# Patient Record
Sex: FEMALE | Race: WHITE | NOT HISPANIC OR LATINO | Employment: FULL TIME | URBAN - METROPOLITAN AREA
[De-identification: names, ages, dates, MRNs, and addresses within clinical notes are randomized per-mention and may not be internally consistent; named-entity substitution may affect disease eponyms.]

---

## 2017-01-27 ENCOUNTER — RADIANT APPOINTMENT (OUTPATIENT)
Dept: GENERAL RADIOLOGY | Facility: CLINIC | Age: 42
End: 2017-01-27
Attending: PEDIATRICS
Payer: COMMERCIAL

## 2017-01-27 ENCOUNTER — OFFICE VISIT (OUTPATIENT)
Dept: ORTHOPEDICS | Facility: CLINIC | Age: 42
End: 2017-01-27
Payer: COMMERCIAL

## 2017-01-27 VITALS
WEIGHT: 254 LBS | HEIGHT: 65 IN | BODY MASS INDEX: 42.32 KG/M2 | DIASTOLIC BLOOD PRESSURE: 80 MMHG | SYSTOLIC BLOOD PRESSURE: 126 MMHG

## 2017-01-27 DIAGNOSIS — M25.512 LEFT SHOULDER PAIN, UNSPECIFIED CHRONICITY: ICD-10-CM

## 2017-01-27 DIAGNOSIS — M25.552 LATERAL PAIN OF LEFT HIP: ICD-10-CM

## 2017-01-27 DIAGNOSIS — M75.42 IMPINGEMENT SYNDROME OF LEFT SHOULDER: Primary | ICD-10-CM

## 2017-01-27 PROCEDURE — 99214 OFFICE O/P EST MOD 30 MIN: CPT | Performed by: PEDIATRICS

## 2017-01-27 PROCEDURE — 73030 X-RAY EXAM OF SHOULDER: CPT | Mod: LT

## 2017-01-27 NOTE — PROGRESS NOTES
Sports Medicine Clinic Visit    PCP: Vonnie Baugh    Raffy Castelan is a 41 year old female who is seen  as a self referral presenting with left lateral shoulder pain.  Pain has been present for at least 2 months.  Pain with overhead motions and reaching behind her head.  Pain with sudden movements.  Pain with reaching.  Patient is right hand dominant.     Patient does also have pain in her left hip with crossing her legs.  No known injury.  Has been present for a few weeks.  Shoulder is more concerning to her at this point.     Injury: gradual onset  + pain is constant now.  No nighttime pain.    Location of Pain: left lateral shoulder  Duration of Pain: 2 month(s)  Rating of Pain at worst: 8/10  Rating of Pain Currently: 2/10  Symptoms are better with: Nothing  Symptoms are worse with: reaching, overhead motions  Additional Features:   Positive:    Negative: swelling, bruising, popping, grinding, locking, instability, paresthesias, numbness, weakness, pain in other joints and systemic symptoms  Other evaluation and/or treatments so far consists of: Nothing  Prior History of related problems: denies    Social History:      Review of Systems  Musculoskeletal: as above  Remainder of review of systems is negative including constitutional, CV, pulmonary, GI, Skin and Neurologic except as noted in HPI or medical history.    Past Medical History   Diagnosis Date     GERD (gastroesophageal reflux disease)      Asthma      Hypertension      Past Surgical History   Procedure Laterality Date     No history of surgery       Esophagoscopy, gastroscopy, duodenoscopy (egd), combined  7/30/2013     Procedure: COMBINED ESOPHAGOSCOPY, GASTROSCOPY, DUODENOSCOPY (EGD), BIOPSY SINGLE OR MULTIPLE;;  Surgeon: Wilson Fournier MD;  Location:  GI     Laparoscopic gastric adjustable banding  5/27/2014     Procedure: LAPAROSCOPIC GASTRIC ADJUSTABLE BANDING;  Surgeon: Wilson Fournier MD;  Location:  OR      "Esophagoscopy, gastroscopy, duodenoscopy (egd), combined  3/24/2014     Procedure: COMBINED ESOPHAGOSCOPY, GASTROSCOPY, DUODENOSCOPY (EGD);  Surgeon: Wilson Fournier MD;  Location: [facility]     Biopsy       Colonoscopy       Hernia repair       Family History   Problem Relation Age of Onset     Blood Disease Mother      anemia     HEART DISEASE Mother      Hypertension Mother      Hyperlipidemia Mother      Anxiety Disorder Mother      OSTEOPOROSIS Mother      Genetic Disorder Mother      CEREBROVASCULAR DISEASE Father      Hypertension Father      DIABETES Father      type  2     Breast Cancer Maternal Aunt      Breast Cancer Paternal Aunt      CANCER Paternal Aunt      stomach     Breast Cancer Other      Blood Disease       son with alpha thalessemia/mother alpha thalasemia trait     Breast Cancer Other      Breast Cancer Other      Breast Cancer Cousin      Social History     Social History     Marital Status:      Spouse Name: N/A     Number of Children: N/A     Years of Education: N/A     Occupational History     [employer]     Social History Main Topics     Smoking status: Never Smoker      Smokeless tobacco: Never Used     Alcohol Use: No     Drug Use: No     Sexual Activity:     Partners: Male     Birth Control/ Protection: IUD     Other Topics Concern     Parent/Sibling W/ Cabg, Mi Or Angioplasty Before 65f 55m? No     Social History Narrative       Objective  /80 mmHg  Ht 5' 5\" (1.651 m)  Wt 254 lb (115.214 kg)  BMI 42.27 kg/m2      GENERAL APPEARANCE: healthy, alert and no distress   GAIT: NORMAL  SKIN: no suspicious lesions or rashes  NEURO: Normal strength and tone, mentation intact and speech normal  PSYCH:  mentation appears normal and affect normal/bright  HEENT: no scleral icterus  CV: no extremity edema  RESP: nonlabored breathing    Left Shoulder exam    ROM:        forward flexion 150 mild limitation       abduction 100 mod " limitation       internal rotation L2 mild limitation       external rotation 80 min limitation  Pain at/above shoulder level, also end of rotation    Tender:        No focal tenderness    Non Tender:       acromioclavicular joint       subacromial space       posterior shoulder    Strength:        abduction 5-/5       internal rotation 5/5       external rotation 4+/5  No pain    Impingement testing:        Min pain with Neer       Mild pain with [place]       Mild pain with empty can       neg (-) crossover       neg (-) [place]    Skin:       no visible deformities       well perfused       capillary refill brisk    Sensation:        normal sensation over shoulder and upper extremity     **    Hip (left):  Range of motion: flexion:     extension:     internal rotation:     external rotation:    Grossly full, symmetric   Mild pain with resisted abduction  Strength testing: flexion: 5/5    ABduction: 4/5    ADduction: 5/5  Palpation: Tender to palpation over TFL.  Sensation: grossly intact in hip, thigh  FADIR: neg  Log roll: neg  Toya with lateral pain      Radiology  Visualized radiographs of left shoulder obtained today, and reviewed the images with the patient.  Impression: no acute findings. May be mild narrowed subacromial space.  XR Shoulder Left G/E 3 Views    Narrative    XR SHOULDER LT G/E 3 VW 1/27/2017 9:39 AM     HISTORY: Pain in left shoulder      Impression    IMPRESSION: Negative exam.    TRUDY PETE MD         Assessment:  1. Impingement syndrome of left shoulder    2. Left shoulder pain, unspecified chronicity    3. Lateral pain of left hip        Plan:  Discussed the assessment with the patient. We discussed the following treatment options: symptom treatment, activity modification/rest, imaging, rehab and injection therapy. Following discussion, plan:  Topical Treatments: Ice or Heat  Over the counter medication: Patient's preferred OTC medication as directed on packaging.  Plain films of the  shoulder reviewed. We discussed potential for additional imaging with MRI, not required with her current assessment. Also discussed potential for left hip plain films, but this does not appear to be a joint pain pattern.  Activity Modification: discussed  Rehab: Home Exercise Program reviewed, pt preference, both shoulder and for hip  Physical Therapy: may call for therapy, particularly for the shoulder  Briefly discussed consideration of subacromial steroid injection for pain, she prefers to start with physical therapy, and I agree.  Follow up: one month if not improving with above, sooner if needed.  Questions answered. The patient indicates understanding of these issues and agrees with the plan.    Srikanth Watkins, DO, CAQ        Disclaimer: This note consists of symbols derived from keyboarding, dictation and/or voice recognition software. As a result, there may be errors in the script that have gone undetected. Please consider this when interpreting information found in this chart.

## 2017-01-27 NOTE — NURSING NOTE
"Chief Complaint   Patient presents with     Musculoskeletal Problem     left shoulder pain       Initial /80 mmHg  Ht 5' 5\" (1.651 m)  Wt 254 lb (115.214 kg)  BMI 42.27 kg/m2 Estimated body mass index is 42.27 kg/(m^2) as calculated from the following:    Height as of this encounter: 5' 5\" (1.651 m).    Weight as of this encounter: 254 lb (115.214 kg).  BP completed using cuff size: regular    Patient was given home exercise program at the direction of HOLLIE CAGE  that included the following exercise(s) :    Exercise(s) Isometric IR/ER, wall push ups, I, W, T    Repititions: 2x10    Times per day: 1, every other    Patient demonstrated understanding of and the ability to perform these exercises. Patient was seen for 5 minutes to provide this home exercise program. Patient was directed to discontinue any exercises that cause pain, and to call the clinic if any questions.    "

## 2017-01-27 NOTE — Clinical Note
Raffy CEBALLOS was seen in [facility] clinic for her shoulder and hip complaints. Plan rehab approach. Please see copy of the chart note for additional details. Thanks.

## 2017-04-06 ENCOUNTER — TELEPHONE (OUTPATIENT)
Dept: FAMILY MEDICINE | Facility: CLINIC | Age: 42
End: 2017-04-06

## 2017-04-06 NOTE — TELEPHONE ENCOUNTER
OK to call the patient and let her know that the risk for her is low, both from the blood pressure and the medication.  She should keep her visit on 4/14 and continue to monitor her blood pressure at home, checking 1-3 times per day and call us for numbers >170 top number or >110 bottom number.  She may experience some dizziness or swelling in her legs today and tomorrow due to the extra doses of amlodipine.  She can take her usual dose later tomorrow and once daily after until follow up.    Antione Reina MD

## 2017-04-06 NOTE — TELEPHONE ENCOUNTER
Reason for Call:  Other     Detailed comments: Patient stated that her blood sugar is high at 157/99 and she took three of her amlodipine. Please contact patient to discuss further.     Phone Number Patient can be reached at: Home number on file [phone] (home)    Best Time:     Can we leave a detailed message on this number? Not Applicable    Call taken on 4/6/2017 at 12:46 PM by Richelle Polanco

## 2017-04-06 NOTE — TELEPHONE ENCOUNTER
Called patient- patient states her blood pressure was high this AM- the lowest was 155/99 & 166/104, hr- . She decided to take three amlodipine at 8, 9, & 12:30. She doesn't have a headache, chest pain or dizziness.   She has been sparingly checking her blood pressure. She just got back from her vacation & took her amlodipine only once in the last week. She states she gained weight & ate unhealthy foods. There is family stuff going on as well that is causing her stress- her cousin just had a heart attack.   Per [facility] the maximum dose of amlodipine is 10mg daily. She has taken 30mg- will huddle with provider.  She knows to watch for dizziness, will stand slowly & will not drive if she doesn't feel well. She is drinking lots of water.  She is scheduled with RN htn on 4/14. She is going camping with her son MChristianW next week.    Will huddle with provider.     Lala Clement RN

## 2017-04-07 NOTE — TELEPHONE ENCOUNTER
Relayed message, At bed 150/99 so she took a 4th one. She notes swelling in her feet. She states she wasn't drinking as much & was in the heat in [place].   She was voiding a lot last night. At 3am her bp= 135/85 & this AM it was 144/90. She states she lost 4 pounds & feels better. She is working today & will keep us posted if her bps are elevated. Patient verbalized understanding.     Lala Clement RN

## 2017-04-14 ENCOUNTER — ALLIED HEALTH/NURSE VISIT (OUTPATIENT)
Dept: NURSING | Facility: CLINIC | Age: 42
End: 2017-04-14
Payer: COMMERCIAL

## 2017-04-14 VITALS
WEIGHT: 254.4 LBS | SYSTOLIC BLOOD PRESSURE: 142 MMHG | HEART RATE: 88 BPM | BODY MASS INDEX: 42.33 KG/M2 | DIASTOLIC BLOOD PRESSURE: 88 MMHG

## 2017-04-14 DIAGNOSIS — I10 HYPERTENSION GOAL BP (BLOOD PRESSURE) < 140/90: Primary | ICD-10-CM

## 2017-04-14 LAB
ANION GAP SERPL CALCULATED.3IONS-SCNC: 6 MMOL/L (ref 3–14)
BUN SERPL-MCNC: 12 MG/DL (ref 7–30)
CALCIUM SERPL-MCNC: 9 MG/DL (ref 8.5–10.1)
CHLORIDE SERPL-SCNC: 103 MMOL/L (ref 94–109)
CO2 SERPL-SCNC: 28 MMOL/L (ref 20–32)
CREAT SERPL-MCNC: 0.65 MG/DL (ref 0.52–1.04)
GFR SERPL CREATININE-BSD FRML MDRD: NORMAL ML/MIN/1.7M2
GLUCOSE SERPL-MCNC: 85 MG/DL (ref 70–99)
POTASSIUM SERPL-SCNC: 4 MMOL/L (ref 3.4–5.3)
SODIUM SERPL-SCNC: 137 MMOL/L (ref 133–144)

## 2017-04-14 PROCEDURE — 99211 OFF/OP EST MAY X REQ PHY/QHP: CPT

## 2017-04-14 PROCEDURE — 36415 COLL VENOUS BLD VENIPUNCTURE: CPT | Performed by: FAMILY MEDICINE

## 2017-04-14 PROCEDURE — 80048 BASIC METABOLIC PNL TOTAL CA: CPT | Performed by: FAMILY MEDICINE

## 2017-04-14 RX ORDER — ALBUTEROL SULFATE 90 UG/1
2 AEROSOL, METERED RESPIRATORY (INHALATION) EVERY 6 HOURS PRN
COMMUNITY
End: 2018-02-02

## 2017-04-14 RX ORDER — HYDROCHLOROTHIAZIDE 25 MG/1
25 TABLET ORAL DAILY
Qty: 30 TABLET | Refills: 0 | Status: SHIPPED | OUTPATIENT
Start: 2017-04-14 | End: 2017-04-28

## 2017-04-14 NOTE — PROGRESS NOTES
"Subjective/Objective    Indications for Blood Pressure (BP) monitoring: See 4/6/17 telephone encounter. Raffy has been having high blood pressure readings at home. She has been on her own taking extra of her amlodipine but it \"isn't helping.\" She is already on the maximum recommended dose of 10mg daily.     Her home BP reading this morning was 153/98 with a resting heart rate of 98.     Headaches?: Occasionally, mild.   Chest pain?: No  Shortness of breath?: No  Edema?: Yes - +1 pitting in feet, and her weight is going up.   Visual problems?: No  Parathesia?: No  Epitaxis?: No  Dizziness?: No  Hematuria?: No    BP:   BP Readings from Last 1 Encounters:   04/14/17 142/88     88       Diabetic?: No  Heart Disease?: No  Smoking?: No  Alcohol usage?: No  Low sodium diet?: No  Exercise?: Yes - has been very active lately on vacation and at camp, getting between 10,000 and 20,000 steps some days.     Assessment/Plan:   BP in clinic today is lower than what she has been getting at home but still above goal of 140/90. Discussed that the cuff of her home machine may be too small. Recommended not checking at home because this has been increasing her anxiety, but instead coming here to the pharmacy for walk-in blood pressure checks once per week or so.     Huddled with Dr. Baugh who gave V.O. To start hydrochlorothiazide 25mg daily and follow up with her in 2-3 weeks. Also gave VO for BMP today. Patient was counseled to NOT take extra amlodipine, this may be what is causing her swelling.     Gave DASH diet information as she is very concerned with her weight and also healthy eating in general.     Patient verbalized understanding and agreement with this plan.       CC: Chart to Dr. Baugh.     Thi Loco RN                             "

## 2017-04-14 NOTE — PATIENT INSTRUCTIONS
Start the new medication, hydrochlorothiazide, 25mg every morning.   Keep taking your amlodipine, 10mg in the evening. Do not take extra!     See Dr. Baugh on 4/28.                                      Dietary Approaches to Stop Hypertension (The DASH Diet)   What is hypertension?   Hypertension is blood pressure that keeps being higher than normal. Blood pressure is the force of blood against artery walls as the heart pumps blood through the body. Blood pressure can be unhealthy if it is above 120/80. The higher your blood pressure, the greater the health risk.   High blood pressure can be controlled if you take these steps:   Maintain a healthy weight.   Are physically active.   Follow a healthy eating plan, which includes foods that do not have a lot of salt and sodium.   Do not drink a lot of alcohol.   Diet affects high blood pressure. Following the DASH diet and reducing the amount of sodium in your diet will help lower your blood pressure. It will also help prevent high blood pressure.   What is the DASH diet?   Dietary Approaches to Stop Hypertension (DASH) is a diet that is low in saturated fat, cholesterol, and total fat. It emphasizes fruits, vegetables, and low-fat dairy foods. The DASH diet also includes whole-grain products, fish, poultry, and nuts. It encourages fewer servings of red meat, sweets, and sugar-containing beverages. It is rich in magnesium, potassium, and calcium, as well as protein and fiber.   How do I get started on the DASH diet?   The DASH diet requires no special foods and has no hard-to-follow recipes. Start by seeing how DASH compares with your current eating habits.   The DASH eating plan illustrated below is based on a diet of 2,000 calories a day. Your healthcare provider or a dietitian can help you determine how many calories a day you need. Most adults need somewhere between 1600 and 2800 calories a day. Serving sizes for different foods vary from 1/2 cup to 1 and 1/4  cups. Check product nutrition labels for serving sizes and the number of calories per serving.                      Number of        Examples of  Food Group      servings         serving size  ----------------------------------------------------------------    Grains and      7 to 8 per day   1 slice of bread  grain products                   1 cup ready-to-eat cold cereal                                   1/2 cup cooked rice, pasta,                                   or cereal    Vegetables      4 to 5 per day   1 cup raw leafy vegetable                                   1/2 cup cooked vegetable                                   6 ounces (oz) vegetable juice      Fruits          4 to 5 per day   1 medium fruit                                   1/4 cup dried fruit                                   1/2 cup fresh, frozen, or                                   canned fruit                                   6 oz fruit juice      Low-fat or      2 to 3 per day   8 oz milk  fat-free                         1 cup yogurt  dairy foods                      1 and 1/2 oz cheese    Lean meats,  poultry,        2 or fewer per   3 oz cooked lean meat,  or fish         day              skinless poultry, or fish    Nuts, seeds,    4 to 5 per week  1/3 cup or 1 and 1/2 oz nuts  and dry beans                    1 tablespoon or 1/2 oz seeds                                   1/2 cup cooked dry beans    Fats and oils   2 to 3 per day   1 teaspoon soft margarine                                   1 tablespoon low-fat mayonnaise                                   2 tablespoons light salad                                   dressing                                   1 teaspoon vegetable oil    Sweets          5 per week       1 tablespoon sugar                                   1 tablespoon jelly or jam                                   1/2 oz jelly beans                                   8 oz  lemonade  ----------------------------------------------------------------  Make changes gradually. Here are some suggestions that might help:   If you now eat 1 or 2 servings of vegetables a day, add a serving at lunch and another at dinner.   If you have not been eating fruit regularly, or have only juice at breakfast, add a serving to your meals or have it as a snack.   Drink milk or water with lunch or dinner instead of soda, sugar-sweetened tea, or alcohol. Choose low-fat (1%) or fat-free (nonfat) dairy products so that you are eating fewer calories and less saturated fat, total fat, and cholesterol.   Read food labels on margarines and salad dressings to choose products lowest in fat.   If you now eat large portions of meat, slowly cut back--by a half or a third at each meal. Limit meat to 6 ounces a day (two 3-ounce servings). Three to 4 ounces is about the size of a deck of cards.   Have 2 or more meatless meals each week. Increase servings of vegetables, rice, pasta, and beans in all meals. Try casseroles, pasta, and stir-macedo dishes, which have less meat and more vegetables, grains, and beans.   Use fruits canned in their own juice. Fresh fruits require little or no preparation. Dried fruits are a good choice to carry with you or to have ready in the car.   Try these snacks ideas: unsalted pretzels or nuts mixed with raisins, todd crackers, low-fat and fat-free yogurt or frozen yogurt, popcorn with no salt or butter added, and raw vegetables.   Choose whole-grain foods to get more nutrients, including minerals and fiber. For example, choose whole-wheat bread, whole-grain cereals, or brown rice.   Use fresh, frozen, or no-salt-added canned vegetables.   Remember to also reduce the salt and sodium in your diet. Try to have no more than 2000 milligrams (mg) of sodium per day, with a goal of further reducing the sodium to 1500 mg per day. Three important ways to reduce sodium are:   Eat food products with  reduced-sodium or no salt added.   Use less salt when you prepare foods and do not add salt to your food at the table.   Read food labels. Aim for foods that contain less than 5% of the daily value of sodium.   The DASH eating plan is not designed for weight loss. But it contains many lower-calorie foods, such as fruits and vegetables. You can make it lower in calories by replacing high-calorie foods with more fruits and vegetables. Some ideas to increase fruits and vegetables and decrease calories include:   Eat a medium apple instead of 4 shortbread cookies. You'll save 80 calories.   Eat 1/4 cup of dried apricots instead of a 2-ounce bag of pork rinds. You'll save 230 calories.   Have a hamburger that's 3 ounces instead of 6 ounces. Add a 1/2 cup serving of carrots and a 1/2 cup serving of spinach. You'll save more than 200 calories.   Instead of 5 ounces of chicken, have a stir macedo with 2 ounces of chicken and 1 and 1/2 cups of raw vegetables. Use just a small amount of vegetable oil. You'll save 50 calories.   Have a 1/2 cup serving of low-fat frozen yogurt instead of a 1-and-1/2-ounce chocolate bar. You'll save about 110 calories.   Use low-fat or fat-free condiments, such as fat-free salad dressings.   Eat smaller portions. Cut back gradually.   Use food labels to compare fat and calorie content in packaged foods. Items marked low fat or fat free may be lower in fat but not lower in calories than their regular versions.   Limit foods with lots of added sugar, such as pies, flavored yogurts, candy bars, ice cream, sherbet, regular soft drinks, and fruit drinks.   Drink water or club soda instead of cola or other soda drinks.     For more information, see the National Heart, Lung, and [employer] [place] Web site at: http://www.nhlbi.nih.gov/health/public/heart/hbp/dash/.

## 2017-04-14 NOTE — MR AVS SNAPSHOT
After Visit Summary   4/14/2017    Raffy Castelan    MRN: [id number]           Patient Information     Date Of Birth          1975        Visit Information        Provider Department      4/14/2017 8:30 AM NE RN [facility]        Today's Diagnoses     Hypertension goal BP (blood pressure) < 140/90    -  1      Care Instructions    Start the new medication, hydrochlorothiazide, 25mg every morning.   Keep taking your amlodipine, 10mg in the evening. Do not take extra!     See Dr. Baugh on 4/28.                                      Dietary Approaches to Stop Hypertension (The DASH Diet)   What is hypertension?   Hypertension is blood pressure that keeps being higher than normal. Blood pressure is the force of blood against artery walls as the heart pumps blood through the body. Blood pressure can be unhealthy if it is above 120/80. The higher your blood pressure, the greater the health risk.   High blood pressure can be controlled if you take these steps:   Maintain a healthy weight.   Are physically active.   Follow a healthy eating plan, which includes foods that do not have a lot of salt and sodium.   Do not drink a lot of alcohol.   Diet affects high blood pressure. Following the DASH diet and reducing the amount of sodium in your diet will help lower your blood pressure. It will also help prevent high blood pressure.   What is the DASH diet?   Dietary Approaches to Stop Hypertension (DASH) is a diet that is low in saturated fat, cholesterol, and total fat. It emphasizes fruits, vegetables, and low-fat dairy foods. The DASH diet also includes whole-grain products, fish, poultry, and nuts. It encourages fewer servings of red meat, sweets, and sugar-containing beverages. It is rich in magnesium, potassium, and calcium, as well as protein and fiber.   How do I get started on the DASH diet?   The DASH diet requires no special foods and has no hard-to-follow recipes. Start by  seeing how DASH compares with your current eating habits.   The DASH eating plan illustrated below is based on a diet of 2,000 calories a day. Your healthcare provider or a dietitian can help you determine how many calories a day you need. Most adults need somewhere between 1600 and 2800 calories a day. Serving sizes for different foods vary from 1/2 cup to 1 and 1/4 cups. Check product nutrition labels for serving sizes and the number of calories per serving.                      Number of        Examples of  Food Group      servings         serving size  ----------------------------------------------------------------    Grains and      7 to 8 per day   1 slice of bread  grain products                   1 cup ready-to-eat cold cereal                                   1/2 cup cooked rice, pasta,                                   or cereal    Vegetables      4 to 5 per day   1 cup raw leafy vegetable                                   1/2 cup cooked vegetable                                   6 ounces (oz) vegetable juice      Fruits          4 to 5 per day   1 medium fruit                                   1/4 cup dried fruit                                   1/2 cup fresh, frozen, or                                   canned fruit                                   6 oz fruit juice      Low-fat or      2 to 3 per day   8 oz milk  fat-free                         1 cup yogurt  dairy foods                      1 and 1/2 oz cheese    Lean meats,  poultry,        2 or fewer per   3 oz cooked lean meat,  or fish         day              skinless poultry, or fish    Nuts, seeds,    4 to 5 per week  1/3 cup or 1 and 1/2 oz nuts  and dry beans                    1 tablespoon or 1/2 oz seeds                                   1/2 cup cooked dry beans    Fats and oils   2 to 3 per day   1 teaspoon soft margarine                                   1 tablespoon low-fat mayonnaise                                   2 tablespoons  light salad                                   dressing                                   1 teaspoon vegetable oil    Sweets          5 per week       1 tablespoon sugar                                   1 tablespoon jelly or jam                                   1/2 oz jelly beans                                   8 oz lemonade  ----------------------------------------------------------------  Make changes gradually. Here are some suggestions that might help:   If you now eat 1 or 2 servings of vegetables a day, add a serving at lunch and another at dinner.   If you have not been eating fruit regularly, or have only juice at breakfast, add a serving to your meals or have it as a snack.   Drink milk or water with lunch or dinner instead of soda, sugar-sweetened tea, or alcohol. Choose low-fat (1%) or fat-free (nonfat) dairy products so that you are eating fewer calories and less saturated fat, total fat, and cholesterol.   Read food labels on margarines and salad dressings to choose products lowest in fat.   If you now eat large portions of meat, slowly cut back--by a half or a third at each meal. Limit meat to 6 ounces a day (two 3-ounce servings). Three to 4 ounces is about the size of a deck of cards.   Have 2 or more meatless meals each week. Increase servings of vegetables, rice, pasta, and beans in all meals. Try casseroles, pasta, and stir-macedo dishes, which have less meat and more vegetables, grains, and beans.   Use fruits canned in their own juice. Fresh fruits require little or no preparation. Dried fruits are a good choice to carry with you or to have ready in the car.   Try these snacks ideas: unsalted pretzels or nuts mixed with raisins, todd crackers, low-fat and fat-free yogurt or frozen yogurt, popcorn with no salt or butter added, and raw vegetables.   Choose whole-grain foods to get more nutrients, including minerals and fiber. For example, choose whole-wheat bread, whole-grain cereals, or brown  rice.   Use fresh, frozen, or no-salt-added canned vegetables.   Remember to also reduce the salt and sodium in your diet. Try to have no more than 2000 milligrams (mg) of sodium per day, with a goal of further reducing the sodium to 1500 mg per day. Three important ways to reduce sodium are:   Eat food products with reduced-sodium or no salt added.   Use less salt when you prepare foods and do not add salt to your food at the table.   Read food labels. Aim for foods that contain less than 5% of the daily value of sodium.   The DASH eating plan is not designed for weight loss. But it contains many lower-calorie foods, such as fruits and vegetables. You can make it lower in calories by replacing high-calorie foods with more fruits and vegetables. Some ideas to increase fruits and vegetables and decrease calories include:   Eat a medium apple instead of 4 shortbread cookies. You'll save 80 calories.   Eat 1/4 cup of dried apricots instead of a 2-ounce bag of pork rinds. You'll save 230 calories.   Have a hamburger that's 3 ounces instead of 6 ounces. Add a 1/2 cup serving of carrots and a 1/2 cup serving of spinach. You'll save more than 200 calories.   Instead of 5 ounces of chicken, have a stir macedo with 2 ounces of chicken and 1 and 1/2 cups of raw vegetables. Use just a small amount of vegetable oil. You'll save 50 calories.   Have a 1/2 cup serving of low-fat frozen yogurt instead of a 1-and-1/2-ounce chocolate bar. You'll save about 110 calories.   Use low-fat or fat-free condiments, such as fat-free salad dressings.   Eat smaller portions. Cut back gradually.   Use food labels to compare fat and calorie content in packaged foods. Items marked low fat or fat free may be lower in fat but not lower in calories than their regular versions.   Limit foods with lots of added sugar, such as pies, flavored yogurts, candy bars, ice cream, sherbet, regular soft drinks, and fruit drinks.   Drink water or club soda instead  of cola or other soda drinks.     For more information, see the National Heart, Lung, and Blood [place] Web site at: http://www.nhlbi.nih.gov/health/public/heart/hbp/dash/.           Follow-ups after your visit        Your next 10 appointments already scheduled     Apr 28, 2017  9:20 AM CDT   [facility]    [address] [place] [id number] [phone]           Your Arrival times is X, We need you to be here at this time to get checked in and have the assistant get you ready for the provider, which can take about 15 minutes. Your appointment time with your provider is at  X. Thank you.              Who to contact     If you have questions or need follow up information about today's clinic visit or your schedule please contact [facility] directly at [phone].  Normal or non-critical lab and imaging results will be communicated to you by Infinity Augmented Realityhart, letter or phone within 4 business days after the clinic has received the results. If you do not hear from us within 7 days, please contact the clinic through ScaleXtremet or phone. If you have a critical or abnormal lab result, we will notify you by phone as soon as possible.  Submit refill requests through [employer] or call your pharmacy and they will forward the refill request to us. Please allow 3 business days for your refill to be completed.          Additional Information About Your Visit        [employer] Reality[employer] Laundry Information     [employer] gives you secure access to your electronic health record. If you see a primary care provider, you can also send messages to your care team and make appointments. If you have questions, please call your primary care clinic.  If you do not have a primary care provider, please call [phone] and they will assist you.        Care EveryWhere ID     This is your Care EveryWhere ID. This could be used by other organizations to  access your [place] medical records  [id number]        Your Vitals Were     Pulse BMI (Body Mass Index)                88 42.33 kg/m2           Blood Pressure from Last 3 Encounters:   04/14/17 142/88   01/27/17 126/80   12/12/16 124/84    Weight from Last 3 Encounters:   04/14/17 254 lb 6.4 oz (115.4 kg)   01/27/17 254 lb (115.2 kg)   12/12/16 257 lb (116.6 kg)              We Performed the Following     Basic metabolic panel          Today's Medication Changes          These changes are accurate as of: 4/14/17  8:59 AM.  If you have any questions, ask your nurse or doctor.               Start taking these medicines.        Dose/Directions    hydrochlorothiazide 25 MG tablet   Commonly known as:  HYDRODIURIL   Used for:  Hypertension goal BP (blood pressure) < 140/90        Dose:  25 mg   Take 1 tablet (25 mg) by mouth daily   Quantity:  30 tablet   Refills:  0            Where to get your medicines      These medications were sent to [facility] [address]  [address] [place] [address]     Phone:  [phone]     hydrochlorothiazide 25 MG tablet                Primary Care Provider Office Phone # Fax #    Vonnie Baugh [phone]       [place] [id number]        Thank you!     Thank you for choosing [facility]  for your care. Our goal is always to provide you with excellent care. Hearing back from our patients is one way we can continue to improve our services. Please take a few minutes to complete the written survey that you may receive in the mail after your visit with us. Thank you!             Your Updated Medication List - Protect others around you: Learn how to safely use, store and throw away your medicines at www.disposemymeds.org.          This list is accurate as of: 4/14/17  8:59 AM.  Always use your most recent med list.                    Brand Name Dispense Instructions for use    albuterol 108 (90 BASE) MCG/ACT Inhaler    PROAIR HFA/PROVENTIL HFA/VENTOLIN HFA     Inhale 2 puffs into the lungs every 6 hours as needed for shortness of breath / dyspnea or wheezing       amLODIPine 10 MG tablet    NORVASC    90 tablet    Take 1 tablet (10 mg) by mouth daily       clobetasol propionate 0.05 % Foam     600 g    Apply sparingly to affected area twice daily as needed.  Do not apply to face.       hydrochlorothiazide 25 MG tablet    HYDRODIURIL    30 tablet    Take 1 tablet (25 mg) by mouth daily       Multi-vitamin Tabs tablet      Take 1 tablet by mouth daily.       Phentermine-Topiramate 15-92 MG Cp24     30 capsule    Take 1 capsule by mouth daily       SENNA LAXATIVE 8.6 MG tablet   Generic drug:  senna      Take 1 tablet by mouth daily

## 2017-04-28 ENCOUNTER — OFFICE VISIT (OUTPATIENT)
Dept: FAMILY MEDICINE | Facility: CLINIC | Age: 42
End: 2017-04-28
Payer: COMMERCIAL

## 2017-04-28 VITALS
DIASTOLIC BLOOD PRESSURE: 80 MMHG | RESPIRATION RATE: 20 BRPM | TEMPERATURE: 98.3 F | HEIGHT: 65 IN | SYSTOLIC BLOOD PRESSURE: 138 MMHG | HEART RATE: 88 BPM | WEIGHT: 256 LBS | BODY MASS INDEX: 42.65 KG/M2

## 2017-04-28 DIAGNOSIS — E66.01 MORBID OBESITY DUE TO EXCESS CALORIES (H): ICD-10-CM

## 2017-04-28 DIAGNOSIS — I10 HYPERTENSION GOAL BP (BLOOD PRESSURE) < 140/90: Primary | ICD-10-CM

## 2017-04-28 DIAGNOSIS — M75.42 IMPINGEMENT SYNDROME OF LEFT SHOULDER: ICD-10-CM

## 2017-04-28 DIAGNOSIS — J45.20 INTERMITTENT ASTHMA, UNCOMPLICATED: ICD-10-CM

## 2017-04-28 LAB
ANION GAP SERPL CALCULATED.3IONS-SCNC: 7 MMOL/L (ref 3–14)
BUN SERPL-MCNC: 11 MG/DL (ref 7–30)
CALCIUM SERPL-MCNC: 8.9 MG/DL (ref 8.5–10.1)
CHLORIDE SERPL-SCNC: 101 MMOL/L (ref 94–109)
CO2 SERPL-SCNC: 29 MMOL/L (ref 20–32)
CREAT SERPL-MCNC: 0.72 MG/DL (ref 0.52–1.04)
GFR SERPL CREATININE-BSD FRML MDRD: 88 ML/MIN/1.7M2
GLUCOSE SERPL-MCNC: 99 MG/DL (ref 70–99)
POTASSIUM SERPL-SCNC: 3.3 MMOL/L (ref 3.4–5.3)
SODIUM SERPL-SCNC: 137 MMOL/L (ref 133–144)

## 2017-04-28 PROCEDURE — 36415 COLL VENOUS BLD VENIPUNCTURE: CPT | Performed by: FAMILY MEDICINE

## 2017-04-28 PROCEDURE — 80048 BASIC METABOLIC PNL TOTAL CA: CPT | Performed by: FAMILY MEDICINE

## 2017-04-28 PROCEDURE — 99214 OFFICE O/P EST MOD 30 MIN: CPT | Performed by: FAMILY MEDICINE

## 2017-04-28 RX ORDER — AMLODIPINE BESYLATE 10 MG/1
10 TABLET ORAL DAILY
Qty: 90 TABLET | Refills: 3 | Status: SHIPPED | OUTPATIENT
Start: 2017-04-28 | End: 2017-12-08 | Stop reason: SINTOL

## 2017-04-28 RX ORDER — HYDROCHLOROTHIAZIDE 25 MG/1
25 TABLET ORAL DAILY
Qty: 90 TABLET | Refills: 3 | Status: SHIPPED | OUTPATIENT
Start: 2017-04-28 | End: 2017-12-08

## 2017-04-28 NOTE — MR AVS SNAPSHOT
After Visit Summary   4/28/2017    Raffy Castelan    MRN: [id number]           Patient Information     Date Of Birth          1975        Visit Information        Provider Department      4/28/2017 9:20 AM Vonnie Baugh MD [facility]        Today's Diagnoses     Hypertension goal BP (blood pressure) < 140/90    -  1    Impingement syndrome of left shoulder        Intermittent asthma, uncomplicated        BMI >40          Care Instructions    Research the Anh Program   I would like you to consider therapy again    [facility]   Discharged by : Dulce SOTO, Certified Medical Assistant (AAMA)April 28, 2017 10:11 AM    Paper scripts provided to patient : none   If you have any questions regarding to your visit please contact your care team:   Team Gold Clinic Hours Telephone Number   Dr. Dulce Ang, OMAIRA   7am-7pm Monday - Thursday   7am-5pm Fridays  [phone]   (Appointment scheduling available 24/7)   RN Line   [phone] option 2     What options do I have for visits at the clinic other than the traditional office visit?   To expand how we care for you, many of our providers are utilizing electronic visits (e-visits) and telephone visits, when medically appropriate, for interactions with their patients rather than a visit in the clinic. We also offer nurse visits for many medical concerns. Just like any other service, we will bill your insurance company for this type of visit based on time spent on the phone with your provider. Not all insurance companies cover these visits. Please check with your medical insurance if this type of visit is covered. You will be responsible for any charges that are not paid by your insurance.   E-visits via Boastify: generally incur a $35.00 fee.   Telephone visits:   Time spent on the phone: *charged based on time that is spent on the phone in increments of 10  minutes. Estimated cost:   5-10 mins $30.00   11-20 mins. $59.00   21-30 mins. $85.00   Use [employer] (secure email communication and access to your chart) to send your primary care provider a message or make an appointment. Ask someone on your Team how to sign up for [employer].   For a Price Quote for your services, please call our Consumer Price Line at [phone].   As always, Thank you for trusting us with your health care needs!                    [facility]:    Scheduling Appointments  [place] [facility]  Call: [phone]    [place], [facility]  Call: [phone]    [facility]  Call: [phone]    WHERE TO GO FOR CARE?    Clinic    Make an appointment if you:       Are sick (cold, cough, flu, sore throat, earache or in pain).       Have a small injury (sprain, small cut, burn or broken bone).       Need a physical exam, Pap smear, vaccine or prescription refill.       Have questions about your health or medicines.    To reach us:      Call [phone] ([phone]). Open 24 hours every day. (For counseling services, call [phone].)    Log into [employer] at [email or web address]. (Visit [email or web address] to create an account.) Hospital emergency room    An emergency is a serious or life- threatening problem that must be treated right away.    Call 833 or get to the hospital if you have:      Very bad or sudden:            - Chest pain or pressure         - Bleeding         - Head or belly pain         - Dizziness or trouble seeing, walking or                          Speaking      Problems breathing      Blood in your vomit or you are coughing up blood      A major injury (knocked out, loss of a finger or limb, rape, broken bone protruding from skin)    A mental health crisis. (Or call the Mental Health Crisis line at 1-809.792.9889 or Suicide Prevention Hotline at 1-363.101.9853.)    Open 24 hours every day. You don't  need an appointment.     Urgent care    Visit urgent care for sickness or small injuries when the clinic is closed. You don't need an appointment. To check hours or find an urgent care near you, visit [email or web address]. Online care    Get online care from [facility] for more than 70 common problems, like colds, allergies and infections. Open 24 hours every day at: [email or web address]   Need help deciding?    For advice about where to be seen, you may call your clinic and ask to speak with a nurse. We're here for you 24 hours every day.         If you are deaf or hard of hearing, please let us know. We provide many free services including sign language interpreters, oral interpreters, TTYs, telephone amplifiers, note takers and written materials.       Dulce SOTO, Certified Medical Assistant (AAMA)April 28, 2017 10:11 AM          Follow-ups after your visit        Additional Services     PARAM PT, HAND, AND CHIROPRACTIC REFERRAL       **This order will print in the [facility] Scheduling Office**    Physical Therapy, Hand Therapy and Chiropractic Care are available through:    *[facility]  *[facility]  *[facility]    Call one number to schedule at any of the above locations: [phone].    Your provider has referred you to: Physical Therapy at [facility] or [facility]    Indication/Reason for Referral: Shoulder Pain  Onset of Illness: few months  Therapy Orders: Evaluate and Treat  Special Programs: None  Special Request: None    Tarah Rascon      Additional Comments for the Therapist or Chiropractor:     Please be aware that coverage of these services is subject to the terms and limitations of your health insurance plan.  Call member services at your health plan with any benefit or coverage questions.      Please bring the following to your appointment:    *Your personal calendar for scheduling future appointments  *Comfortable clothing                  Follow-up notes  "from your care team     Return in about 3 months (around 7/28/2017) for BP Recheck.      Who to contact     If you have questions or need follow up information about today's clinic visit or your schedule please contact [facility] directly at [phone].  Normal or non-critical lab and imaging results will be communicated to you by MyChart, letter or phone within 4 business days after the clinic has received the results. If you do not hear from us within 7 days, please contact the clinic through profectus health researchhart or phone. If you have a critical or abnormal lab result, we will notify you by phone as soon as possible.  Submit refill requests through EatingWell or call your pharmacy and they will forward the refill request to us. Please allow 3 business days for your refill to be completed.          Additional Information About Your Visit        MyChart Information     [employer]atingWell gives you secure access to your electronic health record. If you see a primary care provider, you can also send messages to your care team and make appointments. If you have questions, please call your primary care clinic.  If you do not have a primary care provider, please call [phone] and they will assist you.        Care EveryWhere ID     This is your Care EveryWhere ID. This could be used by other organizations to access your [facility] medical records  [id number]        Your Vitals Were     Pulse Temperature Respirations Height BMI (Body Mass Index)       88 98.3  F (36.8  C) (Oral) 20 5' 5\" (1.651 m) 42.6 kg/m2        Blood Pressure from Last 3 Encounters:   04/28/17 138/80   04/14/17 142/88   01/27/17 126/80    Weight from Last 3 Encounters:   04/28/17 256 lb (116.1 kg)   04/14/17 254 lb 6.4 oz (115.4 kg)   01/27/17 254 lb (115.2 kg)              We Performed the Following     Basic metabolic panel     PARAM PT, HAND, AND CHIROPRACTIC REFERRAL          Where to get your medicines      These medications were sent to [facility] - [place], [place] - [address]  [address] [facility] [address]     Phone:  [phone]     amLODIPine 10 MG tablet    hydrochlorothiazide 25 MG tablet          Primary Care Provider Office Phone # Fax #    Vonnie Baugh [phone]       [facility] [address] [facility] [address]        Thank you!     Thank you for choosing [facility]  for your care. Our goal is always to provide you with excellent care. Hearing back from our patients is one way we can continue to improve our services. Please take a few minutes to complete the written survey that you may receive in the mail after your visit with us. Thank you!             Your Updated Medication List - Protect others around you: Learn how to safely use, store and throw away your medicines at www.disposemymeds.org.          This list is accurate as of: 4/28/17 10:12 AM.  Always use your most recent med list.                   Brand Name Dispense Instructions for use    albuterol 108 (90 BASE) MCG/ACT Inhaler    PROAIR HFA/PROVENTIL HFA/VENTOLIN HFA     Inhale 2 puffs into the lungs every 6 hours as needed for shortness of breath / dyspnea or wheezing       amLODIPine 10 MG tablet    NORVASC    90 tablet    Take 1 tablet (10 mg) by mouth daily       clobetasol propionate 0.05 % Foam     600 g    Apply sparingly to affected area twice daily as needed.  Do not apply to face.       hydrochlorothiazide 25 MG tablet    HYDRODIURIL    90 tablet    Take 1 tablet (25 mg) by mouth daily       Multi-vitamin Tabs tablet      Take 1 tablet by mouth daily.       SENNA LAXATIVE 8.6 MG tablet   Generic drug:  senna      Take 1 tablet by mouth daily

## 2017-04-28 NOTE — NURSING NOTE
"Chief Complaint   Patient presents with     Hypertension       Initial /80 (BP Location: Right arm, Patient Position: Chair, Cuff Size: Adult Large)  Pulse 88  Temp 98.3  F (36.8  C) (Oral)  Resp 20  Ht 5' 5\" (1.651 m)  Wt 256 lb (116.1 kg)  BMI 42.6 kg/m2 Estimated body mass index is 42.6 kg/(m^2) as calculated from the following:    Height as of this encounter: 5' 5\" (1.651 m).    Weight as of this encounter: 256 lb (116.1 kg).  Medication Reconciliation: complete    "

## 2017-04-28 NOTE — PROGRESS NOTES
SUBJECTIVE:                                                    Raffy Castelan is a 41 year old female who presents to clinic today for the following health issues:      Hypertension Follow-up      Outpatient blood pressures are not being checked.    Low Salt Diet: not monitoring salt     Asthma Follow-Up    Was ACT completed today?    Yes    ACT Total Scores 4/28/2017   ACT TOTAL SCORE (Goal Greater than or Equal to 20) 25   In the past 12 months, how many times did you visit the emergency room for your asthma without being admitted to the hospital? 0   In the past 12 months, how many times were you hospitalized overnight because of your asthma? 0       Recent asthma triggers that patient is dealing with: Gastric Reflux        Amount of exercise or physical activity: 2-3 days/week for an average of 15-30 minutes    Problems taking medications regularly: No    Medication side effects: none    Diet: regular (no restrictions)      Hypertension    Checked BP at home the other day - 166/104. No indicative symptoms with this elevated blood pressure reading.    Experiences headaches with elevated blood pressure. Did not have a headache     Taking amlodipine 10 mg in PM, hydrochlorothiazide 25 mg in AM. She relates she sometimes forget to take the amlodipine at night    Had swelling in ankles that has decreased some since starting hydrochlorothiazide     Weight management     Gaining weight as she has not been following a nutritious diet    Eats more frequently with increased stress. Eating is a stress coping mechanism for her.    Not currently in therapy. When she was in therapy, she relates it helped significantly and made her more aware of her situation. She has considering restarting therapy as she has had increased power struggles with her mother.     Did find that she stress ate less while in therapy. Therapist told her not to go by her weight on the scale, but focus on healthy eating habits and physical activity.  "She notes weight loss using this strategy. However, since discontinuing counseling she has put the scale back in her bathroom     Weight management physician prescribed qsymia for weight loss. She had to discontinue it as her blood pressure and heart rate became elevated     Positive history of diabetes: type 1 in maternal grandmother and maternal uncle; type 2 in father     Left shoulder pain    Limited ROM of left shoulder. Pain with overhead and extension motions.     Onset: December 2016 after lifting luggage out of overhead compartment. She notes she heard a \"popping\" sound while lifting the luggage out     Short relief in pain with warm compresses     Seen by sports medicine Dr. Watkins on 1/27/17 for left shoulder pain     Heartburn      Occasional symptoms if she eats heavier meal     TUMS provide relief    Problem list and histories reviewed & adjusted, as indicated.  Additional history: as documented    Patient Active Problem List   Diagnosis     Chronic constipation     CARDIOVASCULAR SCREENING; LDL GOAL LESS THAN 160     Family history of breast cancer     GERD (gastroesophageal reflux disease)     BMI >40     Vitamin D deficiency     Psoriasis     Intermittent asthma     Bariatric surgery status     H/O laparoscopic adjustable gastric banding     Diaphragmatic hernia     Hypertension goal BP (blood pressure) < 140/90     Anxiety     Past Surgical History:   Procedure Laterality Date     BIOPSY       COLONOSCOPY       ESOPHAGOSCOPY, GASTROSCOPY, DUODENOSCOPY (EGD), COMBINED  7/30/2013    Procedure: COMBINED ESOPHAGOSCOPY, GASTROSCOPY, DUODENOSCOPY (EGD), BIOPSY SINGLE OR MULTIPLE;;  Surgeon: Wilson Fournier MD;  Location:  GI     ESOPHAGOSCOPY, GASTROSCOPY, DUODENOSCOPY (EGD), COMBINED  3/24/2014    Procedure: COMBINED ESOPHAGOSCOPY, GASTROSCOPY, DUODENOSCOPY (EGD);  Surgeon: Wilson Fournier MD;  Location:  GI     HERNIA REPAIR       LAPAROSCOPIC GASTRIC ADJUSTABLE BANDING  5/27/2014    " Procedure: LAPAROSCOPIC GASTRIC ADJUSTABLE BANDING;  Surgeon: Wilson Fournier MD;  Location: [facility] OR     no history of surgery         Social History   Substance Use Topics     Smoking status: Never Smoker     Smokeless tobacco: Never Used     Alcohol use No     Family History   Problem Relation Age of Onset     Blood Disease Mother      anemia     HEART DISEASE Mother      Hypertension Mother      Hyperlipidemia Mother      Anxiety Disorder Mother      OSTEOPOROSIS Mother      Genetic Disorder Mother      CEREBROVASCULAR DISEASE Father      Hypertension Father      DIABETES Father      type  2     Breast Cancer Maternal Aunt      Breast Cancer Paternal Aunt      CANCER Paternal Aunt      stomach     Breast Cancer Other      Blood Disease Other      son with alpha thalessemia/mother alpha thalasemia trait     Breast Cancer Other      Breast Cancer Other      Breast Cancer Cousin          Current Outpatient Prescriptions   Medication Sig Dispense Refill     hydrochlorothiazide (HYDRODIURIL) 25 MG tablet Take 1 tablet (25 mg) by mouth daily 90 tablet 3     amLODIPine (NORVASC) 10 MG tablet Take 1 tablet (10 mg) by mouth daily 90 tablet 3     albuterol (PROAIR HFA/PROVENTIL HFA/VENTOLIN HFA) 108 (90 BASE) MCG/ACT Inhaler Inhale 2 puffs into the lungs every 6 hours as needed for shortness of breath / dyspnea or wheezing       clobetasol propionate 0.05 % FOAM Apply sparingly to affected area twice daily as needed.  Do not apply to face. 600 g 1     senna (SENNA LAXATIVE) 8.6 MG tablet Take 1 tablet by mouth daily       multivitamin, therapeutic with minerals (MULTI-VITAMIN) TABS Take 1 tablet by mouth daily.       [DISCONTINUED] hydrochlorothiazide (HYDRODIURIL) 25 MG tablet Take 1 tablet (25 mg) by mouth daily 30 tablet 0     [DISCONTINUED] amLODIPine (NORVASC) 10 MG tablet Take 1 tablet (10 mg) by mouth daily 90 tablet 3     Allergies   Allergen Reactions     Lisinopril Cough       Reviewed and updated as needed  "this visit by clinical staff  Tobacco  Allergies  Meds  Med Hx  Surg Hx  Fam Hx  Soc Hx      Reviewed and updated as needed this visit by Provider         ROS:  Constitutional, neuro, ENT, endocrine, pulmonary, cardiac, gastrointestinal, genitourinary, musculoskeletal, integument and psychiatric systems are negative, except as otherwise noted.    GEN: POSITIVE for weight gain   GI: POSITIVE for heartburn   MS: POSITIVE for left shoulder pain and limited ROM. Swelling in ankles     This document serves as a record of the services and decisions personally performed and made by Vonnie Baugh MD. It was created on her behalf by Verónica Madden, a trained medical scribe. The creation of this document is based on the provider's statements to the medical scribe.  Verónica Madden 9:58 AM April 28, 2017    OBJECTIVE:                                                    /80 (BP Location: Right arm, Patient Position: Chair, Cuff Size: Adult Large)  Pulse 88  Temp 98.3  F (36.8  C) (Oral)  Resp 20  Ht 5' 5\" (1.651 m)  Wt 256 lb (116.1 kg)  BMI 42.6 kg/m2  Body mass index is 42.6 kg/(m^2).  GENERAL APPEARANCE: healthy, alert and no distress  RESP: lungs clear to auscultation - no rales, rhonchi or wheezes  CV: regular rates and rhythm, normal S1 S2, no S3 or S4 and no murmur, click or rub  MS: Decreased internal and external rotation of left shoulder, left anterior shoulder pain. Normal ROM of right shoulder.  Positive impingement  PSYCH: mentation appears normal and affect normal/bright    Results for orders placed or performed in visit on 04/28/17 (from the past 24 hour(s))   Basic metabolic panel   Result Value Ref Range    Sodium 137 133 - 144 mmol/L    Potassium 3.3 (L) 3.4 - 5.3 mmol/L    Chloride 101 94 - 109 mmol/L    Carbon Dioxide 29 20 - 32 mmol/L    Anion Gap 7 3 - 14 mmol/L    Glucose 99 70 - 99 mg/dL    Urea Nitrogen 11 7 - 30 mg/dL    Creatinine 0.72 0.52 - 1.04 mg/dL    GFR Estimate 88 " >60 mL/min/1.7m2    GFR Estimate If Black >90   GFR Calc   >60 mL/min/1.7m2    Calcium 8.9 8.5 - 10.1 mg/dL        ASSESSMENT/PLAN:                                                    Raffy was seen today for hypertension.    Diagnoses and all orders for this visit:    Hypertension goal BP (blood pressure) < 140/90  /80, within guidelines on calcium channel blocker and diuretic. Continue to monitor swelling.   -     hydrochlorothiazide (HYDRODIURIL) 25 MG tablet; Take 1 tablet (25 mg) by mouth daily  -     amLODIPine (NORVASC) 10 MG tablet; Take 1 tablet (10 mg) by mouth daily  -     Basic metabolic panel    Impingement syndrome of left shoulder  Limited ROM of left shoulder.   -     [facility] CHIR[facility]RACTIC REFERRAL    Intermittent asthma, uncomplicated  Well controlled with albuterol as needed.    BMI >40  BMI is 42.6. Recommended patient research the [facility]h Program as this may provide appropriate coping strategies in place of stress eating. Patient is considering restarting counseling as well.     Follow up with: Physical therapy    The information in this document, created by the medical scribe for me, accurately reflects the services I personally performed and the decisions made by me. I have reviewed and approved this document for accuracy prior to leaving the patient care area.  April 28, 2017 10:21 AM    Vonnie Baugh MD  [employer] [facility]

## 2017-04-28 NOTE — PATIENT INSTRUCTIONS
Research the [facility]h Program   I would like you to consider therapy again    [facility]   Discharged by : Dulce SOTO, Certified Medical Assistant (AAMA)April 28, 2017 10:11 AM    Paper scripts provided to patient : none   If you have any questions regarding to your visit please contact your care team:   [facility] Clinic Hours Telephone Number   Dr. Dulce Ang, OMAIRA   7am-7pm Monday - Thursday   7am-5pm Fridays  [phone]   (Appointment scheduling available 24/7)   RN Line   [phone] option 2     What options do I have for visits at the clinic other than the traditional office visit?   To expand how we care for you, many of our providers are utilizing electronic visits (e-visits) and telephone visits, when medically appropriate, for interactions with their patients rather than a visit in the clinic. We also offer nurse visits for many medical concerns. Just like any other service, we will bill your insurance company for this type of visit based on time spent on the phone with your provider. Not all insurance companies cover these visits. Please check with your medical insurance if this type of visit is covered. You will be responsible for any charges that are not paid by your insurance.   E-visits via [employer]: generally incur a $35.00 fee.   Telephone visits:   Time spent on the phone: *charged based on time that is spent on the phone in increments of 10 minutes. Estimated cost:   5-10 mins $30.00   11-20 mins. $59.00   21-30 mins. $85.00   Use [employer] (secure email communication and access to your chart) to send your primary care provider a message or make an appointment. Ask someone on your Team how to sign up for [employer].   For a Price Quote for your services, please call our Consumer Price Line at [phone].   As always, Thank you for trusting us with your health care needs!                    [facility]  Services:    Scheduling Appointments  [facility] [place]  Call: [phone]    [facility]  Call: [phone]    [facility]  Call: [phone]    WHERE TO GO FOR CARE?    Clinic    Make an appointment if you:       Are sick (cold, cough, flu, sore throat, earache or in pain).       Have a small injury (sprain, small cut, burn or broken bone).       Need a physical exam, Pap smear, vaccine or prescription refill.       Have questions about your health or medicines.    To reach us:      Call [phone] ([phone]). Open 24 hours every day. (For counseling services, call [phone].)    Log into [employer] at [employer]. (Visit [employer] to create an account.) Hospital emergency room    An emergency is a serious or life- threatening problem that must be treated right away.    Call [phone] or get to the hospital if you have:      Very bad or sudden:            - Chest pain or pressure         - Bleeding         - Head or belly pain         - Dizziness or trouble seeing, walking or                          Speaking      Problems breathing      Blood in your vomit or you are coughing up blood      A major injury (knocked out, loss of a finger or limb, rape, broken bone protruding from skin)    A mental health crisis. (Or call the Mental Health Crisis line at 1-407.283.3297 or Suicide Prevention Hotline at 1-470.516.7458.)    Open 24 hours every day. You don't need an appointment.     Urgent care    Visit urgent care for sickness or small injuries when the clinic is closed. You don't need an appointment. To check hours or find an urgent care near you, visit [email or web address]. Online care    Get online care from [facility] for more than 70 common problems, like colds, allergies and infections. Open 24 hours every day at: [email or web address]   Need help deciding?    For advice about where to be seen, you may call your clinic  and ask to speak with a nurse. We're here for you 24 hours every day.         If you are deaf or hard of hearing, please let us know. We provide many free services including sign language interpreters, oral interpreters, TTYs, telephone amplifiers, note takers and written materials.       Dulce SOTO, Certified Medical Assistant (AAMA)April 28, 2017 10:11 AM

## 2017-04-29 ASSESSMENT — ASTHMA QUESTIONNAIRES: ACT_TOTALSCORE: 25

## 2017-05-01 ENCOUNTER — MYC MEDICAL ADVICE (OUTPATIENT)
Dept: FAMILY MEDICINE | Facility: CLINIC | Age: 42
End: 2017-05-01

## 2017-05-01 DIAGNOSIS — E87.6 HYPOKALEMIA: Primary | ICD-10-CM

## 2017-05-01 NOTE — TELEPHONE ENCOUNTER
"Inform patient slightly low potassium  I talked to her about high potassium foods at last visit, but forgot to print out the list from \"up to date\".  Please print list and send her info and send my chart.  Order future potassium for one month.  Vonnie Baugh MD    "

## 2017-05-01 NOTE — LETTER
[address] [id number] [phone]    May 1, 2017    Dear Raffy,  Your potassium level is a little low. I'm sending you a list of foods that have a lot of potassium in them & Dr. Baugh would like to recheck your blood level in one month. She already ordered the lab, so you can schedule or walk into the lab in about one more month. I will print & mail you this list as well. Please let us know if you have more questions!     High-potassium content foods: Highest content (>25 meq/100 g)   Dried figs   Molasses   Seaweed   Very high content (>12.5 meq/100 g)   Dried fruits (dates, prunes)   Nuts   Avocados   Bran cereals   Wheat germ   Lima beans      High content (>6.2 meq/100 g)   Vegetables   Spinach   Tomatoes   Broccoli   Winter squash   Beets   Carrots   Cauliflower   Potatoes   Fruits   Bananas   Cantaloupe   Kiwis, Oranges, Mangos   Meats- Ground beef, Steak, Pork, Veal, Lamb       Adapted from: Arjun HERRERA. Hypokalemia. N Engl J Med 1998; 339:451.    Thanks & have a good day!  AVILA Crandall

## 2017-05-08 ENCOUNTER — THERAPY VISIT (OUTPATIENT)
Dept: PHYSICAL THERAPY | Facility: CLINIC | Age: 42
End: 2017-05-08
Payer: COMMERCIAL

## 2017-05-08 DIAGNOSIS — M25.512 CHRONIC LEFT SHOULDER PAIN: Primary | ICD-10-CM

## 2017-05-08 DIAGNOSIS — G89.29 CHRONIC LEFT SHOULDER PAIN: Primary | ICD-10-CM

## 2017-05-08 DIAGNOSIS — M75.42 IMPINGEMENT SYNDROME, SHOULDER, LEFT: ICD-10-CM

## 2017-05-08 PROCEDURE — 97110 THERAPEUTIC EXERCISES: CPT | Mod: GP | Performed by: PHYSICAL THERAPIST

## 2017-05-08 PROCEDURE — 97161 PT EVAL LOW COMPLEX 20 MIN: CPT | Mod: GP | Performed by: PHYSICAL THERAPIST

## 2017-05-08 PROCEDURE — 97112 NEUROMUSCULAR REEDUCATION: CPT | Mod: GP | Performed by: PHYSICAL THERAPIST

## 2017-05-08 NOTE — PROGRESS NOTES
Subjective:    HPI                    Objective:    System    Physical Exam    [facility]  [place] [facility] Initial Evaluation - Upper Extremity    Evaluation Date: May 8, 2017  Raffy Castelan is a 41 year old female with a Lt shld   condition.   Referral: GP  Employment:    Employment status: normal hours  Work Mechanical Stresses: Computer work  Leisure Mechanical Stresses: none - d/t busy with kids and work  Functional disability from present episode: Difficulty washing back, can't unhook Bra  Functional disability score (SPADI): 36.92  Visual Analog Score (VAS 0-10): 1-2/10, ranges 0-9/10    HISTORY:  Pt presents with:  Intermittent pain lateral upper arm Lt, Dull achy, sharp shooting  (Constant/Intermittent, Dull/achy/sharp/stabbing/throbbing and location/Radiation)  Associated symptoms (Numbness/tingling/weakness/swelling/catching/clicking/locking/subluxing):  none  Onset of symptoms (date/how):  Dec 2016 - Lifting luggage from overhead compartment and felt pop in shld  Symptoms Status (new/recurrent/chronic): Chronic and (improving/not changing/worsening): worsening  Condition occurred in the following environment: on Airplane   Symptoms at onset: Sharp pain w/ reaching movements    (with consideration for bending, sitting, turning neck, dressing, reaching, gripping, am, as the day progresses, pm, when still, on the move, sleeping: prone, sup, side R/L, other )  Symptoms are made worse with:  Sitting - no change with length of time, not change with computer work, Reaching overhead, out to the side and behind back, Always, modifies dressing always, cannot sleep in Lt side - always, Time of day does not affect  Symptoms are made better with:  Heat, pain pills (Tylenol or Advil)    Continued use makes pain:  Avoiding causing pain; Sleep disturbance: occas if she lies on Lt shld ;   Pain at rest:  yes: Site:  Lat deltoid area    Previous episodes: none  Previous treatments:  none  Improvement with previous treatments: NA     Specific Questions: (as reported by the patient)  Do you have pain with coughing/sneezing: no  Is gait and coordination of upper extremities normal or abnormal: normal  Patient describes his/her general health as: good  Imaging/special testing: X-ray  Recent or major surgery includes the following: Lap binding  Do you have night pain: no  Have you had any recent accidents: no  Have you experienced any unexplained weight loss: no  Pertinent medical history includes: HTN, Hep A, Oerweight  Medical allergies include: none  Current medications: High Blood Pressure  Barriers at home: none  Other red flags: none    OBJECTIVE EXAMINATION:  Lt shld (site)    POSTURE:  Sitting: poor  Correction of Posture: NA  Standing Posture: normal    NEUROLOGICAL (motor/sensory/reflex/dural; if applicable): NA    BASELINES: (pain or functional activity): reaching behind back or overhead    EXTREMITIES: (Shoulder / Elbow / Wrist / Hand): Lt Shld   Rt AROM/Pain Lt AROM/Pain Rt PROM w/ w/o OP/Pain Lt PROM w/ w/o OP/Pain   Shld Flexion  115/++ (Concordant sign)  120/+++           Extension  38/+  40           Abduction  63/+++  90+++           Adduction               IR  Post Lt hip/+++ (Concordant sign)  Post Lt Hip/+++           ER  38/+  45++       Resisted Test Response (pain):    Rt Pain Lt Pain   Shld Flexion   W ++           Extension               Abduction   Wf +++           Adduction    ++           IR    +           ER       Elbow Flexion                  Extension         Other Tests:   SPINE  Cervical AROM:  Movement Loss % Loss  Pain   Protrusion 0%    Flexion 0%    Retraction 0% Slight Lt shld pain   Extension 0%    Lateral flexion R 0%    Lateral flexion L 0%    Rotation R 0%    Rotation L 0%    Effect of repeated movements: NA  Effect of static positioning: NA  Spine testing (relevant/not relevant/secondary problem): secondary    Baseline Symptoms: Restricted motion andpain  ER F;lexion  Repeated Tests Symptom Response Mechanical Response   Active/Passive movement, resisted test, functional test During - Produce, Abolish, Increase, Decrease, NE After - Better, Worse, NB, NW, NE Effect - ? or ? ROM, strength or key functional test No Effect   Active Shld Ext      x10 Prod NW  X   Passive Shld Ext - hand on counter   2x10 Prod NW Incr flexion                  Effect of static positioning                  Provisional Classification: Articular Derangememt  Extremity or Spine: Extremity  Principle of Management (education/equipment/mechanical therapy/specific principle): Posture correction - neutral spine and use of L-roll; Passive shld ext.    Assessment/Plan:      Patient is a 41 year old female with left side shoulder complaints.    Patient has the following significant findings with corresponding treatment plan.                Diagnosis 1:  Lt Shld Impingement  Pain -  hot/cold therapy, manual therapy, education, directional preference exercise and home program  Decreased ROM/flexibility - manual therapy, therapeutic exercise and home program  Decreased strength - therapeutic exercise, therapeutic activities and home program  Decreased function - therapeutic activities and home program  Impaired posture - neuro re-education and home program    Therapy Evaluation Codes:   1) History comprised of:   Personal factors that impact the plan of care:      None.    Comorbidity factors that impact the plan of care are:      High blood pressure, Overweight and Hepatitis A.     Medications impacting care: High blood pressure and Pain.  2) Examination of Body Systems comprised of:   Body structures and functions that impact the plan of care:      Cervical spine and Shoulder.   Activity limitations that impact the plan of care are:      Bathing, Dressing and Reaching.  3) Clinical presentation characteristics are:   Stable/Uncomplicated.  4) Decision-Making    Low complexity using standardized  patient assessment instrument and/or measureable assessment of functional outcome.  Cumulative Therapy Evaluation is: Low complexity.    Previous and current functional limitations:  (See Goal Flow Sheet for this information)    Short term and Long term goals: (See Goal Flow Sheet for this information)     Communication ability:  Patient appears to be able to clearly communicate and understand verbal and written communication and follow directions correctly.  Treatment Explanation - The following has been discussed with the patient:   RX ordered/plan of care  Anticipated outcomes  Possible risks and side effects  This patient would benefit from PT intervention to resume normal activities.   Rehab potential is good.    Frequency:  1 X week, once daily  Duration:  for 6-8 weeks  Discharge Plan:  Achieve all LTG.  Independent in home treatment program.  Reach maximal therapeutic benefit.    Please refer to the daily flowsheet for treatment today, total treatment time and time spent performing 1:1 timed codes.

## 2017-05-15 ENCOUNTER — THERAPY VISIT (OUTPATIENT)
Dept: PHYSICAL THERAPY | Facility: CLINIC | Age: 42
End: 2017-05-15
Payer: COMMERCIAL

## 2017-05-15 DIAGNOSIS — M75.42 IMPINGEMENT SYNDROME, SHOULDER, LEFT: ICD-10-CM

## 2017-05-15 DIAGNOSIS — M25.512 CHRONIC LEFT SHOULDER PAIN: ICD-10-CM

## 2017-05-15 DIAGNOSIS — G89.29 CHRONIC LEFT SHOULDER PAIN: ICD-10-CM

## 2017-05-15 PROCEDURE — 97530 THERAPEUTIC ACTIVITIES: CPT | Mod: GP | Performed by: PHYSICAL THERAPIST

## 2017-05-15 PROCEDURE — 97110 THERAPEUTIC EXERCISES: CPT | Mod: GP | Performed by: PHYSICAL THERAPIST

## 2017-06-05 ENCOUNTER — THERAPY VISIT (OUTPATIENT)
Dept: PHYSICAL THERAPY | Facility: CLINIC | Age: 42
End: 2017-06-05
Payer: COMMERCIAL

## 2017-06-05 DIAGNOSIS — M25.512 CHRONIC LEFT SHOULDER PAIN: ICD-10-CM

## 2017-06-05 DIAGNOSIS — G89.29 CHRONIC LEFT SHOULDER PAIN: ICD-10-CM

## 2017-06-05 DIAGNOSIS — M75.42 IMPINGEMENT SYNDROME, SHOULDER, LEFT: ICD-10-CM

## 2017-06-05 PROCEDURE — 97110 THERAPEUTIC EXERCISES: CPT | Mod: GP | Performed by: PHYSICAL THERAPY ASSISTANT

## 2017-07-25 ENCOUNTER — FCC EXTENDED DOCUMENTATION (OUTPATIENT)
Dept: PSYCHOLOGY | Facility: CLINIC | Age: 42
End: 2017-07-25

## 2017-07-25 NOTE — PROGRESS NOTES
Discharge Summary  Multiple Sessions    Client Name: Raffy Castelan MRN#: [id number] YOB: 1975      Intake / Discharge Date: 4-1-16 to 7-25-17       DSM5 Diagnoses: (Sustained by DSM5 Criteria Listed Above)  Diagnoses: 311 Other/unspec. Depressive Disorder  Psychosocial & Contextual Factors: relationship difficulty  WHODAS 2.0 (12 item)          Presenting Concern:  Mild depression      Reason for Discharge:  Client is satisfied with progress      Disposition at Time of Last Encounter:   Comments:        Risk Management:   Client denies a history of suicidal ideation, suicide attempts, self-injurious behavior, homicidal ideation, homicidal behavior and and other safety concerns  A safety and risk management plan has not been developed at this time, however client was given the after-hours number / 911 should there be a change in any of these risk factors.      Referred To:  N/a        Lucia Caldera LP   7/25/2017

## 2017-10-30 DIAGNOSIS — E66.01 MORBID OBESITY DUE TO EXCESS CALORIES (H): ICD-10-CM

## 2017-11-29 ENCOUNTER — MYC MEDICAL ADVICE (OUTPATIENT)
Dept: FAMILY MEDICINE | Facility: CLINIC | Age: 42
End: 2017-11-29

## 2017-11-30 NOTE — TELEPHONE ENCOUNTER
Called patient- she gained about 20 pounds over the last 2 years & hasn't changed her diet or activity.  She states she doesn't need the IUD anymore so she might as well not have it.   She had to cut back some of the medications for swelling due to side effects & Dr. Baugh is aware.   Scheduled 12/8 for f/u & 2/2 for her physical.  Lala Clement RN

## 2017-12-01 ENCOUNTER — TELEPHONE (OUTPATIENT)
Dept: FAMILY MEDICINE | Facility: CLINIC | Age: 42
End: 2017-12-01

## 2017-12-01 NOTE — TELEPHONE ENCOUNTER
"Patient has an appointment on 12/8 with Dr. Baugh and the note states \"remove IUD\"  Dr. Baugh does NOT remove IUD's. Please call and inform patient.     Olena Frias MA    "

## 2017-12-04 ENCOUNTER — OFFICE VISIT (OUTPATIENT)
Dept: FAMILY MEDICINE | Facility: CLINIC | Age: 42
End: 2017-12-04
Payer: COMMERCIAL

## 2017-12-04 VITALS
HEART RATE: 80 BPM | BODY MASS INDEX: 44.98 KG/M2 | SYSTOLIC BLOOD PRESSURE: 144 MMHG | HEIGHT: 65 IN | TEMPERATURE: 98.1 F | DIASTOLIC BLOOD PRESSURE: 82 MMHG | WEIGHT: 270 LBS

## 2017-12-04 DIAGNOSIS — Z30.432 ENCOUNTER FOR IUD REMOVAL: Primary | ICD-10-CM

## 2017-12-04 PROCEDURE — 58301 REMOVE INTRAUTERINE DEVICE: CPT | Performed by: FAMILY MEDICINE

## 2017-12-04 NOTE — MR AVS SNAPSHOT
After Visit Summary   12/4/2017    Raffy Castelan    MRN: [id number]           Patient Information     Date Of Birth          1975        Visit Information        Provider Department      12/4/2017 2:20 PM Antione Reina MD [facility]        Today's Diagnoses     Encounter for IUD removal    -  1      Care Instructions      Birth Control: IUD (Intrauterine Device)    The IUD (intrauterine device) is small, flexible, and T-shaped. A trained healthcare provider places it in the uterus. The IUD is one of the most effective birth control methods. It is also reversible. This means it can be removed at any time by a trained healthcare provider. New IUDs are safe and do not have the risks of older types of IUDs.  Pregnancy rates  Talk to your healthcare provider about the effectiveness of this birth control method.  Types of IUDs  IUD insertion is done in the healthcare provider s office. Two types of IUDs are available:    The copper IUD releases a small amount of copper into the uterus. The copper makes it harder for sperm to reach the egg. The device works for at least 10 years.    The progestin IUD releases a hormone called progestin. It causes changes in the uterus to help prevent pregnancy. The device works for 3 to 5 years, depending on which device is chosen. It may be recommended for women who have anemia or heavy and painful periods.  IUDs have thin strings that hang from the opening of the uterus into the vagina. This lets you check that the IUD stays in place.  Things to know about IUDs    IUDs can be used by women who have never been pregnant or by women with a history of sexually transmitted infections (STIs) or tubal pregnancy.    It won't move from the uterus to any other part of the body.    There is a slight risk of the device coming out of the vagina (expulsion).    It may not work in women who have an abnormally shaped uterus.    A copper IUD may cause  heavier periods and cramping.    Progestin IUD may cause light periods or no periods at all (irregular bleeding or spotting is possible and normal during first 3 to 6 months).    If you get a sexually transmitted infection with an IUD in place, symptoms may be more severe.  What to report to your healthcare provider  Be sure your healthcare provider knows if you have:    A sexually transmitted infection (STI) or possible STI    Liver problems    Blood clots (for progestin IUD only)    Breast cancer or a history of breast cancer (progestin IUD only)   Date Last Reviewed: 3/1/2017    2770-3547 [employer]. [address] [place] [address]. All rights reserved. This information is not intended as a substitute for professional medical care. Always follow your healthcare professional's instructions.                Follow-ups after your visit        Your next 10 appointments already scheduled     Dec 08, 2017  9:20 AM CST   SHORT with Vonnie Baugh MD   [facility] ([address] [address] [phone]           Your Arrival times is X, We need you to be here at this time to get checked in and have the assistant get you ready for the provider, which can take about 15 minutes. Your appointment time with your provider is at  X. Thank you.            Feb 02, 2018 11:20 AM CST   PHYSICAL with Vonnie Baugh MD   [facility] ([address] [address] [phone]              Who to contact     If you have questions or need follow up information about today's clinic visit or your schedule please contact [facility] [place] directly at [phone].  Normal or non-critical lab and imaging results will be communicated to you by MyChart, letter or phone within 4 business days after the clinic has received the results. If you do  "not hear from us within 7 days, please contact the clinic through [employer] or phone. If you have a critical or abnormal lab result, we will notify you by phone as soon as possible.  Submit refill requests through [employer] or call your pharmacy and they will forward the refill request to us. Please allow 3 business days for your refill to be completed.          Additional Information About Your Visit        Authentic[employer]hart Information     [employer] gives you secure access to your electronic health record. If you see a primary care provider, you can also send messages to your care team and make appointments. If you have questions, please call your primary care clinic.  If you do not have a primary care provider, please call [phone] and they will assist you.        Care EveryWhere ID     This is your Care EveryWhere ID. This could be used by other organizations to access your [place] medical records  [id number]        Your Vitals Were     Pulse Temperature Height BMI (Body Mass Index)          80 98.1  F (36.7  C) (Oral) 5' 5\" (1.651 m) 44.93 kg/m2         Blood Pressure from Last 3 Encounters:   12/04/17 144/82   04/28/17 138/80   04/14/17 142/88    Weight from Last 3 Encounters:   12/04/17 270 lb (122.5 kg)   04/28/17 256 lb (116.1 kg)   04/14/17 254 lb 6.4 oz (115.4 kg)              We Performed the Following     REMOVE INTRAUTERINE DEVICE          Today's Medication Changes          These changes are accurate as of: 12/4/17  3:30 PM.  If you have any questions, ask your nurse or doctor.               These medicines have changed or have updated prescriptions.        Dose/Directions    amLODIPine 10 MG tablet   Commonly known as:  NORVASC   This may have changed:  additional instructions   Used for:  Hypertension goal BP (blood pressure) < 140/90        Dose:  10 mg   Take 1 tablet (10 mg) by mouth daily   Quantity:  90 tablet   Refills:  3                Primary Care Provider Office Phone # Fax #    Vonnie Baugh, " [phone]       [address] [facility] [address]        Equal Access to Services     IMTIAZ BESS : Hadii aad ku hadevisummer Magojohanny, wasanchoda lubrody, qaybta kaalmada alba, gigi krishin hayaaoscar millernavya israyusefaidee barakat. So [facility] [phone].    ATENCIÓN: Si habla español, tiene a petersen disposición servicios gratuitos de asistencia lingüística. Llame al [phone].    We comply with applicable federal civil rights laws and [place] laws. We do not discriminate on the basis of race, color, national origin, age, disability, sex, sexual orientation, or gender identity.            Thank you!     Thank you for choosing [facility]  for your care. Our goal is always to provide you with excellent care. Hearing back from our patients is one way we can continue to improve our services. Please take a few minutes to complete the written survey that you may receive in the mail after your visit with us. Thank you!             Your Updated Medication List - Protect others around you: Learn how to safely use, store and throw away your medicines at www.disposemymeds.org.          This list is accurate as of: 12/4/17  3:30 PM.  Always use your most recent med list.                   Brand Name Dispense Instructions for use Diagnosis    albuterol 108 (90 BASE) MCG/ACT Inhaler    PROAIR HFA/PROVENTIL HFA/VENTOLIN HFA     Inhale 2 puffs into the lungs every 6 hours as needed for shortness of breath / dyspnea or wheezing        amLODIPine 10 MG tablet    NORVASC    90 tablet    Take 1 tablet (10 mg) by mouth daily    Hypertension goal BP (blood pressure) < 140/90       clobetasol propionate 0.05 % Foam     600 g    Apply sparingly to affected area twice daily as needed.  Do not apply to face.    Psoriasis       hydrochlorothiazide 25 MG tablet    HYDRODIURIL    90 tablet    Take 1 tablet (25 mg) by mouth daily    Hypertension goal BP (blood pressure) < 140/90       Multi-vitamin Tabs tablet       Take 1 tablet by mouth daily.        SENNA LAXATIVE 8.6 MG tablet   Generic drug:  senna      Take 1 tablet by mouth daily

## 2017-12-04 NOTE — NURSING NOTE
"Chief Complaint   Patient presents with     IUD     removal       Initial /82  Pulse 80  Temp 98.1  F (36.7  C) (Oral)  Ht 5' 5\" (1.651 m)  Wt 270 lb (122.5 kg)  BMI 44.93 kg/m2 Estimated body mass index is 44.93 kg/(m^2) as calculated from the following:    Height as of this encounter: 5' 5\" (1.651 m).    Weight as of this encounter: 270 lb (122.5 kg).  Medication Reconciliation: complete   Olena Frias MA      "

## 2017-12-04 NOTE — PROGRESS NOTES
SUBJECTIVE:   Raffy Castelan is a 42 year old female who presents to clinic today for the following health issues:      Patient is here today for IUD removal   worried about her weight gain and swelling in her hands and feet (also knows that her blood pressure med might be related).  She is not sexually active at this time and does not need the IUD for period control or contraception.  Has follow up later this with Dr. Baugh to discuss other issues.  Has appointment scheduled with PCP to discuss further concerns.         Problem list and histories reviewed & adjusted, as indicated.  Additional history: as documented    Patient Active Problem List   Diagnosis     Chronic constipation     CARDIOVASCULAR SCREENING; LDL GOAL LESS THAN 160     Family history of breast cancer     GERD (gastroesophageal reflux disease)     BMI >40     Vitamin D deficiency     Psoriasis     Intermittent asthma     Bariatric surgery status     H/O laparoscopic adjustable gastric banding     Diaphragmatic hernia     Hypertension goal BP (blood pressure) < 140/90     Anxiety     Chronic left shoulder pain     Impingement syndrome, shoulder, left     Past Surgical History:   Procedure Laterality Date     BIOPSY       COLONOSCOPY       ESOPHAGOSCOPY, GASTROSCOPY, DUODENOSCOPY (EGD), COMBINED  7/30/2013    Procedure: COMBINED ESOPHAGOSCOPY, GASTROSCOPY, DUODENOSCOPY (EGD), BIOPSY SINGLE OR MULTIPLE;;  Surgeon: Wilson Fournier MD;  Location:  GI     ESOPHAGOSCOPY, GASTROSCOPY, DUODENOSCOPY (EGD), COMBINED  3/24/2014    Procedure: COMBINED ESOPHAGOSCOPY, GASTROSCOPY, DUODENOSCOPY (EGD);  Surgeon: Wilson Fournier MD;  Location:  GI     HERNIA REPAIR       LAPAROSCOPIC GASTRIC ADJUSTABLE BANDING  5/27/2014    Procedure: LAPAROSCOPIC GASTRIC ADJUSTABLE BANDING;  Surgeon: Wilson Fournier MD;  Location:  OR     no history of surgery         Social History   Substance Use Topics     Smoking status: Never Smoker     Smokeless  tobacco: Never Used     Alcohol use No     Family History   Problem Relation Age of Onset     Blood Disease Mother      anemia     HEART DISEASE Mother      Hypertension Mother      Hyperlipidemia Mother      Anxiety Disorder Mother      OSTEOPOROSIS Mother      Genetic Disorder Mother      CEREBROVASCULAR DISEASE Father      Hypertension Father      DIABETES Father      type  2     Breast Cancer Maternal Aunt      Breast Cancer Paternal Aunt      CANCER Paternal Aunt      stomach     Breast Cancer Other      Blood Disease Other      son with alpha thalessemia/mother alpha thalasemia trait     Breast Cancer Other      Breast Cancer Other      Breast Cancer Cousin          Current Outpatient Prescriptions   Medication Sig Dispense Refill     hydrochlorothiazide (HYDRODIURIL) 25 MG tablet Take 1 tablet (25 mg) by mouth daily 90 tablet 3     amLODIPine (NORVASC) 10 MG tablet Take 1 tablet (10 mg) by mouth daily (Patient taking differently: Take 10 mg by mouth daily Taking 1/2 tablet for a couple weeks) 90 tablet 3     albuterol (PROAIR HFA/PROVENTIL HFA/VENTOLIN HFA) 108 (90 BASE) MCG/ACT Inhaler Inhale 2 puffs into the lungs every 6 hours as needed for shortness of breath / dyspnea or wheezing       clobetasol propionate 0.05 % FOAM Apply sparingly to affected area twice daily as needed.  Do not apply to face. 600 g 1     senna (SENNA LAXATIVE) 8.6 MG tablet Take 1 tablet by mouth daily       multivitamin, therapeutic with minerals (MULTI-VITAMIN) TABS Take 1 tablet by mouth daily.       Allergies   Allergen Reactions     Lisinopril Cough     BP Readings from Last 3 Encounters:   12/04/17 144/82   04/28/17 138/80   04/14/17 142/88    Wt Readings from Last 3 Encounters:   12/04/17 270 lb (122.5 kg)   04/28/17 256 lb (116.1 kg)   04/14/17 254 lb 6.4 oz (115.4 kg)                        Reviewed and updated as needed this visit by clinical staffTobacco  Allergies  Meds  Problems  Med Hx  Surg Hx  Fam Hx  Soc Hx  "       Reviewed and updated as needed this visit by Provider  Tobacco  Allergies  Meds  Problems  Med Hx  Surg Hx  Fam Hx  Soc Hx          ROS:  CONSTITUTIONAL:NEGATIVE for fever, chills, change in weight  : normal menstrual cycles    OBJECTIVE:     /82  Pulse 80  Temp 98.1  F (36.7  C) (Oral)  Ht 5' 5\" (1.651 m)  Wt 270 lb (122.5 kg)  BMI 44.93 kg/m2  Body mass index is 44.93 kg/(m^2).  GENERAL: healthy, alert and no distress  EYES: Eyes grossly normal to inspection, PERRL and conjunctivae and sclerae normal   (female): normal female external genitalia, normal urethral meatus, vaginal mucosa, normal cervix/adnexa/uterus without masses or discharge  MS: no gross musculoskeletal defects noted, no edema  NEURO: Normal strength and tone, mentation intact and speech normal  PSYCH: mentation appears normal, affect normal/bright    Diagnostic Test Results:  none     Procedure note:  After the nature of an IUD removal was explained and questions were answered, verbal permission was given to proceed.  With the patient in the supine position a speculum was placed, cervix and strings visualized the strings were grasped with a ring forceps.  The IUD was removed without any resistance or bleeding.  Inspected and noted to be complete prior to disposal.  EBL none, no complications noted.    ASSESSMENT/PLAN:             1. Encounter for IUD removal  Removed without difficulty.  May help with some of her concerns but will take some time to assess.  Follow up with PCP later this week.  - REMOVE INTRAUTERINE DEVICE    See Patient Instructions    Antione Reina MD  [facility]    "

## 2017-12-04 NOTE — PATIENT INSTRUCTIONS
Birth Control: IUD (Intrauterine Device)    The IUD (intrauterine device) is small, flexible, and T-shaped. A trained healthcare provider places it in the uterus. The IUD is one of the most effective birth control methods. It is also reversible. This means it can be removed at any time by a trained healthcare provider. New IUDs are safe and do not have the risks of older types of IUDs.  Pregnancy rates  Talk to your healthcare provider about the effectiveness of this birth control method.  Types of IUDs  IUD insertion is done in the healthcare provider s office. Two types of IUDs are available:    The copper IUD releases a small amount of copper into the uterus. The copper makes it harder for sperm to reach the egg. The device works for at least 10 years.    The progestin IUD releases a hormone called progestin. It causes changes in the uterus to help prevent pregnancy. The device works for 3 to 5 years, depending on which device is chosen. It may be recommended for women who have anemia or heavy and painful periods.  IUDs have thin strings that hang from the opening of the uterus into the vagina. This lets you check that the IUD stays in place.  Things to know about IUDs    IUDs can be used by women who have never been pregnant or by women with a history of sexually transmitted infections (STIs) or tubal pregnancy.    It won't move from the uterus to any other part of the body.    There is a slight risk of the device coming out of the vagina (expulsion).    It may not work in women who have an abnormally shaped uterus.    A copper IUD may cause heavier periods and cramping.    Progestin IUD may cause light periods or no periods at all (irregular bleeding or spotting is possible and normal during first 3 to 6 months).    If you get a sexually transmitted infection with an IUD in place, symptoms may be more severe.  What to report to your healthcare provider  Be sure your healthcare provider knows if you  have:    A sexually transmitted infection (STI) or possible STI    Liver problems    Blood clots (for progestin IUD only)    Breast cancer or a history of breast cancer (progestin IUD only)   Date Last Reviewed: 3/1/2017    9151-7599 [employer]. [address] [place] [address]. All rights reserved. This information is not intended as a substitute for professional medical care. Always follow your healthcare professional's instructions.

## 2017-12-08 ENCOUNTER — MYC MEDICAL ADVICE (OUTPATIENT)
Dept: FAMILY MEDICINE | Facility: CLINIC | Age: 42
End: 2017-12-08

## 2017-12-08 ENCOUNTER — OFFICE VISIT (OUTPATIENT)
Dept: FAMILY MEDICINE | Facility: CLINIC | Age: 42
End: 2017-12-08
Payer: COMMERCIAL

## 2017-12-08 VITALS
HEIGHT: 65 IN | WEIGHT: 269.2 LBS | SYSTOLIC BLOOD PRESSURE: 140 MMHG | BODY MASS INDEX: 44.85 KG/M2 | TEMPERATURE: 98.3 F | DIASTOLIC BLOOD PRESSURE: 80 MMHG | OXYGEN SATURATION: 98 % | HEART RATE: 108 BPM

## 2017-12-08 DIAGNOSIS — R06.00 DYSPNEA, UNSPECIFIED TYPE: ICD-10-CM

## 2017-12-08 DIAGNOSIS — Z98.84 H/O LAPAROSCOPIC ADJUSTABLE GASTRIC BANDING: ICD-10-CM

## 2017-12-08 DIAGNOSIS — E66.01 MORBID OBESITY DUE TO EXCESS CALORIES (H): ICD-10-CM

## 2017-12-08 DIAGNOSIS — I10 HYPERTENSION GOAL BP (BLOOD PRESSURE) < 140/90: Primary | ICD-10-CM

## 2017-12-08 PROCEDURE — 99214 OFFICE O/P EST MOD 30 MIN: CPT | Performed by: FAMILY MEDICINE

## 2017-12-08 PROCEDURE — 80053 COMPREHEN METABOLIC PANEL: CPT | Performed by: FAMILY MEDICINE

## 2017-12-08 PROCEDURE — 93000 ELECTROCARDIOGRAM COMPLETE: CPT | Performed by: FAMILY MEDICINE

## 2017-12-08 PROCEDURE — 84443 ASSAY THYROID STIM HORMONE: CPT | Performed by: FAMILY MEDICINE

## 2017-12-08 PROCEDURE — 36415 COLL VENOUS BLD VENIPUNCTURE: CPT | Performed by: FAMILY MEDICINE

## 2017-12-08 RX ORDER — LOSARTAN POTASSIUM 25 MG/1
25 TABLET ORAL DAILY
Qty: 90 TABLET | Refills: 3 | Status: SHIPPED | OUTPATIENT
Start: 2017-12-08 | End: 2018-01-04

## 2017-12-08 ASSESSMENT — ANXIETY QUESTIONNAIRES
IF YOU CHECKED OFF ANY PROBLEMS ON THIS QUESTIONNAIRE, HOW DIFFICULT HAVE THESE PROBLEMS MADE IT FOR YOU TO DO YOUR WORK, TAKE CARE OF THINGS AT HOME, OR GET ALONG WITH OTHER PEOPLE: NOT DIFFICULT AT ALL
6. BECOMING EASILY ANNOYED OR IRRITABLE: NOT AT ALL
7. FEELING AFRAID AS IF SOMETHING AWFUL MIGHT HAPPEN: NOT AT ALL
1. FEELING NERVOUS, ANXIOUS, OR ON EDGE: NOT AT ALL
5. BEING SO RESTLESS THAT IT IS HARD TO SIT STILL: NOT AT ALL
GAD7 TOTAL SCORE: 0
3. WORRYING TOO MUCH ABOUT DIFFERENT THINGS: NOT AT ALL
2. NOT BEING ABLE TO STOP OR CONTROL WORRYING: NOT AT ALL

## 2017-12-08 ASSESSMENT — PATIENT HEALTH QUESTIONNAIRE - PHQ9
5. POOR APPETITE OR OVEREATING: NOT AT ALL
SUM OF ALL RESPONSES TO PHQ QUESTIONS 1-9: 2

## 2017-12-08 NOTE — PATIENT INSTRUCTIONS
Due to your SOB, heart racing and swelling we will consider getting an Echocardiogram.   We will begin Losartan for your blood pressure.   Stop amlodipine  Decrease carbohydrates  Consider consult with endocrinology and/or bariatric center again      [facility]   Discharged by : Mone SAMPSON CMA ([facility])    Paper scripts provided to patient : none      If you have any questions regarding your visit please contact your care team:     Team Gold                Clinic Hours Telephone Number     Dr. Dulce Johnson 7am-7pm  Monday - Thursday   7am-5pm  Fridays  [phone]   (Appointment scheduling available 24/7)     RN Line  [phone] option 2     Urgent Care - [place] - 11am-9pm Monday-Friday Saturday-Sunday- 9am-5pm     [place] -   5pm-9pm Monday-Friday Saturday-Sunday- 9am-5pm    [phone] - [place]    [phone] - [place]       For a Price Quote for your services, please call our Consumer Price Line at [phone].     What options do I have for visits at the clinic other than the traditional office visit?     To expand how we care for you, many of our providers are utilizing electronic visits (e-visits) and telephone visits, when medically appropriate, for interactions with their patients rather than a visit in the clinic. We also offer nurse visits for many medical concerns. Just like any other service, we will bill your insurance company for this type of visit based on time spent on the phone with your provider. Not all insurance companies cover these visits. Please check with your medical insurance if this type of visit is covered. You will be responsible for any charges that are not paid by your insurance.   E-visits via [email or web address]: generally incur a $35.00 fee.     Telephone visits:   Time spent on the phone: *charged based on time that is spent on the phone in  increments of 10 minutes. Estimated cost:   5-10 mins $30.00   11-20 mins. $59.00   21-30 mins. $85.00     Use K[employer]Ex (secure email communication and access to your chart) to send your primary care provider a message or make an appointment. Ask someone on your Team how to sign up for KlickEx.     As always, Thank you for trusting us with your health care needs!      [facility]:    Scheduling Appointments  [place] [place]  Call: [phone]    Phyllis Mcnamara [facility]  Call: [phone]    [facility]  Call: [phone]    For Gastroenterology referrals   [facility]   Clinics and Surgery Center, 4th Floor   [address] [place] [address]   Appointments: [phone]    WHERE TO GO FOR CARE?  Clinic    Make an appointment if you:       Are sick (cold, cough, flu, sore throat, earache or in pain).       Have a small injury (sprain, small cut, burn or broken bone).       Need a physical exam, Pap smear, vaccine or prescription refill.       Have questions about your health or medicines.    To reach us:      Call [phone] ([phone]). Open 24 hours every day. (For counseling services, call [phone].)    Log into KlickEx at [email or web address]. (Visit [email or web address] to create an account.) Hospital emergency room    An emergency is a serious or life- threatening problem that must be treated right away.    Call 316 or get to the hospital if you have:      Very bad or sudden:            - Chest pain or pressure         - Bleeding         - Head or belly pain         - Dizziness or trouble seeing, walking or                          Speaking      Problems breathing      Blood in your vomit or you are coughing up blood      A major injury (knocked out, loss of a finger or limb, rape, broken bone protruding from skin)    A mental health crisis. (Or call the Mental Health Crisis line at 1-579.639.3242  or Suicide Prevention Hotline at 1-808.562.2086.)    Open 24 hours every day. You don't need an appointment.     Urgent care    Visit urgent care for sickness or small injuries when the clinic is closed. You don't need an appointment. To check hours or find an urgent care near you, visit [email or web address]. Online care    Get online care from [facility] for more than 70 common problems, like colds, allergies and infections. Open 24 hours every day at:   [email or web address]   Need help deciding?    For advice about where to be seen, you may call your clinic and ask to speak with a nurse. We're here for you 24 hours every day.         If you are deaf or hard of hearing, please let us know. We provide many free services including sign language interpreters, oral interpreters, TTYs, telephone amplifiers, note takers and written materials.

## 2017-12-08 NOTE — NURSING NOTE
"Chief Complaint   Patient presents with     Chronic Disease Management     Edema     Weight Problem     Tachycardia       Initial /80 (BP Location: Right arm, Patient Position: Sitting, Cuff Size: Adult Large)  Pulse 108  Temp 98.3  F (36.8  C) (Oral)  Ht 5' 5\" (1.651 m)  Wt 269 lb 3.2 oz (122.1 kg)  SpO2 98%  BMI 44.8 kg/m2 Estimated body mass index is 44.8 kg/(m^2) as calculated from the following:    Height as of this encounter: 5' 5\" (1.651 m).    Weight as of this encounter: 269 lb 3.2 oz (122.1 kg).  Medication Reconciliation: complete    "

## 2017-12-08 NOTE — PROGRESS NOTES
SUBJECTIVE:   Raffy Castelan is a 42 year old female who presents to clinic today for the following health issues:      Hypertension Follow-up      Outpatient blood pressures are being checked at home- sometimes- but not recently- 160's/100's    Low Salt Diet: not monitoring salt    Depression and Anxiety Follow-Up    Status since last visit: No change    Other associated symptoms:None    Complicating factors:     Significant life event: No     Current substance abuse: None    PHQ-9 Score and MyChart F/U Questions 4/1/2016   Total Score 3   Q9: Suicide Ideation Not at all     JENNY-7 SCORE 4/1/2016   Total Score 0     PHQ-9  English  PHQ-9   Any Language  GAD7  Suicide Assessment Five-step Evaluation and Treatment (SAFE-T)    Asthma Follow-Up    Was ACT completed today?    Yes    ACT Total Scores 4/28/2017   ACT TOTAL SCORE -   ASTHMA ER VISITS -   ASTHMA HOSPITALIZATIONS -   ACT TOTAL SCORE (Goal Greater than or Equal to 20) 25   In the past 12 months, how many times did you visit the emergency room for your asthma without being admitted to the hospital? 0   In the past 12 months, how many times were you hospitalized overnight because of your asthma? 0       Recent asthma triggers that patient is dealing with: None      Amount of exercise or physical activity:  Daily walks    Problems taking medications regularly: No    Medication side effects: none  Diet: regular (no restrictions)    Concern - Swelling with legs/feet  Onset: Couple months    Description:   Has noticed swelling with legs and feet.    Intensity: severe    Progression of Symptoms:  Improving- slightly    Accompanying Signs & Symptoms:  Weight gain-- just had IUD removed on Monday. Some shortness of breath, tachycardia, numbness with hands/fingers at times but not sure if related    Previous history of similar problem:   During pregnancy    Precipitating factors:   Worsened by: walking    Alleviating factors:  Improved by: None    Therapies Tried and  "outcome: None      Leg swelling:  She has noticed feet swelling recently. Has had to change shoes sizes from an 8 to a 10.5 wide due to swelling. She states that this can be improved slightly with walking more frequently.    Weight gain: Has put on 20 lbs in the last year without making significant changes in her lifestyle. She notes she is not eating the healthiest but is trying to watch portions. Tries to stay between 2000 and 2500 calories per day but has not been watching this lately. Believes that she may have lost a few pounds since she has her IUD removed. Has seen a weight loss physician who prescribed some weight loss medications which have not helped or caused unwanted side effects and are not covered by her insurance.     Heart: Feels like her heart is racing recently. Has a watch that reads her heartbeat and has seen 166 beats per minute when she walks short distances which is causing her to be concerned.  No chest pain.  Respiratory: More SOB in the past few months but believe it is related to weight gain.    Headache: Has some consistent headaches for a few weeks in her neck but believes it was because she was sleeping wrong. These have since resolved.  Medication:  -HCTZ: No longer on this because she felt it \"in her bones\" and it also gave her urinary urgency. She states it worked but decreased her nutrients and she had to be on supplements.   -Amlodipine: She recently decreased to 5 mg in the past few weeks.  -Does not tolerate lisinopril.     Stress: Less stress at work lately but some at home with her kids.    Problem list and histories reviewed & adjusted, as indicated.  Additional history: as documented    Patient Active Problem List   Diagnosis     Chronic constipation     CARDIOVASCULAR SCREENING; LDL GOAL LESS THAN 160     Family history of breast cancer     GERD (gastroesophageal reflux disease)     BMI >40     Vitamin D deficiency     Psoriasis     Intermittent asthma     Bariatric surgery " status     H/O laparoscopic adjustable gastric banding     Diaphragmatic hernia     Hypertension goal BP (blood pressure) < 140/90     Anxiety     Chronic left shoulder pain     Impingement syndrome, shoulder, left     Past Surgical History:   Procedure Laterality Date     BIOPSY       COLONOSCOPY       ESOPHAGOSCOPY, GASTROSCOPY, DUODENOSCOPY (EGD), COMBINED  7/30/2013    Procedure: COMBINED ESOPHAGOSCOPY, GASTROSCOPY, DUODENOSCOPY (EGD), BIOPSY SINGLE OR MULTIPLE;;  Surgeon: Wilson Fournier MD;  Location:  GI     ESOPHAGOSCOPY, GASTROSCOPY, DUODENOSCOPY (EGD), COMBINED  3/24/2014    Procedure: COMBINED ESOPHAGOSCOPY, GASTROSCOPY, DUODENOSCOPY (EGD);  Surgeon: Wilson Fournier MD;  Location:  GI     HERNIA REPAIR       LAPAROSCOPIC GASTRIC ADJUSTABLE BANDING  5/27/2014    Procedure: LAPAROSCOPIC GASTRIC ADJUSTABLE BANDING;  Surgeon: Wilson Fournier MD;  Location:  OR     no history of surgery         Social History   Substance Use Topics     Smoking status: Never Smoker     Smokeless tobacco: Never Used     Alcohol use No     Family History   Problem Relation Age of Onset     Blood Disease Mother      anemia     HEART DISEASE Mother      Hypertension Mother      Hyperlipidemia Mother      Anxiety Disorder Mother      OSTEOPOROSIS Mother      Genetic Disorder Mother      CEREBROVASCULAR DISEASE Father      Hypertension Father      DIABETES Father      type  2     Breast Cancer Maternal Aunt      Breast Cancer Paternal Aunt      CANCER Paternal Aunt      stomach     Breast Cancer Other      Blood Disease Other      son with alpha thalessemia/mother alpha thalasemia trait     Breast Cancer Other      Breast Cancer Other      Breast Cancer Cousin          Current Outpatient Prescriptions   Medication Sig Dispense Refill     amLODIPine (NORVASC) 10 MG tablet Take 1 tablet (10 mg) by mouth daily (Patient taking differently: Take 10 mg by mouth daily Taking 1/2 tablet for a couple weeks) 90 tablet 3      "clobetasol propionate 0.05 % FOAM Apply sparingly to affected area twice daily as needed.  Do not apply to face. 600 g 1     senna (SENNA LAXATIVE) 8.6 MG tablet Take 1 tablet by mouth daily       multivitamin, therapeutic with minerals (MULTI-VITAMIN) TABS Take 1 tablet by mouth daily.       albuterol (PROAIR HFA/PROVENTIL HFA/VENTOLIN HFA) 108 (90 BASE) MCG/ACT Inhaler Inhale 2 puffs into the lungs every 6 hours as needed for shortness of breath / dyspnea or wheezing       Allergies   Allergen Reactions     Lisinopril Cough       Reviewed and updated as needed this visit by clinical staffTobacco  Allergies  Meds  Med Hx  Surg Hx  Fam Hx  Soc Hx      Reviewed and updated as needed this visit by Provider       ROS:  Constitutional, HEENT, cardiovascular, pulmonary, GI, , musculoskeletal, neuro, skin, endocrine and psych systems are negative, except as otherwise noted.    This document serves as a record of the services and decisions personally performed and made by Vonnie Baugh MD. It was created on her behalf by Gladis Rondon, a trained medical scribe. The creation of this document is based the provider's statements to the medical scribe.    Gladis Rondon December 8, 2017 2:20 PM    OBJECTIVE:   /80 (BP Location: Right arm, Patient Position: Sitting, Cuff Size: Adult Large)  Pulse 108  Temp 98.3  F (36.8  C) (Oral)  Ht 1.651 m (5' 5\")  Wt 122.1 kg (269 lb 3.2 oz)  SpO2 98%  BMI 44.8 kg/m2  Body mass index is 44.8 kg/(m^2).  GENERAL: morbid obesity, alert and no distress  RESP: lungs clear to auscultation - no rales, rhonchi or wheezes  CV: regular rate and rhythm, normal S1 S2, no S3 or S4, no murmur, click or rub, no peripheral edema and peripheral pulses strong  SKIN: Trace non-pitting edema to bilateral lower extremities.  PSYCH: mentation appears normal, affect normal/bright    Diagnostic Test Results:  No results found for this or any previous visit (from the past 24 " hour(s)).    ASSESSMENT/PLAN:     1. Hypertension goal BP (blood pressure) < 140/90  Stop amlodipine due to swelling. Start Losartan.     Common side effects of medications prescribed at this visit were discussed with the patient. Severe side effects, including current applicable black box warnings, were discussed. We discussed options for dealing with these possible side effects and allergic reactions, based on their severity.    - EKG 12-lead complete w/read - Clinics  - Comprehensive metabolic panel (BMP + Alb, Alk Phos, ALT, AST, Total. Bili, TP)  - Echocardiogram Complete; Future  - losartan (COZAAR) 25 MG tablet; Take 1 tablet (25 mg) by mouth daily  Dispense: 90 tablet; Refill: 3  Will need RN HTN follow up in 2-3 weeks    2. BMI >40  Has gained weight back.  She is eating about 2400 calories/day  I recommend consult again with nutritionist, consider consult with bariatric surgery team and/or endocrine      - TSH with free T4 reflex  - Echocardiogram Complete; Future    3. H/O laparoscopic adjustable gastric banding  Has gained weight back.  She is eating about 2400 calories/day  I recommend consult again with nutritionist, consider consult with bariatric surgery team and/or endocrine    4. Dyspnea, unspecified type  Patient feels this is connected to her weight gain.  She reports it doesn't feel anything like her asthma symptoms.  - Echocardiogram Complete; Future    FUTURE APPOINTMENTS:       - Follow-up visit pending lab results and echo.    Vonnie Baugh MD  [facility]    The information in this document, created by the medical scribe for me, accurately reflects the services I personally performed and the decisions made by me. I have reviewed and approved this document for accuracy prior to leaving the patient care area.

## 2017-12-09 LAB
ALBUMIN SERPL-MCNC: 3.3 G/DL (ref 3.4–5)
ALP SERPL-CCNC: 50 U/L (ref 40–150)
ALT SERPL W P-5'-P-CCNC: 21 U/L (ref 0–50)
ANION GAP SERPL CALCULATED.3IONS-SCNC: 6 MMOL/L (ref 3–14)
AST SERPL W P-5'-P-CCNC: 13 U/L (ref 0–45)
BILIRUB SERPL-MCNC: 0.2 MG/DL (ref 0.2–1.3)
BUN SERPL-MCNC: 12 MG/DL (ref 7–30)
CALCIUM SERPL-MCNC: 9 MG/DL (ref 8.5–10.1)
CHLORIDE SERPL-SCNC: 101 MMOL/L (ref 94–109)
CO2 SERPL-SCNC: 29 MMOL/L (ref 20–32)
CREAT SERPL-MCNC: 0.74 MG/DL (ref 0.52–1.04)
GFR SERPL CREATININE-BSD FRML MDRD: 85 ML/MIN/1.7M2
GLUCOSE SERPL-MCNC: 128 MG/DL (ref 70–99)
POTASSIUM SERPL-SCNC: 3.7 MMOL/L (ref 3.4–5.3)
PROT SERPL-MCNC: 8.1 G/DL (ref 6.8–8.8)
SODIUM SERPL-SCNC: 136 MMOL/L (ref 133–144)
TSH SERPL DL<=0.005 MIU/L-ACNC: 0.84 MU/L (ref 0.4–4)

## 2017-12-09 ASSESSMENT — ASTHMA QUESTIONNAIRES: ACT_TOTALSCORE: 25

## 2017-12-09 ASSESSMENT — ANXIETY QUESTIONNAIRES: GAD7 TOTAL SCORE: 0

## 2017-12-15 ENCOUNTER — RADIANT APPOINTMENT (OUTPATIENT)
Dept: CARDIOLOGY | Facility: CLINIC | Age: 42
End: 2017-12-15
Attending: FAMILY MEDICINE
Payer: COMMERCIAL

## 2017-12-15 DIAGNOSIS — E66.01 MORBID OBESITY DUE TO EXCESS CALORIES (H): ICD-10-CM

## 2017-12-15 DIAGNOSIS — I10 HYPERTENSION GOAL BP (BLOOD PRESSURE) < 140/90: ICD-10-CM

## 2017-12-15 DIAGNOSIS — R06.00 DYSPNEA, UNSPECIFIED TYPE: ICD-10-CM

## 2017-12-15 PROCEDURE — 93306 TTE W/DOPPLER COMPLETE: CPT | Mod: GC | Performed by: INTERNAL MEDICINE

## 2017-12-17 ENCOUNTER — HEALTH MAINTENANCE LETTER (OUTPATIENT)
Age: 42
End: 2017-12-17

## 2017-12-24 ENCOUNTER — MYC MEDICAL ADVICE (OUTPATIENT)
Dept: FAMILY MEDICINE | Facility: CLINIC | Age: 42
End: 2017-12-24

## 2017-12-26 NOTE — TELEPHONE ENCOUNTER
Route to PCP to advise.  Returned call to patient; she reports her heart rate is still high per her Fitbit, resting is around 86, then increases to between 110-120 with walking/light activity.  She reports having a headache a couple of times, and when she checked, BP was 150's/100, then she doubled her dose of Losartan to 50mg and BP decreased to  140's/90.  She is more SOB with moderate activity, like going up stairs or for brisk walks, states this is likely from her weight gain.  She denies any SOB at this time, denies chest pain, dizziness or severe headaches.  She would like Dr. Baugh to advise, is aware Dr. Baugh is out until later this week and denies the need for an appt in the meantime.   iLzy Mcgill RN

## 2017-12-27 NOTE — TELEPHONE ENCOUNTER
RN HTN appointment recommended as she will need labs due to new start of losartan and dose changes.  Vonnie Baugh MD

## 2018-01-04 ENCOUNTER — ALLIED HEALTH/NURSE VISIT (OUTPATIENT)
Dept: NURSING | Facility: CLINIC | Age: 43
End: 2018-01-04
Payer: COMMERCIAL

## 2018-01-04 VITALS
WEIGHT: 265 LBS | DIASTOLIC BLOOD PRESSURE: 80 MMHG | BODY MASS INDEX: 42.59 KG/M2 | SYSTOLIC BLOOD PRESSURE: 120 MMHG | HEART RATE: 98 BPM | HEIGHT: 66 IN

## 2018-01-04 DIAGNOSIS — I10 HYPERTENSION GOAL BP (BLOOD PRESSURE) < 140/90: ICD-10-CM

## 2018-01-04 PROCEDURE — 99207 ZZC NO CHARGE NURSE ONLY: CPT

## 2018-01-04 RX ORDER — LOSARTAN POTASSIUM 50 MG/1
50 TABLET ORAL DAILY
Qty: 90 TABLET | Refills: 1 | Status: SHIPPED | OUTPATIENT
Start: 2018-01-04 | End: 2018-02-02

## 2018-01-04 NOTE — PROGRESS NOTES
Raffy Castelan is being followed for Blood Pressure management.    NURSING ASSESSMENT/PLAN:  Blood pressure reading today is at the provider specified goal of <130/80.    Current blood pressure medication(s):  Losartan taking 50 mg last 3 days not 25 as prescribed, so we will stay on this dose.  1.  The patient will continue current medication regimen unchanged. The 50 mg she increased to!!     2.  We will not be checking a metabolic lab panel today.  no    3.  Follow up instructions include:     Next Provider visit: Follow up in 1 month..    SUBJECTIVE:                                                    The patient is not taking medication as prescribed and is tolerating well.   Patient is monitoring Blood Pressure at home.   Last 3 home readings 153/95 155/95   In addition notes:     Out of the following complicating factors: Cough, Headache, Lightheadedness, Shortness of breath, Fatigue, Nausea, Sexual Dysfunction, New onset of swelling or edema, Weakness and New onset of Chest Pain, the patient reports:  None    OBJECTIVE:                                                    Is pulse 55 or greater? - Yes  Pulse Readings from Last 1 Encounters:   12/08/17 108     Today's BP completed using cuff size: large on right side arm.  BP Readings from Last 3 Encounters:   12/08/17 140/80   12/04/17 144/82   04/28/17 138/80       Current Outpatient Prescriptions   Medication Sig Dispense Refill     losartan (COZAAR) 25 MG tablet Take 1 tablet (25 mg) by mouth daily 90 tablet 3     albuterol (PROAIR HFA/PROVENTIL HFA/VENTOLIN HFA) 108 (90 BASE) MCG/ACT Inhaler Inhale 2 puffs into the lungs every 6 hours as needed for shortness of breath / dyspnea or wheezing       clobetasol propionate 0.05 % FOAM Apply sparingly to affected area twice daily as needed.  Do not apply to face. 600 g 1     senna (SENNA LAXATIVE) 8.6 MG tablet Take 1 tablet by mouth daily       multivitamin, therapeutic with minerals (MULTI-VITAMIN) TABS Take 1  tablet by mouth daily.       Potassium   Date Value Ref Range Status   12/08/2017 3.7 3.4 - 5.3 mmol/L Final     Creatinine   Date Value Ref Range Status   12/08/2017 0.74 0.52 - 1.04 mg/dL Final     Urea Nitrogen   Date Value Ref Range Status   12/08/2017 12 7 - 30 mg/dL Final     GFR Estimate   Date Value Ref Range Status   12/08/2017 85 >60 mL/min/1.7m2 Final     Comment:     Non  GFR Calc      A baseline potassium, creatinine, BUN, GFR has been done within past 12 months    Education:  general discussion/verbal explanation  Limit dietary sodium intake between 1500-2000mg every day  Regular aerobic exercise.  Discussed habit change regarding diet/weight loss and patient activation   These interventions were used: Empathy/Understanding and Permission to raise concern or advise  Education material provided and patient was given an opportunity to ask questions.    Patient verbalized understanding of this plan and is agreeable.    Professional Reference click here   Copy of current RN HTN MGMT order or refer to Other Orders:  Blood pressure goal:  <130/80    Cozaar (Losartan) starting dose 25 mg may be titrated to 100 mg.  Increase by doubling mg every 2 to 3 weeks.  Usual dose 50 mg to 100 mg orally once daily.  Most common side effect:  cough     Potassium  Creatinine  At 1st RN Visit  Two weeks after dose change    Nurse enter orders for lab draw.    If blood pressure not at goal after current medication titrated, the following medication(s) may be added and titrated.    No further medications ordered.    None    If blood pressure not at goal after maximum dose reached, consult provider while patient in clinic.    Nurse enter orders for lab draw.    Provider has reviewed contraindications and dosing considerations for current and recommended medications.  Yes    See provider 6 months from date of last appointment    Once at goal, recheck BP with nurse in 30 days.  Then if still at goal, see  provider in 6 months for hypertension follow up.  If not at goal at 30 day follow up, consult provider.  Dosage adjustment made based on patient specific, physician directed, care plan.  Birgit Paul RN

## 2018-01-04 NOTE — PATIENT INSTRUCTIONS
PATIENT INSTRUCTIONS     1.  You will continue current medication regimen unchanged    2.  FOLLOW UP:   Follow up with: provider inone month as scheduled    3.  Limit total daily sodium to 1500-2000mg per day    Your BP Goal is: less than <130/80 consistently    Today we used a large cuff on your right arm.    BP Readings from Last 3 Encounters:   18 120/80   17 140/80   17 144/82     Pulse Readings from Last 1 Encounters:   18 98       You will need baseline lab tests done within 12 months of beginning a new blood pressure medication and during/after medication adjustment is complete.    Last Labs:  Sodium   Date Value Ref Range Status   2017 136 133 - 144 mmol/L Final      Potassium   Date Value Ref Range Status   2017 3.7 3.4 - 5.3 mmol/L Final     Urea Nitrogen   Date Value Ref Range Status   2017 12 7 - 30 mg/dL Final     Creatinine   Date Value Ref Range Status   2017 0.74 0.52 - 1.04 mg/dL Final     GFR Estimate   Date Value Ref Range Status   2017 85 >60 mL/min/1.7m2 Final     Comment:     Non  GFR Calc       Today we talked about...     The key to effective blood pressure monitorin) Take your BP medication in the morning or as directed.     2) Sit in a chair with feet flat on the floor for 5-10 minutes before checking your blood pressure.     3) Use the same arm every time.       4) Use the same machine and appropriate cuff size.       5) Check your blood pressure at the same time of day.    High Blood Pressure      Over time, high blood pressure can damage the blood vessels and vital organs. This can lead to strokes, heart disease, and kidney disease. Treating high blood pressure decreases your chances of having heart and kidney problems.  A healthy diet low in sodium and high in grains along with regular exercise have a direct effect on your blood pressure.      Several kinds of medicines are used to treat high blood pressure.  The medicines may be effective alone, or they may be used with other drugs.     How do high blood pressure medicines work?   Each type of medicine lowers blood pressure in a different way.     Diuretics: also called water pills, rid the body of excess sodium (salt) and water. This means there is less blood volume putting pressure on the vessel walls.   Examples include: hydrochlorothiazide or furosemide (Lasix).     Beta blockers: reduce the heart rate and the heart's output of blood.  Slowing the heart down a bit allows it to fill more completely in between beats.  Examples include: metoprolol, atenolol, and propranolol.     Vasodilators, ACE inhibitors, ARBs, and calcium channel blockers: lower blood pressure by relaxing and opening up narrowed blood vessels allowing more room for blood to flow.   Examples:  -Calcium Channel Blockers: diltiazem, verapamil, nifedipine, and amlodipine .     -ACE inhibitors (angiotensin-converting enzyme inhibitor): enalapril, captopril and lisinopril.     -ARBs (angiotensin receptor blockers)- irbesartan, valsartan and losartan.      -Vasodilators- nitroglycerin, isosorbide mononitrate, and hydralazine.     What should I watch out for while taking high blood pressure medicines?   When you take high blood pressure medicine, it is important to:     Take the medicine regularly, exactly as prescribed. Do not change your dosage or stop taking the medicine without talking to your provider first. It can be dangerous to suddenly stop taking blood pressure medicine.     Tell your provider about any side effects right away. You may feel dizzy or have headaches while taking these medicines. Older people may be more sensitive than younger people to the medicine's effects. It may take several weeks or months to find the best treatment for you. Make sure that you keep your follow-up appointments with your healthcare provider and let your provider know how you are tolerating your medicine.      Check your blood pressure (or have it checked) as often as your healthcare provider advises. Ask your provider if you should have a home blood pressure monitor to check your blood pressure between visits.    Does everyone need to take medication to control high blood pressure?  No.  Some patients with elevated BP readings are able to lower their blood pressure by modifying diet and increasing activity level.    Your pharmacist is a great resource for questions regarding your medication's side effects and potential interactions.     If you are having a side effect from your medication, please contact your primary provider.     If you believe you are experiencing a life threatening reaction to your medication call 911 immediately.     Physical activity not only helps control your blood pressure but also helps manage your weight, strengthen your heart and manage your stress level.  Any activity you can add to your day is helpful.  American Heart Association has some good suggestions at http://www.americanheart.org/presenter.jhtml?mtuobmvxzm=2828567    Ideally you will work your way up to 30 minutes of moderate exercise - such as brisk walking - per day, at least 5 days a week.  If you want to take three 10 minute walks, or two 15 minute brisk walks, per day that is ok.  You re working too hard if you get out of breath quickly and have to stop to catch your breath or short sentences feel like a strain.    Warming up before and cooling down after exercising helps decrease your risk of injury and soreness.  A good rule of thumb to remember is,  Do slowly what you are about to do quickly.   Don't forget to breathe!  Holding your breath can increase your blood pressure.  Find your rhythm!  The following information is from the National Kid[employer] Foundation.  Http://www.kidney.org/kidneyDisease/howkidneyswrk.cfm    Also helpful is this You Tube video clip:  http://www.youPagoPago.com/watch?v=CwlFzd8H8Ai&feature=related    Why  are my kidneys so important?  The kidneys are powerful chemical factories that perform the following functions:  remove waste products from the body   remove drugs from the body   balance the body's fluids (wherever sodium goes, water follows)  release hormones that regulate blood pressure   produce an active form of vitamin D that promotes strong, healthy bones  control the production of red blood cells    Where Are the Kidneys and How Do They Function?  There are two kidneys, each about the size of a fist, located on either side of the spine at the lowest level of the rib cage.                   Each kidney contains up to a million functioning units called nephrons.                   A nephron consists of a filtering unit of tiny blood vessels called a glomerulus.              The glomerulus is the main filter of the nephron and resembles a twisted mass of tiny tubes through which the blood passes. It allows water and other things like creatinine and urea nitrogen to be filtered out and excreted in the urine.  Typically, protein and glucose (sugar) molecules are too large to pass through the glomerulus filter.                       The nephrons drain into the main part of the kidney and down into the bladder.                        The kidneys perform their life-sustaining job of filtering and returning to the bloodstream about 200 quarts of fluid every 24 hours.  Two quarts are removed from the body in the form of urine, and about 198 quarts are recovered. The urine we excrete has been stored in the bladder for anywhere from 1 to 8 hours.  Limiting your intake of salt (sodium) to 1,500-2,000mg per day will help lower your blood pressure.  One great way to do that is using spices for flavor and reading labels to spot hidden sodium in processed and canned foods.  For more information and some great recipe ideas you can visit [employer]OW[employer]  salt free seasoning at http://www.Nezasa/?s_cid=c3a:google:brand:valeriano   (She even has a FACEBOOK page).    We also talked about portion control and how adding more whole grains, fresh fruits and vegetables can help you lose weight which has a direct effect on lowering your blood pressure.  Try taking the  Portion Distortion Quiz  at http://kh4774.nhlbihin.net/portion/portion.cgi?action=question&number=1 it s only 11 questions.    Think of meat as a side dish rather than the main course by loading up on veggies and healthy grains.  You ll feel full sooner and longer.  This is a great opportunity to try new vegetables and preparation methods.  Have fun with this!    Birgit Paul, RN

## 2018-01-04 NOTE — MR AVS SNAPSHOT
After Visit Summary   2018    Raffy Castelan    MRN: [id number]           Patient Information     Date Of Birth          1975        Visit Information        Provider Department      2018 1:00 PM NE RN [facility]        Today's Diagnoses     Hypertension goal BP (blood pressure) < 140/90          Care Instructions    PATIENT INSTRUCTIONS     1.  You will continue current medication regimen unchanged    2.  FOLLOW UP:   Follow up with: provider    3.  Limit total daily sodium to 1500-2000mg per day    Your BP Goal is: less than <130/80 consistently    Today we used a large cuff on your right arm.    BP Readings from Last 3 Encounters:   18 120/80   17 140/80   17 144/82     Pulse Readings from Last 1 Encounters:   18 98       You will need baseline lab tests done within 12 months of beginning a new blood pressure medication and during/after medication adjustment is complete.    Last Labs:  Sodium   Date Value Ref Range Status   2017 136 133 - 144 mmol/L Final      Potassium   Date Value Ref Range Status   2017 3.7 3.4 - 5.3 mmol/L Final     Urea Nitrogen   Date Value Ref Range Status   2017 12 7 - 30 mg/dL Final     Creatinine   Date Value Ref Range Status   2017 0.74 0.52 - 1.04 mg/dL Final     GFR Estimate   Date Value Ref Range Status   2017 85 >60 mL/min/1.7m2 Final     Comment:     Non  GFR Calc       Today we talked about...     The key to effective blood pressure monitorin) Take your BP medication in the morning or as directed.     2) Sit in a chair with feet flat on the floor for 5-10 minutes before checking your blood pressure.     3) Use the same arm every time.       4) Use the same machine and appropriate cuff size.       5) Check your blood pressure at the same time of day.    High Blood Pressure      Over time, high blood pressure can damage the blood vessels and vital organs.  This can lead to strokes, heart disease, and kidney disease. Treating high blood pressure decreases your chances of having heart and kidney problems.  A healthy diet low in sodium and high in grains along with regular exercise have a direct effect on your blood pressure.      Several kinds of medicines are used to treat high blood pressure. The medicines may be effective alone, or they may be used with other drugs.     How do high blood pressure medicines work?   Each type of medicine lowers blood pressure in a different way.     Diuretics: also called water pills, rid the body of excess sodium (salt) and water. This means there is less blood volume putting pressure on the vessel walls.   Examples include: hydrochlorothiazide or furosemide (Lasix).     Beta blockers: reduce the heart rate and the heart's output of blood.  Slowing the heart down a bit allows it to fill more completely in between beats.  Examples include: metoprolol, atenolol, and propranolol.     Vasodilators, ACE inhibitors, ARBs, and calcium channel blockers: lower blood pressure by relaxing and opening up narrowed blood vessels allowing more room for blood to flow.   Examples:  -Calcium Channel Blockers: diltiazem, verapamil, nifedipine, and amlodipine .     -ACE inhibitors (angiotensin-converting enzyme inhibitor): enalapril, captopril and lisinopril.     -ARBs (angiotensin receptor blockers)- irbesartan, valsartan and losartan.      -Vasodilators- nitroglycerin, isosorbide mononitrate, and hydralazine.     What should I watch out for while taking high blood pressure medicines?   When you take high blood pressure medicine, it is important to:     Take the medicine regularly, exactly as prescribed. Do not change your dosage or stop taking the medicine without talking to your provider first. It can be dangerous to suddenly stop taking blood pressure medicine.     Tell your provider about any side effects right away. You may feel dizzy or have  headaches while taking these medicines. Older people may be more sensitive than younger people to the medicine's effects. It may take several weeks or months to find the best treatment for you. Make sure that you keep your follow-up appointments with your healthcare provider and let your provider know how you are tolerating your medicine.     Check your blood pressure (or have it checked) as often as your healthcare provider advises. Ask your provider if you should have a home blood pressure monitor to check your blood pressure between visits.    Does everyone need to take medication to control high blood pressure?  No.  Some patients with elevated BP readings are able to lower their blood pressure by modifying diet and increasing activity level.    Your pharmacist is a great resource for questions regarding your medication's side effects and potential interactions.     If you are having a side effect from your medication, please contact your primary provider.     If you believe you are experiencing a life threatening reaction to your medication call 911 immediately.     Physical activity not only helps control your blood pressure but also helps manage your weight, strengthen your heart and manage your stress level.  Any activity you can add to your day is helpful.  American Heart Association has some good suggestions at http://www.americanheart.org/presenter.jhtml?thcddyuvcl=9997657    Ideally you will work your way up to 30 minutes of moderate exercise - such as brisk walking - per day, at least 5 days a week.  If you want to take three 10 minute walks, or two 15 minute brisk walks, per day that is ok.  You re working too hard if you get out of breath quickly and have to stop to catch your breath or short sentences feel like a strain.    Warming up before and cooling down after exercising helps decrease your risk of injury and soreness.  A good rule of thumb to remember is,  Do slowly what you are about to do  quickly.   Don't forget to breathe!  Holding your breath can increase your blood pressure.  Find your rhythm!  The following information is from the National Kidney Foundation.  Http://www.kidney.org/kidneyDisease/claudiokidneyswrjonathan.cfm    Also helpful is this You Tube video clip:  http://www.[employer].com/watch?v=UizFxz5A8Hx&feature=related    Why are my kidneys so important?  The kidneys are powerful chemical factories that perform the following functions:  remove waste products from the body   remove drugs from the body   balance the body's fluids (wherever sodium goes, water follows)  release hormones that regulate blood pressure   produce an active form of vitamin D that promotes strong, healthy bones  control the production of red blood cells    Where Are the Kidneys and How Do They Function?  There are two kidneys, each about the size of a fist, located on either side of the spine at the lowest level of the rib cage.                   Each kidney contains up to a million functioning units called nephrons.                   A nephron consists of a filtering unit of tiny blood vessels called a glomerulus.              The glomerulus is the main filter of the nephron and resembles a twisted mass of tiny tubes through which the blood passes. It allows water and other things like creatinine and urea nitrogen to be filtered out and excreted in the urine.  Typically, protein and glucose (sugar) molecules are too large to pass through the glomerulus filter.                       The nephrons drain into the main part of the kidney and down into the bladder.                        The kidneys perform their life-sustaining job of filtering and returning to the bloodstream about 200 quarts of fluid every 24 hours.  Two quarts are removed from the body in the form of urine, and about 198 quarts are recovered. The urine we excrete has been stored in the bladder for anywhere from 1 to 8 hours.  Limiting your intake of salt  (sodium) to 1,500-2,000mg per day will help lower your blood pressure.  One great way to do that is using spices for flavor and reading labels to spot hidden sodium in processed and canned foods.  For more information and some great recipe ideas you can visit MRS[employer] BOWDEN  salt free seasoning at http://www.ISH.[employer]/?s_cid=c3a:google:brand:mrs+dash  (She even has a FACEBOOK page).    We also talked about portion control and how adding more whole grains, fresh fruits and vegetables can help you lose weight which has a direct effect on lowering your blood pressure.  Try taking the  Portion Distortion Quiz  at http://iz5153.nhlbihin.net/portion/portion.cgi?action=question&number=1 it s only 11 questions.    Think of meat as a side dish rather than the main course by loading up on veggies and healthy grains.  You ll feel full sooner and longer.  This is a great opportunity to try new vegetables and preparation methods.  Have fun with this!    Birgit Paul RN            Follow-ups after your visit        Your next 10 appointments already scheduled     Feb 02, 2018 11:20 AM CST   PHYSICAL with Vonnie Baugh MD   [facility]    [address] [place] [id number] [phone]              Who to contact     If you have questions or need follow up information about today's clinic visit or your schedule please contact [facility] directly at [phone].  Normal or non-critical lab and imaging results will be communicated to you by MyChart, letter or phone within 4 business days after the clinic has received the results. If you do not hear from us within 7 days, please contact the clinic through [employer] or phone. If you have a critical or abnormal lab result, we will notify you by phone as soon as possible.  Submit refill requests through [employer] or call your pharmacy and they will forward the refill request to us. Please allow 3  "business days for your refill to be completed.          Additional Information About Your Visit        MyChart Information     [facility] gives you secure access to your electronic health record. If you see a primary care provider, you can also send messages to your care team and make appointments. If you have questions, please call your primary care clinic.  If you do not have a primary care provider, please call [phone] and they will assist you.        Care EveryWhere ID     This is your Care EveryWhere ID. This could be used by other organizations to access your [facility] medical records  [id number]        Your Vitals Were     Pulse Height BMI (Body Mass Index)             98 5' 6\" (1.676 m) 42.77 kg/m2          Blood Pressure from Last 3 Encounters:   01/04/18 120/80   12/08/17 140/80   12/04/17 144/82    Weight from Last 3 Encounters:   01/04/18 265 lb (120.2 kg)   12/08/17 269 lb 3.2 oz (122.1 kg)   12/04/17 270 lb (122.5 kg)              Today, you had the following     No orders found for display         Today's Medication Changes          These changes are accurate as of: 1/4/18  1:50 PM.  If you have any questions, ask your nurse or doctor.               These medicines have changed or have updated prescriptions.        Dose/Directions    losartan 50 MG tablet   Commonly known as:  COZAAR   This may have changed:    - medication strength  - how much to take   Used for:  Hypertension goal BP (blood pressure) < 140/90        Dose:  50 mg   Take 1 tablet (50 mg) by mouth daily   Quantity:  90 tablet   Refills:  1            Where to get your medicines      These medications were sent to [facility] - [place], [place] - [address]  [facility] [address]     Phone:  [phone]     losartan 50 MG tablet                Primary Care Provider Office Phone # Fax #    Vonnie Baugh [phone]       [address] [facility] " [id number]        Equal Access to Services     [facility] : Hadii santana campbell vadim Mobley, wasanchoda luqadaha, qaybta mitchellverngigi howell. So [facility] [phone].    ATENCIÓN: Si habla español, tiene a petersen disposición servicios gratuitos de asistencia lingüística. Amelie al [phone].    We comply with applicable federal civil rights laws and [place] laws. We do not discriminate on the basis of race, color, national origin, age, disability, sex, sexual orientation, or gender identity.            Thank you!     Thank you for choosing [facility]  for your care. Our goal is always to provide you with excellent care. Hearing back from our patients is one way we can continue to improve our services. Please take a few minutes to complete the written survey that you may receive in the mail after your visit with us. Thank you!             Your Updated Medication List - Protect others around you: Learn how to safely use, store and throw away your medicines at www.disposemymeds.org.          This list is accurate as of: 1/4/18  1:50 PM.  Always use your most recent med list.                   Brand Name Dispense Instructions for use Diagnosis    albuterol 108 (90 BASE) MCG/ACT Inhaler    PROAIR HFA/PROVENTIL HFA/VENTOLIN HFA     Inhale 2 puffs into the lungs every 6 hours as needed for shortness of breath / dyspnea or wheezing        clobetasol propionate 0.05 % Foam     600 g    Apply sparingly to affected area twice daily as needed.  Do not apply to face.    Psoriasis       losartan 50 MG tablet    COZAAR    90 tablet    Take 1 tablet (50 mg) by mouth daily    Hypertension goal BP (blood pressure) < 140/90       Multi-vitamin Tabs tablet      Take 1 tablet by mouth daily.        SENNA LAXATIVE 8.6 MG tablet   Generic drug:  senna      Take 1 tablet by mouth daily

## 2018-02-02 ENCOUNTER — OFFICE VISIT (OUTPATIENT)
Dept: FAMILY MEDICINE | Facility: CLINIC | Age: 43
End: 2018-02-02
Payer: COMMERCIAL

## 2018-02-02 VITALS
TEMPERATURE: 98.4 F | HEIGHT: 65 IN | HEART RATE: 80 BPM | DIASTOLIC BLOOD PRESSURE: 80 MMHG | WEIGHT: 270.6 LBS | BODY MASS INDEX: 45.08 KG/M2 | SYSTOLIC BLOOD PRESSURE: 138 MMHG

## 2018-02-02 DIAGNOSIS — E66.01 MORBID OBESITY DUE TO EXCESS CALORIES (H): ICD-10-CM

## 2018-02-02 DIAGNOSIS — Z12.4 SCREENING FOR MALIGNANT NEOPLASM OF CERVIX: ICD-10-CM

## 2018-02-02 DIAGNOSIS — Z80.3 FAMILY HISTORY OF BREAST CANCER: ICD-10-CM

## 2018-02-02 DIAGNOSIS — Z13.6 CARDIOVASCULAR SCREENING; LDL GOAL LESS THAN 160: ICD-10-CM

## 2018-02-02 DIAGNOSIS — I10 HYPERTENSION GOAL BP (BLOOD PRESSURE) < 140/90: ICD-10-CM

## 2018-02-02 DIAGNOSIS — Z12.31 VISIT FOR SCREENING MAMMOGRAM: ICD-10-CM

## 2018-02-02 DIAGNOSIS — Z00.01 ENCOUNTER FOR PREVENTATIVE ADULT HEALTH CARE EXAM WITH ABNORMAL FINDINGS: Primary | ICD-10-CM

## 2018-02-02 PROCEDURE — 87624 HPV HI-RISK TYP POOLED RSLT: CPT | Performed by: FAMILY MEDICINE

## 2018-02-02 PROCEDURE — G0145 SCR C/V CYTO,THINLAYER,RESCR: HCPCS | Performed by: FAMILY MEDICINE

## 2018-02-02 PROCEDURE — 99396 PREV VISIT EST AGE 40-64: CPT | Performed by: FAMILY MEDICINE

## 2018-02-02 RX ORDER — LOSARTAN POTASSIUM 50 MG/1
50 TABLET ORAL DAILY
Qty: 90 TABLET | Refills: 3 | Status: SHIPPED | OUTPATIENT
Start: 2018-02-02 | End: 2019-03-26

## 2018-02-02 NOTE — MR AVS SNAPSHOT
After Visit Summary   2/2/2018    Raffy Castelan    MRN: [id number]           Patient Information     Date Of Birth          1975        Visit Information        Provider Department      2/2/2018 11:20 AM Vonnie Baugh MD [facility]        Today's Diagnoses     Encounter for preventative adult health care exam with abnormal findings    -  1    Screening for malignant neoplasm of cervix        Visit for screening mammogram        Family history of breast cancer        BMI >40        Hypertension goal BP (blood pressure) < 140/90        CARDIOVASCULAR SCREENING; LDL GOAL LESS THAN 160          Care Instructions      Preventive Health Recommendations  Female Ages 40 to 49    Yearly exam:     See your health care provider every year in order to  1. Review health changes.   2. Discuss preventive care.    3. Review your medicines if your doctor prescribed any.      Get a Pap test every three years (unless you have an abnormal result and your provider advises testing more often).      If you get Pap tests with HPV test, you only need to test every 5 years, unless you have an abnormal result. You do not need a Pap test if your uterus was removed (hysterectomy) and you have not had cancer.      You should be tested each year for STDs (sexually transmitted diseases), if you're at risk.       Ask your doctor if you should have a mammogram.      Have a colonoscopy (test for colon cancer) if someone in your family has had colon cancer or polyps before age 50.       Have a cholesterol test every 5 years.       Have a diabetes test (fasting glucose) after age 45. If you are at risk for diabetes, you should have this test every 3 years.    Shots: Get a flu shot each year. Get a tetanus shot every 10 years.     Nutrition:     Eat at least 5 servings of fruits and vegetables each day.    Eat whole-grain bread, whole-wheat pasta and brown rice instead of white grains and rice.    Talk to  your provider about Calcium and Vitamin D.     Lifestyle    Exercise at least 150 minutes a week (an average of 30 minutes a day, 5 days a week). This will help you control your weight and prevent disease.    Limit alcohol to one drink per day.    No smoking.     Wear sunscreen to prevent skin cancer.    See your dentist every six months for an exam and cleaning.  [facility]   Discharged by : Olena Frias MA    Paper scripts provided to patient : no     If you have any questions regarding your visit please contact your care team:     Team Gold                Clinic Hours Telephone Number     Dr. Dulce Johnson 7am-7pm  Monday - Thursday   7am-5pm  Fridays  [phone]   (Appointment scheduling available 24/7)     RN Line  [phone] option 2     Urgent Care - [place] and [place] - 11am-9pm Monday-Friday Saturday-Sunday- 9am-5pm     [place] -   5pm-9pm Monday-Friday Saturday-Sunday- 9am-5pm    [phone] - [place]    [phone] - [place]       For a Price Quote for your services, please call our Consumer Price Line at [phone].     What options do I have for visits at the clinic other than the traditional office visit?     To expand how we care for you, many of our providers are utilizing electronic visits (e-visits) and telephone visits, when medically appropriate, for interactions with their patients rather than a visit in the clinic. We also offer nurse visits for many medical concerns. Just like any other service, we will bill your insurance company for this type of visit based on time spent on the phone with your provider. Not all insurance companies cover these visits. Please check with your medical insurance if this type of visit is covered. You will be responsible for any charges that are not paid by your insurance.   E-visits via Viddler: generally incur a  $35.00 fee.     Telephone visits:   Time spent on the phone: *charged based on time that is spent on the phone in increments of 10 minutes. Estimated cost:   5-10 mins $30.00   11-20 mins. $59.00   21-30 mins. $85.00     Use [employer] (secure email communication and access to your chart) to send your primary care provider a message or make an appointment. Ask someone on your Team how to sign up for [employer].     As always, Thank you for trusting us with your health care needs!      [facility]:    Scheduling Appointments  [facility]  Call: [phone]    [facility] [place]  Call: [phone]    [facility]  Call: [phone]    For Gastroenterology referrals   [facility]   Clinics and Surgery Center, 4th Floor   [address] [place] [address]   Appointments: [phone]    WHERE TO GO FOR CARE?  Clinic    Make an appointment if you:     Are sick (cold, cough, flu, sore throat, earache or in pain).     Have a small injury (sprain, small cut, burn or broken bone).     Need a physical exam, Pap smear, vaccine or prescription refill.     Have questions about your health or medicines.    To reach us:    Call [phone] ([phone]). Open 24 hours every day. (For counseling services, call [phone].)  Log into [employer] at [email or web address] [employer] [email or web address]. (Visit [email or web address] [email or web address] to create an account.) Hospital emergency room    An emergency is a serious or life- threatening problem that must be treated right away.    Call 497 or get to the hospital if you have:    Very bad or sudden:            - Chest pain or pressure         - Bleeding         - Head or belly pain         - Dizziness or trouble seeing, walking or                          Speaking    Problems breathing    Blood in your vomit or you are coughing up blood    A major injury (knocked out, loss of a finger or limb, rape, broken bone protruding  from skin)  A mental health crisis. (Or call the Mental Health Crisis line at 1-727.107.2611 or Suicide Prevention Hotline at 1-687.370.2481.)    Open 24 hours every day. You don't need an appointment.     Urgent care    Visit urgent care for sickness or small injuries when the clinic is closed. You don't need an appointment. To check hours or find an urgent care near you, visit [email or web address]. Online care    Get online care from [facility] for more than 70 common problems, like colds, allergies and infections. Open 24 hours every day at:   [email or web address]   Need help deciding?    For advice about where to be seen, you may call your clinic and ask to speak with a nurse. We're here for you 24 hours every day.         If you are deaf or hard of hearing, please let us know. We provide many free services including sign language interpreters, oral interpreters, TTYs, telephone amplifiers, note takers and written materials.                       Follow-ups after your visit        Additional Services     NUTRITION REFERRAL       Your provider has referred you to: ARSEN: [facility] [phone]   [email or web address]    Please be aware that coverage of these services is subject to the terms and limitations of your health insurance plan.  Call member services at your health plan with any benefit or coverage questions.      Please bring the following with you to your appointment:    (1) This referral request  (2) Any documents given to you regarding this referral  (3) Any specific questions you have about diet and/or food choices                  Future tests that were ordered for you today     Open Future Orders        Priority Expected Expires Ordered    Lipid panel reflex to direct LDL Fasting Routine 2/2/2018 2/2/2019 2/2/2018            Who to contact     If you have questions or need follow up information about today's clinic visit or your schedule please contact  "[facility] directly at [phone].  Normal or non-critical lab and imaging results will be communicated to you by MyChart, letter or phone within 4 business days after the clinic has received the results. If you do not hear from us within 7 days, please contact the clinic through MyChart or phone. If you have a critical or abnormal lab result, we will notify you by phone as soon as possible.  Submit refill requests through [employer] or call your pharmacy and they will forward the refill request to us. Please allow 3 business days for your refill to be completed.          Additional Information About Your Visit        [employer] Information     [employer] gives you secure access to your electronic health record. If you see a primary care provider, you can also send messages to your care team and make appointments. If you have questions, please call your primary care clinic.  If you do not have a primary care provider, please call [phone] and they will assist you.        Care EveryWhere ID     This is your Care EveryWhere ID. This could be used by other organizations to access your [facility] medical records  [id number]        Your Vitals Were     Pulse Temperature Height Last Period BMI (Body Mass Index)       80 98.4  F (36.9  C) (Oral) 5' 5\" (1.651 m) 01/05/2018 45.03 kg/m2        Blood Pressure from Last 3 Encounters:   02/02/18 138/80   01/04/18 120/80   12/08/17 140/80    Weight from Last 3 Encounters:   02/02/18 270 lb 9.6 oz (122.7 kg)   01/04/18 265 lb (120.2 kg)   12/08/17 269 lb 3.2 oz (122.1 kg)              We Performed the Following     HPV High Risk Types DNA Cervical     NUTRITION REFERRAL     Pap imaged thin layer screen with HPV - recommended age 30 - 65 years (select HPV order below)          Where to get your medicines      These medications were sent to [facility] [place] - [address]  [address] [facility] [address]     Phone: "  [phone]     losartan 50 MG tablet          Primary Care Provider Office Phone # Fax #    Vonnie Baugh [phone]       [address] [facility] [address]        Equal Access to Services     IMTIAZ BESS : Sonia campbell malikao Soomaali, waaxda luqadaha, qaybta kaalmada adeegyada, gigi krishin hayaaoscar millernavya arciniega renetta barakat. So [facility] [phone].    ATENCIÓN: Si habla español, tiene a petersen disposición servicios gratuitos de asistencia lingüística. Llame al [phone].    We comply with applicable federal civil rights laws and [place] laws. We do not discriminate on the basis of race, color, national origin, age, disability, sex, sexual orientation, or gender identity.            Thank you!     Thank you for choosing [facility]  for your care. Our goal is always to provide you with excellent care. Hearing back from our patients is one way we can continue to improve our services. Please take a few minutes to complete the written survey that you may receive in the mail after your visit with us. Thank you!             Your Updated Medication List - Protect others around you: Learn how to safely use, store and throw away your medicines at www.disposemymeds.org.          This list is accurate as of 2/2/18 12:45 PM.  Always use your most recent med list.                   Brand Name Dispense Instructions for use Diagnosis    clobetasol propionate 0.05 % Foam     600 g    Apply sparingly to affected area twice daily as needed.  Do not apply to face.    Psoriasis       losartan 50 MG tablet    COZAAR    90 tablet    Take 1 tablet (50 mg) by mouth daily    Hypertension goal BP (blood pressure) < 140/90       Multi-vitamin Tabs tablet      Take 1 tablet by mouth daily.        SENNA LAXATIVE 8.6 MG tablet   Generic drug:  senna      Take 1 tablet by mouth daily

## 2018-02-02 NOTE — PATIENT INSTRUCTIONS
Preventive Health Recommendations  Female Ages 40 to 49    Yearly exam:     See your health care provider every year in order to  1. Review health changes.   2. Discuss preventive care.    3. Review your medicines if your doctor prescribed any.      Get a Pap test every three years (unless you have an abnormal result and your provider advises testing more often).      If you get Pap tests with HPV test, you only need to test every 5 years, unless you have an abnormal result. You do not need a Pap test if your uterus was removed (hysterectomy) and you have not had cancer.      You should be tested each year for STDs (sexually transmitted diseases), if you're at risk.       Ask your doctor if you should have a mammogram.      Have a colonoscopy (test for colon cancer) if someone in your family has had colon cancer or polyps before age 50.       Have a cholesterol test every 5 years.       Have a diabetes test (fasting glucose) after age 45. If you are at risk for diabetes, you should have this test every 3 years.    Shots: Get a flu shot each year. Get a tetanus shot every 10 years.     Nutrition:     Eat at least 5 servings of fruits and vegetables each day.    Eat whole-grain bread, whole-wheat pasta and brown rice instead of white grains and rice.    Talk to your provider about Calcium and Vitamin D.     Lifestyle    Exercise at least 150 minutes a week (an average of 30 minutes a day, 5 days a week). This will help you control your weight and prevent disease.    Limit alcohol to one drink per day.    No smoking.     Wear sunscreen to prevent skin cancer.    See your dentist every six months for an exam and cleaning.  [facility] [place]   Discharged by : Olena Frias MA    Paper scripts provided to patient : no     If you have any questions regarding your visit please contact your care team:     Team Gold                Clinic Hours Telephone Number     Dr. Dulce Baugh Dr.  Jie Johnson 7am-7pm  Monday - Thursday   7am-5pm  Fridays  [phone]   (Appointment scheduling available 24/7)     RN Line  [phone] option 2     Urgent Care - [place] and [facility] - 11am-9pm Monday-Friday Saturday-Sunday- 9am-5pm     [place] -   5pm-9pm Monday-Friday Saturday-Sunday- 9am-5pm    [phone] - [place]    [phone] - [place]       For a Price Quote for your services, please call our Consumer Price Line at [phone].     What options do I have for visits at the clinic other than the traditional office visit?     To expand how we care for you, many of our providers are utilizing electronic visits (e-visits) and telephone visits, when medically appropriate, for interactions with their patients rather than a visit in the clinic. We also offer nurse visits for many medical concerns. Just like any other service, we will bill your insurance company for this type of visit based on time spent on the phone with your provider. Not all insurance companies cover these visits. Please check with your medical insurance if this type of visit is covered. You will be responsible for any charges that are not paid by your insurance.   E-visits via Masalat: generally incur a $35.00 fee.     Telephone visits:   Time spent on the phone: *charged based on time that is spent on the phone in increments of 10 minutes. Estimated cost:   5-10 mins $30.00   11-20 mins. $59.00   21-30 mins. $85.00     Use Masalat (secure email communication and access to your chart) to send your primary care provider a message or make an appointment. Ask someone on your Team how to sign up for 3KeyIt.     As always, Thank you for trusting us with your health care needs!      [facility]:    Scheduling Appointments  [facility] [place]  Call: [phone]    [facility]  Call:  [phone]    [facility]  Call: [phone]    For Gastroenterology referrals   [facility]   Clinics and Surgery Center, 4th Floor   [address] [place] [address]   Appointments: [phone]    WHERE TO GO FOR CARE?  Clinic    Make an appointment if you:     Are sick (cold, cough, flu, sore throat, earache or in pain).     Have a small injury (sprain, small cut, burn or broken bone).     Need a physical exam, Pap smear, vaccine or prescription refill.     Have questions about your health or medicines.    To reach us:    Call [phone] ([phone]). Open 24 hours every day. (For counseling services, call [phone].)  Log into [employer] at [employer]. (Visit [place].[employer] to create an account.) Hospital emergency room    An emergency is a serious or life- threatening problem that must be treated right away.    Call 414 or get to the hospital if you have:    Very bad or sudden:            - Chest pain or pressure         - Bleeding         - Head or belly pain         - Dizziness or trouble seeing, walking or                          Speaking    Problems breathing    Blood in your vomit or you are coughing up blood    A major injury (knocked out, loss of a finger or limb, rape, broken bone protruding from skin)  A mental health crisis. (Or call the Mental Health Crisis line at 1-700.603.9395 or Suicide Prevention Hotline at 1-243.799.5123.)    Open 24 hours every day. You don't need an appointment.     Urgent care    Visit urgent care for sickness or small injuries when the clinic is closed. You don't need an appointment. To check hours or find an urgent care near you, visit [email or web address]. Online care    Get online care from [facility] for more than 70 common problems, like colds, allergies and infections. Open 24 hours every day at:   [email or web address]   Need help deciding?    For advice about where to be seen, you may call your  clinic and ask to speak with a nurse. We're here for you 24 hours every day.         If you are deaf or hard of hearing, please let us know. We provide many free services including sign language interpreters, oral interpreters, TTYs, telephone amplifiers, note takers and written materials.

## 2018-02-02 NOTE — PROGRESS NOTES
"   SUBJECTIVE:   CC: Raffy Castelan is an 42 year old woman who presents for preventive health visit.     Physical   Annual:     Getting at least 3 servings of Calcium per day::  Yes    Bi-annual eye exam::  Yes    Dental care twice a year::  Yes    Sleep apnea or symptoms of sleep apnea::  Excessive snoring    Diet::  Regular (no restrictions)    Taking medications regularly::  Yes    Medication side effects::  None    Additional concerns today::  No          Weight: Patient reports that she noticed her weight has started to \"creep up\". She says that she is back to her pre-surgery weight. This week she has started going to the gym 4-5 days per week but says she is still not losing weight. She states she still feels heavy. She is also in the midst of moving so she thinks that stress can certainly be contributing to her weight. She works from home 3 times per week and is not going to her office that often as well. She has tried weight loss medications in the past and says she experiences side effects that will effect her mood and cognition with people and places. She discontinued all weight loss medications. She also has started tracking her food and counting carbs, fats, and protein. She has been trying to lower her carb intake, states that she really enjoys her carbs (rice, bread, pasta). Patient states her co-worker told her about a \"program\" that includes meeting with a nutritionist and food planning once per month, wondering if [facility] has a similar program.      Menstrual cycles/BC: Patient has started getting periods again. She previously had an IUD inserted but had it removed 12/4/2017 due to swelling. After the IUD removal, her period afterward was very heavy. She says she is \"fine\" with this as long as she does not start to become anemic again. Swelling has gone down. She is not sexually active at this time and does not need contraception.    On Senna regularly for chronic constipation.      Today's PHQ-2 " Score:   PHQ-2 ( 1999 Pfizer) 2/2/2018   Q1: Little interest or pleasure in doing things 0   Q2: Feeling down, depressed or hopeless 0   PHQ-2 Score 0   Q1: Little interest or pleasure in doing things Not at all   Q2: Feeling down, depressed or hopeless Not at all   PHQ-2 Score 0       Abuse: Current or Past(Physical, Sexual or Emotional)- No  Do you feel safe in your environment - Yes    Social History   Substance Use Topics     Smoking status: Never Smoker     Smokeless tobacco: Never Used     Alcohol use No     Alcohol Use 2/2/2018   If you drink alcohol, do you typically have greater than 3 drinks per day OR greater than 7 drinks per week?   Not applicable   No flowsheet data found.    Reviewed orders with patient.  Reviewed health maintenance and updated orders accordingly - No  BP Readings from Last 3 Encounters:   02/02/18 138/80   01/04/18 120/80   12/08/17 140/80    Wt Readings from Last 3 Encounters:   02/02/18 270 lb 9.6 oz (122.7 kg)   01/04/18 265 lb (120.2 kg)   12/08/17 269 lb 3.2 oz (122.1 kg)         Patient Active Problem List   Diagnosis     Chronic constipation     CARDIOVASCULAR SCREENING; LDL GOAL LESS THAN 160     Family history of breast cancer     GERD (gastroesophageal reflux disease)     BMI >40     Vitamin D deficiency     Psoriasis     Intermittent asthma     Bariatric surgery status     H/O laparoscopic adjustable gastric banding     Diaphragmatic hernia     Hypertension goal BP (blood pressure) < 140/90     Anxiety     Chronic left shoulder pain     Impingement syndrome, shoulder, left     Past Surgical History:   Procedure Laterality Date     BIOPSY       COLONOSCOPY       ESOPHAGOSCOPY, GASTROSCOPY, DUODENOSCOPY (EGD), COMBINED  7/30/2013    Procedure: COMBINED ESOPHAGOSCOPY, GASTROSCOPY, DUODENOSCOPY (EGD), BIOPSY SINGLE OR MULTIPLE;;  Surgeon: Wilson Fournier MD;  Location:  GI     ESOPHAGOSCOPY, GASTROSCOPY, DUODENOSCOPY (EGD), COMBINED  3/24/2014    Procedure: COMBINED  ESOPHAGOSCOPY, GASTROSCOPY, DUODENOSCOPY (EGD);  Surgeon: Wilson Fournier MD;  Location: [facility]     HERNIA REPAIR       LAPAROSCOPIC GASTRIC ADJUSTABLE BANDING  5/27/2014    Procedure: LAPAROSCOPIC GASTRIC ADJUSTABLE BANDING;  Surgeon: Wilson Fournier MD;  Location: [facility] OR     no history of surgery         Social History   Substance Use Topics     Smoking status: Never Smoker     Smokeless tobacco: Never Used     Alcohol use No     Family History   Problem Relation Age of Onset     Blood Disease Mother      anemia     HEART DISEASE Mother      Hypertension Mother      Hyperlipidemia Mother      Anxiety Disorder Mother      OSTEOPOROSIS Mother      Genetic Disorder Mother      CEREBROVASCULAR DISEASE Father      Hypertension Father      DIABETES Father      type  2     Breast Cancer Maternal Aunt      Breast Cancer Paternal Aunt      CANCER Paternal Aunt      stomach     Breast Cancer Other      Blood Disease Other      son with alpha thalessemia/mother alpha thalasemia trait     Breast Cancer Other      Breast Cancer Other      Breast Cancer Cousin          Current Outpatient Prescriptions   Medication Sig Dispense Refill     losartan (COZAAR) 50 MG tablet Take 1 tablet (50 mg) by mouth daily 90 tablet 1     clobetasol propionate 0.05 % FOAM Apply sparingly to affected area twice daily as needed.  Do not apply to face. 600 g 1     senna (SENNA LAXATIVE) 8.6 MG tablet Take 1 tablet by mouth daily       multivitamin, therapeutic with minerals (MULTI-VITAMIN) TABS Take 1 tablet by mouth daily.       Allergies   Allergen Reactions     Lisinopril Cough     Recent Labs   Lab Test  12/08/17   1435  04/28/17   1016   02/11/16   0822   10/13/14   1126  10/02/14   0908   07/15/13   1659   A1C   --    --    --    --    --    --   5.5   --   5.8   LDL   --    --    --   82   --    --   96   --   112   HDL   --    --    --   91   --    --   102   --   81   TRIG   --    --    --   127   --    --   75   --   89   ALT  21    --    --    --    --    --   16   --   27   CR  0.74  0.72   < >  0.61   < >   --   0.82   < >  0.67   GFRESTIMATED  85  88   < >  >90  Non  GFR Calc     < >   --   77   < >  >90   GFRESTBLACK  >90  >90  African American GFR Calc     < >  >90   GFR Calc     < >   --   >90   GFR Calc     < >  >90   POTASSIUM  3.7  3.3*   < >  4.1   < >   --   3.7   < >  3.7   TSH  0.84   --    --    --    --   1.10   --    --    --     < > = values in this interval not displayed.      Patient under age 50, mutual decision reflected in health maintenance.    Pertinent mammograms are reviewed under the imaging tab.  History of abnormal Pap smear:   NO - age 30- 65 PAP every 3 years recommended  Last 3 Pap Results:   PAP (no units)   Date Value   10/13/2014 NIL   05/09/2011 NIL       Reviewed and updated as needed this visit by clinical staff  Tobacco  Allergies  Meds       Reviewed and updated as needed this visit by Provider        Review of Systems  C: NEGATIVE for fever, chills, change in weight  I: NEGATIVE for worrisome rashes, moles or lesions  E: NEGATIVE for vision changes or irritation  ENT: NEGATIVE for ear, mouth and throat problems  R: NEGATIVE for significant cough or SOB  B: NEGATIVE for masses, tenderness or discharge  CV: NEGATIVE for chest pain, palpitations or peripheral edema  GI: NEGATIVE for nausea, abdominal pain, heartburn, and POSITIVE for constipation   female: normal menses, no unusual urinary symptoms, no unusual vaginal symptoms and menses: menorrhagia after IUD removal  M: NEGATIVE for significant arthralgias or myalgia  N: NEGATIVE for weakness, dizziness or paresthesias  P: NEGATIVE for changes in mood or affect     This document serves as a record of the services and decisions personally performed and made by Vonnie Baugh MD. It was created on her behalf by Marci Gonzalez, a trained medical scribe. The creation of this document is based the  "provider's statements to the medical scribe.    Marci Carlos February 2, 2018 12:17 PM    OBJECTIVE:   /80 (BP Location: Right arm, Patient Position: Sitting, Cuff Size: Adult Large)  Pulse 80  Temp 98.4  F (36.9  C) (Oral)  Ht 5' 5\" (1.651 m)  Wt 270 lb 9.6 oz (122.7 kg)  LMP 01/05/2018  BMI 45.03 kg/m2  Physical Exam  GENERAL: morbidly obese, alert and no distress  EYES: Eyes grossly normal to inspection, PERRL and conjunctivae and sclerae normal  HENT: ear canals and TM's normal, nose and mouth without ulcers or lesions  NECK: no adenopathy, no asymmetry, masses, or scars and thyroid normal to palpation  RESP: lungs clear to auscultation - no rales, rhonchi or wheezes  BREAST: normal without masses, tenderness or nipple discharge and no palpable axillary masses or adenopathy  CV: regular rate and rhythm, normal S1 S2, no S3 or S4, no murmur, click or rub, no peripheral edema and peripheral pulses strong  ABDOMEN: soft, nontender, no hepatosplenomegaly, no masses and bowel sounds normal   (female): normal female external genitalia, normal urethral meatus, vaginal mucosa pink, moist, well rugated, and normal cervix/adnexa/uterus without masses or discharge  MS: no gross musculoskeletal defects noted, no edema  SKIN: no suspicious lesions or rashes  NEURO: Normal strength and tone, mentation intact and speech normal  PSYCH: mentation appears normal, affect normal/bright    ASSESSMENT/PLAN:   1. Encounter for preventative adult health care exam with abnormal findings      2. Screening for malignant neoplasm of cervix    - Pap imaged thin layer screen with HPV - recommended age 30 - 65 years (select HPV order below)  - HPV High Risk Types DNA Cervical    3. Visit for screening mammogram      4. Family history of breast cancer  Recommended mammogram     5. BMI >40  Continue with food journal and exercise   She declines going to weight loss specialist or bariatric follow up  - NUTRITION REFERRAL    6. " "Hypertension goal BP (blood pressure) < 140/90  Chronic, stable, well controlled, continue current medication, refill done if needed    - losartan (COZAAR) 50 MG tablet; Take 1 tablet (50 mg) by mouth daily  Dispense: 90 tablet; Refill: 3    7. CARDIOVASCULAR SCREENING; LDL GOAL LESS THAN 160    - Lipid panel reflex to direct LDL Fasting; Future        COUNSELING:  Special attention given to:        Regular exercise       Healthy diet/nutrition       Vision screening       Contraception       The 10-year ASCVD risk score (Montourno LUGO Jr, et al., 2013) is: 0.5%    Values used to calculate the score:      Age: 42 years      Sex: Female      Is Non- : No      Diabetic: No      Tobacco smoker: No      Systolic Blood Pressure: 138 mmHg      Is BP treated: Yes      HDL Cholesterol: 91 mg/dL      Total Cholesterol: 198 mg/dL         reports that she has never smoked. She has never used smokeless tobacco.    Estimated body mass index is 45.03 kg/(m^2) as calculated from the following:    Height as of this encounter: 5' 5\" (1.651 m).    Weight as of this encounter: 270 lb 9.6 oz (122.7 kg).   Weight management plan: Discussed healthy diet and exercise guidelines and patient will follow up in 12 months in clinic to re-evaluate.    Counseling Resources:  ATP IV Guidelines  Pooled Cohorts Equation Calculator  Breast Cancer Risk Calculator  FRAX Risk Assessment  ICSI Preventive Guidelines  Dietary Guidelines for Americans, 2010  USDA's MyPlate  ASA Prophylaxis  Lung CA Screening    The information in this document, created by the medical scribe for me, accurately reflects the services I personally performed and the decisions made by me. I have reviewed and approved this document for accuracy prior to leaving the patient care area.    Vonnie Baugh MD  [facility] [place] [facility]  "

## 2018-02-04 ENCOUNTER — HEALTH MAINTENANCE LETTER (OUTPATIENT)
Age: 43
End: 2018-02-04

## 2018-02-07 LAB
COPATH REPORT: NORMAL
PAP: NORMAL

## 2018-02-08 LAB
FINAL DIAGNOSIS: NORMAL
HPV HR 12 DNA CVX QL NAA+PROBE: NEGATIVE
HPV16 DNA SPEC QL NAA+PROBE: NEGATIVE
HPV18 DNA SPEC QL NAA+PROBE: NEGATIVE
SPECIMEN DESCRIPTION: NORMAL
SPECIMEN SOURCE CVX/VAG CYTO: NORMAL

## 2018-02-09 ENCOUNTER — RADIANT APPOINTMENT (OUTPATIENT)
Dept: MAMMOGRAPHY | Facility: CLINIC | Age: 43
End: 2018-02-09
Attending: FAMILY MEDICINE
Payer: COMMERCIAL

## 2018-02-09 DIAGNOSIS — Z12.31 VISIT FOR SCREENING MAMMOGRAM: ICD-10-CM

## 2018-02-09 PROCEDURE — 77067 SCR MAMMO BI INCL CAD: CPT | Mod: TC

## 2018-02-09 PROCEDURE — 77063 BREAST TOMOSYNTHESIS BI: CPT | Mod: TC

## 2018-02-21 ENCOUNTER — OFFICE VISIT (OUTPATIENT)
Dept: NUTRITION | Facility: CLINIC | Age: 43
End: 2018-02-21
Payer: COMMERCIAL

## 2018-02-21 VITALS — HEIGHT: 65 IN | BODY MASS INDEX: 45.25 KG/M2 | WEIGHT: 271.6 LBS

## 2018-02-21 DIAGNOSIS — E66.01 MORBID OBESITY DUE TO EXCESS CALORIES (H): Primary | ICD-10-CM

## 2018-02-21 PROCEDURE — 97802 MEDICAL NUTRITION INDIV IN: CPT

## 2018-02-21 NOTE — PATIENT INSTRUCTIONS
Goals:    1. Aim for 1900 calories a day    2. Keep cheese stick or 1 cup of tea for a snack    3. Limit sugar in tea to 1 spoonful     4. Start going to the gym again 3 days a week.    FOLLOW UP: Friday, March 23rd at 12pm (noon) at [facility]    Lupe Clement RD, MAICOL, CDE   [phone]

## 2018-02-21 NOTE — MR AVS SNAPSHOT
After Visit Summary   2/21/2018    Raffy Castelan    MRN: [id number]           Patient Information     Date Of Birth          1975        Visit Information        Provider Department      2/21/2018 9:00 AM NE NUTRITION RESOURCE [facility]        Care Instructions    Goals:    1. Aim for 1900 calories a day    2. Keep cheese stick or 1 cup of tea for a snack    3. Limit sugar in tea to 1 spoonful     4. Start going to the gym again 3 days a week.    FOLLOW UP: Friday, March 23rd at 12pm (noon) at [facility]    Lupe Clement RD, LD, CDE   [phone]          Follow-ups after your visit        Your next 10 appointments already scheduled     Mar 23, 2018 12:00 PM CDT   Diabetic Education with [facility] DIABETIC ED RESOURCE   [facility] ([facility])    [address] [place] [address] [id number] [phone]            Jun 08, 2018 10:00 AM CDT   SHORT with Lauren Anneliese Engelmann, MD   [facility]    [address] [place] [id number] [phone]              Who to contact     If you have questions or need follow up information about today's clinic visit or your schedule please contact [facility] directly at [phone].  Normal or non-critical lab and imaging results will be communicated to you by MyChart, letter or phone within 4 business days after the clinic has received the results. If you do not hear from us within 7 days, please contact the clinic through MyChart or phone. If you have a critical or abnormal lab result, we will notify you by phone as soon as possible.  Submit refill requests through [employer] or call your pharmacy and they will forward the refill request to us. Please allow 3 business days for your refill to be completed.          Additional Information About Your Visit        MyChart  "Information     Juan gives you secure access to your electronic health record. If you see a primary care provider, you can also send messages to your care team and make appointments. If you have questions, please call your primary care clinic.  If you do not have a primary care provider, please call [phone] and they will assist you.        Care EveryWhere ID     This is your Care EveryWhere ID. This could be used by other organizations to access your [place] medical records  [id number]        Your Vitals Were     Height BMI (Body Mass Index)                1.651 m (5' 5\") 45.2 kg/m2           Blood Pressure from Last 3 Encounters:   02/02/18 138/80   01/04/18 120/80   12/08/17 140/80    Weight from Last 3 Encounters:   02/21/18 123.2 kg (271 lb 9.6 oz)   02/02/18 122.7 kg (270 lb 9.6 oz)   01/04/18 120.2 kg (265 lb)              Today, you had the following     No orders found for display       Primary Care Provider Office Phone # Fax #    Vonnie Baugh [phone]       [address] [facility] [address]        Equal Access to Services     IMTIAZ BESS AH: Hadii santana campbell hadasho Sochanoali, waaxda luqadaha, qaybta kaalmada adeegyada, gigi barakat. So [facility] [phone].    ATENCIÓN: Si habla español, tiene a petersen disposición servicios gratuitos de asistencia lingüística. Llame al [phone].    We comply with applicable federal civil rights laws and [place] laws. We do not discriminate on the basis of race, color, national origin, age, disability, sex, sexual orientation, or gender identity.            Thank you!     Thank you for choosing [facility]  for your care. Our goal is always to provide you with excellent care. Hearing back from our patients is one way we can continue to improve our services. Please take a few minutes to complete the written survey that you may receive in the mail after your visit with us. Thank you!      "        Your Updated Medication List - Protect others around you: Learn how to safely use, store and throw away your medicines at www.disposemymeds.org.          This list is accurate as of 2/21/18  9:58 AM.  Always use your most recent med list.                   Brand Name Dispense Instructions for use Diagnosis    clobetasol propionate 0.05 % Foam     600 g    Apply sparingly to affected area twice daily as needed.  Do not apply to face.    Psoriasis       losartan 50 MG tablet    COZAAR    90 tablet    Take 1 tablet (50 mg) by mouth daily    Hypertension goal BP (blood pressure) < 140/90       Multi-vitamin Tabs tablet      Take 1 tablet by mouth daily.        SENNA LAXATIVE 8.6 MG tablet   Generic drug:  senna      Take 1 tablet by mouth daily

## 2018-02-21 NOTE — PROGRESS NOTES
Medical Nutrition Therapy  Visit Type:Initial assessment and intervention    Raffy Castelan presents today for MNT and education related to weight management.   She is accompanied by self.     ASSESSMENT:   Patient comments/concerns relating to nutrition: says weight gone up recently.  Says has been tracking food for months.  Says weight is now back to the amount prior to having a lap-band surgery.  Says working from home 3 days a week and says emotional eater - eats when happy and eats when stressed.      Says enjoys food and being 42 years old, metabolism not as good as used to be.  Has been on medications for weight loss before but had bad reaction to one and not see weight loss with other one.  Says children having some weight issues.  Says feels if fixes eating habits, thinks it will help her children.  Says likes carbs- wants something sweet with each meal (peanut butter toast with jelly, fruit, tea with sugar and cream or something sweet).  Says recently got Invisiline braces and helps her snack less since needs to remove them. Tried low carbohydrate diet in the past and did not like it.     Says biggest meal is at lunch on Tuesday and Wednesday- lunch is usually bigger than dinner.  Says will eat at 5pm and get hungry again at 8pm.  Says on average, calories are 5036-3364 calories a day. Says earlier in the year, joined the gym but fell off the past 2 weeks due to work and helping her kids and having morning  appointments. Says wants to lose weight to keep up with her kids- feels weight is holding her back and normally is a very active person.      Since April, gained 15-16 lbs; late summer. Not sure why gained weight but noticed it start to creep.  Was not moving a lot.     NUTRITION HISTORY:  Calories (2656, 2127, 2143, 2226, 2050, 1982, 1820, 2192 -calories past 7 days)  Wakes: 5:45am  Breakfast: 9-9:30am: Hazelnut coffee creamer with coffee, veggie omelet, hashbrowns, instant dry oatmeal (2 pkts cinnamon  spice and apple cinnamon) OR white shawna bread, cream cheese, hazelnut coffee creamer and terry beans OR shawna bread, terry beans, hazelnut creamer, boiled egg  AM: fig bar, Thin mint, oreo cookies  Lunch: 12-1pm: kettle chips, salmon, rice, kale salad OR spaghetti with meat sauce, fig bars OR yan rice, salmon, kale salad  PM: Belle kisses  Dinner: 5-6pm: spaghetti with meat sauce, wheat bread (2 sl), peanut butter, raspberry preserves OR spaghetti with meat sauce, wheat toasted (1 slice), PB and raspberry preserves OR grilled chicken sandwich  Snacks: PBJ sandwich  Beverages: Tea  2x/day, Coffee 1x/day and Water water day    Misses meals? none  Eats out:  2-3 meals/per week (work)    Previous diet education:  Yes - prior to lap band surgery     Food allergies/intolerances: None noted    Diet is high in: calories  Diet is low in: calcium, fiber, fruits and vegetables    EXERCISE: 3-5 days a week - spinning, yoga- 2x/week, body pump - 1 hour class.  Says usually a very active person but not feel body keeps up with her.     Trouble with lower-back pain. Says trouble with maintaining speed walking.  Notices higher heart rate.      SOCIO/ECONOMIC:   Lives with: 2 sons    MEDICATIONS:  Current Outpatient Prescriptions   Medication     losartan (COZAAR) 50 MG tablet     clobetasol propionate 0.05 % FOAM     senna (SENNA LAXATIVE) 8.6 MG tablet     multivitamin, therapeutic with minerals (MULTI-VITAMIN) TABS     No current facility-administered medications for this visit.        LABS:  Last Basic Metabolic Panel:  Lab Results   Component Value Date     12/08/2017      Lab Results   Component Value Date    POTASSIUM 3.7 12/08/2017     Lab Results   Component Value Date    CHLORIDE 101 12/08/2017     Lab Results   Component Value Date    CHELSEY 9.0 12/08/2017     Lab Results   Component Value Date    CO2 29 12/08/2017     Lab Results   Component Value Date    BUN 12 12/08/2017     Lab Results   Component Value Date     "CR 0.74 12/08/2017     Lab Results   Component Value Date     12/08/2017     Recent Labs   Lab Test  02/11/16   0822  10/02/14   0908  07/15/13   1659   CHOL  198  213*  211*   HDL  91  102  81   LDL  82  96  112   TRIG  127  75  89   CHOLHDLRATIO   --   2.1  2.6     TSH   Date Value Ref Range Status   12/08/2017 0.84 0.40 - 4.00 mU/L Final   ]    ANTHROPOMETRICS:  Vitals: Ht 1.651 m (5' 5\")  Wt 123.2 kg (271 lb 9.6 oz)  BMI 45.2 kg/m2  Body mass index is 45.2 kg/(m^2).      Wt Readings from Last 5 Encounters:   02/02/18 122.7 kg (270 lb 9.6 oz)   01/04/18 120.2 kg (265 lb)   12/08/17 122.1 kg (269 lb 3.2 oz)   12/04/17 122.5 kg (270 lb)   04/28/17 116.1 kg (256 lb)       Weight Change: 1 lb in the past 2 weeks    ESTIMATED KCAL REQUIREMENTS:  2256 kcal per day    NUTRITION DIAGNOSIS: Excessive energy intake related to larger portions as evidenced by diet discussion, weight gain and BMI >40    NUTRITION INTERVENTION:  Nutrition Prescription: Energy Intake: 1900 kcal/day for weight loss  Education given to support: general nutrition guidelines, weight reduction, fat modification, exercise, fiber, behavior modification, portion control, heart healthy diet and reducing sweets  Education Materials Provided: 1800 Calorie 5-day Sample Menus, 100 Calorie Snacks, Weight Loss Tips and Weight Management Cooking Tips  Motivational Interviewing    Discussion: Reviewed patient's 24 hour diet recall. Patient appears to be making healthy food choices for meals but eating larger portions and getting excess calories.  Discussed healthy breakfast, lunch, dinner and snack ideas and suggested plate method for simplicity on balancing meals.  Informed estimated energy needs are around 1900 calories/day for weight loss.  She is already tracking foods. Stressed importance of portion control at meals and snacks and provided tips for estimating portion sizes and used food models to demonstrate portion control.  Discussed general " healthy eating tips and discussed ways to try to reduce stress eating and cut back on simple sugar.     Heart healthy fats were encouraged in moderation, as was lean meats and heathy preparation methods to help with weight loss and improve LDL cholesterol.  Encouraged working up to 30-60 minutes aerobic activity a day and to include the resistance training/weights for variety as tolerated and per MD approval.  Patient seems receptive to the information provided and verbalized understanding of concepts discussed and recommendations provided.      PATIENT'S BEHAVIOR CHANGE GOALS:   See Patient Instructions for patient stated behavior change goals. AVS was printed and given to patient at today's appointment.    MONITOR / EVALUATE:  RD will monitor/evaluate:  Progress toward meeting stated nutrition-related goals  Weight change    FOLLOW-UP:  Call RD with questions/concerns.   Follow-up appointment scheduled on 3/23/18.     Lupe Clement RD, LD, CDE   Time spent in minutes: 60 minutes  Encounter: Individual

## 2018-03-23 ENCOUNTER — ALLIED HEALTH/NURSE VISIT (OUTPATIENT)
Dept: NUTRITION | Facility: CLINIC | Age: 43
End: 2018-03-23
Payer: COMMERCIAL

## 2018-03-23 VITALS — WEIGHT: 271 LBS | BODY MASS INDEX: 45.1 KG/M2

## 2018-03-23 DIAGNOSIS — E66.01 MORBID OBESITY (H): ICD-10-CM

## 2018-03-23 DIAGNOSIS — Z98.84 BARIATRIC SURGERY STATUS: ICD-10-CM

## 2018-03-23 DIAGNOSIS — Z98.84 H/O LAPAROSCOPIC ADJUSTABLE GASTRIC BANDING: ICD-10-CM

## 2018-03-23 PROCEDURE — 97803 MED NUTRITION INDIV SUBSEQ: CPT

## 2018-03-23 NOTE — PROGRESS NOTES
Medical Nutrition Therapy  Visit Type:Reassessment and intervention    Raffy Castelan presents today for MNT and education related to weight management.   She is accompanied by self.     ASSESSMENT:   Patient comments/concerns relating to nutrition: says did well at first with weight loss but then met more stress at work and with family and was doing more stress eating. Says she did not work out as much as she wanted.    Says if eating at work will eat in the cafeteria and when stressed, will eat something sugary.  Says not make it to gym today due to time constraints.  Says she was sitting more recently.  Early in March, was trying to keep calories around 2000 a day.      Would usually get 3656-1289 steps a day; usually averages 9182-3548 steps a day.  Meals 500-600 calories but feel snacks raises her values.     Says weight was down to 265 lbs.  Says usually can lose weight fast.     NUTRITION HISTORY:  Recent Calorie totals: 2099, 2145, 2748, 1944, 2543, 2101  Breakfast: veggie omelet OR oatmeal OR croissant and cheese OR Hazelnut coffee creamer, veggie omelet, hashbrowns and milk  Lunch: [employer] noodles  Dinner: basmati rice, 1% milk, crab  Snacks: Chocolate Krave Cereal  Beverages: Tea (wayne) 1-2x/day, Coffee 1x/day and Water all day    Misses meals? none  Eats out:  2-3 meals/per week     Previous diet education:  Yes     Food allergies/intolerances: None noted    Diet is high in: calories  Diet is low in: fiber, fruits and vegetables    EXERCISE: 7532-5734 steps most days.  Gym: 3x this month.    SOCIO/ECONOMIC:   Lives with: children    MEDICATIONS:  Current Outpatient Prescriptions   Medication     losartan (COZAAR) 50 MG tablet     clobetasol propionate 0.05 % FOAM     senna (SENNA LAXATIVE) 8.6 MG tablet     multivitamin, therapeutic with minerals (MULTI-VITAMIN) TABS     No current facility-administered medications for this visit.        LABS:  Last Basic Metabolic Panel:  Lab Results   Component Value  Date     12/08/2017      Lab Results   Component Value Date    POTASSIUM 3.7 12/08/2017     Lab Results   Component Value Date    CHLORIDE 101 12/08/2017     Lab Results   Component Value Date    CHELSEY 9.0 12/08/2017     Lab Results   Component Value Date    CO2 29 12/08/2017     Lab Results   Component Value Date    BUN 12 12/08/2017     Lab Results   Component Value Date    CR 0.74 12/08/2017     Lab Results   Component Value Date     12/08/2017     Recent Labs   Lab Test  02/11/16   0822  10/02/14   0908  07/15/13   1659   CHOL  198  213*  211*   HDL  91  102  81   LDL  82  96  112   TRIG  127  75  89   CHOLHDLRATIO   --   2.1  2.6       ANTHROPOMETRICS:  Vitals: Wt 122.9 kg (271 lb)  BMI 45.1 kg/m2  Body mass index is 45.1 kg/(m^2).      Wt Readings from Last 5 Encounters:   02/21/18 123.2 kg (271 lb 9.6 oz)   02/02/18 122.7 kg (270 lb 9.6 oz)   01/04/18 120.2 kg (265 lb)   12/08/17 122.1 kg (269 lb 3.2 oz)   12/04/17 122.5 kg (270 lb)       Weight Change: Lost 0.6 lbs in the past month    ESTIMATED KCAL REQUIREMENTS:  2256 kcal per day    NUTRITION DIAGNOSIS: Excessive energy intake related to stress eating as evidenced by food recording (6-day calorie average: 2263 kcal/day) and patient stating she regained some of the weight she lost.    NUTRITION INTERVENTION:  Nutrition Prescription: Energy Intake: 1900 kcal/day for weight loss  Education given to support: weight reduction, exercise, dining out/special occasions, behavior modification, portion control and heart healthy diet  Motivational Interviewing    Discussion: Commended patient on small weight loss during times of higher stress.  Patient admits to being a stress eater and was able to identify snacks contributing to higher calorie intake so tried to find ways to control stress eating.  Patient has a hard time finding non-food related activities but is willing to try drinking Cory when needing something sweet and pre-portioning her snack when  eating. Explored other ideas to help bring mindfulness to when snacking to help try to feel satisfied with less. She is disappointed and says weight was down to 265 lbs but past 2 weeks were stressful so more stress eating and less activity.  She would like to work on making it to the gym 3 days a week and re-increase steps to 0145-8092 a day.  Says work should now be getting better.    She is also worried about weight gain while going on vacation.  Encouraged Abeer to focus on barriers and she says she will be moving more but also eating out more.  Discussed ways to help try to control intake/make better choices such as looking up meals/calories before going out, try eating at [employer] for lower calorie lunches a few days, share a meal and/or order appetizer for entree.  Commended her on continuing to record meals and snacks.  Pt verbalized understanding of concepts discussed and recommendations provided.       PATIENT'S BEHAVIOR CHANGE GOALS:   See Patient Instructions for patient stated behavior change goals. AVS was printed and given to patient at today's appointment.    MONITOR / EVALUATE:  RD will monitor/evaluate:  Progress toward meeting stated nutrition-related goals  Weight change    FOLLOW-UP:  Call RD with questions/concerns.   Follow-up appointment scheduled on 4/27/18.     Lupe Clement RD, LD, CDE   Time spent in minutes: 45 minutes  Encounter: Individual

## 2018-03-23 NOTE — MR AVS SNAPSHOT
After Visit Summary   3/23/2018    Raffy Castelan    MRN: [id number]           Patient Information     Date Of Birth          1975        Visit Information        Provider Department      3/23/2018 12:00 PM [facility]        Care Instructions    Goals:    1. When feeling stressed, try drinking Cory instead of getting more food.  Remind yourself that while sugar feels good immediately, you feel more drained later.     2. When traveling, try to look up nutrition information before you eat out and/or try sharing a meal and/or ordering appetizer    3. Pre-portion the snacks.  Take what need/ 1 serving and put everything away.      4. Try to limit calories to 1900 calories a day (average)    5. Re-increase the gym to 3 days a week    FOLLOW UP: Friday, April 27th at 12pm (noon) at [facility]    Lupe Clement RD, LD, CDE   [phone]          Follow-ups after your visit        Your next 10 appointments already scheduled     Apr 27, 2018 12:00 PM CDT   Diabetic Education with [facility]   [facility] ([facility])    [address] [place] [id number] [phone]            Jun 08, 2018 10:00 AM CDT   SHORT with Lauren Anneliese Engelmann, MD   [facility]    [address] [facility] [id number] [phone]              Who to contact     If you have questions or need follow up information about today's clinic visit or your schedule please contact [facility] directly at [phone].  Normal or non-critical lab and imaging results will be communicated to you by MyChart, letter or phone within 4 business days after the clinic has received the results. If you do not hear from us within 7 days, please contact the clinic through MyChart or phone. If  you have a critical or abnormal lab result, we will notify you by phone as soon as possible.  Submit refill requests through [employer] or call your pharmacy and they will forward the refill request to us. Please allow 3 business days for your refill to be completed.          Additional Information About Your Visit        [employer] Information     [employer] gives you secure access to your electronic health record. If you see a primary care provider, you can also send messages to your care team and make appointments. If you have questions, please call your primary care clinic.  If you do not have a primary care provider, please call [phone] and they will assist you.        Care EveryWhere ID     This is your Care EveryWhere ID. This could be used by other organizations to access your [place] medical records  [id number]        Your Vitals Were     BMI (Body Mass Index)                   45.1 kg/m2            Blood Pressure from Last 3 Encounters:   02/02/18 138/80   01/04/18 120/80   12/08/17 140/80    Weight from Last 3 Encounters:   03/23/18 122.9 kg (271 lb)   02/21/18 123.2 kg (271 lb 9.6 oz)   02/02/18 122.7 kg (270 lb 9.6 oz)              Today, you had the following     No orders found for display       Primary Care Provider Office Phone # Fax #    Vonnie Baugh [phone]       [address] [facility] [address]        Equal Access to Services     [facility] : Hadii santana campbell hadasho Sochanoali, waaxda luqadaha, qaybta kaalmada adenavyayada, gigi jackson . So [facility] [phone].    ATENCIÓN: Si habla español, tiene a petersen disposición servicios gratuitos de asistencia lingüística. Llame al [phone].    We comply with applicable federal civil rights laws and [place] laws. We do not discriminate on the basis of race, color, national origin, age, disability, sex, sexual orientation, or gender identity.            Thank you!     Thank you for choosing  [facility]  for your care. Our goal is always to provide you with excellent care. Hearing back from our patients is one way we can continue to improve our services. Please take a few minutes to complete the written survey that you may receive in the mail after your visit with us. Thank you!             Your Updated Medication List - Protect others around you: Learn how to safely use, store and throw away your medicines at www.disposemymeds.org.          This list is accurate as of 3/23/18 12:53 PM.  Always use your most recent med list.                   Brand Name Dispense Instructions for use Diagnosis    clobetasol propionate 0.05 % Foam     600 g    Apply sparingly to affected area twice daily as needed.  Do not apply to face.    Psoriasis       losartan 50 MG tablet    COZAAR    90 tablet    Take 1 tablet (50 mg) by mouth daily    Hypertension goal BP (blood pressure) < 140/90       Multi-vitamin Tabs tablet      Take 1 tablet by mouth daily.        SENNA LAXATIVE 8.6 MG tablet   Generic drug:  senna      Take 1 tablet by mouth daily

## 2018-06-22 ENCOUNTER — OFFICE VISIT (OUTPATIENT)
Dept: FAMILY MEDICINE | Facility: CLINIC | Age: 43
End: 2018-06-22
Payer: COMMERCIAL

## 2018-06-22 VITALS
TEMPERATURE: 98.8 F | WEIGHT: 264 LBS | SYSTOLIC BLOOD PRESSURE: 139 MMHG | HEART RATE: 85 BPM | OXYGEN SATURATION: 98 % | DIASTOLIC BLOOD PRESSURE: 89 MMHG | BODY MASS INDEX: 43.99 KG/M2 | HEIGHT: 65 IN

## 2018-06-22 DIAGNOSIS — L40.9 PSORIASIS: ICD-10-CM

## 2018-06-22 DIAGNOSIS — I10 HYPERTENSION GOAL BP (BLOOD PRESSURE) < 140/90: Primary | ICD-10-CM

## 2018-06-22 DIAGNOSIS — R60.0 LOWER EXTREMITY EDEMA: ICD-10-CM

## 2018-06-22 PROCEDURE — 99214 OFFICE O/P EST MOD 30 MIN: CPT | Performed by: FAMILY MEDICINE

## 2018-06-22 RX ORDER — FUROSEMIDE 20 MG
TABLET ORAL
Qty: 60 TABLET | Refills: 1 | Status: SHIPPED | OUTPATIENT
Start: 2018-06-22 | End: 2020-06-25

## 2018-06-22 RX ORDER — POTASSIUM CHLORIDE 1500 MG/1
TABLET, EXTENDED RELEASE ORAL
Qty: 60 TABLET | Refills: 1 | Status: SHIPPED | OUTPATIENT
Start: 2018-06-22 | End: 2020-03-26

## 2018-06-22 RX ORDER — CLOBETASOL PROPIONATE 0.5 MG/G
AEROSOL, FOAM TOPICAL
Qty: 600 G | Refills: 3 | Status: SHIPPED | OUTPATIENT
Start: 2018-06-22 | End: 2020-11-11

## 2018-06-22 ASSESSMENT — PAIN SCALES - GENERAL: PAINLEVEL: NO PAIN (0)

## 2018-06-22 NOTE — PROGRESS NOTES
SUBJECTIVE:                                                    Raffy Castelan is a 42 year old female who presents to clinic today for the following health issues:    Hypertension Follow-up      Outpatient blood pressures are being checked at home.  Results are  From 150/90 to 167/102.    Low Salt Diet: not monitoring salt      Amount of exercise or physical activity: Patient stated she is very active, but does not workout    Problems taking medications regularly: No    Medication side effects: Swollen in feet at the end of the day    Diet: regular (no restrictions)    Has lower extremity edema which is bothersome, wants to try a diuretic.   Needs potassium supplementation along with it.     Also needs refills of clobetasol for her psoriasis. It is well controlled on this regimen. No active plaques.     Problem list and histories reviewed & adjusted, as indicated.  Additional history: as documented    Patient Active Problem List   Diagnosis     Chronic constipation     CARDIOVASCULAR SCREENING; LDL GOAL LESS THAN 160     Family history of breast cancer     GERD (gastroesophageal reflux disease)     BMI >40     Vitamin D deficiency     Psoriasis     Bariatric surgery status     H/O laparoscopic adjustable gastric banding     Diaphragmatic hernia     Hypertension goal BP (blood pressure) < 140/90     Anxiety     Chronic left shoulder pain     Impingement syndrome, shoulder, left     Past Surgical History:   Procedure Laterality Date     BIOPSY       COLONOSCOPY       ESOPHAGOSCOPY, GASTROSCOPY, DUODENOSCOPY (EGD), COMBINED  7/30/2013    Procedure: COMBINED ESOPHAGOSCOPY, GASTROSCOPY, DUODENOSCOPY (EGD), BIOPSY SINGLE OR MULTIPLE;;  Surgeon: Wilson Fournier MD;  Location:  GI     ESOPHAGOSCOPY, GASTROSCOPY, DUODENOSCOPY (EGD), COMBINED  3/24/2014    Procedure: COMBINED ESOPHAGOSCOPY, GASTROSCOPY, DUODENOSCOPY (EGD);  Surgeon: Wilson Fournier MD;  Location:  GI     HERNIA REPAIR       LAPAROSCOPIC GASTRIC  "ADJUSTABLE BANDING  5/27/2014    Procedure: LAPAROSCOPIC GASTRIC ADJUSTABLE BANDING;  Surgeon: Wilson Fournier MD;  Location: [facility] OR     no history of surgery         Social History   Substance Use Topics     Smoking status: Never Smoker     Smokeless tobacco: Never Used     Alcohol use No     Family History   Problem Relation Age of Onset     Blood Disease Mother      anemia     HEART DISEASE Mother      Hypertension Mother      Hyperlipidemia Mother      Anxiety Disorder Mother      Osteoperosis Mother      Genetic Disorder Mother      Cerebrovascular Disease Father      Hypertension Father      Diabetes Father      type  2     Breast Cancer Maternal Aunt      Breast Cancer Paternal Aunt      Cancer Paternal Aunt      stomach     Breast Cancer Other      Blood Disease Other      son with alpha thalessemia/mother alpha thalasemia trait     Breast Cancer Other      Breast Cancer Other      Breast Cancer Cousin            ROS:  Constitutional, HEENT, cardiovascular, pulmonary, gi and gu systems are negative, except as otherwise noted.    OBJECTIVE:     /89 (BP Location: Right arm, Patient Position: Chair, Cuff Size: Adult Large)  Pulse 85  Temp 98.8  F (37.1  C) (Oral)  Ht 5' 4.57\" (1.64 m)  Wt 264 lb (119.7 kg)  LMP 06/06/2018 (Exact Date)  SpO2 98%  Breastfeeding? Unknown  BMI 44.52 kg/m2  Body mass index is 44.52 kg/(m^2).  GENERAL: healthy, alert, no distress and obese  NECK: no adenopathy, no asymmetry, masses, or scars and thyroid normal to palpation  RESP: lungs clear to auscultation - no rales, rhonchi or wheezes  CV: regular rates and rhythm, normal S1 S2, no S3 or S4, no murmur, click or rub, peripheral pulses strong and 1+ bilateral lower extremity pitting edema to ankles bilaterally    ABDOMEN: soft, nontender, no hepatosplenomegaly, no masses and bowel sounds normal  MS: no gross musculoskeletal defects noted, no edema  SKIN: no suspicious lesions or rashes  PSYCH: mentation appears " normal, affect normal/bright    Diagnostic Test Results:  No results found for this or any previous visit (from the past 24 hour(s)).    ASSESSMENT/PLAN:       ICD-10-CM    1. Hypertension goal BP (blood pressure) < 140/90 I10    2. Lower extremity edema R60.0 furosemide (LASIX) 20 MG tablet     potassium chloride SA (KLOR-CON) 20 MEQ CR tablet   3. Psoriasis L40.9 clobetasol propionate 0.05 % FOAM     I will add furosemide to her regimen for slight BP control and for edema.   Potassium supplementation for potential K loss from the diuretic.   Ok to refill her topical steroid for psoriasis.     Work on weight loss  Regular exercise  See Patient Instructions    Lauren A. Engelmann, MD  [facility]

## 2018-06-22 NOTE — MR AVS SNAPSHOT
"              After Visit Summary   6/22/2018    Raffy Castelan    MRN: [id number]           Patient Information     Date Of Birth          1975        Visit Information        Provider Department      6/22/2018 9:20 AM Engelmann, Lauren Anneliese, MD [facility]        Today's Diagnoses     Lower extremity edema    -  1    Psoriasis          Care Instructions    Come back in 9 months for a physical.          Follow-ups after your visit        Who to contact     If you have questions or need follow up information about today's clinic visit or your schedule please contact [facility] directly at [phone].  Normal or non-critical lab and imaging results will be communicated to you by MyChart, letter or phone within 4 business days after the clinic has received the results. If you do not hear from us within 7 days, please contact the clinic through [employer] or phone. If you have a critical or abnormal lab result, we will notify you by phone as soon as possible.  Submit refill requests through [employer]ie or call your pharmacy and they will forward the refill request to us. Please allow 3 business days for your refill to be completed.          Additional Information About Your Visit        MyChart Information     [employer] gives you secure access to your electronic health record. If you see a primary care provider, you can also send messages to your care team and make appointments. If you have questions, please call your primary care clinic.  If you do not have a primary care provider, please call [phone] and they will assist you.        Care EveryWhere ID     This is your Care EveryWhere ID. This could be used by other organizations to access your [place] medical records  [id number]        Your Vitals Were     Pulse Temperature Height Last Period Pulse Oximetry Breastfeeding?    85 98.8  F (37.1  C) (Oral) 5' 4.57\" (1.64 m) 06/06/2018 (Exact Date) 98% Unknown    BMI " (Body Mass Index)                   44.52 kg/m2            Blood Pressure from Last 3 Encounters:   06/22/18 139/89   02/02/18 138/80   01/04/18 120/80    Weight from Last 3 Encounters:   06/22/18 264 lb (119.7 kg)   03/23/18 271 lb (122.9 kg)   02/21/18 271 lb 9.6 oz (123.2 kg)              Today, you had the following     No orders found for display         Today's Medication Changes          These changes are accurate as of 6/22/18 10:20 AM.  If you have any questions, ask your nurse or doctor.               Start taking these medicines.        Dose/Directions    furosemide 20 MG tablet   Commonly known as:  LASIX   Used for:  Lower extremity edema   Started by:  Engelmann, Lauren Anneliese, MD        Take one tablet in the morning as needed for leg swelling.   Quantity:  60 tablet   Refills:  1       potassium chloride SA 20 MEQ CR tablet   Commonly known as:  KLOR-CON   Used for:  Lower extremity edema   Started by:  Engelmann, Lauren Anneliese, MD        Take one tablet as needed if you are taking furosemide for swelling   Quantity:  60 tablet   Refills:  1            Where to get your medicines      These medications were sent to [place], [place] - [address]  [address], [facility] [address]     Phone:  [phone]     potassium chloride SA 20 MEQ CR tablet         These medications were sent to [place], [place] - [address]  [address] [facility] [address]     Phone:  [phone]     clobetasol propionate 0.05 % Foam    furosemide 20 MG tablet                Primary Care Provider Fax #    Physician No Ref-Primary [phone]       No address on file        Equal Access to Services     IMTIAZ BESS : Sonia Mobley, belinda sterling, qaybgigi gonzalez. So [facility] [phone].    ATENCIÓN: Si bethany da silva petersen  disposición servicios gratuitos de asistencia lingüística. Amelie alanis [phone].    We comply with applicable federal civil rights laws and [place] laws. We do not discriminate on the basis of race, color, national origin, age, disability, sex, sexual orientation, or gender identity.            Thank you!     Thank you for choosing [facility]  for your care. Our goal is always to provide you with excellent care. Hearing back from our patients is one way we can continue to improve our services. Please take a few minutes to complete the written survey that you may receive in the mail after your visit with us. Thank you!             Your Updated Medication List - Protect others around you: Learn how to safely use, store and throw away your medicines at www.disposemymeds.org.          This list is accurate as of 6/22/18 10:20 AM.  Always use your most recent med list.                   Brand Name Dispense Instructions for use Diagnosis    clobetasol propionate 0.05 % Foam     600 g    Apply sparingly to affected area twice daily as needed.  Do not apply to face.    Psoriasis       furosemide 20 MG tablet    LASIX    60 tablet    Take one tablet in the morning as needed for leg swelling.    Lower extremity edema       losartan 50 MG tablet    COZAAR    90 tablet    Take 1 tablet (50 mg) by mouth daily    Hypertension goal BP (blood pressure) < 140/90       Multi-vitamin Tabs tablet      Take 1 tablet by mouth daily.        potassium chloride SA 20 MEQ CR tablet    KLOR-CON    60 tablet    Take one tablet as needed if you are taking furosemide for swelling    Lower extremity edema       SENNA LAXATIVE 8.6 MG tablet   Generic drug:  senna      Take 1 tablet by mouth daily

## 2018-10-06 ENCOUNTER — NURSE TRIAGE (OUTPATIENT)
Dept: NURSING | Facility: CLINIC | Age: 43
End: 2018-10-06

## 2018-10-06 NOTE — TELEPHONE ENCOUNTER
Reason for Disposition    [1] Continuous (nonstop) coughing interferes with work or school AND [2] no improvement using cough treatment per protocol    Additional Information    Negative: Severe difficulty breathing (e.g., struggling for each breath, speaks in single words)    Negative: Bluish lips, tongue, or face now    Negative: [1] Rapid onset of cough AND [2] has hives    Negative: Coughing started suddenly after medicine, an allergic food or bee sting    Negative: [1] Difficulty breathing AND [2] exposure to flames, smoke, or fumes    Negative: [1] Stridor AND [2] difficulty breathing    Negative: Sounds like a life-threatening emergency to the triager    Negative: Choked on object of food that could be caught in the throat    Negative: Chest pain is main symptom    Negative: [1] Previous asthma attacks AND [2] this feels like asthma attack    Negative: Cough lasts > 3 weeks    Negative: Wet (productive) cough (i.e., white-yellow, yellow, green, or ashish colored sputum)    Negative: Chest pain  (Exception: MILD central chest pain, present only when coughing)    Negative: Difficulty breathing    Negative: Patient sounds very sick or weak to the triager    Negative: [1] Coughed up blood AND [2] > 1 tablespoon (15 ml) (Exception: blood-tinged sputum)    Negative: Fever > 103 F (39.4 C)    Negative: [1] Fever > 101 F (38.3 C) AND [2] age > 60    Negative: [1] Fever > 101 F (38.3 C) AND [2] bedridden (e.g., nursing home patient, CVA, chronic illness, recovering from surgery)    Negative: [1] Fever > 100.5 F (38.1 C) AND [2] diabetes mellitus or weak immune system (e.g., HIV positive, cancer chemo, splenectomy, organ transplant, chronic steroids)    Negative: Wheezing is present    Negative: [1] Ankle swelling AND [2] swelling is increasing    Negative: SEVERE coughing spells (e.g., whooping sound after coughing, vomiting after coughing)    Protocols used: COUGH - ACUTE NON-PRODUCTIVE-ADULT-AH  Onset of cough 4  days prior to call, increased in frequency, no fever known, mild nasal congestion. Cough is tight , mostly dry. Use of rescue inhaler helps for 1 hour, not longer. Use of steam more helpful . She is aware of need to see provider if wheezing, or fever sooner, or call back, she wishes to wait for clinic visit if possible to conserve cost .

## 2019-03-21 ENCOUNTER — DOCUMENTATION ONLY (OUTPATIENT)
Dept: LAB | Facility: CLINIC | Age: 44
End: 2019-03-21

## 2019-03-21 DIAGNOSIS — I10 HYPERTENSION GOAL BP (BLOOD PRESSURE) < 140/90: ICD-10-CM

## 2019-03-21 DIAGNOSIS — Z13.6 CARDIOVASCULAR SCREENING; LDL GOAL LESS THAN 160: Primary | ICD-10-CM

## 2019-03-21 DIAGNOSIS — E66.01 MORBID OBESITY DUE TO EXCESS CALORIES (H): ICD-10-CM

## 2019-03-21 NOTE — PROGRESS NOTES
Is the patient aware that I am leaving [place] in May? If she isn't seen before the first week of May, she will need to see a new provider and I don't see that she has scheduled with me prior to that.    Thank you.     Lauren Engelmann, MD

## 2019-03-21 NOTE — PROGRESS NOTES
This patient has a future lab only appointment 03/22/2019 for overdue labs ordered by  who is no longer with [facility]. The patient wishes to still get these labs drawn buy have the results sent to you. Please send a new order for her Blip[facility]. Thanks fabián

## 2019-03-22 NOTE — PROGRESS NOTES
Patient came in for her lab work and was very upset when the  informed her that she could draw the blood, but she would have to hold it until she was able to get the orders. Patient stated she was told by a nurse that there was orders in her chart and it was ok to have the blood drawn. Patient was told of the notes  Below and just wouldn't listen  To what we were telling her.

## 2019-03-22 NOTE — PROGRESS NOTES
I have ordered labs if she opts to return. I just prefer that she has an appointment soon as my last day of accepting appointments ahead of time is May 3, otherwise I will have a reduced/same day schedule until my departure.     Please alert patient.    Lauren Engelmann, MD

## 2019-03-22 NOTE — PROGRESS NOTES
Notified patient of the message below per Dr. Engelmann.  Lab appointment scheduled here for 3/22/19.  Also advised that provider will be leaving clinic and moving.  She stated that she still wanted to see her before she left.  Appointment made for a physical on 4/22/19.    Patient stated understanding and agreeable with the plan of care. Laura Cordova,STACYN,RN, CPC Triage.

## 2019-03-22 NOTE — PROGRESS NOTES
Was blood drawn or not? I can put in a few orders but she needs an appointment to review it, preferably with a new provider.     Lauren Engelmann, MD

## 2019-03-25 DIAGNOSIS — I10 HYPERTENSION GOAL BP (BLOOD PRESSURE) < 140/90: ICD-10-CM

## 2019-03-25 NOTE — TELEPHONE ENCOUNTER
"Patient wouold like refills on losartan 50mg    Requested Prescriptions   Pending Prescriptions Disp Refills     losartan (COZAAR) 50 MG tablet  Last Written Prescription Date:  2/2/2018  Last Fill Quantity: 90 tablet,  # refills: 3   Last office visit: 2/2/2018 with prescribing provider:  JASS Baugh   Future Office Visit:   Next 5 appointments (look out 90 days)    Apr 22, 2019  8:00 AM CDT  PHYSICAL with Lauren Anneliese Engelmann, MD  [facility] ([facility]) [address] [place] [id number] [phone]          90 tablet 3     Sig: Take 1 tablet (50 mg) by mouth daily    Angiotensin-II Receptors Failed - 3/25/2019 11:14 AM       Failed - Normal serum creatinine on file in past 12 months    Recent Labs   Lab Test 12/08/17  1435   CR 0.74            Failed - Normal serum potassium on file in past 12 months    Recent Labs   Lab Test 12/08/17  1435   POTASSIUM 3.7             Passed - Blood pressure under 140/90 in past 12 months    BP Readings from Last 3 Encounters:   06/22/18 139/89   02/02/18 138/80   01/04/18 120/80            Passed - Recent (12 mo) or future (30 days) visit within the authorizing provider's specialty    Patient had office visit in the last 12 months or has a visit in the next 30 days with authorizing provider or within the authorizing provider's specialty.  See \"Patient Info\" tab in inbasket, or \"Choose Columns\" in Meds & Orders section of the refill encounter.             Passed - Medication is active on med list       Passed - Patient is age 18 or older       Passed - No active pregnancy on record       Passed - No positive pregnancy test in past 12 months          "

## 2019-03-26 RX ORDER — LOSARTAN POTASSIUM 50 MG/1
50 TABLET ORAL DAILY
Qty: 30 TABLET | Refills: 0 | Status: SHIPPED | OUTPATIENT
Start: 2019-03-26 | End: 2019-04-25

## 2019-03-28 DIAGNOSIS — I10 HYPERTENSION GOAL BP (BLOOD PRESSURE) < 140/90: ICD-10-CM

## 2019-03-28 DIAGNOSIS — E66.01 MORBID OBESITY DUE TO EXCESS CALORIES (H): ICD-10-CM

## 2019-03-28 DIAGNOSIS — Z13.6 CARDIOVASCULAR SCREENING; LDL GOAL LESS THAN 160: ICD-10-CM

## 2019-03-28 LAB
ANION GAP SERPL CALCULATED.3IONS-SCNC: 9 MMOL/L (ref 3–14)
BUN SERPL-MCNC: 10 MG/DL (ref 7–30)
CALCIUM SERPL-MCNC: 9.1 MG/DL (ref 8.5–10.1)
CHLORIDE SERPL-SCNC: 104 MMOL/L (ref 94–109)
CHOLEST SERPL-MCNC: 191 MG/DL
CO2 SERPL-SCNC: 25 MMOL/L (ref 20–32)
CREAT SERPL-MCNC: 0.72 MG/DL (ref 0.52–1.04)
GFR SERPL CREATININE-BSD FRML MDRD: >90 ML/MIN/{1.73_M2}
GLUCOSE SERPL-MCNC: 91 MG/DL (ref 70–99)
HBA1C MFR BLD: 6 % (ref 0–5.6)
HDLC SERPL-MCNC: 83 MG/DL
LDLC SERPL CALC-MCNC: 88 MG/DL
NONHDLC SERPL-MCNC: 108 MG/DL
POTASSIUM SERPL-SCNC: 4.2 MMOL/L (ref 3.4–5.3)
SODIUM SERPL-SCNC: 138 MMOL/L (ref 133–144)
TRIGL SERPL-MCNC: 101 MG/DL

## 2019-03-28 PROCEDURE — 36415 COLL VENOUS BLD VENIPUNCTURE: CPT | Performed by: FAMILY MEDICINE

## 2019-03-28 PROCEDURE — 80048 BASIC METABOLIC PNL TOTAL CA: CPT | Performed by: FAMILY MEDICINE

## 2019-03-28 PROCEDURE — 83036 HEMOGLOBIN GLYCOSYLATED A1C: CPT | Performed by: FAMILY MEDICINE

## 2019-03-28 PROCEDURE — 80061 LIPID PANEL: CPT | Performed by: FAMILY MEDICINE

## 2019-04-25 ENCOUNTER — OFFICE VISIT (OUTPATIENT)
Dept: FAMILY MEDICINE | Facility: CLINIC | Age: 44
End: 2019-04-25
Payer: COMMERCIAL

## 2019-04-25 VITALS
HEART RATE: 76 BPM | BODY MASS INDEX: 45.03 KG/M2 | OXYGEN SATURATION: 96 % | WEIGHT: 267 LBS | SYSTOLIC BLOOD PRESSURE: 130 MMHG | DIASTOLIC BLOOD PRESSURE: 91 MMHG | TEMPERATURE: 97.7 F

## 2019-04-25 DIAGNOSIS — I10 HYPERTENSION GOAL BP (BLOOD PRESSURE) < 140/90: ICD-10-CM

## 2019-04-25 DIAGNOSIS — Z00.00 ROUTINE HISTORY AND PHYSICAL EXAMINATION OF ADULT: Primary | ICD-10-CM

## 2019-04-25 DIAGNOSIS — Z12.39 SCREENING FOR BREAST CANCER: ICD-10-CM

## 2019-04-25 DIAGNOSIS — E66.01 MORBID OBESITY DUE TO EXCESS CALORIES (H): ICD-10-CM

## 2019-04-25 PROCEDURE — 99396 PREV VISIT EST AGE 40-64: CPT | Performed by: FAMILY MEDICINE

## 2019-04-25 RX ORDER — LOSARTAN POTASSIUM 50 MG/1
50 TABLET ORAL DAILY
Qty: 90 TABLET | Refills: 3 | Status: SHIPPED | OUTPATIENT
Start: 2019-04-25 | End: 2020-03-26

## 2019-04-25 ASSESSMENT — ENCOUNTER SYMPTOMS
FEVER: 0
HEMATURIA: 0
ARTHRALGIAS: 0
CONSTIPATION: 1
NAUSEA: 0
PALPITATIONS: 0
CHILLS: 0
JOINT SWELLING: 0
PARESTHESIAS: 0
HEADACHES: 0
HEARTBURN: 0
SORE THROAT: 0
SHORTNESS OF BREATH: 0
DIARRHEA: 0
COUGH: 0
HEMATOCHEZIA: 0
DYSURIA: 0
DIZZINESS: 0
WEAKNESS: 0
NERVOUS/ANXIOUS: 0
BREAST MASS: 0
MYALGIAS: 0
ABDOMINAL PAIN: 0
FREQUENCY: 0
EYE PAIN: 0

## 2019-04-25 ASSESSMENT — PATIENT HEALTH QUESTIONNAIRE - PHQ9
5. POOR APPETITE OR OVEREATING: SEVERAL DAYS
SUM OF ALL RESPONSES TO PHQ QUESTIONS 1-9: 0

## 2019-04-25 ASSESSMENT — ANXIETY QUESTIONNAIRES
3. WORRYING TOO MUCH ABOUT DIFFERENT THINGS: NOT AT ALL
5. BEING SO RESTLESS THAT IT IS HARD TO SIT STILL: NOT AT ALL
7. FEELING AFRAID AS IF SOMETHING AWFUL MIGHT HAPPEN: NOT AT ALL
1. FEELING NERVOUS, ANXIOUS, OR ON EDGE: NOT AT ALL
2. NOT BEING ABLE TO STOP OR CONTROL WORRYING: NOT AT ALL
GAD7 TOTAL SCORE: 2
IF YOU CHECKED OFF ANY PROBLEMS ON THIS QUESTIONNAIRE, HOW DIFFICULT HAVE THESE PROBLEMS MADE IT FOR YOU TO DO YOUR WORK, TAKE CARE OF THINGS AT HOME, OR GET ALONG WITH OTHER PEOPLE: NOT DIFFICULT AT ALL
6. BECOMING EASILY ANNOYED OR IRRITABLE: SEVERAL DAYS

## 2019-04-25 ASSESSMENT — PAIN SCALES - GENERAL: PAINLEVEL: NO PAIN (0)

## 2019-04-25 NOTE — PATIENT INSTRUCTIONS
Majo Mott, DO at the [facility]  Schedule your mammogram  Keep up the good work!

## 2019-04-25 NOTE — PROGRESS NOTES
SUBJECTIVE:   CC: Raffy Castelan is an 43 year old woman who presents for preventive health visit.     Healthy Habits:     Getting at least 3 servings of Calcium per day:  Yes    Bi-annual eye exam:  NO    Dental care twice a year:  Yes    Sleep apnea or symptoms of sleep apnea:  None    Diet:  Regular (no restrictions)    Frequency of exercise:  None    Taking medications regularly:  Yes    Medication side effects:  None    PHQ-2 Total Score: 0    Additional concerns today:  Yes    Hypertension Follow-up      Outpatient blood pressures are not being checked.    Low Salt Diet: no added salt      Today's PHQ-2 Score:   PHQ-2 ( 1999 Pfizer) 4/25/2019   Q1: Little interest or pleasure in doing things 0   Q2: Feeling down, depressed or hopeless 0   PHQ-2 Score 0   Q1: Little interest or pleasure in doing things Not at all   Q2: Feeling down, depressed or hopeless Not at all   PHQ-2 Score 0     Weight was up to 280lbs, lost 13lbs.   Started doing Noom - loves it. Finally has cut down on carbs.     Abuse: Current or Past(Physical, Sexual or Emotional)- No  Do you feel safe in your environment? Yes    Social History     Tobacco Use     Smoking status: Never Smoker     Smokeless tobacco: Never Used   Substance Use Topics     Alcohol use: No     If you drink alcohol do you typically have >3 drinks per day or >7 drinks per week? No    Alcohol Use 4/25/2019   Prescreen: >3 drinks/day or >7 drinks/week? Not Applicable   Prescreen: >3 drinks/day or >7 drinks/week? -   No flowsheet data found.    Reviewed orders with patient.  Reviewed health maintenance and updated orders accordingly - Yes  BP Readings from Last 3 Encounters:   04/25/19 (!) 130/91   06/22/18 139/89   02/02/18 138/80    Wt Readings from Last 3 Encounters:   04/25/19 121.1 kg (267 lb)   06/22/18 119.7 kg (264 lb)   03/23/18 122.9 kg (271 lb)                    Mammogram Screening: Patient under age 50, mutual decision reflected in health  maintenance.      Pertinent mammograms are reviewed under the imaging tab.  History of abnormal Pap smear: NO - age 30-65 PAP every 5 years with negative HPV co-testing recommended  PAP / HPV Latest Ref Rng & Units 2/2/2018 10/13/2014 5/9/2011   PAP - NIL NIL NIL   HPV 16 DNA NEG:Negative Negative - -   HPV 18 DNA NEG:Negative Negative - -   OTHER HR HPV NEG:Negative Negative - -     Reviewed and updated as needed this visit by clinical staff  Tobacco  Allergies  Med Hx  Surg Hx  Fam Hx  Soc Hx        Reviewed and updated as needed this visit by Provider            Review of Systems   Constitutional: Negative for chills and fever.   HENT: Negative for congestion, ear pain, hearing loss and sore throat.    Eyes: Negative for pain and visual disturbance.   Respiratory: Negative for cough and shortness of breath.    Cardiovascular: Negative for chest pain, palpitations and peripheral edema.   Gastrointestinal: Positive for constipation. Negative for abdominal pain, diarrhea, heartburn, hematochezia and nausea.   Breasts:  Negative for tenderness, breast mass and discharge.   Genitourinary: Negative for dysuria, frequency, genital sores, hematuria, pelvic pain, urgency, vaginal bleeding and vaginal discharge.   Musculoskeletal: Negative for arthralgias, joint swelling and myalgias.   Skin: Negative for rash.   Neurological: Negative for dizziness, weakness, headaches and paresthesias.   Psychiatric/Behavioral: Negative for mood changes. The patient is not nervous/anxious.         OBJECTIVE:   BP (!) 130/91 (BP Location: Right arm, Patient Position: Chair, Cuff Size: Adult Large)   Pulse 76   Temp 97.7  F (36.5  C) (Oral)   Wt 121.1 kg (267 lb)   LMP 04/19/2019   SpO2 96%   BMI 45.03 kg/m    Physical Exam  GENERAL: healthy, alert, no distress and obese  EYES: Eyes grossly normal to inspection, PERRL and conjunctivae and sclerae normal  NECK: no adenopathy, no asymmetry, masses, or scars and thyroid normal to  palpation  RESP: lungs clear to auscultation - no rales, rhonchi or wheezes  CV: regular rate and rhythm, normal S1 S2, no S3 or S4, no murmur, click or rub, no peripheral edema and peripheral pulses strong  ABDOMEN: soft, nontender, no hepatosplenomegaly, no masses and bowel sounds normal  MS: no gross musculoskeletal defects noted, no edema  SKIN: no suspicious lesions or rashes  NEURO: Normal strength and tone, mentation intact and speech normal  PSYCH: mentation appears normal, affect normal/bright    Diagnostic Test Results:  Results for orders placed or performed in visit on 03/28/19   **Basic metabolic panel FUTURE anytime   Result Value Ref Range    Sodium 138 133 - 144 mmol/L    Potassium 4.2 3.4 - 5.3 mmol/L    Chloride 104 94 - 109 mmol/L    Carbon Dioxide 25 20 - 32 mmol/L    Anion Gap 9 3 - 14 mmol/L    Glucose 91 70 - 99 mg/dL    Urea Nitrogen 10 7 - 30 mg/dL    Creatinine 0.72 0.52 - 1.04 mg/dL    GFR Estimate >90 >60 mL/min/[1.73_m2]    GFR Estimate If Black >90 >60 mL/min/[1.73_m2]    Calcium 9.1 8.5 - 10.1 mg/dL   Lipid panel reflex to direct LDL Fasting   Result Value Ref Range    Cholesterol 191 <200 mg/dL    Triglycerides 101 <150 mg/dL    HDL Cholesterol 83 >49 mg/dL    LDL Cholesterol Calculated 88 <100 mg/dL    Non HDL Cholesterol 108 <130 mg/dL   **A1C FUTURE anytime   Result Value Ref Range    Hemoglobin A1C 6.0 (H) 0 - 5.6 %       ASSESSMENT/PLAN:       ICD-10-CM    1. Routine history and physical examination of adult Z00.00    2. Hypertension goal BP (blood pressure) < 140/90 I10 losartan (COZAAR) 50 MG tablet   3. BMI >40 E66.01    4. Screening for breast cancer Z12.31 MA Screening Digital Bilateral       COUNSELING:  Reviewed preventive health counseling, as reflected in patient instructions       Regular exercise       Healthy diet/nutrition       Advance Care Planning    BP Readings from Last 1 Encounters:   04/25/19 (!) 130/91     Estimated body mass index is 45.03 kg/m  as  "calculated from the following:    Height as of 6/22/18: 1.64 m (5' 4.57\").    Weight as of this encounter: 121.1 kg (267 lb).      Weight management plan: Specific weight management program called [facility] discussed     reports that she has never smoked. She has never used smokeless tobacco.      Counseling Resources:  ATP IV Guidelines  Pooled Cohorts Equation Calculator  Breast Cancer Risk Calculator  FRAX Risk Assessment  ICSI Preventive Guidelines  Dietary Guidelines for Americans, 2010  USDA's MyPlate  ASA Prophylaxis  Lung CA Screening    Lauren A. Engelmann, MD  [facility]  "

## 2019-04-26 ASSESSMENT — ANXIETY QUESTIONNAIRES: GAD7 TOTAL SCORE: 2

## 2019-04-26 ASSESSMENT — ASTHMA QUESTIONNAIRES: ACT_TOTALSCORE: 25

## 2019-05-20 ENCOUNTER — ANCILLARY PROCEDURE (OUTPATIENT)
Dept: MAMMOGRAPHY | Facility: CLINIC | Age: 44
End: 2019-05-20
Attending: FAMILY MEDICINE
Payer: COMMERCIAL

## 2019-05-20 DIAGNOSIS — Z12.39 SCREENING FOR BREAST CANCER: ICD-10-CM

## 2019-05-20 PROCEDURE — 77067 SCR MAMMO BI INCL CAD: CPT | Mod: TC

## 2019-06-07 ENCOUNTER — TELEPHONE (OUTPATIENT)
Dept: NUTRITION | Facility: CLINIC | Age: 44
End: 2019-06-07

## 2019-06-07 NOTE — TELEPHONE ENCOUNTER
Patient is s/p 5 years LAGB and has not followed up in clinic over the past few years.  Spoke to patient over the phone pertaining to her progress since her last follow-up/band adjustment in clinic.  Patient notes that she is very frustrated and wishes she never had the procedure done in the first place.  Patients notes that she has gained all of her weight back that she had lost.  Patient also notes that she continues to struggle with tolerating certain food items, noting that she feels like they get stuck and then ends up vomiting afterwards.      Apologized to patient regarding her frustrations and concerns and encouraged her to come into clinic to be evaluated in regarding to further intervention, especially pertaining to intolerances.  Patient agreeable to scheduling an appointment in clinic to see Shoshana FRANKLIN.  Provided patient with number to schedule the appointment.  No other questions at this time.

## 2019-06-10 ENCOUNTER — OFFICE VISIT (OUTPATIENT)
Dept: SURGERY | Facility: CLINIC | Age: 44
End: 2019-06-10
Payer: COMMERCIAL

## 2019-06-10 ENCOUNTER — TELEPHONE (OUTPATIENT)
Dept: SURGERY | Facility: CLINIC | Age: 44
End: 2019-06-10

## 2019-06-10 VITALS
HEART RATE: 81 BPM | BODY MASS INDEX: 43.82 KG/M2 | WEIGHT: 263 LBS | SYSTOLIC BLOOD PRESSURE: 138 MMHG | DIASTOLIC BLOOD PRESSURE: 87 MMHG | HEIGHT: 65 IN | OXYGEN SATURATION: 96 %

## 2019-06-10 DIAGNOSIS — E66.813 CLASS 3 SEVERE OBESITY DUE TO EXCESS CALORIES WITH SERIOUS COMORBIDITY AND BODY MASS INDEX (BMI) OF 40.0 TO 44.9 IN ADULT (H): ICD-10-CM

## 2019-06-10 DIAGNOSIS — Z98.84 STATUS POST GASTRIC BANDING SURGERY: Primary | ICD-10-CM

## 2019-06-10 DIAGNOSIS — E66.01 CLASS 3 SEVERE OBESITY DUE TO EXCESS CALORIES WITH SERIOUS COMORBIDITY AND BODY MASS INDEX (BMI) OF 40.0 TO 44.9 IN ADULT (H): ICD-10-CM

## 2019-06-10 ASSESSMENT — MIFFLIN-ST. JEOR: SCORE: 1832.01

## 2019-06-10 ASSESSMENT — PAIN SCALES - GENERAL: PAINLEVEL: NO PAIN (0)

## 2019-06-10 NOTE — PATIENT INSTRUCTIONS
See Shoshana Bustamante in 4 weeks after Upper GI  Upper GI in 4 weeks prior to appt with Shoshana. Schedule at  today  Start Contrave    To reach RUFUS Culver for any questions/concerns call [phone] OR send My Chart message        MEDICATION STARTED AT THIS APPOINTMENT    We are starting Contrave. Contrave is specifically prescribed for obesity. It is a combination of two medications, Naltrexone and Bupropione (Wellbutrin). The Bupropion helps lessen appetite and the Naltrexone works by blocking certain receptors in the brain and curbing cravings.      For some of our patients the medication works right away. Other patients don't feel much of a change but find they've lost weight. Like all weight loss medications, Contrave works best when you help it work. This means:  1. Having less tempting high calorie (fattening) food around the house or office. (For people with strong cravings this is very important.)   2. Staying away from situations or people that may trigger your cravings .   3. Eating out only one time or less each week.  4. Eating your meals at a table with the TV or computer off.    WARNING: This medication blocks the action of opioid type pain medications. If you routinely take any medication like Codeine, Oxycontin, Percocet, Morphine, Dilaudid or Methodone, do not take this until you have talked with weight management staff. If you are planning surgery you should stop Naltrexone 4 days prior to the surgery, and should not restart it until 4 days after your last dose of narcotics. If you have an injury that requires pain medication, make sure the health care staff knows you take Naltrexone.     Dosing as follows:  Week 1- 1 tablet in the morning  Week 2- 1 tablet in the morning and 1 tablet at bedtime  Week 3- 2 tablets in the morning and 1 tablet at bedtime  Week 4 and thereafter- 2 tablets in the morning and 2 tablets at bedtime    Side-effects: The most common side effect include: nausea;  constipation; headache; vomiting; dizziness; diarrhea; trouble sleeping; and dry mouth.     This medication typically isn t covered by insurance and will require a prior authorization, which can take 1-2 weeks to review. Patients can visit [email or web address] and sign up for the Pharmacy savings program and receive the medication for $60-70 per month for 12 months if eligible.    Call the nurse at [phone] if you have any questions or concerns. (Do not stop taking it if you don't think it's working. For some people it works without them knowing it.)       In order to get refills of this or any medication we prescribe you must be seen in the medical weight mgmt clinic every 2-4 months. Please have your pharmacy fax a refill request to [phone].

## 2019-06-10 NOTE — TELEPHONE ENCOUNTER
Prior Authorization Retail Medication Request    Medication/Dose: Contrave  ICD code (if different than what is on RX):  S/p adjustable band surgery, morbid obesity   Previously Tried and Failed:  Qsymia, Phentermine alone, Saxenda, weight loss surgery, history of diet and exercise  Rationale:  Highest weight in life 280 lbs Feb 2019, recently has lost 20 lbs.  Weight prior to the band and MWM she was 270 lbs  She met with Dr Recinos prior to lap band and lost 30 lbs with Qsymia   Prior to lap band she was 240 lbs  Lap band: 2014  After lap band started regaining from 240 lbs   After the lap band she started having reactions to Qsymia and it was no longer covered  Last saw Dr Recinos 11/18/16 and was taking Saxenda which at the time was not helping with much weight loss and was very expensive  Last visit with Marcie Brad was 2014 and she had a total of 9cc in her band after that 0.5 ml adjustement        Insurance Name:    Insurance ID:        Pharmacy Information (if different than what is on RX)  Name:  [facility] - [place], [place] - [address] [place] [address]  Phone:  [phone]

## 2019-06-10 NOTE — PROGRESS NOTES
"Band Assessment Visit    Date: 6/10/2019  Name: Raffy Castelan  MR#: [id number]  : 1975    VITALS:          Weight: 263 lbs 0 oz         BMI: Body mass index is 44.35 kg/m .                      /87   Pulse 81   Ht 1.64 m (5' 4.57\")   Wt 119.3 kg (263 lb)   SpO2 96%   BMI 44.35 kg/m      SUBJECTIVE:  Patient comes to the clinic today for band assessment.  In regards to the patient's band, the patient feels they need fluid removed from their band.     Highest weight in life 280 lbs 2019, recently has lost 20 lbs.  Weight prior to the band and MWM she was 270 lbs  She met with Dr Recinos prior to lap band and lost 30 lbs with Qsymia   Prior to lap band she was 240 lbs  Lap band:   After lap band started regaining from 240 lbs   After the lap band she started having reactions to Qsymia and it was no longer covered  Last saw Dr Recinos 16 and was taking Saxenda which at the time was not helping with much weight loss and was very expensive  Last visit with Marcie Salinas was  and she had a total of 9cc in her band after that 0.5 ml adjustement    WEIGHT SELF ASSESSMENT:    6/10/2019   Are you satisfied with your weight or weight loss? No   Please check the boxes (1-3 boxes) which you think have been influencing your weight in the past month. I have not been active or exercising, I have been making poor food choices and/or eating poorly, The band is not in the right setting       CURRENT EXERCISE AND ACTIVITY:    6/10/2019   I am exercising 3x weekly or more.  No   Please select the statement that best describes your ability to exercise and be active in the past 4 weeks or since your last clinic visit: I should be more active but I just have not gotten around to it       BAND ROS:    6/10/2019   I am hungry between meals? Yes   I am eating between meals? Yes   I am eating > 1 cup of food at meals? Yes   I am not losing 1-2 lbs a week? Yes   I do not feel a sense of restriction? " Yes         6/10/2019   I have pain when swallowing foods or liquids. Yes   I have heart burn, vomiting, or reflux. Yes   I have night cough or hiccups. No   I am making poor food choices (smoothies, shakes, chips). Yes   I am unable to eat chicken, steak, and bread. Yes         6/10/2019   Are you pregnant? No   Will you be traveling to remote areas? No   Will you be having major surgery soon? No       ASSESSMENT:    1.  S/P adjustable band surgery. Last follow up was in 2014 and 9cc in her band.  Her band has been too tight for 5 years now with frequent vomiting and unable to lose weight due to maladaptive eating. Plan to remove all fluid from band today.  2. Will re evaluate band in 4 weeks with Upper GI and band assessment to decide if we will start to add fluid back to her band  3. Start Contrave today.  Has tried Qsymia in the past with side effects and Saxenda with no response.      PLAN: After evaluation, we have elected to adjust her gastric band. Risk, benefits, and alternatives were reviewed before a consent was signed. Raffy wishes to proceed and will follow up in 4 weeks  for subsequent assessment.    PROCEDURE: Adjustment of gastric band performed by Shoshana Bustamante PA-C    PROCEDURE DETAILS: In the clinic exam room, the patient was placed in supine position on the exam table. The area over the access port was prepped with an alcohol swab, gloves were donned, and a Tillman needle and syringe were directed into the port under palpation guidance. A small amount of saline was aspirated to verify location, and 7 mls was removed from the port. Access needle was withdrawn and bandaid was applied. Patient sat up and drank 4 ounces of lukewarm water without difficulty. She should return to a liquid diet and advance as tolerated. Tight band warning signs were reviewed.  She left home in a stable and ambulatory condition.     I spent a total of 30 minutes face to face with Raffy Castelan during today's  office visit.  Over 50% of this time was spent counseling the patient and/or coordinating care.

## 2019-06-10 NOTE — LETTER
"6/10/2019       RE: Raffy Castelan  [address]  [place] [address]     Dear Colleague,    Thank you for referring your patient, Raffy Castelan, to the [facility] at [facility]. Please see a copy of my visit note below.    Band Assessment Visit    Date: 6/10/2019  Name: Raffy Castelan  MR#: [id number]  : 1975    VITALS:          Weight: 263 lbs 0 oz         BMI: Body mass index is 44.35 kg/m .                      /87   Pulse 81   Ht 1.64 m (5' 4.57\")   Wt 119.3 kg (263 lb)   SpO2 96%   BMI 44.35 kg/m       SUBJECTIVE:  Patient comes to the clinic today for band assessment.  In regards to the patient's band, the patient feels they need fluid removed from their band.     Highest weight in life 280 lbs 2019, recently has lost 20 lbs.  Weight prior to the band and MWM she was 270 lbs  She met with Dr Recinos prior to lap band and lost 30 lbs with Qsymia   Prior to lap band she was 240 lbs  Lap band:   After lap band started regaining from 240 lbs   After the lap band she started having reactions to Qsymia and it was no longer covered  Last saw Dr Recinos 16 and was taking Saxenda which at the time was not helping with much weight loss and was very expensive  Last visit with Marcie Salinas was  and she had a total of 9cc in her band after that 0.5 ml adjustement    WEIGHT SELF ASSESSMENT:    6/10/2019   Are you satisfied with your weight or weight loss? No   Please check the boxes (1-3 boxes) which you think have been influencing your weight in the past month. I have not been active or exercising, I have been making poor food choices and/or eating poorly, The band is not in the right setting       CURRENT EXERCISE AND ACTIVITY:    6/10/2019   I am exercising 3x weekly or more.  No   Please select the statement that best describes your ability to exercise and be active in the past 4 weeks or since your " last clinic visit: I should be more active but I just have not gotten around to it       BAND ROS:    6/10/2019   I am hungry between meals? Yes   I am eating between meals? Yes   I am eating > 1 cup of food at meals? Yes   I am not losing 1-2 lbs a week? Yes   I do not feel a sense of restriction? Yes         6/10/2019   I have pain when swallowing foods or liquids. Yes   I have heart burn, vomiting, or reflux. Yes   I have night cough or hiccups. No   I am making poor food choices (smoothies, shakes, chips). Yes   I am unable to eat chicken, steak, and bread. Yes         6/10/2019   Are you pregnant? No   Will you be traveling to remote areas? No   Will you be having major surgery soon? No       ASSESSMENT:    1.  S/P adjustable band surgery. Last follow up was in 2014 and 9cc in her band.  Her band has been too tight for 5 years now with frequent vomiting and unable to lose weight due to maladaptive eating. Plan to remove all fluid from band today.  2. Will re evaluate band in 4 weeks with Upper GI and band assessment to decide if we will start to add fluid back to her band  3. Start Contrave today.  Has tried Qsymia in the past with side effects and Saxenda with no response.      PLAN: After evaluation, we have elected to adjust her gastric band. Risk, benefits, and alternatives were reviewed before a consent was signed. Raffy wishes to proceed and will follow up in 4 weeks  for subsequent assessment.    PROCEDURE: Adjustment of gastric band performed by Shoshana Bustamante PA-C    PROCEDURE DETAILS: In the clinic exam room, the patient was placed in supine position on the exam table. The area over the access port was prepped with an alcohol swab, gloves were donned, and a Tillman needle and syringe were directed into the port under palpation guidance. A small amount of saline was aspirated to verify location, and 7 mls was removed from the port. Access needle was withdrawn and bandaid was applied. Patient sat  up and drank 4 ounces of lukewarm water without difficulty. She should return to a liquid diet and advance as tolerated. Tight band warning signs were reviewed.  She left home in a stable and ambulatory condition.     I spent a total of 30 minutes face to face with Raffy Castelan during today's office visit.  Over 50% of this time was spent counseling the patient and/or coordinating care.        Again, thank you for allowing me to participate in the care of your patient.      Sincerely,    Shoshana Bustamante PA-C

## 2019-06-10 NOTE — NURSING NOTE
"Chief Complaint   Patient presents with     RECHECK     Band assessment       Vitals:    06/10/19 0711   BP: 138/87   Pulse: 81   SpO2: 96%   Weight: 119.3 kg (263 lb)   Height: 1.64 m (5' 4.57\")       Body mass index is 44.35 kg/m .    Pinky Gonzalez CMA    "

## 2019-06-10 NOTE — TELEPHONE ENCOUNTER
Central Prior Authorization Team   Phone: [phone]      PA Initiation    Medication: Contrave-PA initiated  Insurance Company: [facility] - Phone [phone] Fax [phone]  Pharmacy Filling the Rx: [facility] - [place], [place] - [address] [place], [place] [address]  Filling Pharmacy Phone: [phone]  Filling Pharmacy Fax:    Start Date: 6/10/2019

## 2019-06-11 NOTE — TELEPHONE ENCOUNTER
Prior Authorization Approval    Authorization Effective Date: 6/10/2019  Authorization Expiration Date: 9/10/2019  Medication: Contrave-PA approved  Approved Dose/Quantity:   Reference #:     Insurance Company: [employer] [place] - Phone [phone] Fax [phone]  Expected CoPay:       CoPay Card Available:      Foundation Assistance Needed:    Which Pharmacy is filling the prescription (Not needed for infusion/clinic administered): [facility] - [place], [place] - [address] [place] [address]  Pharmacy Notified: Yes  Patient Notified: No-Pharmacy will contact

## 2019-08-04 ENCOUNTER — NURSE TRIAGE (OUTPATIENT)
Dept: NURSING | Facility: CLINIC | Age: 44
End: 2019-08-04

## 2019-08-24 NOTE — TELEPHONE ENCOUNTER
Cold symptoms now abating, but has had cough for a few days.  Albuterol inhaler is helping control wheezing.  Home care advice given and patient expressed understanding.    Josie Prieto RN  [facility]      Additional Information    Negative: Severe difficulty breathing (e.g., struggling for each breath, speaks in single words)    Negative: Bluish (or gray) lips or face now    Negative: [1] Difficulty breathing AND [2] exposure to flames, smoke, or fumes    Negative: [1] Stridor AND [2] difficulty breathing    Negative: Sounds like a life-threatening emergency to the triager    Negative: Chest pain  (Exception: MILD central chest pain, present only when coughing)    Negative: Difficulty breathing    Negative: Patient sounds very sick or weak to the triager    Negative: [1] Coughed up blood AND [2] > 1 tablespoon (15 ml) (Exception: blood-tinged sputum)    Negative: Fever > 103 F (39.4 C)    Negative: [1] Fever > 101 F (38.3 C) AND [2] age > 60    Negative: [1] Fever > 100.0 F (37.8 C) AND [2] bedridden (e.g., nursing home patient, CVA, chronic illness, recovering from surgery)    Negative: [1] Fever > 100.0 F (37.8 C) AND [2] diabetes mellitus or weak immune system (e.g., HIV positive, cancer chemo, splenectomy, chronic steroids)    Negative: Wheezing is present    Negative: SEVERE coughing spells (e.g., whooping sound after coughing, vomiting after coughing)    Negative: [1] Continuous (nonstop) coughing interferes with work or school AND [2] no improvement using cough treatment per Care Advice    Negative: Coughing up ashish-colored (reddish-brown) sputum    Negative: Fever present > 3 days (72 hours)    Negative: [1] Fever returns after gone for over 24 hours AND [2] symptoms worse or not improved    Negative: [1] Using nasal washes and pain medicine > 24 hours AND [2] sinus pain (around cheekbone or eye) persists    Negative: Earache    Negative: [1] Known COPD or other severe lung disease (i.e.,  bronchiectasis, cystic fibrosis, lung surgery) AND [2] worsening symptoms (i.e., increased sputum purulence or amount, increased breathing difficulty    Negative: Cough has been present for > 3 weeks    Negative: [1] Nasal discharge AND [2] present > 10 days    Negative: [1] Coughed up blood-tinged sputum AND [2] more than once    Negative: Exposure to TB (Tuberculosis)    Negative: Cough    Cough with cold symptoms (e.g., runny nose, postnasal drip, throat clearing)    Protocols used: COUGH - ACUTE [id number]       no

## 2020-03-26 ENCOUNTER — VIRTUAL VISIT (OUTPATIENT)
Dept: FAMILY MEDICINE | Facility: CLINIC | Age: 45
End: 2020-03-26
Payer: COMMERCIAL

## 2020-03-26 DIAGNOSIS — R73.09 ELEVATED HEMOGLOBIN A1C: ICD-10-CM

## 2020-03-26 DIAGNOSIS — Z13.220 LIPID SCREENING: ICD-10-CM

## 2020-03-26 DIAGNOSIS — I10 HYPERTENSION GOAL BP (BLOOD PRESSURE) < 140/90: Primary | ICD-10-CM

## 2020-03-26 PROCEDURE — 99213 OFFICE O/P EST LOW 20 MIN: CPT | Mod: 95 | Performed by: FAMILY MEDICINE

## 2020-03-26 RX ORDER — LOSARTAN POTASSIUM 50 MG/1
50 TABLET ORAL DAILY
Qty: 90 TABLET | Refills: 3 | Status: SHIPPED | OUTPATIENT
Start: 2020-03-26 | End: 2020-06-25

## 2020-03-26 NOTE — PROGRESS NOTES
"Raffy Castelan is a 44 year old female who is being evaluated via a billable telephone visit.      The patient has been notified of following:     \"This telephone visit will be conducted via a call between you and your physician/provider. We have found that certain health care needs can be provided without the need for a physical exam.  This service lets us provide the care you need with a short phone conversation.  If a prescription is necessary we can send it directly to your pharmacy.  If lab work is needed we can place an order for that and you can then stop by our lab to have the test done at a later time.    If during the course of the call the physician/provider feels a telephone visit is not appropriate, you will not be charged for this service.\"     Raffy Castelan complains of   Chief Complaint   Patient presents with     Hypertension       I have reviewed and updated the patient's Past Medical History, Social History, Family History and Medication List.    ALLERGIES  Lisinopril    Hypertension Follow-up  She did take her medication today.    Do you check your blood pressure regularly outside of the clinic? Been a while since she used her BP machine, she uses a cuff.    Are you following a low salt diet? No    Are your blood pressures ever more than 140 on the top number (systolic) OR more   than 90 on the bottom number (diastolic), for example 140/90? Today's readings 156/90 and 152/93. This were taken 2 min apart.    She rechecked her BP right before our call and it was 141/90 and 139/89.  She states that her home readings are usually under 140/90.  She is not having any side effects from her losartan.      Due for labs to be done.  She previously had an A1C of 6.0.        How many servings of fruits and vegetables do you eat daily?  2-3    On average, how many sweetened beverages do you drink each day (Examples: soda, juice, sweet tea, etc.  Do NOT count diet or artificially sweetened beverages)?   " 0    How many days per week do you miss taking your medication? 0    Assessment/Plan:  1. Hypertension goal BP (blood pressure) < 140/90  - Generally well controlled  - May need to consider adding a medication if her blood pressures are more frequently over 140s/90s  - losartan (COZAAR) 50 MG tablet; Take 1 tablet (50 mg) by mouth daily  Dispense: 90 tablet; Refill: 3  - **Comprehensive metabolic panel FUTURE anytime; Future    2. Elevated hemoglobin A1c  - **A1C FUTURE anytime; Future  - **TSH with free T4 reflex FUTURE anytime; Future    3. Lipid screening  - Lipid panel reflex to direct LDL Fasting; Future    Patient will come in for a lab visit for fasting labs when she comes to  her RX.      Phone call duration:  6 minutes    Majo Mott, DO

## 2020-03-27 DIAGNOSIS — I10 HYPERTENSION GOAL BP (BLOOD PRESSURE) < 140/90: ICD-10-CM

## 2020-03-27 DIAGNOSIS — R73.09 ELEVATED HEMOGLOBIN A1C: ICD-10-CM

## 2020-03-27 DIAGNOSIS — Z13.220 LIPID SCREENING: ICD-10-CM

## 2020-03-27 LAB
ALBUMIN SERPL-MCNC: 3.1 G/DL (ref 3.4–5)
ALP SERPL-CCNC: 48 U/L (ref 40–150)
ALT SERPL W P-5'-P-CCNC: 24 U/L (ref 0–50)
ANION GAP SERPL CALCULATED.3IONS-SCNC: 5 MMOL/L (ref 3–14)
AST SERPL W P-5'-P-CCNC: 14 U/L (ref 0–45)
BILIRUB SERPL-MCNC: 0.4 MG/DL (ref 0.2–1.3)
BUN SERPL-MCNC: 10 MG/DL (ref 7–30)
CALCIUM SERPL-MCNC: 8.9 MG/DL (ref 8.5–10.1)
CHLORIDE SERPL-SCNC: 103 MMOL/L (ref 94–109)
CHOLEST SERPL-MCNC: 228 MG/DL
CO2 SERPL-SCNC: 28 MMOL/L (ref 20–32)
CREAT SERPL-MCNC: 0.66 MG/DL (ref 0.52–1.04)
GFR SERPL CREATININE-BSD FRML MDRD: >90 ML/MIN/{1.73_M2}
GLUCOSE SERPL-MCNC: 88 MG/DL (ref 70–99)
HBA1C MFR BLD: 5.8 % (ref 0–5.6)
HDLC SERPL-MCNC: 94 MG/DL
LDLC SERPL CALC-MCNC: 111 MG/DL
NONHDLC SERPL-MCNC: 134 MG/DL
POTASSIUM SERPL-SCNC: 4.2 MMOL/L (ref 3.4–5.3)
PROT SERPL-MCNC: 8.3 G/DL (ref 6.8–8.8)
SODIUM SERPL-SCNC: 136 MMOL/L (ref 133–144)
TRIGL SERPL-MCNC: 117 MG/DL
TSH SERPL DL<=0.005 MIU/L-ACNC: 1.67 MU/L (ref 0.4–4)

## 2020-03-27 PROCEDURE — 84443 ASSAY THYROID STIM HORMONE: CPT | Performed by: FAMILY MEDICINE

## 2020-03-27 PROCEDURE — 80053 COMPREHEN METABOLIC PANEL: CPT | Performed by: FAMILY MEDICINE

## 2020-03-27 PROCEDURE — 83036 HEMOGLOBIN GLYCOSYLATED A1C: CPT | Performed by: FAMILY MEDICINE

## 2020-03-27 PROCEDURE — 80061 LIPID PANEL: CPT | Performed by: FAMILY MEDICINE

## 2020-03-27 PROCEDURE — 36415 COLL VENOUS BLD VENIPUNCTURE: CPT | Performed by: FAMILY MEDICINE

## 2020-06-19 ENCOUNTER — MYC MEDICAL ADVICE (OUTPATIENT)
Dept: FAMILY MEDICINE | Facility: CLINIC | Age: 45
End: 2020-06-19

## 2020-06-24 DIAGNOSIS — R60.0 LOWER EXTREMITY EDEMA: ICD-10-CM

## 2020-06-25 ENCOUNTER — VIRTUAL VISIT (OUTPATIENT)
Dept: FAMILY MEDICINE | Facility: CLINIC | Age: 45
End: 2020-06-25
Payer: COMMERCIAL

## 2020-06-25 DIAGNOSIS — Z98.84 BARIATRIC SURGERY STATUS: ICD-10-CM

## 2020-06-25 DIAGNOSIS — R60.0 LOWER EXTREMITY EDEMA: Primary | ICD-10-CM

## 2020-06-25 DIAGNOSIS — M21.612 BUNION, LEFT: ICD-10-CM

## 2020-06-25 DIAGNOSIS — I10 HYPERTENSION GOAL BP (BLOOD PRESSURE) < 140/90: ICD-10-CM

## 2020-06-25 PROCEDURE — 99214 OFFICE O/P EST MOD 30 MIN: CPT | Mod: GT | Performed by: FAMILY MEDICINE

## 2020-06-25 RX ORDER — LOSARTAN POTASSIUM AND HYDROCHLOROTHIAZIDE 12.5; 5 MG/1; MG/1
1 TABLET ORAL DAILY
Qty: 90 TABLET | Refills: 3 | Status: SHIPPED | OUTPATIENT
Start: 2020-06-25 | End: 2020-12-07

## 2020-06-25 RX ORDER — FUROSEMIDE 20 MG
TABLET ORAL
Qty: 90 TABLET | Refills: 3 | Status: SHIPPED | OUTPATIENT
Start: 2020-06-25 | End: 2021-07-30

## 2020-06-25 NOTE — PROGRESS NOTES
"Raffy Castelan is a 44 year old female who is being evaluated via a billable video visit.      The patient has been notified of following:     \"This video visit will be conducted via a call between you and your physician/provider. We have found that certain health care needs can be provided without the need for an in-person physical exam.  This service lets us provide the care you need with a video conversation.  If a prescription is necessary we can send it directly to your pharmacy.  If lab work is needed we can place an order for that and you can then stop by our lab to have the test done at a later time.    Video visits are billed at different rates depending on your insurance coverage.  Please reach out to your insurance provider with any questions.    If during the course of the call the physician/provider feels a video visit is not appropriate, you will not be charged for this service.\"    Patient has given verbal consent for Video visit? Yes    Will anyone else be joining your video visit? No    =============================================================================    Subjective     Raffy Castelan is a 44 year old female who presents today via video visit for the following health issues:    HPI  Concern - edema in both feet   Onset: 1 week    Description:   Since started walking 1 week ago noticed excessive swelling in feet     Intensity: moderate    Progression of Symptoms:  Getting worse by the end of the day     Accompanying Signs & Symptoms:  Possible bunion on left foot, growing     Previous history of similar problem:   yes    Precipitating factors:   Worsened by: walking     Alleviating factors:  Improved by: rest  Therapies Tried and outcome: Furosemide, would like to discuss a refill     She admits that she's gained about 10-12 lbs since having to stay home because of COVID.  Because of this weight gain she's started walking more and has noticed that her foot swelling is worse.  She " previously took Lasix for this and it's worked pretty well.  Patient has HTN that has previously been well controlled.  She is taking losartan 50mg daily. Her home BPs recently have been 140s/90s.  She has been working from home and most of her day is seated with her feet on the floor.      Patient also notes a bunion on her left foot, which has been there for months.  She's noticed that it's starting to hurt more and has been creating holes in her shoes.  She's interested in considering surgical correction.      Patient would also like a referral to bariatric surgery.  She has a history of lap band placement and is hoping to have it tightened.       Patient Active Problem List   Diagnosis     Chronic constipation     CARDIOVASCULAR SCREENING; LDL GOAL LESS THAN 160     Family history of breast cancer     GERD (gastroesophageal reflux disease)     BMI >40     Vitamin D deficiency     Psoriasis     Bariatric surgery status     H/O laparoscopic adjustable gastric banding     Diaphragmatic hernia     Hypertension goal BP (blood pressure) < 140/90     Anxiety     Chronic left shoulder pain     Impingement syndrome, shoulder, left     Past Surgical History:   Procedure Laterality Date     BIOPSY       COLONOSCOPY       ESOPHAGOSCOPY, GASTROSCOPY, DUODENOSCOPY (EGD), COMBINED  7/30/2013    Procedure: COMBINED ESOPHAGOSCOPY, GASTROSCOPY, DUODENOSCOPY (EGD), BIOPSY SINGLE OR MULTIPLE;;  Surgeon: Wilson Fournier MD;  Location:  GI     ESOPHAGOSCOPY, GASTROSCOPY, DUODENOSCOPY (EGD), COMBINED  3/24/2014    Procedure: COMBINED ESOPHAGOSCOPY, GASTROSCOPY, DUODENOSCOPY (EGD);  Surgeon: Wilson Fournier MD;  Location:  GI     HERNIA REPAIR       LAPAROSCOPIC GASTRIC ADJUSTABLE BANDING  5/27/2014    Procedure: LAPAROSCOPIC GASTRIC ADJUSTABLE BANDING;  Surgeon: Wilson Fournier MD;  Location:  OR     no history of surgery         Social History     Tobacco Use     Smoking status: Never Smoker     Smokeless tobacco:  Never Used   Substance Use Topics     Alcohol use: No     Family History   Problem Relation Age of Onset     Blood Disease Mother         anemia     Heart Disease Mother      Hypertension Mother      Hyperlipidemia Mother      Anxiety Disorder Mother      Osteoporosis Mother      Genetic Disorder Mother      Cerebrovascular Disease Father      Hypertension Father      Diabetes Father         type  2     Breast Cancer Maternal Aunt      Breast Cancer Paternal Aunt      Cancer Paternal Aunt         stomach     Breast Cancer Other      Blood Disease Other         son with alpha thalessemia/mother alpha thalasemia trait     Breast Cancer Other      Breast Cancer Other      Breast Cancer Cousin            Reviewed and updated as needed this visit by Provider         Review of Systems   Constitutional, HEENT, cardiovascular, pulmonary, GI, , musculoskeletal, neuro, skin, endocrine and psych systems are negative, except as otherwise noted.      Objective             Physical Exam     GENERAL: Healthy, alert and no distress  EYES: Eyes grossly normal to inspection.  No discharge or erythema, or obvious scleral/conjunctival abnormalities.  RESP: No audible wheeze, cough, or visible cyanosis.  No visible retractions or increased work of breathing.    SKIN: Visible skin clear. No significant rash, abnormal pigmentation or lesions.  NEURO: Cranial nerves grossly intact.  Mentation and speech appropriate for age.  PSYCH: Mentation appears normal, affect normal/bright, judgement and insight intact, normal speech and appearance well-groomed.  MSK: Both feet with obvious swelling to her ankles.  Bunion of the left 5th toe.        Diagnostic Test Results:  Labs reviewed in Epic        Assessment & Plan     1. Lower extremity edema  - Discussed that swelling is likely due to a combination of weight gain, peripheral venous insufficiency, sedentary lifestyle, and hotter weather  - Agree to continue Lasix PRN  - Also added  hydrochlorothiazide to her BP regimen with should help with swelling as well  - Encouraged her to continue walking regularly  - She should try to elevate her feet when seated  - Advised using compression stockings   - furosemide (LASIX) 20 MG tablet; Take one tablet in the morning as needed for leg swelling.  Dispense: 90 tablet; Refill: 3    2. Hypertension goal BP (blood pressure) < 140/90  - Uncontrolled on losartan 50mg daily  - Add hydrochlorothiazide   - losartan-hydrochlorothiazide (HYZAAR) 50-12.5 MG tablet; Take 1 tablet by mouth daily  Dispense: 90 tablet; Refill: 3    3. Bariatric surgery status  - Patient requesting referral to consider lab band tightening   - COMPREHENSIVE WEIGHT MANAGEMENT    4. Bunion, left  - Orthopedic [facility] Sp[facility]  Referral; Future       Return in about 6 months (around 12/25/2020) for BP Recheck.    Majo Mott DO  [facility]      Video-Visit Details    Type of service:  Video Visit    Video Start Time: 3:34 PM  Video End Time:3:50 PM    Originating Location (pt. Location): Home    Distant Location (provider location):  [facility]     Platform used for Video Visit: Jess

## 2020-06-26 RX ORDER — FUROSEMIDE 20 MG
TABLET ORAL
Qty: 60 TABLET | Refills: 1 | OUTPATIENT
Start: 2020-06-26

## 2020-07-03 ENCOUNTER — OFFICE VISIT (OUTPATIENT)
Dept: PODIATRY | Facility: CLINIC | Age: 45
End: 2020-07-03
Payer: COMMERCIAL

## 2020-07-03 ENCOUNTER — ANCILLARY PROCEDURE (OUTPATIENT)
Dept: GENERAL RADIOLOGY | Facility: CLINIC | Age: 45
End: 2020-07-03
Attending: PODIATRIST
Payer: COMMERCIAL

## 2020-07-03 VITALS
HEART RATE: 86 BPM | SYSTOLIC BLOOD PRESSURE: 142 MMHG | DIASTOLIC BLOOD PRESSURE: 92 MMHG | BODY MASS INDEX: 44.35 KG/M2 | WEIGHT: 263 LBS

## 2020-07-03 DIAGNOSIS — E55.9 VITAMIN D DEFICIENCY: ICD-10-CM

## 2020-07-03 DIAGNOSIS — M21.611 BUNION, RIGHT: Primary | ICD-10-CM

## 2020-07-03 DIAGNOSIS — M21.621 TAILOR'S BUNION OF BOTH FEET: ICD-10-CM

## 2020-07-03 DIAGNOSIS — M21.622 TAILOR'S BUNION OF BOTH FEET: ICD-10-CM

## 2020-07-03 DIAGNOSIS — M21.612 BUNION, LEFT: ICD-10-CM

## 2020-07-03 LAB — DEPRECATED CALCIDIOL+CALCIFEROL SERPL-MC: 26 UG/L (ref 20–75)

## 2020-07-03 PROCEDURE — 99244 OFF/OP CNSLTJ NEW/EST MOD 40: CPT | Performed by: PODIATRIST

## 2020-07-03 PROCEDURE — 36415 COLL VENOUS BLD VENIPUNCTURE: CPT | Performed by: PODIATRIST

## 2020-07-03 PROCEDURE — 73630 X-RAY EXAM OF FOOT: CPT | Mod: RT

## 2020-07-03 PROCEDURE — 73630 X-RAY EXAM OF FOOT: CPT | Mod: 59

## 2020-07-03 PROCEDURE — 82306 VITAMIN D 25 HYDROXY: CPT | Performed by: PODIATRIST

## 2020-07-03 NOTE — NURSING NOTE
Weight management plan: Patient was referred to their PCP to discuss a diet and exercise plan.     Abeer to follow up with Primary Care provider regarding elevated blood pressure.

## 2020-07-03 NOTE — LETTER
[address]  [place] [address]  Phone: [phone]    July 3, 2020        Raffy Frasernoatodd  [facility] [address]          To whom it may concern:    RE: Raffy Castelan    [place] surgery consist of 6 weeks of Non weight bearing using crutches or Knee walker/scooter.    3 Weeks of wearing a walking boot.    Please call when you are ready to schedule surgery.    Please contact me for questions or concerns.      Sincerely,        Dr. Markus SALASPJESSICA FAC FAS/lld

## 2020-07-03 NOTE — LETTER
7/3/2020         RE: Raffy Castelan  [address]  [facility] [address]        Dear Colleague,    Thank you for referring your patient, Raffy Castelan, to the [facility]. Please see a copy of my visit note below.    Subjective:    Pt is seen today in consult from Dr. Mott with the chief complaint of right and left foot pain.  Points to medial bump of bunion and states that has been bothersome for many years.  Has tried shoegear changes with no improvement.  Bothersome both in joint and along medial aspect of 1st MPJ right foot.  Describes as burning pain.  Pain with ambulation and shoewear and difficulty working secondary to pain.  Positive family history of bunions.   Has noticed deformity worsening over time.  Also gets pain on lateral foot and points to fifth metatarsal head.  On the left fifth metatarsal head over the last 6 months she is noted a bump here.  She denies stepping on any foreign bodies erythema blistering or drainage.  Patient is mostly sitting at work.  Patient has tried buying wider shoes but she states this is getting more difficult.  She has tried toe devices.  She would like to inquire about surgery today.  She has a history of vitamin D deficiency.  She has had a gastric bypass.  Her mother had osteoporosis and her father had diabetes.  Denies numbness.  Denies weakness.  Denies erythema.  Denies ecchymosis.    ROS:  A 10-point review of systems was performed and is positive for that noted in the HPI and as seen below.  All other areas are negative.          Allergies   Allergen Reactions     Lisinopril Cough       Current Outpatient Medications   Medication Sig Dispense Refill     clobetasol propionate 0.05 % FOAM Apply sparingly to affected area twice daily as needed.  Do not apply to face. (Patient not taking: Reported on 3/26/2020) 600 g 3     furosemide (LASIX) 20 MG tablet Take one tablet in the morning as needed for leg swelling. 90 tablet 3      losartan-hydrochlorothiazide (HYZAAR) 50-12.5 MG tablet Take 1 tablet by mouth daily 90 tablet 3     multivitamin, therapeutic with minerals (MULTI-VITAMIN) TABS Take 1 tablet by mouth daily.       senna (SENNA LAXATIVE) 8.6 MG tablet Take 1 tablet by mouth daily         Patient Active Problem List   Diagnosis     Chronic constipation     CARDIOVASCULAR SCREENING; LDL GOAL LESS THAN 160     Family history of breast cancer     GERD (gastroesophageal reflux disease)     BMI >40     Vitamin D deficiency     Psoriasis     Bariatric surgery status     H/O laparoscopic adjustable gastric banding     Diaphragmatic hernia     Hypertension goal BP (blood pressure) < 140/90     Anxiety     Chronic left shoulder pain     Impingement syndrome, shoulder, left     Spontaneous vaginal delivery     Advanced maternal age during pregnancy     37+ weeks gestation completed       Past Medical History:   Diagnosis Date     Asthma      GERD (gastroesophageal reflux disease)      Hypertension        Past Surgical History:   Procedure Laterality Date     BIOPSY       COLONOSCOPY       ESOPHAGOSCOPY, GASTROSCOPY, DUODENOSCOPY (EGD), COMBINED  7/30/2013    Procedure: COMBINED ESOPHAGOSCOPY, GASTROSCOPY, DUODENOSCOPY (EGD), BIOPSY SINGLE OR MULTIPLE;;  Surgeon: Wilson Fournier MD;  Location:  GI     ESOPHAGOSCOPY, GASTROSCOPY, DUODENOSCOPY (EGD), COMBINED  3/24/2014    Procedure: COMBINED ESOPHAGOSCOPY, GASTROSCOPY, DUODENOSCOPY (EGD);  Surgeon: Wilson Fournier MD;  Location:  GI     HERNIA REPAIR       LAPAROSCOPIC GASTRIC ADJUSTABLE BANDING  5/27/2014    Procedure: LAPAROSCOPIC GASTRIC ADJUSTABLE BANDING;  Surgeon: Wilson Fournier MD;  Location:  OR     no history of surgery         Family History   Problem Relation Age of Onset     Blood Disease Mother         anemia     Heart Disease Mother      Hypertension Mother      Hyperlipidemia Mother      Anxiety Disorder Mother      Osteoporosis Mother      Genetic Disorder  Mother      Cerebrovascular Disease Father      Hypertension Father      Diabetes Father         type  2     Breast Cancer Maternal Aunt      Breast Cancer Paternal Aunt      Cancer Paternal Aunt         stomach     Breast Cancer Other      Blood Disease Other         son with alpha thalessemia/mother alpha thalasemia trait     Breast Cancer Other      Breast Cancer Other      Breast Cancer Cousin        Social History     Tobacco Use     Smoking status: Never Smoker     Smokeless tobacco: Never Used   Substance Use Topics     Alcohol use: No         Objective:    Vitals: BP (!) 142/92   Pulse 86   Wt 119.3 kg (263 lb)   BMI 44.35 kg/m    BMI: Body mass index is 44.35 kg/m .  Height: Data Unavailable    Constitutional/ general:  Pt is in no apparent distress, appears well-nourished.  Cooperative with history and physical exam.     Psych:  The patient answered questions appropriately.  Normal affect.  Seems to have reasonable expectations, in terms of treatment.    Eyes:  Visual scanning/ tracking without deficit.    Ears:  Response to auditory stimuli is normal.   negative hearing aid devices.  Auricles in proper alignment.     Lymphatic:  Popliteal lymph nodes not enlarged.     Lungs:  Non labored breathing, non labored speech. No cough.  No audible wheezing. Even, quiet breathing.      Vascular:  Pedal pulses are palpable bilaterally for both the DP and PT arteries.  CFT < 3 sec.  positive edema.  positive varicosities.  positive pedal hair growth noted.     Neuro:  Alert and oriented x 3. Coordinated gait.  Light touch sensation is intact to the L4, L5, S1 distributions. No obvious deficits.  No evidence of neurological-based weakness, spasticity, or contracture in the lower extremities.     Derm: Normal texture and turgor.  No erythema, ecchymosis, or cyanosis.  No open lesions.     Musculoskeletal:    Lower extremity muscle strength is normal.  Patient is ambulatory without an assistive device or brace.    Mild reactive hyperemia over 1st MPJ of the right and left foot.  No underlying bursa noted.  Pronated arch with weightbearing.  Hypermobility noted at TMTJ.  Very large bunion deformity noted.  Valgus rotation of hallux at MPJ. negative crepitus with range of motion of the first MPJ right and left foot.  Patient has bunionette deformity bilaterally.  There is a bursa over the left fifth MTPJ lateral and plantar.  There is none on the right.             Radiographic Exam:  Xrays, three views right and left foot weight bearing obtained today.  This demonstrated an intermetatarsal angle of 23 degrees on left and right 22.  Positive metatarsus adductus.  Sesamoid position appears to be a 7.  First MPJ joint space deviated/subluxed.  Bunionette noted bilaterally with increased IM angle between fourth and fifth metatarsals    ASSESSMENT:    Painful Hallux Valgus deformity right and left foot.  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  Discussed with patient a bunion is caused by a muscle imbalance. The big toe is pulled toward the smaller toes. The lump is created by a bone pushing outward.  Also explained that hypermobility and midfoot instability can contribute to this and other problems such as pronation.     Bunion pain is usually a combination of shoes rubbing on the skin, nerve irritation, compression between the toes, joint misalignment, arthritis, and altered gait.   Most bunion pain can be improved by wearing compatible shoes. People with bunions cannot choose footwear just because they like the style. Your bunion should determine what shoe should be worn. Wide shoes with non-irritating seams, soft leather, and a square toe box are most compatible. Shoes should fit well out of the box but may need to be professionally stretched and modified to accommodate the bump. Heels, dress shoes, and pointed toes will not provide  comfort.   Shoe inserts or orthotics will often times help with the bunion pain, however, inserts make a shoe fit more challenging. Pads placed around the bunion may help.   Bunion surgery involves cutting and repositioning the bones surrounding the bunion. Pins and screws are used to hold the bones in place during the healing process. The goal of bunion surgery is to reduce the size of the bunion bump. Realignment of the toe and joint is attempted. Most first toes can not be forced back into a normal alignment even with surgery.   Healing after surgery requires about 6 weeks of protection. This allows the bone to heal. Maximum recovery takes about one year. The scar tissue and joint structures require this amount of time to finish the healing process. Expect stiffness, swelling, and numbness during that time frame.   Bunion surgery does involve side effects. Some side effects are predictable and others are less common but do occur. A scar will be visible and may be irritated by shoes. The shoe may rub on the screw or internal pin requiring surgical removal of the fixation devices. The screw and pin would like be in place for one year. The first toe may loose motion after surgery. The amount of stiffness is variable. Some people never regain motion of their big toe. This is due to scar tissue that develops with any surgery. The first toe may drift towards or away from the second toe. Spreading of the first and second toe is a rare occurrence after bunion surgery. This can be bothersome and may require surgical repair. Toe drift toward the second toe may result in a recurrent bunion and revision surgery. Joint fusion is one option to correct and unstable, drifting toe. This procedure straightens the toe, however, no motion remains. Fusion may be necessary to correct complications of bunion surgery or as the original procedure in severe cases.   All surgical procedures involve the risk of infection, numbness, pain,  delayed bone healing, dislocation, blood clots, and continued foot pain. Bunion surgery is complex and should not be taken lightly.  Also discussed improving midfoot instability would involve fusing midfoot joint.     Any skin incision can cause infection. Deep infection might involve the bone. If this occurs, repeat surgery and antibiotics through an IV for 6 weeks may be needed. Scar tissue from dissection and retraction can cause nerve pain or numbness. This is generally temporary but may be permanent. We do not have treatments that cure nerve problems. Pain could develop in the second toe due to a change in pressure. In addition, the cut in the bone may displace and require additional surgery.   Delayed healing would lengthen the healing time. Some bones occasionally do not heal. If this occurs, repeat surgery, extended protection, or electronic bone stimulation may be needed. Smokers have a 20 % of the bone not healing; this is 2 times the risk of people who do not smoke.   Foot pain is complex. Most feet hurt for more than one reason. Fixing the bunion would not necessarily create a pain free foot. Appropriate shoes, healthy body weight, avoidance of bare foot walking, and moderation of activity will always be necessary to enjoy foot comfort. Your bunion may involve arthritis, or loss of the cartilage in the joint. This is incurable even with surgery. Also, long standing bunions often involve chronic irritation to the surrounding nerves. Nerve pain may not resolve even with reducing the bunion bump since permanent nerve damage may be present.   Bunion surgery is actually quite successful. Most surgical patients are pleased with their foot following bunion surgery. Many of the issues described above can be controlled by taking proper care of your foot during the healing process.   Discussed surgical as well as conservative options for her current foot pathology at length.  Patient has opted for surgical  correction.  A modified Jacob bunionectomy, midfoot fusion, fifth metatarsal head osteotomy, and left fifth MTPJ bursa resection will be done on an outpatient basis under MAC sedation.  Explained he can only do 1 foot at a time since recovery involves prolonged nonweightbearing.  Benefits and risks again discussed at length. These include, but are not limited to overcorrection, undercorrection, infection, numbness, scar formation, decreased ROM or painful ROM, loss of limb, chronic pain,need for further procedures.   NWB for 6 weeks, cam walker for three weeks, and then will start walking in a shoe.    No guarantees of outcome were given. Patient gave verbal understanding. Will need pre-op history and physical within 7 days of the planned procedure. After care instructions, and the procedure were discussed at length.  Patient considering her surgery in the fall and will call to set this up.  Will order vitamin D to ensure level is adequate.  Thank you for allowing me to participate in the care of this patient.    Markus Hart DPM DPM, FACFAS          Again, thank you for allowing me to participate in the care of your patient.        Sincerely,        Markus Hart DPM

## 2020-07-03 NOTE — PATIENT INSTRUCTIONS
We wish you continued good healing. If you have any questions or concerns, please do not hesitate to contact us at [phone]    Please remember to call and schedule a follow up appointment if one was recommended at your earliest convenience.   PODIATRY CLINIC HOURS  TELEPHONE NUMBER    Dr. Markus Hart D.P.M [facility]    Clinics:  [facility]    [facility]ianna Villarreal [facility]   Tuesday 1PM-6PM  [place]  Wednesday 7AM-2PM  [facility]  Thursday 10AM-6PM  [place]  Friday 7AM-3PM  [place]  Specialty schedulers:   [phone] to make an appointment with any Specialty Provider.        Urgent Care locations:    [facility] Monday-Friday 5 pm - 9 pm. Saturday-Sunday 9 am -5pm    Monday-Friday 11 am - 9 pm Saturday 9 am - 5 pm     Monday-Sunday 12 noon-8PM [phone]     If you need a medication refill, please contact us you may need lab work and/or a follow up visit prior to your refill (i.e. Antifungal medications).    [employer] (secure e-mail communication and access to your chart) to send a message or to make an appointment.    If MRI needed please call [place] at [phone]        Bunion surgery consist of 6 weeks of Non weight bearing using crutches or Knee walker/scooter.    3 Weeks of wearing a walking boot.    Please call when you are ready to schedule surgery.    Please contact me for questions or concerns.

## 2020-07-07 NOTE — PROGRESS NOTES
Subjective:    Pt is seen today in consult from Dr. Mott with the chief complaint of right and left foot pain.  Points to medial bump of bunion and states that has been bothersome for many years.  Has tried shoegear changes with no improvement.  Bothersome both in joint and along medial aspect of 1st MPJ right foot.  Describes as burning pain.  Pain with ambulation and shoewear and difficulty working secondary to pain.  Positive family history of bunions.   Has noticed deformity worsening over time.  Also gets pain on lateral foot and points to fifth metatarsal heads bilateral.  On the left fifth metatarsal head over the last 6 months she is noted a bump here.  She denies stepping on any foreign bodies erythema blistering or drainage.  Patient is mostly sitting at work.  Patient has tried buying wider shoes but she states this is getting more difficult.  She has tried toe devices.  She would like to inquire about surgery today.  She has a history of vitamin D deficiency.  She has had a gastric bypass.  Her mother had osteoporosis and her father had diabetes.  Denies numbness.  Denies weakness.  Denies erythema.  Denies ecchymosis.    ROS:  A 10-point review of systems was performed and is positive for that noted in the HPI and as seen below.  All other areas are negative.          Allergies   Allergen Reactions     Lisinopril Cough       Current Outpatient Medications   Medication Sig Dispense Refill     clobetasol propionate 0.05 % FOAM Apply sparingly to affected area twice daily as needed.  Do not apply to face. (Patient not taking: Reported on 3/26/2020) 600 g 3     furosemide (LASIX) 20 MG tablet Take one tablet in the morning as needed for leg swelling. 90 tablet 3     losartan-hydrochlorothiazide (HYZAAR) 50-12.5 MG tablet Take 1 tablet by mouth daily 90 tablet 3     multivitamin, therapeutic with minerals (MULTI-VITAMIN) TABS Take 1 tablet by mouth daily.       senna (SENNA LAXATIVE) 8.6 MG tablet Take 1  tablet by mouth daily         Patient Active Problem List   Diagnosis     Chronic constipation     CARDIOVASCULAR SCREENING; LDL GOAL LESS THAN 160     Family history of breast cancer     GERD (gastroesophageal reflux disease)     BMI >40     Vitamin D deficiency     Psoriasis     Bariatric surgery status     H/O laparoscopic adjustable gastric banding     Diaphragmatic hernia     Hypertension goal BP (blood pressure) < 140/90     Anxiety     Chronic left shoulder pain     Impingement syndrome, shoulder, left     Spontaneous vaginal delivery     Advanced maternal age during pregnancy     37+ weeks gestation completed       Past Medical History:   Diagnosis Date     Asthma      GERD (gastroesophageal reflux disease)      Hypertension        Past Surgical History:   Procedure Laterality Date     BIOPSY       COLONOSCOPY       ESOPHAGOSCOPY, GASTROSCOPY, DUODENOSCOPY (EGD), COMBINED  7/30/2013    Procedure: COMBINED ESOPHAGOSCOPY, GASTROSCOPY, DUODENOSCOPY (EGD), BIOPSY SINGLE OR MULTIPLE;;  Surgeon: Wilson Fournier MD;  Location:  GI     ESOPHAGOSCOPY, GASTROSCOPY, DUODENOSCOPY (EGD), COMBINED  3/24/2014    Procedure: COMBINED ESOPHAGOSCOPY, GASTROSCOPY, DUODENOSCOPY (EGD);  Surgeon: Wilson Fournier MD;  Location:  GI     HERNIA REPAIR       LAPAROSCOPIC GASTRIC ADJUSTABLE BANDING  5/27/2014    Procedure: LAPAROSCOPIC GASTRIC ADJUSTABLE BANDING;  Surgeon: Wilson Fournier MD;  Location:  OR     no history of surgery         Family History   Problem Relation Age of Onset     Blood Disease Mother         anemia     Heart Disease Mother      Hypertension Mother      Hyperlipidemia Mother      Anxiety Disorder Mother      Osteoporosis Mother      Genetic Disorder Mother      Cerebrovascular Disease Father      Hypertension Father      Diabetes Father         type  2     Breast Cancer Maternal Aunt      Breast Cancer Paternal Aunt      Cancer Paternal Aunt         stomach     Breast Cancer Other      Blood  Disease Other         son with alpha thalessemia/mother alpha thalasemia trait     Breast Cancer Other      Breast Cancer Other      Breast Cancer Cousin        Social History     Tobacco Use     Smoking status: Never Smoker     Smokeless tobacco: Never Used   Substance Use Topics     Alcohol use: No         Objective:    Vitals: BP (!) 142/92   Pulse 86   Wt 119.3 kg (263 lb)   BMI 44.35 kg/m    BMI: Body mass index is 44.35 kg/m .  Height: Data Unavailable    Constitutional/ general:  Pt is in no apparent distress, appears well-nourished.  Cooperative with history and physical exam.     Psych:  The patient answered questions appropriately.  Normal affect.  Seems to have reasonable expectations, in terms of treatment.    Eyes:  Visual scanning/ tracking without deficit.    Ears:  Response to auditory stimuli is normal.   negative hearing aid devices.  Auricles in proper alignment.     Lymphatic:  Popliteal lymph nodes not enlarged.     Lungs:  Non labored breathing, non labored speech. No cough.  No audible wheezing. Even, quiet breathing.      Vascular:  Pedal pulses are palpable bilaterally for both the DP and PT arteries.  CFT < 3 sec.  positive edema.  positive varicosities.  positive pedal hair growth noted.     Neuro:  Alert and oriented x 3. Coordinated gait.  Light touch sensation is intact to the L4, L5, S1 distributions. No obvious deficits.  No evidence of neurological-based weakness, spasticity, or contracture in the lower extremities.     Derm: Normal texture and turgor.  No erythema, ecchymosis, or cyanosis.  No open lesions.     Musculoskeletal:    Lower extremity muscle strength is normal.  Patient is ambulatory without an assistive device or brace.   Mild reactive hyperemia over 1st MPJ of the right and left foot.  No underlying bursa noted.  Pronated arch with weightbearing.  Hypermobility noted at TMTJ.  Very large bunion deformity noted.  Valgus rotation of hallux at MPJ. negative crepitus  with range of motion of the first MPJ right and left foot.  Patient has bunionette deformity bilaterally.  There is a bursa over the left fifth MTPJ lateral and plantar.  There is none on the right.             Radiographic Exam:  Xrays, three views right and left foot weight bearing obtained today.  This demonstrated an intermetatarsal angle of 23 degrees on left and right 22.  Positive metatarsus adductus.  Sesamoid position appears to be a 7.  First MPJ joint space deviated/subluxed.  Bunionette noted bilaterally with increased IM angle between fourth and fifth metatarsals    ASSESSMENT:    Painful Hallux Valgus deformity right and left foot.  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  Discussed with patient a bunion is caused by a muscle imbalance. The big toe is pulled toward the smaller toes. The lump is created by a bone pushing outward.  Also explained that hypermobility and midfoot instability can contribute to this and other problems such as pronation.     Bunion pain is usually a combination of shoes rubbing on the skin, nerve irritation, compression between the toes, joint misalignment, arthritis, and altered gait.   Most bunion pain can be improved by wearing compatible shoes. People with bunions cannot choose footwear just because they like the style. Your bunion should determine what shoe should be worn. Wide shoes with non-irritating seams, soft leather, and a square toe box are most compatible. Shoes should fit well out of the box but may need to be professionally stretched and modified to accommodate the bump. Heels, dress shoes, and pointed toes will not provide comfort.   Shoe inserts or orthotics will often times help with the bunion pain, however, inserts make a shoe fit more challenging. Pads placed around the bunion may help.   Bunion surgery involves cutting and repositioning the bones surrounding the  bunion. Pins and screws are used to hold the bones in place during the healing process. The goal of bunion surgery is to reduce the size of the bunion bump. Realignment of the toe and joint is attempted. Most first toes can not be forced back into a normal alignment even with surgery.   Healing after surgery requires about 6 weeks of protection. This allows the bone to heal. Maximum recovery takes about one year. The scar tissue and joint structures require this amount of time to finish the healing process. Expect stiffness, swelling, and numbness during that time frame.   Bunion surgery does involve side effects. Some side effects are predictable and others are less common but do occur. A scar will be visible and may be irritated by shoes. The shoe may rub on the screw or internal pin requiring surgical removal of the fixation devices. The screw and pin would like be in place for one year. The first toe may loose motion after surgery. The amount of stiffness is variable. Some people never regain motion of their big toe. This is due to scar tissue that develops with any surgery. The first toe may drift towards or away from the second toe. Spreading of the first and second toe is a rare occurrence after bunion surgery. This can be bothersome and may require surgical repair. Toe drift toward the second toe may result in a recurrent bunion and revision surgery. Joint fusion is one option to correct and unstable, drifting toe. This procedure straightens the toe, however, no motion remains. Fusion may be necessary to correct complications of bunion surgery or as the original procedure in severe cases.   All surgical procedures involve the risk of infection, numbness, pain, delayed bone healing, dislocation, blood clots, and continued foot pain. Bunion surgery is complex and should not be taken lightly.  Also discussed improving midfoot instability would involve fusing midfoot joint.     Any skin incision can cause  infection. Deep infection might involve the bone. If this occurs, repeat surgery and antibiotics through an IV for 6 weeks may be needed. Scar tissue from dissection and retraction can cause nerve pain or numbness. This is generally temporary but may be permanent. We do not have treatments that cure nerve problems. Pain could develop in the second toe due to a change in pressure. In addition, the cut in the bone may displace and require additional surgery.   Delayed healing would lengthen the healing time. Some bones occasionally do not heal. If this occurs, repeat surgery, extended protection, or electronic bone stimulation may be needed. Smokers have a 20 % of the bone not healing; this is 2 times the risk of people who do not smoke.   Foot pain is complex. Most feet hurt for more than one reason. Fixing the bunion would not necessarily create a pain free foot. Appropriate shoes, healthy body weight, avoidance of bare foot walking, and moderation of activity will always be necessary to enjoy foot comfort. Your bunion may involve arthritis, or loss of the cartilage in the joint. This is incurable even with surgery. Also, long standing bunions often involve chronic irritation to the surrounding nerves. Nerve pain may not resolve even with reducing the bunion bump since permanent nerve damage may be present.   Bunion surgery is actually quite successful. Most surgical patients are pleased with their foot following bunion surgery. Many of the issues described above can be controlled by taking proper care of your foot during the healing process.   Discussed surgical as well as conservative options for her current foot pathology at length.  Patient has opted for surgical correction.  A modified Jacob bunionectomy, midfoot fusion, fifth metatarsal head osteotomy, and left fifth MTPJ bursa resection will be done on an outpatient basis under MAC sedation.  Explained he can only do 1 foot at a time since recovery involves  prolonged nonweightbearing.  Benefits and risks again discussed at length. These include, but are not limited to overcorrection, undercorrection, infection, numbness, scar formation, decreased ROM or painful ROM, loss of limb, chronic pain,need for further procedures.   NWB for 6 weeks, cam walker for three weeks, and then will start walking in a shoe.    No guarantees of outcome were given. Patient gave verbal understanding. Will need pre-op history and physical within 7 days of the planned procedure. After care instructions, and the procedure were discussed at length.  Patient considering her surgery in the fall and will call to set this up.  Will order vitamin D to ensure level is adequate.  Thank you for allowing me to participate in the care of this patient.    Markus Hart, PIPPA DAS, FACFAS

## 2020-07-08 ENCOUNTER — MYC MEDICAL ADVICE (OUTPATIENT)
Dept: PODIATRY | Facility: CLINIC | Age: 45
End: 2020-07-08

## 2020-07-09 ENCOUNTER — TELEPHONE (OUTPATIENT)
Dept: PODIATRY | Facility: CLINIC | Age: 45
End: 2020-07-09

## 2020-07-14 NOTE — TELEPHONE ENCOUNTER
Patient checking if orders are in place. Advise orders indicated wanting fall, patient confirmed would like late September. Advise to call back mid-August to schedule. Patient agreed.

## 2020-07-27 DIAGNOSIS — Z98.84 HISTORY OF ADJUSTABLE GASTRIC BANDING: Primary | ICD-10-CM

## 2020-07-30 ENCOUNTER — OFFICE VISIT (OUTPATIENT)
Dept: SURGERY | Facility: CLINIC | Age: 45
End: 2020-07-30
Attending: FAMILY MEDICINE
Payer: COMMERCIAL

## 2020-07-30 VITALS
OXYGEN SATURATION: 99 % | WEIGHT: 283.5 LBS | SYSTOLIC BLOOD PRESSURE: 127 MMHG | HEIGHT: 65 IN | HEART RATE: 99 BPM | TEMPERATURE: 98.8 F | DIASTOLIC BLOOD PRESSURE: 87 MMHG | BODY MASS INDEX: 47.23 KG/M2

## 2020-07-30 DIAGNOSIS — E66.01 CLASS 3 SEVERE OBESITY DUE TO EXCESS CALORIES WITH SERIOUS COMORBIDITY AND BODY MASS INDEX (BMI) OF 40.0 TO 44.9 IN ADULT (H): Primary | ICD-10-CM

## 2020-07-30 DIAGNOSIS — E66.813 CLASS 3 SEVERE OBESITY DUE TO EXCESS CALORIES WITH SERIOUS COMORBIDITY AND BODY MASS INDEX (BMI) OF 40.0 TO 44.9 IN ADULT (H): Primary | ICD-10-CM

## 2020-07-30 ASSESSMENT — PAIN SCALES - GENERAL: PAINLEVEL: NO PAIN (0)

## 2020-07-30 ASSESSMENT — MIFFLIN-ST. JEOR: SCORE: 1921.83

## 2020-07-30 NOTE — NURSING NOTE
"(   Chief Complaint   Patient presents with     RECHECK     RBS, Band assessment.    )    ( Weight: 128.6 kg (283 lb 8 oz) )  ( Height: 165.1 cm (5' 5\") )  ( BMI (Calculated): 47.18 )  (   )  ( Cumulative weight loss (lbs): -15.5 )  ( Last Visits Weight: 119.3 kg (263 lb)(06/10/19) )  ( Wt change since last visit (lbs): 20.5 )  (   )  (   )    ( BP: 127/87 )  (   )  ( Temp: 98.8  F (37.1  C) )  ( Temp src: Oral )  ( Pulse: 99 )  (   )  ( SpO2: 99 % )    (   Patient Active Problem List   Diagnosis     Chronic constipation     CARDIOVASCULAR SCREENING; LDL GOAL LESS THAN 160     Family history of breast cancer     GERD (gastroesophageal reflux disease)     BMI >40     Vitamin D deficiency     Psoriasis     Bariatric surgery status     H/O laparoscopic adjustable gastric banding     Diaphragmatic hernia     Hypertension goal BP (blood pressure) < 140/90     Anxiety     Chronic left shoulder pain     Impingement syndrome, shoulder, left     Spontaneous vaginal delivery     Advanced maternal age during pregnancy     37+ weeks gestation completed    )  (   Current Outpatient Medications   Medication Sig Dispense Refill     furosemide (LASIX) 20 MG tablet Take one tablet in the morning as needed for leg swelling. 90 tablet 3     losartan-hydrochlorothiazide (HYZAAR) 50-12.5 MG tablet Take 1 tablet by mouth daily 90 tablet 3     multivitamin, therapeutic with minerals (MULTI-VITAMIN) TABS Take 1 tablet by mouth daily.       senna (SENNA LAXATIVE) 8.6 MG tablet Take 1 tablet by mouth daily       clobetasol propionate 0.05 % FOAM Apply sparingly to affected area twice daily as needed.  Do not apply to face. (Patient not taking: Reported on 3/26/2020) 600 g 3    )  ( Diabetes Eval:    )    ( Pain Eval:  No Pain (0) )    ( Wound Eval:       )    (   History   Smoking Status     Never Smoker   Smokeless Tobacco     Never Used    )    ( Signed By:  [facility]; July 30, 2020; 3:00 PM )    "

## 2020-07-30 NOTE — PROGRESS NOTES
Return Bariatric Surgery Note    RE: Raffy Castelan  MR#: [id number]  : 1975  VISIT DATE: 2020      I had the pleasure of seeing your patient, Raffy Castelan, in my post-bariatric surgery assessment clinic.    CHIEF COMPLAINT: Post-bariatric surgery follow-up    HISTORY OF PRESENT ILLNESS:    Had lapband ; weighed 234#.  Gained weight shortly after lapband placement.    Had fluid added to ~9cc by patient report.     AT one time did well with Qsymia; tried again recently, but it caused some problems with mental state; discontinued.  Used liraglutide, higher dose at one time without effect.    Hasn't used contrave (don't think).    Currently weighs 263#.    Last UGI was in .    No flowsheet data found.    Wt Readings from Last 20 Encounters:   20 119.3 kg (263 lb)   06/10/19 119.3 kg (263 lb)   19 121.1 kg (267 lb)   18 119.7 kg (264 lb)   18 122.9 kg (271 lb)   18 123.2 kg (271 lb 9.6 oz)   18 122.7 kg (270 lb 9.6 oz)   18 120.2 kg (265 lb)   17 122.1 kg (269 lb 3.2 oz)   17 122.5 kg (270 lb)   17 116.1 kg (256 lb)   17 115.4 kg (254 lb 6.4 oz)   17 115.2 kg (254 lb)   16 116.6 kg (257 lb)   16 114.6 kg (252 lb 9.6 oz)   16 115.7 kg (255 lb)   16 115.7 kg (255 lb)   16 114.7 kg (252 lb 14.4 oz)   16 111.7 kg (246 lb 3.2 oz)   16 116.1 kg (256 lb)   ]      Social History:  No flowsheet data found.    Medications:  Current Outpatient Medications   Medication     clobetasol propionate 0.05 % FOAM     furosemide (LASIX) 20 MG tablet     losartan-hydrochlorothiazide (HYZAAR) 50-12.5 MG tablet     multivitamin, therapeutic with minerals (MULTI-VITAMIN) TABS     senna (SENNA LAXATIVE) 8.6 MG tablet     No current facility-administered medications for this visit.      No flowsheet data found.    ROS:  GI: No flowsheet data found.  Skin: No flowsheet data found.  Psych: No flowsheet  "data found.  Female Only: No flowsheet data found.    LABS/IMAGING/MEDICAL RECORDS REVIEW:     PHYSICAL EXAMINATION:  /87 (BP Location: Left arm, Patient Position: Sitting, Cuff Size: Adult Large)   Pulse 99   Temp 98.8  F (37.1  C) (Oral)   Ht 1.651 m (5' 5\")   Wt 128.6 kg (283 lb 8 oz)   SpO2 99%   BMI 47.18 kg/m     NAD  Abdomen s/nt/nd.    Procedure Details   Patient was positioned on a procedure table. The skin overlying the port was prepared sterilely. A 20 gauge Tillman needle was used to access the port and 3 cc  was added. The needle was removed. The patient drank water without obstruction after the adjustment.       ASSESSMENT AND PLAN:      1. 6 years status laparoscopic adjustable gastric band  2. Morbid Obesity yes current BMI: Body mass index is 47.18 kg/m .  3. Return to clinic in 2-4 weeks after UGI to see NOEMI Palacios.    Sincerely,    Gurinder Ritter MD    I spent a total of 25 minutes face to face with Abeer during today's office visit. Over 50% of this time was spent counseling the patient and/or coordinating care.  "

## 2020-07-30 NOTE — LETTER
2020       RE: Raffy Castelan  [address]  [facility] [address]     Dear Colleague,    Thank you for referring your patient, Raffy Castelan, to the [facility] SURGICAL WEIGHT MANAGEMENT at [facility]. Please see a copy of my visit note below.        Return Bariatric Surgery Note    RE: Raffy Castelan  MR#: [id number]  : 1975  VISIT DATE: 2020      I had the pleasure of seeing your patient, Raffy Castelan, in my post-bariatric surgery assessment clinic.    CHIEF COMPLAINT: Post-bariatric surgery follow-up    HISTORY OF PRESENT ILLNESS:    Had lapband ; weighed 234#.  Gained weight shortly after lapband placement.    Had fluid added to ~9cc by patient report.     AT one time did well with Qsymia; tried again recently, but it caused some problems with mental state; discontinued.  Used liraglutide, higher dose at one time without effect.    Hasn't used contrave (don't think).    Currently weighs 263#.    Last UGI was in .    No flowsheet data found.    Wt Readings from Last 20 Encounters:   20 119.3 kg (263 lb)   06/10/19 119.3 kg (263 lb)   19 121.1 kg (267 lb)   18 119.7 kg (264 lb)   18 122.9 kg (271 lb)   18 123.2 kg (271 lb 9.6 oz)   18 122.7 kg (270 lb 9.6 oz)   18 120.2 kg (265 lb)   17 122.1 kg (269 lb 3.2 oz)   17 122.5 kg (270 lb)   17 116.1 kg (256 lb)   17 115.4 kg (254 lb 6.4 oz)   17 115.2 kg (254 lb)   16 116.6 kg (257 lb)   16 114.6 kg (252 lb 9.6 oz)   16 115.7 kg (255 lb)   16 115.7 kg (255 lb)   16 114.7 kg (252 lb 14.4 oz)   16 111.7 kg (246 lb 3.2 oz)   16 116.1 kg (256 lb)   ]      Social History:  No flowsheet data found.    Medications:  Current Outpatient Medications   Medication     clobetasol propionate 0.05 % FOAM     furosemide (LASIX) 20 MG tablet     losartan-hydrochlorothiazide (HYZAAR) 50-12.5 MG tablet  "    multivitamin, therapeutic with minerals (MULTI-VITAMIN) TABS     senna (SENNA LAXATIVE) 8.6 MG tablet     No current facility-administered medications for this visit.      No flowsheet data found.    ROS:  GI: No flowsheet data found.  Skin: No flowsheet data found.  Psych: No flowsheet data found.  Female Only: No flowsheet data found.    LABS/IMAGING/MEDICAL RECORDS REVIEW:     PHYSICAL EXAMINATION:  /87 (BP Location: Left arm, Patient Position: Sitting, Cuff Size: Adult Large)   Pulse 99   Temp 98.8  F (37.1  C) (Oral)   Ht 1.651 m (5' 5\")   Wt 128.6 kg (283 lb 8 oz)   SpO2 99%   BMI 47.18 kg/m     NAD  Abdomen s/nt/nd.    Procedure Details   Patient was positioned on a procedure table. The skin overlying the port was prepared sterilely. A 20 gauge Tillman needle was used to access the port and 3 cc  was added. The needle was removed. The patient drank water without obstruction after the adjustment.       ASSESSMENT AND PLAN:      1. 6 years status laparoscopic adjustable gastric band  2. Morbid Obesity yes current BMI: Body mass index is 47.18 kg/m .  3. Return to clinic in 2-4 weeks after UGI to see NOEMI Palacios.    Sincerely,    Gurinder Ritter MD    I spent a total of 25 minutes face to face with Abeer during today's office visit. Over 50% of this time was spent counseling the patient and/or coordinating care.      "

## 2020-08-14 ENCOUNTER — HOSPITAL ENCOUNTER (OUTPATIENT)
Facility: AMBULATORY SURGERY CENTER | Age: 45
End: 2020-08-14
Attending: PODIATRIST | Admitting: PODIATRIST
Payer: COMMERCIAL

## 2020-08-14 DIAGNOSIS — Z11.59 ENCOUNTER FOR SCREENING FOR OTHER VIRAL DISEASES: Primary | ICD-10-CM

## 2020-08-14 DIAGNOSIS — M21.621 TAILOR'S BUNION OF BOTH FEET: ICD-10-CM

## 2020-08-14 DIAGNOSIS — M21.622 TAILOR'S BUNION OF BOTH FEET: ICD-10-CM

## 2020-08-14 DIAGNOSIS — M21.612 BUNION, LEFT: ICD-10-CM

## 2020-08-14 NOTE — TELEPHONE ENCOUNTER
Type of surgery: Left first tarsometatarsal joint fusion left Jacob bunionectomy left fifth metatarsal bunion correction with bone cutting and screw fixation left fifth metatarsal phalangeal joint mass removal .   CPT 06111, 50870, 41652     Bunion, right M21.611     Bunion, left M21.612    [place] [facility] [place] and time of surgery: 09/29/2020 / 10:00  Surgeon: Tanner  Pre-Op Appt Date: 09/15/2020  Post-Op Appt Date: 10/02/2020   Packet sent out: Yes  Pre-cert/Authorization completed:    Per Availity:  Transaction ID: [id number] ID: [id number] Date: 2020-08-14  No Authorization Required  Group Number  [id number]  Line of Business  Commerical  Date of Service  2020-09-01  Procedure Code 1  93342    Reference Number  -1-1  Status  NO AUTH REQUIRED    Procedure Code 2  02996    Reference Number  -1-1  Status  NO AUTH REQUIRED    Procedure Code 3  85111    Reference Number  -1-1  Status  NO AUTH REQUIRED  Date: 08/14/2020    Thank you,   Johanne Jang   Prior Authorization [facility]  [phone]

## 2020-08-31 ENCOUNTER — ANCILLARY PROCEDURE (OUTPATIENT)
Dept: GENERAL RADIOLOGY | Facility: CLINIC | Age: 45
End: 2020-08-31
Attending: SURGERY
Payer: COMMERCIAL

## 2020-08-31 ENCOUNTER — OFFICE VISIT (OUTPATIENT)
Dept: SURGERY | Facility: CLINIC | Age: 45
End: 2020-08-31
Payer: COMMERCIAL

## 2020-08-31 ENCOUNTER — TELEPHONE (OUTPATIENT)
Dept: ENDOCRINOLOGY | Facility: CLINIC | Age: 45
End: 2020-08-31

## 2020-08-31 VITALS
HEIGHT: 65 IN | DIASTOLIC BLOOD PRESSURE: 93 MMHG | WEIGHT: 285.5 LBS | SYSTOLIC BLOOD PRESSURE: 132 MMHG | OXYGEN SATURATION: 97 % | HEART RATE: 90 BPM | BODY MASS INDEX: 47.57 KG/M2

## 2020-08-31 DIAGNOSIS — E66.813 CLASS 3 SEVERE OBESITY DUE TO EXCESS CALORIES WITH SERIOUS COMORBIDITY AND BODY MASS INDEX (BMI) OF 40.0 TO 44.9 IN ADULT (H): ICD-10-CM

## 2020-08-31 DIAGNOSIS — E66.01 CLASS 3 SEVERE OBESITY DUE TO EXCESS CALORIES WITH SERIOUS COMORBIDITY AND BODY MASS INDEX (BMI) OF 40.0 TO 44.9 IN ADULT (H): ICD-10-CM

## 2020-08-31 DIAGNOSIS — E66.01 CLASS 3 SEVERE OBESITY DUE TO EXCESS CALORIES WITH SERIOUS COMORBIDITY AND BODY MASS INDEX (BMI) OF 40.0 TO 44.9 IN ADULT (H): Primary | ICD-10-CM

## 2020-08-31 DIAGNOSIS — E66.813 CLASS 3 SEVERE OBESITY DUE TO EXCESS CALORIES WITH SERIOUS COMORBIDITY AND BODY MASS INDEX (BMI) OF 40.0 TO 44.9 IN ADULT (H): Primary | ICD-10-CM

## 2020-08-31 RX ORDER — PHENTERMINE HYDROCHLORIDE 37.5 MG/1
0.5 TABLET ORAL EVERY MORNING
Qty: 15 TABLET | Refills: 1 | Status: SHIPPED | OUTPATIENT
Start: 2020-08-31 | End: 2020-09-14

## 2020-08-31 ASSESSMENT — PAIN SCALES - GENERAL: PAINLEVEL: NO PAIN (0)

## 2020-08-31 ASSESSMENT — MIFFLIN-ST. JEOR: SCORE: 1930.9

## 2020-08-31 NOTE — LETTER
"2020       RE: Raffy Castelan  [address]  [place] [address]     Dear Colleague,    Thank you for referring your patient, Raffy Castelan, to the [facility] SURGICAL WEIGHT MANAGEMENT at [facility]. Please see a copy of my visit note below.    Band Assessment Visit    Date: 2020  Name: Raffy Castelan  MR#: [id number]  : 1975    VITALS:          Weight: 285 lbs 8 oz         BMI: Body mass index is 47.51 kg/m .         Wt change since last visit (lbs): 2         Cumulative weight loss (lbs): -17.5    SUBJECTIVE:  Patient comes to the clinic today for band assessment.  In regards to the patient's band, the patient feels they need fluid added to their band.     Saw Dr Ritter 20  \"Had lapband ; weighed 234#.  Gained weight shortly after lapband placement.     Had fluid added to ~9cc by patient report.      AT one time did well with Qsymia; tried again recently, but it caused some problems with mental state; discontinued.  Used liraglutide, higher dose at one time without effect.     Hasn't used contrave (don't think).     Currently weighs 263#.     Last UGI was in .\"    All fluid removed 1 year ago  Dr Ritter added 3cc to band last visit, feels some restriction, no vomiting or GERD  UGI today, reviewed images and fluid goes through easily.     Foot surgery and needs to lose weight prior. Surgery in Dec  Controlled HTN  Tried saxenda with no success with weight loss  Prescribed Contrave     Wt Readings from Last 4 Encounters:   20 129.5 kg (285 lb 8 oz)   20 128.6 kg (283 lb 8 oz)   20 119.3 kg (263 lb)   06/10/19 119.3 kg (263 lb)         WEIGHT SELF ASSESSMENT:    2020   Are you satisfied with your weight or weight loss? No   Please check the boxes (1-3 boxes) which you think have been influencing your weight in the past month. -       CURRENT EXERCISE AND ACTIVITY:    2020   I am exercising 3x weekly or more.  No "   Please select the statement that best describes your ability to exercise and be active in the past 4 weeks or since your last clinic visit: Pain in my joints limits my exercise and activity       BAND ROS:    8/31/2020   I am hungry between meals? Yes   I am eating between meals? Yes   I am eating > 1 cup of food at meals? Yes   I am not losing 1-2 lbs a week? No   I do not feel a sense of restriction? No         8/31/2020   I have pain when swallowing foods or liquids. No   I have heart burn, vomiting, or reflux. Yes   I have night cough or hiccups. No   I am making poor food choices (smoothies, shakes, chips). No   I am unable to eat chicken, steak, and bread. Yes         8/31/2020   Are you pregnant? No   Will you be traveling to remote areas? No   Will you be having major surgery soon? No       ASSESSMENT/PLAN:    Start phentermine 37.5mg Start with half tab 18.75mg daily and check blood pressure at  pharmacy in 1-2 weeks and then can possibly increase dose to full tab    Consider adding Contrave at next visit, was prescribed in the past but never started    Follow up in [facility] pharmacist in 1 month by phone visit to follow up phentermine and discuss possible Contrave    Follow up in 2 months for band assessment in person if band adjustment needed or by video if no band adjustment needed.      PLAN: After evaluation, we have elected to adjust her gastric band. Risk, benefits, and alternatives were reviewed before a consent was signed. Raffy wishes to proceed and will follow up in 4 weeks  for subsequent assessment.    PROCEDURE: Adjustment of gastric band performed by Shoshana Bustamante PA-C    PROCEDURE DETAILS: In the clinic exam room, the patient was placed in supine position on the exam table. The area over the access port was prepped with an alcohol swab, gloves were donned, and a Tillman needle and syringe were directed into the port under palpation guidance. A small amount of saline was  aspirated to verify location, and 2 mls was delivered into the port. Access needle was withdrawn and bandaid was applied. Patient sat up and drank 4 ounces of lukewarm water without difficulty. She should return to a liquid diet and advance as tolerated. Tight band warning signs were reviewed.  She left home in a stable and ambulatory condition. ~5cc in band now      I spent a total of 15 minutes face to face with Raffy Castelan during today's office visit.  Over 50% of this time was spent counseling the patient and/or coordinating care.      Again, thank you for allowing me to participate in the care of your patient.      Sincerely,    Shoshana Bustamante PA-C

## 2020-08-31 NOTE — TELEPHONE ENCOUNTER
Central Prior Authorization Team   Phone: [phone]        PA Initiation    Medication: Phentermine 37.5 mg-PA initiated  Insurance Company: [employer] [place] - Phone [phone] Fax [phone]  Pharmacy Filling the Rx: [facility] - [place], [place] - [address] [place] [address].  Filling Pharmacy Phone: [phone]  Filling Pharmacy Fax:    Start Date: 8/31/2020

## 2020-08-31 NOTE — LETTER
"2020       RE: Raffy Castelan  [address]  [place] [address]     Dear Colleague,    Thank you for referring your patient, Raffy Castelan, to the [facility] SURGICAL WEIGHT MANAGEMENT at [facility]. Please see a copy of my visit note below.    Band Assessment Visit    Date: 2020  Name: Raffy Castelan  MR#: [id number]  : 1975    VITALS:          Weight: 285 lbs 8 oz         BMI: Body mass index is 47.51 kg/m .         Wt change since last visit (lbs): 2         Cumulative weight loss (lbs): -17.5    SUBJECTIVE:  Patient comes to the clinic today for band assessment.  In regards to the patient's band, the patient feels they need fluid added to their band.     Saw Dr Ritter 20  \"Had lapband ; weighed 234#.  Gained weight shortly after lapband placement.     Had fluid added to ~9cc by patient report.      AT one time did well with Qsymia; tried again recently, but it caused some problems with mental state; discontinued.  Used liraglutide, higher dose at one time without effect.     Hasn't used contrave (don't think).     Currently weighs 263#.     Last UGI was in .\"    All fluid removed 1 year ago  Dr Ritter added 3cc to band last visit, feels some restriction, no vomiting or GERD  UGI today, reviewed images and fluid goes through easily.     Foot surgery and needs to lose weight prior. Surgery in Dec  Controlled HTN  Tried saxenda with no success with weight loss  Prescribed Contrave     Wt Readings from Last 4 Encounters:   20 129.5 kg (285 lb 8 oz)   20 128.6 kg (283 lb 8 oz)   20 119.3 kg (263 lb)   06/10/19 119.3 kg (263 lb)       WEIGHT SELF ASSESSMENT:    2020   Are you satisfied with your weight or weight loss? No   Please check the boxes (1-3 boxes) which you think have been influencing your weight in the past month. -     CURRENT EXERCISE AND ACTIVITY:    2020   I am exercising 3x weekly or more.  No "   Please select the statement that best describes your ability to exercise and be active in the past 4 weeks or since your last clinic visit: Pain in my joints limits my exercise and activity     BAND ROS:    8/31/2020   I am hungry between meals? Yes   I am eating between meals? Yes   I am eating > 1 cup of food at meals? Yes   I am not losing 1-2 lbs a week? No   I do not feel a sense of restriction? No         8/31/2020   I have pain when swallowing foods or liquids. No   I have heart burn, vomiting, or reflux. Yes   I have night cough or hiccups. No   I am making poor food choices (smoothies, shakes, chips). No   I am unable to eat chicken, steak, and bread. Yes         8/31/2020   Are you pregnant? No   Will you be traveling to remote areas? No   Will you be having major surgery soon? No       ASSESSMENT/PLAN:  Start phentermine 37.5mg Start with half tab 18.75mg daily and check blood pressure at  pharmacy in 1-2 weeks and then can possibly increase dose to full tab    Consider adding Contrave at next visit, was prescribed in the past but never started    Follow up in [facility] pharmacist in 1 month by phone visit to follow up phentermine and discuss possible Contrave    Follow up in 2 months for band assessment in person if band adjustment needed or by video if no band adjustment needed.    PLAN: After evaluation, we have elected to adjust her gastric band. Risk, benefits, and alternatives were reviewed before a consent was signed. Raffy wishes to proceed and will follow up in 4 weeks  for subsequent assessment.    PROCEDURE: Adjustment of gastric band performed by Shoshana Bustamante PA-C    PROCEDURE DETAILS: In the clinic exam room, the patient was placed in supine position on the exam table. The area over the access port was prepped with an alcohol swab, gloves were donned, and a Tillman needle and syringe were directed into the port under palpation guidance. A small amount of saline was  aspirated to verify location, and 2 mls was delivered into the port. Access needle was withdrawn and bandaid was applied. Patient sat up and drank 4 ounces of lukewarm water without difficulty. She should return to a liquid diet and advance as tolerated. Tight band warning signs were reviewed.  She left home in a stable and ambulatory condition. ~5cc in band now    I spent a total of 15 minutes face to face with Raffy Castelan during today's office visit.  Over 50% of this time was spent counseling the patient and/or coordinating care.    Again, thank you for allowing me to participate in the care of your patient.  Sincerely,    Shoshana Bustamante PA-C

## 2020-08-31 NOTE — TELEPHONE ENCOUNTER
Prior Authorization Retail Medication Request    Medication/Dose: Phentermine  ICD code (if different than what is on RX):  Class 3 severe obesity due to excess calories with serious comorbidity and body mass index (BMI) of 40.0 to 44.9 in adult (H) [E66.01, Z68.41]  - Primary     Previously Tried and Failed:  Weight loss surgery, history of diet and exercise, Qsymia, Saxenda/Liraglutide high dose, Conrave  Rationale:  Had lapband 2014; weighed 234#.  Gained weight shortly after lapband placement.     Had fluid added to ~9cc by patient report.      At one time did well with Qsymia; tried again recently, but it caused some problems with mental state; discontinued.  Used liraglutide, higher dose at one time without effect.     Currently weighs 263#.    Insurance Name:    Insurance ID:        Pharmacy Information (if different than what is on RX)  Name:  [facility] [place] - [place], [place] - [address] [place] [address].   Phone:  [phone]

## 2020-08-31 NOTE — PATIENT INSTRUCTIONS
Start phentermine 37.5mg Start with half tab 18.75mg daily and check blood pressure at  pharmacy in 1-2 weeks and then can possibly increase dose to full tab    Consider adding Contrave at next visit, was prescribed in the past but never started    Follow up in [facility] pharmacist in 1 month by phone visit to follow up phentermine and discuss possible Contrave    Follow up in 2 months for band assessment in person if band adjustment needed or by video if no band adjustment needed.  MEDICATION STARTED AT THIS APPOINTMENT    We are starting Phentermine. Take one tablet in the morning.  Call the nurse at [phone] if you have any questions or concerns. (Do not stop taking it if you don't think it's working. For some people it works without them knowing it.)    Phentermine is being prescribed because you identified hunger as one of the main causes for your extra weight.      Our patients on Phentermine find that they:    >feel less hunger    >find it easier to push the plate away   >have an easier time eating less    For some of our patients, these feelings are very real and immediate. For other patients, the feelings are less obvious. They don't feel much of a change but find they've lost weight. Like all weight loss medications, Phentermine  works best when you help it work. This means:  1. Having less tempting high calorie (fattening) food around the house or office. (For people with strong cravings this is very important.)   2. Staying away from situations or people that may trigger your cravings .   3. Eating out only one time or less each week.  4. Eating your meals at a table with the TV or computer off.    Side-effects. Phentermine is generally well tolerated. The main side-effects we see are feelings of racing pulse or rapid heart beat. Some people can get an elevated blood pressure. Because of this we may have you come back within a week or so of starting the medication for a blood pressure check.          In order to get refills of this or any medication we prescribe you must be seen in the medical weight mgmt clinic every 2-3 months. Please have your pharmacy fax a refill request to [phone].

## 2020-08-31 NOTE — PROGRESS NOTES
"Band Assessment Visit    Date: 2020  Name: Raffy Castelan  MR#: [id number]  : 1975    VITALS:          Weight: 285 lbs 8 oz         BMI: Body mass index is 47.51 kg/m .         Wt change since last visit (lbs): 2         Cumulative weight loss (lbs): -17.5    SUBJECTIVE:  Patient comes to the clinic today for band assessment.  In regards to the patient's band, the patient feels they need fluid added to their band.     Saw Dr Ritter 20  \"Had lapband ; weighed 234#.  Gained weight shortly after lapband placement.     Had fluid added to ~9cc by patient report.      AT one time did well with Qsymia; tried again recently, but it caused some problems with mental state; discontinued.  Used liraglutide, higher dose at one time without effect.     Hasn't used contrave (don't think).     Currently weighs 263#.     Last UGI was in .\"    All fluid removed 1 year ago  Dr Ritter added 3cc to band last visit, feels some restriction, no vomiting or GERD  UGI today, reviewed images and fluid goes through easily.     Foot surgery and needs to lose weight prior. Surgery in Dec  Controlled HTN  Tried saxenda with no success with weight loss  Prescribed Contrave     Wt Readings from Last 4 Encounters:   20 129.5 kg (285 lb 8 oz)   20 128.6 kg (283 lb 8 oz)   20 119.3 kg (263 lb)   06/10/19 119.3 kg (263 lb)         WEIGHT SELF ASSESSMENT:    2020   Are you satisfied with your weight or weight loss? No   Please check the boxes (1-3 boxes) which you think have been influencing your weight in the past month. -       CURRENT EXERCISE AND ACTIVITY:    2020   I am exercising 3x weekly or more.  No   Please select the statement that best describes your ability to exercise and be active in the past 4 weeks or since your last clinic visit: Pain in my joints limits my exercise and activity       BAND ROS:    2020   I am hungry between meals? Yes   I am eating between meals? Yes   I am " eating > 1 cup of food at meals? Yes   I am not losing 1-2 lbs a week? No   I do not feel a sense of restriction? No         8/31/2020   I have pain when swallowing foods or liquids. No   I have heart burn, vomiting, or reflux. Yes   I have night cough or hiccups. No   I am making poor food choices (smoothies, shakes, chips). No   I am unable to eat chicken, steak, and bread. Yes         8/31/2020   Are you pregnant? No   Will you be traveling to remote areas? No   Will you be having major surgery soon? No       ASSESSMENT/PLAN:    Start phentermine 37.5mg Start with half tab 18.75mg daily and check blood pressure at  pharmacy in 1-2 weeks and then can possibly increase dose to full tab    Consider adding Contrave at next visit, was prescribed in the past but never started    Follow up in [facility] pharmacist in 1 month by phone visit to follow up phentermine and discuss possible Contrave    Follow up in 2 months for band assessment in person if band adjustment needed or by video if no band adjustment needed.      PLAN: After evaluation, we have elected to adjust her gastric band. Risk, benefits, and alternatives were reviewed before a consent was signed. Raffy wishes to proceed and will follow up in 4 weeks  for subsequent assessment.    PROCEDURE: Adjustment of gastric band performed by Shoshana Bustamante PA-C    PROCEDURE DETAILS: In the clinic exam room, the patient was placed in supine position on the exam table. The area over the access port was prepped with an alcohol swab, gloves were donned, and a Tillman needle and syringe were directed into the port under palpation guidance. A small amount of saline was aspirated to verify location, and 2 mls was delivered into the port. Access needle was withdrawn and bandaid was applied. Patient sat up and drank 4 ounces of lukewarm water without difficulty. She should return to a liquid diet and advance as tolerated. Tight band warning signs were reviewed.   She left home in a stable and ambulatory condition. ~5cc in band now      I spent a total of 15 minutes face to face with Raffy Castelan during today's office visit.  Over 50% of this time was spent counseling the patient and/or coordinating care.

## 2020-08-31 NOTE — NURSING NOTE
"Chief Complaint   Patient presents with     Consult     Appointment for band assessment.       Vitals:    08/31/20 0933   BP: (!) 132/93   BP Location: Left arm   Patient Position: Sitting   Cuff Size: Adult Large   Pulse: 90   SpO2: 97%   Weight: 129.5 kg (285 lb 8 oz)   Height: 1.651 m (5' 5\")       Body mass index is 47.51 kg/m .                            BRITTNEY AHN, EMT    "

## 2020-09-01 NOTE — TELEPHONE ENCOUNTER
Prior Authorization Approval    Authorization Effective Date: 8/31/2020  Authorization Expiration Date: 11/30/2020  Medication: Phentermine 37.5 mg-PA approved  Approved Dose/Quantity:   Reference #: [id number]   Insurance Company: [employer] [place] - Phone [phone] Fax [phone]  Expected CoPay:       CoPay Card Available:      Foundation Assistance Needed:    Which Pharmacy is filling the prescription (Not needed for infusion/clinic administered): [facility] - [place], [place] - [address] [place] [address].  Pharmacy Notified: Yes  Patient Notified: No-Patient picked up yesterday. Informed pharmacy that this was approved as of 8/31 and they need to refund patient.

## 2020-09-11 ENCOUNTER — ALLIED HEALTH/NURSE VISIT (OUTPATIENT)
Dept: SURGERY | Facility: CLINIC | Age: 45
End: 2020-09-11

## 2020-09-11 VITALS — SYSTOLIC BLOOD PRESSURE: 123 MMHG | HEART RATE: 95 BPM | DIASTOLIC BLOOD PRESSURE: 75 MMHG

## 2020-09-11 DIAGNOSIS — I10 HYPERTENSION GOAL BP (BLOOD PRESSURE) < 140/90: Primary | ICD-10-CM

## 2020-09-11 NOTE — PROGRESS NOTES
Raffy Castelan was evaluated at [facility] on September 11, 2020 at which time her blood pressure was:    BP Readings from Last 3 Encounters:   09/11/20 123/75   08/31/20 (!) 132/93   07/30/20 127/87     Pulse Readings from Last 3 Encounters:   09/11/20 95   08/31/20 90   07/30/20 99       Reviewed lifestyle modifications for blood pressure control and reduction: including making healthy food choices, managing weight, getting regular exercise, smoking cessation, reducing alcohol consumption, monitoring blood pressure regularly.     Symptoms: None    BP Goal:< 140/90 mmHg    BP Assessment:  BP at goal    Potential Reasons for BP too high: NA - Not applicable    BP Follow-Up Plan: Recheck BP in 6 months at pharmacy    Recommendation to Provider: None    Note completed by:     Palak Zavala PharmD, [facility]  Pharmacist Manager   [facility]  [phone]

## 2020-09-14 ENCOUNTER — MYC MEDICAL ADVICE (OUTPATIENT)
Dept: SURGERY | Facility: CLINIC | Age: 45
End: 2020-09-14

## 2020-09-14 DIAGNOSIS — E66.01 CLASS 3 SEVERE OBESITY DUE TO EXCESS CALORIES WITH SERIOUS COMORBIDITY AND BODY MASS INDEX (BMI) OF 40.0 TO 44.9 IN ADULT (H): ICD-10-CM

## 2020-09-14 DIAGNOSIS — E66.813 CLASS 3 SEVERE OBESITY DUE TO EXCESS CALORIES WITH SERIOUS COMORBIDITY AND BODY MASS INDEX (BMI) OF 40.0 TO 44.9 IN ADULT (H): ICD-10-CM

## 2020-09-14 RX ORDER — PHENTERMINE HYDROCHLORIDE 37.5 MG/1
37.5 TABLET ORAL
Qty: 30 TABLET | Refills: 1 | Status: SHIPPED | OUTPATIENT
Start: 2020-09-14 | End: 2021-07-30

## 2020-10-14 ENCOUNTER — TELEPHONE (OUTPATIENT)
Dept: PODIATRY | Facility: CLINIC | Age: 45
End: 2020-10-14

## 2020-10-14 NOTE — TELEPHONE ENCOUNTER
Needs new surgery orders. She cancelled her Sept Surgery date. Her new date is 12/15/20    Rianna Villarreal CMA

## 2020-10-15 ENCOUNTER — TELEPHONE (OUTPATIENT)
Dept: PODIATRY | Facility: CLINIC | Age: 45
End: 2020-10-15

## 2020-10-19 ENCOUNTER — MYC MEDICAL ADVICE (OUTPATIENT)
Dept: PODIATRY | Facility: CLINIC | Age: 45
End: 2020-10-19

## 2020-10-19 VITALS — BODY MASS INDEX: 43.39 KG/M2 | HEIGHT: 66 IN | WEIGHT: 270 LBS

## 2020-10-19 ASSESSMENT — MIFFLIN-ST. JEOR: SCORE: 1868.52

## 2020-10-27 ENCOUNTER — VIRTUAL VISIT (OUTPATIENT)
Dept: FAMILY MEDICINE | Facility: OTHER | Age: 45
End: 2020-10-27
Payer: COMMERCIAL

## 2020-10-27 PROCEDURE — 99421 OL DIG E/M SVC 5-10 MIN: CPT | Performed by: FAMILY MEDICINE

## 2020-10-27 NOTE — PROGRESS NOTES
"Date: 10/27/2020 11:01:46  Clinician: Higinio Alcantar  Clinician NPI: [id number]  Patient: Raffy Castelan  Patient : 1975  Patient Address: [address] [place] [address]  Patient Phone: [phone]  Visit Protocol: URI  Patient Summary:  Raffy is a 45 year old ( : 1975 ) female who initiated a [facility] Visit for COVID-19 (Coronavirus) evaluation and screening. When asked the question \"Please sign me up to receive news, health information and promotions from [facility].\", Raffy responded \"No\".    Raffy states her symptoms started 1-2 days ago.   Her symptoms consist of wheezing, a cough, nasal congestion, vomiting, and malaise. Raffy also feels feverish.   Symptom details     Nasal secretions: The color of her mucus is clear.    Cough: Raffy coughs every 5-10 minutes and her cough is more bothersome at night. Phlegm comes into her throat when she coughs. She does not believe her cough is caused by post-nasal drip. The color of the phlegm is clear.     Temperature: Her current temperature is 100.3 degrees Fahrenheit. Raffy has had a temperature over 100 degrees Fahrenheit for 1-2 days.     Wheezing: Raffy has been diagnosed with asthma. Additional wheezing details as reported by the patient (free text): Congested chest with mild wheezing        Raffy denies having ear pain, headache, enlarged lymph nodes, anosmia, rhinitis, nausea, facial pain or pressure, myalgias, chills, sore throat, teeth pain, ageusia, and diarrhea. She also denies taking antibiotic medication in the past month and having recent facial or sinus surgery in the past 60 days. She is not experiencing dyspnea.   Precipitating events  She has not recently been exposed to someone with influenza. Raffy has been in close contact with the following high risk individuals: adults 65 or older.   Pertinent COVID-19 (Coronavirus) information  In the past 14 days, Raffy has not worked in a congregate living setting.   She does not work or " volunteer as healthcare worker or a  and does not work or volunteer in a healthcare facility.   Raffy also has not lived in a congregate living setting in the past 14 days. She does not live with a healthcare worker.   Raffy has not had a close contact with a laboratory-confirmed COVID-19 patient within 14 days of symptom onset.   Since December 2019, Raffy and has had upper respiratory infection (URI) or influenza-like illness. Has not been diagnosed with lab-confirmed COVID-19 test      Date(s) of previous URI or influenza-like illness (free-text): Don't remember     Symptoms Raffy experienced during previous URI or influenza-like illness as reported by the patient (free-text): Runny nose        Pertinent medical history  Raffy does not get yeast infections when she takes antibiotics.   Raffy does not need a return to work/school note.   Weight: 275 lbs   Raffy does not smoke or use smokeless tobacco.   She denies pregnancy and denies breastfeeding. She has menstruated in the past month.   Additional information as reported by the patient (free text): I traveled to [place], [place] from 10/23 to 10/26   Weight: 275 lbs    MEDICATIONS: Natural Vegetable Laxative oral, losartan-hydrochlorothiazide oral, ALLERGIES: NKDA  Clinician Response:  Dear Raffy,   Your symptoms show that you may have coronavirus (COVID-19). This illness can cause fever, cough and trouble breathing. Many people get a mild case and get better on their own. Some people can get very sick.  What should I do?  We would like to test you for this virus.   1. Please call [phone] to schedule your visit. Explain that you were referred by [facility] to have a COVID-19 test. Be ready to share your [facility] visit ID number.  Please note that if you are assessed for Covid-19 testing and receive an order for testing from [facility], that the scheduling of your Covid test at [facility] may be delayed by three or four days or more due to limited  "availability for testing. Additional options for testing can be found on the [place] Covid-19 Response website. https://[place].gov/covid19/    The following will serve as your written order for this COVID Test, ordered by me, for the indication of suspected COVID [Z20.828]: The test will be ordered in [employer], our electronic health record, after you are scheduled. It will show as ordered and authorized by Dayne Guardado MD.  Order: COVID-19 (Coronavirus) PCR for SYMPTOMATIC testing from [facility].   2. When it's time for your COVID test:  Stay at least 6 feet away from others. (If someone will drive you to your test, stay in the backseat, as far away from the  as you can.)   Cover your mouth and nose with a mask, tissue or washcloth.  Go straight to the testing site. Don't make any stops on the way there or back.      3.Starting now: Stay home and away from others (self-isolate) until:   You've had no fever---and no medicine that reduces fever---for one full day (24 hours). And...   Your other symptoms have gotten better. For example, your cough or breathing has improved. And...   At least 10 days have passed since your symptoms started.       During this time, don't leave the house except for testing or medical care.   Stay in your own room, even for meals. Use your own bathroom if you can.   Stay away from others in your home. No hugging, kissing or shaking hands. No visitors.  Don't go to work, school or anywhere else.    Clean \"high touch\" surfaces often (doorknobs, counters, handles, etc.). Use a household cleaning spray or wipes. You'll find a full list of  on the EPA website: www.epa.gov/pesticide-registration/list-n-disinfectants-use-against-sars-cov-2.   Cover your mouth and nose with a mask, tissue or washcloth to avoid spreading germs.  Wash your hands and face often. Use soap and water.  Caregivers in these groups are at risk for severe illness due to COVID-19:  o People 65 years and older  o People " who live in a nursing home or long-term care facility  o People with chronic disease (lung, heart, cancer, diabetes, kidney, liver, immunologic)  o People who have a weakened immune system, including those who:   Are in cancer treatment  Take medicine that weakens the immune system, such as corticosteroids  Had a bone marrow or organ transplant  Have an immune deficiency  Have poorly controlled HIV or AIDS  Are obese (body mass index of 40 or higher)  Smoke regularly   o Caregivers should wear gloves while washing dishes, handling laundry and cleaning bedrooms and bathrooms.  o Use caution when washing and drying laundry: Don't shake dirty laundry, and use the warmest water setting that you can.  o For more tips, go to www.cdc.gov/coronavirus/2019-ncov/downloads/10Things.pdf.    4.Sign up for [employer]. We know it's scary to hear that you might have COVID-19. We want to track your symptoms to make sure you're okay over the next 2 weeks. Please look for an email from [employer]---this is a free, online program that we'll use to keep in touch. To sign up, follow the link in the email. Learn more at [email or web address]  How can I take care of myself?   Get lots of rest. Drink extra fluids (unless a doctor has told you not to).   Take Tylenol (acetaminophen) for fever or pain. If you have liver or kidney problems, ask your family doctor if it's okay to take Tylenol.   Adults can take either:    650 mg (two 325 mg pills) every 4 to 6 hours, or...   1,000 mg (two 500 mg pills) every 8 hours as needed.    Note: Don't take more than 3,000 mg in one day. Acetaminophen is found in many medicines (both prescribed and over-the-counter medicines). Read all labels to be sure you don't take too much.   For children, check the Tylenol bottle for the right dose. The dose is based on the child's age or weight.    If you have other health problems (like cancer, heart failure, an organ transplant or severe kidney  disease): Call your specialty clinic if you don't feel better in the next 2 days.       Know when to call 911. Emergency warning signs include:    Trouble breathing or shortness of breath Pain or pressure in the chest that doesn't go away Feeling confused like you haven't felt before, or not being able to wake up Bluish-colored lips or face.  Where can I get more information?   [facility] -- About COVID-19: [email or web address]   CDC -- What to Do If You're Sick: www.cdc.gov/coronavirus/2019-ncov/about/steps-when-sick.html   CDC -- Ending Home Isolation: www.cdc.gov/coronavirus/2019-ncov/hcp/disposition-in-home-patients.html   [facility] -- Caring for Someone: www.cdc.gov/coronavirus/2019-ncov/if-you-are-sick/care-for-someone.html   [facility] -- Interim Guidance for Hospital Discharge to Home: www.[facility].[place]./diseases/coronavirus/hcp/hospdischarge.pdf   [facility] clinical trials (COVID-19 research studies): clinicalaffairs.[facility]-clinical-trials    Below are the COVID-19 hotlines at the [facility] ([facility]). Interpreters are available.    For health questions: Call 178-713-4648 or 1-809.994.8871 (7 a.m. to 7 p.m.) For questions about schools and childcare: Call 954-214-4925 or 1-615.895.9520 (7 a.m. to 7 p.m.)    Diagnosis: Contact with and (suspected) exposure to other viral communicable diseases  Diagnosis ICD: Z20.828

## 2020-10-29 ENCOUNTER — AMBULATORY - HEALTHEAST (OUTPATIENT)
Dept: FAMILY MEDICINE | Facility: CLINIC | Age: 45
End: 2020-10-29

## 2020-10-29 DIAGNOSIS — Z20.822 SUSPECTED 2019 NOVEL CORONAVIRUS INFECTION: ICD-10-CM

## 2020-10-30 ENCOUNTER — NURSE TRIAGE (OUTPATIENT)
Dept: NURSING | Facility: CLINIC | Age: 45
End: 2020-10-30

## 2020-10-30 NOTE — TELEPHONE ENCOUNTER
Patient calling - says she took a COVID19 test at [facility] on Tuesday.  Today she got results and they are positive.  She had cough and fever which have resolved.  Still has loss of taste and smell.  Is asking if any other treatment for her and what precautions she should take as she takes care of her children and her mother.    Triaged to disposition of Home Care.  Care advice and isolation recommendations given per protocol.  Advised to call back if difficulty breathing occurs, chest pain occurs or symptoms worsen.    Message sent to PCP to alert of positive COVID19 test per patient's request.    Maribel Holland RN  Triage Nurse Advisor    COVID 19 Nurse Triage Plan/Patient Instructions    Please be aware that novel coronavirus (COVID-19) may be circulating in the community. If you develop symptoms such as fever, cough, or SOB or if you have concerns about the presence of another infection including coronavirus (COVID-19), please contact your health care provider or visit [email or web address].     Disposition/Instructions    Home care recommended. Follow home care protocol based instructions.    Thank you for taking steps to prevent the spread of this virus.  o Limit your contact with others.  o Wear a simple mask to cover your cough.  o Wash your hands well and often.    Resources    [facility]: About COVID-19: [email or web address]    CDC: What to Do If You're Sick: www.cdc.gov/coronavirus/2019-ncov/about/steps-when-sick.html    CDC: Ending Home Isolation: www.cdc.gov/coronavirus/2019-ncov/hcp/disposition-in-home-patients.html     CDC: Caring for Someone: www.cdc.gov/coronavirus/2019-ncov/if-you-are-sick/care-for-someone.html     [facility]: Interim Guidance for Hospital Discharge to Home: www.health.[facility].[place]/diseases/coronavirus/hcp/hospdischarge.pdf    [facility] clinical trials (COVID-19 research studies): clinicalaffairs.[facility]/umn-clinical-trials     Below are the COVID-19 hotlines  at the [place] Department of Health ([facility]). Interpreters are available.   o For health questions: Call 933-031-2761 or 1-239.970.2040 (7 a.m. to 7 p.m.)  o For questions about schools and childcare: Call 307-224-1555 or 1-275.154.3527 (7 a.m. to 7 p.m.)                         Reason for Disposition    [1] COVID-19 diagnosed by positive lab test AND [2] mild symptoms (e.g., cough, fever, others) AND [3] no complications or SOB    COVID-19 Home Isolation, questions about    Additional Information    Negative: SEVERE difficulty breathing (e.g., struggling for each breath, speaks in single words)    Negative: Difficult to awaken or acting confused (e.g., disoriented, slurred speech)    Negative: Bluish (or gray) lips or face now    Negative: Shock suspected (e.g., cold/pale/clammy skin, too weak to stand, low BP, rapid pulse)    Negative: Sounds like a life-threatening emergency to the triager    Negative: [1] COVID-19 exposure AND [2] no symptoms    Negative: COVID-19 and Breastfeeding, questions about    Negative: [1] Adult with possible COVID-19 symptoms AND [2] triager concerned about severity of symptoms or other causes    Negative: SEVERE or constant chest pain or pressure (Exception: mild central chest pain, present only when coughing)    Negative: MODERATE difficulty breathing (e.g., speaks in phrases, SOB even at rest, pulse 100-120)    Negative: MILD difficulty breathing (e.g., minimal/no SOB at rest, SOB with walking, pulse <100)    Negative: Chest pain or pressure    Negative: Fever > 103 F (39.4 C)    Negative: [1] Fever > 101 F (38.3 C) AND [2] age > 60    Negative: [1] Fever > 100.0 F (37.8 C) AND [2] bedridden (e.g., nursing home patient, CVA, chronic illness, recovering from surgery)    Negative: HIGH RISK patient (e.g., age > 64 years, diabetes, heart or lung disease, weak immune system) (Exception: Has already been evaluated by healthcare provider and has no new or worsening symptoms)    Negative:  Patient sounds very sick or weak to the triager    Negative: Fever present > 3 days (72 hours)    Negative: [1] Fever returns after gone for over 24 hours AND [2] symptoms worse or not improved    Negative: [1] Continuous (nonstop) coughing interferes with work or school AND [2] no improvement using cough treatment per protocol    Negative: [1] COVID-19 infection suspected by caller or triager AND [2] mild symptoms (cough, fever, or others) AND [3] no complications or SOB    Negative: Cough present > 3 weeks    Protocols used: CORONAVIRUS (COVID-19) DIAGNOSED OR [id number]-TM 8.4.20

## 2020-11-04 ENCOUNTER — MYC MEDICAL ADVICE (OUTPATIENT)
Dept: PODIATRY | Facility: CLINIC | Age: 45
End: 2020-11-04

## 2020-11-04 NOTE — TELEPHONE ENCOUNTER
Routing to podiatry to advise of preferred phone and fax number for FMLA forms. Cathy Young RN, BSN [facility]

## 2020-11-06 ENCOUNTER — MYC MEDICAL ADVICE (OUTPATIENT)
Dept: FAMILY MEDICINE | Facility: CLINIC | Age: 45
End: 2020-11-06

## 2020-11-09 ENCOUNTER — MYC MEDICAL ADVICE (OUTPATIENT)
Dept: PODIATRY | Facility: CLINIC | Age: 45
End: 2020-11-09

## 2020-11-09 DIAGNOSIS — M21.612 BUNION, LEFT: Primary | ICD-10-CM

## 2020-11-10 ENCOUNTER — MYC MEDICAL ADVICE (OUTPATIENT)
Dept: FAMILY MEDICINE | Facility: CLINIC | Age: 45
End: 2020-11-10

## 2020-11-10 DIAGNOSIS — U07.1 CLINICAL DIAGNOSIS OF COVID-19: Primary | ICD-10-CM

## 2020-11-10 RX ORDER — ALBUTEROL SULFATE 90 UG/1
2 AEROSOL, METERED RESPIRATORY (INHALATION) EVERY 6 HOURS PRN
Qty: 8.5 G | Refills: 3 | Status: SHIPPED | OUTPATIENT
Start: 2020-11-10 | End: 2023-12-07

## 2020-11-10 NOTE — TELEPHONE ENCOUNTER
DME (Durable Medical Equipment) Orders and Documentation  Orders Placed This Encounter   Procedures     Rolling Knee Walker Order      The patient was assessed and it was determined the patient is in need of the following listed DME Supplies/Equipment. Please complete supporting documentation below to demonstrate medical necessity.      DME All Other Item(s) Documentation    List reason for need and supporting documentation for medical necessity below for each DME item.     1. Left foot Fusion left foot mass removal

## 2020-11-10 NOTE — TELEPHONE ENCOUNTER
See patient's mychart message, I see inhaler refill was declined per 11/6/20 mychart message as had not been filled since 2014.   Virtual visit was advised; see this encounter, patient declines as she does not want to pay for that.    Routed inhaler refill request to provider to address.    Mari Nichols RN  [facility]

## 2020-11-11 ENCOUNTER — MYC REFILL (OUTPATIENT)
Dept: FAMILY MEDICINE | Facility: CLINIC | Age: 45
End: 2020-11-11

## 2020-11-11 DIAGNOSIS — L40.9 PSORIASIS: ICD-10-CM

## 2020-11-12 ENCOUNTER — TELEPHONE (OUTPATIENT)
Dept: FAMILY MEDICINE | Facility: CLINIC | Age: 45
End: 2020-11-12

## 2020-11-12 DIAGNOSIS — L40.9 PSORIASIS: Primary | ICD-10-CM

## 2020-11-12 RX ORDER — CLOBETASOL PROPIONATE 0.5 MG/G
AEROSOL, FOAM TOPICAL
Qty: 600 G | Refills: 3 | Status: SHIPPED | OUTPATIENT
Start: 2020-11-12 | End: 2022-01-07

## 2020-11-12 RX ORDER — CLOBETASOL PROPIONATE 0.5 MG/ML
SOLUTION TOPICAL 2 TIMES DAILY PRN
Qty: 50 ML | Refills: 1 | Status: SHIPPED | OUTPATIENT
Start: 2020-11-12 | End: 2020-12-15

## 2020-11-12 NOTE — TELEPHONE ENCOUNTER
Routing to PCP as FYI    Patient would like to re- address her scalp psoriasis when she see's Dr Mott in February. I do not see an appointment in Feb but there is one in Dec.    She is concerned that this medication is very expensive and not sure that the liquid is going to be any easier to apply as the spot is on the back of her head.      She would like to discuss at that appointment for other options for treatment as she has been seeing a lot of medication for treatment advertised.     Leela Lanza, RN

## 2020-11-12 NOTE — TELEPHONE ENCOUNTER
Most of the medications advertised on TV are specialized treatments that she would need to get from a dermatologist.  She is more than welcome to seek care with dermatology prior to our visit.  I'm happy to put a referral in her chart if she needs one.

## 2020-11-12 NOTE — TELEPHONE ENCOUNTER
Routing refill request to provider for review/approval because:  Patient reported as not taking 3/26/2020. Patient requesting via [employer]. Mediation previously prescribed by a provider who is no longer with [facility]. Appointment 12/7/2020.        Romana Baptiste RN

## 2020-11-12 NOTE — TELEPHONE ENCOUNTER
Please call patient.  I need to know where she gets her psoriasis at so that I can send in an alternative therapy.

## 2020-11-12 NOTE — TELEPHONE ENCOUNTER
Called patient and relayed the message below. She stated she refilled the medication and will make it last because she would prefer to have Dr Mott see it before making a referral. She said this has been ongoing for 15 years and she can wait. Nurse advised to call back if anything is needed.     Romana Baptiste RN

## 2020-11-12 NOTE — TELEPHONE ENCOUNTER
Hello,    Patient is requesting for an alternative for the CLOBETASOL PROPIONATE 0.05 FOAM. Her co-pay even after insurance is over $1,000. If there's an alternative you would like to provide please send us([facility]) a new prescription with the alternative product please.    If there's any question feel free to call us at [phone].    Thank You  Michael Mcdermott  Pharmacy Technician  [facility]

## 2020-11-12 NOTE — TELEPHONE ENCOUNTER
RN spoke with patient. She states that she gets the psoriasis on her scalp. She notes that she wonders if there would be an alternative that would be easier for her to apply?    Routed to PCP to review and advise.     Sonya Kennedy RN, BSN, PHN  [facility] [place]

## 2020-11-12 NOTE — TELEPHONE ENCOUNTER
Please let her know I sent the same medication, but in a liquid form which should hopefully be easier to apply.

## 2020-11-15 ENCOUNTER — MYC MEDICAL ADVICE (OUTPATIENT)
Dept: PODIATRY | Facility: CLINIC | Age: 45
End: 2020-11-15

## 2020-12-07 ENCOUNTER — OFFICE VISIT (OUTPATIENT)
Dept: FAMILY MEDICINE | Facility: CLINIC | Age: 45
End: 2020-12-07
Payer: COMMERCIAL

## 2020-12-07 VITALS
BODY MASS INDEX: 45.38 KG/M2 | HEIGHT: 65 IN | SYSTOLIC BLOOD PRESSURE: 119 MMHG | TEMPERATURE: 97.8 F | DIASTOLIC BLOOD PRESSURE: 81 MMHG | WEIGHT: 272.4 LBS | HEART RATE: 86 BPM

## 2020-12-07 DIAGNOSIS — Z12.31 ENCOUNTER FOR SCREENING MAMMOGRAM FOR BREAST CANCER: ICD-10-CM

## 2020-12-07 DIAGNOSIS — L40.9 PSORIASIS: ICD-10-CM

## 2020-12-07 DIAGNOSIS — I10 HYPERTENSION GOAL BP (BLOOD PRESSURE) < 140/90: ICD-10-CM

## 2020-12-07 DIAGNOSIS — Z01.818 PREOP GENERAL PHYSICAL EXAM: Primary | ICD-10-CM

## 2020-12-07 DIAGNOSIS — M21.621 TAILOR'S BUNION OF BOTH FEET: ICD-10-CM

## 2020-12-07 DIAGNOSIS — M21.622 TAILOR'S BUNION OF BOTH FEET: ICD-10-CM

## 2020-12-07 PROBLEM — G89.29 CHRONIC LEFT SHOULDER PAIN: Status: RESOLVED | Noted: 2017-05-08 | Resolved: 2020-12-07

## 2020-12-07 PROBLEM — M25.512 CHRONIC LEFT SHOULDER PAIN: Status: RESOLVED | Noted: 2017-05-08 | Resolved: 2020-12-07

## 2020-12-07 PROCEDURE — 99214 OFFICE O/P EST MOD 30 MIN: CPT | Performed by: FAMILY MEDICINE

## 2020-12-07 PROCEDURE — 36415 COLL VENOUS BLD VENIPUNCTURE: CPT | Performed by: FAMILY MEDICINE

## 2020-12-07 PROCEDURE — 80048 BASIC METABOLIC PNL TOTAL CA: CPT | Performed by: FAMILY MEDICINE

## 2020-12-07 RX ORDER — CLOBETASOL PROPIONATE 0.5 MG/G
OINTMENT TOPICAL 2 TIMES DAILY
Qty: 45 G | Refills: 1 | Status: SHIPPED | OUTPATIENT
Start: 2020-12-07 | End: 2021-08-25 | Stop reason: ALTCHOICE

## 2020-12-07 RX ORDER — CLOBETASOL PROPIONATE 0.05 G/100ML
SHAMPOO TOPICAL
Qty: 118 ML | Refills: 3 | Status: SHIPPED | OUTPATIENT
Start: 2020-12-07 | End: 2020-12-15

## 2020-12-07 RX ORDER — LOSARTAN POTASSIUM AND HYDROCHLOROTHIAZIDE 12.5; 5 MG/1; MG/1
1 TABLET ORAL DAILY
Qty: 90 TABLET | Refills: 3 | Status: SHIPPED | OUTPATIENT
Start: 2020-12-07 | End: 2021-12-06

## 2020-12-07 ASSESSMENT — MIFFLIN-ST. JEOR: SCORE: 1867.73

## 2020-12-07 NOTE — PATIENT INSTRUCTIONS

## 2020-12-07 NOTE — PROGRESS NOTES
[address] [address]  Phone: [phone]  Primary Provider: No Ref-Primary, Physician  Pre-op Performing Provider: SLY CUELLO    PREOPERATIVE EVALUATION:  Today's date: 12/7/2020    Raffy Castelan is a 45 year old female who presents for a preoperative evaluation.    Surgical Information:  Surgery/Procedure: left first tarsometatarsal joint fusion, left Jacob bunionectomy, left fifth metatarsal Efraín bunionectomy  Surgery Location: [facility]  Surgeon: Markus Hart  Surgery Date: 12/15/20  Time of Surgery: 9:25 AM  Where patient plans to recover: At home with family  Fax number for surgical facility: Note does not need to be faxed, will be available electronically in Epic.    Type of Anesthesia Anticipated: Monitor Care    Subjective     HPI related to upcoming procedure: Patient has bilateral bunions.  Needs to get both sides repaired, starting with the left side.      Preop Questions 12/6/2020   1. Have you ever had a heart attack or stroke? No   2. Have you ever had surgery on your heart or blood vessels, such as a stent placement, a coronary artery bypass, or surgery on an artery in your head, neck, heart, or legs? No   3. Do you have chest pain with activity? No   4. Do you have a history of  heart failure? No   5. Do you currently have a cold, bronchitis or symptoms of other infection? No   6. Do you have a cough, shortness of breath, or wheezing? No   7. Do you or anyone in your family have previous history of blood clots? YES - father, passed due to clot that reached heart   8. Do you or does anyone in your family have a serious bleeding problem such as prolonged bleeding following surgeries or cuts? No   9. Have you ever had problems with anemia or been told to take iron pills? YES - had anemia when she was pregnant both times   10. Have you had any abnormal blood loss such as black, tarry or bloody stools, or  abnormal vaginal bleeding? No   11. Have you ever had a blood transfusion? No   12. Are you willing to have a blood transfusion if it is medically needed before, during, or after your surgery? Yes   13. Have you or any of your relatives ever had problems with anesthesia? No   14. Do you have sleep apnea, excessive snoring or daytime drowsiness? No   15. Do you have any artifical heart valves or other implanted medical devices like a pacemaker, defibrillator, or continuous glucose monitor? No   16. Do you have artificial joints? No   17. Are you allergic to latex? No   18. Is there any chance that you may be pregnant? No       Health Care Directive:  Patient has a Health Care Directive on file      Preoperative Review of :   reviewed - controlled substances reflected in medication list.      Status of Chronic Conditions:  HYPERTENSION - Patient has longstanding history of HTN , currently denies any symptoms referable to elevated blood pressure. Specifically denies chest pain, palpitations, dyspnea, orthopnea, PND or peripheral edema. Blood pressure readings have been in normal range. Current medication regimen is as listed below. Patient denies any side effects of medication.       Review of Systems  CONSTITUTIONAL: NEGATIVE for fever, chills, change in weight  INTEGUMENTARY/SKIN: NEGATIVE for worrisome rashes, moles or lesions  EYES: NEGATIVE for vision changes or irritation  ENT/MOUTH: NEGATIVE for ear, mouth and throat problems  RESP: NEGATIVE for significant cough or SOB  BREAST: NEGATIVE for masses, tenderness or discharge  CV: NEGATIVE for chest pain, palpitations or peripheral edema  GI: NEGATIVE for nausea, abdominal pain, heartburn, or change in bowel habits  : NEGATIVE for frequency, dysuria, or hematuria  MUSCULOSKELETAL: NEGATIVE for significant arthralgias or myalgia  NEURO: NEGATIVE for weakness, dizziness or paresthesias  ENDOCRINE: NEGATIVE for temperature intolerance, skin/hair changes  HEME:  NEGATIVE for bleeding problems  PSYCHIATRIC: NEGATIVE for changes in mood or affect    Patient Active Problem List    Diagnosis Date Noted     Bunion, left 08/14/2020     Priority: Medium     Added automatically from request for surgery [id number]       Tailor's bunion of both feet 08/14/2020     Priority: Medium     Added automatically from request for surgery [id number]       Impingement syndrome, shoulder, left 05/08/2017     Priority: Medium     Anxiety 07/21/2016     Priority: Medium     Hypertension goal BP (blood pressure) < 140/90 10/13/2014     Priority: Medium     H/O laparoscopic adjustable gastric banding 05/29/2014     Priority: Medium     Diaphragmatic hernia 05/29/2014     Priority: Medium     Problem list name updated by automated process. Provider to review       Bariatric surgery status 05/27/2014     Priority: Medium     Psoriasis 02/24/2014     Priority: Medium     Vitamin D deficiency 07/21/2013     Priority: Medium     Imo Update utility       BMI >40 07/15/2013     Priority: Medium     GERD (gastroesophageal reflux disease) 10/25/2012     Priority: Medium     Family history of breast cancer 09/17/2012     Priority: Medium     Aunts on both sides, cousin.       Chronic constipation 04/07/2011     Priority: Medium     CARDIOVASCULAR SCREENING; LDL GOAL LESS THAN 160 04/07/2011     Priority: Medium     Advanced maternal age during pregnancy 08/06/2009     Priority: Medium     Spontaneous vaginal delivery 08/05/2009     Priority: Medium     37+ weeks gestation completed 08/05/2009     Priority: Medium      Past Medical History:   Diagnosis Date     Asthma      GERD (gastroesophageal reflux disease)      Hypertension      Past Surgical History:   Procedure Laterality Date     BIOPSY       COLONOSCOPY       ESOPHAGOSCOPY, GASTROSCOPY, DUODENOSCOPY (EGD), COMBINED  7/30/2013    Procedure: COMBINED ESOPHAGOSCOPY, GASTROSCOPY, DUODENOSCOPY (EGD), BIOPSY SINGLE OR MULTIPLE;;  Surgeon: Wilson Fournier MD;   Location:  GI     ESOPHAGOSCOPY, GASTROSCOPY, DUODENOSCOPY (EGD), COMBINED  3/24/2014    Procedure: COMBINED ESOPHAGOSCOPY, GASTROSCOPY, DUODENOSCOPY (EGD);  Surgeon: Wilson Fournier MD;  Location:  GI     HERNIA REPAIR       LAPAROSCOPIC GASTRIC ADJUSTABLE BANDING  5/27/2014    Procedure: LAPAROSCOPIC GASTRIC ADJUSTABLE BANDING;  Surgeon: Wilson Fournier MD;  Location:  OR     no history of surgery       Current Outpatient Medications   Medication Sig Dispense Refill     albuterol (PROAIR HFA/PROVENTIL HFA/VENTOLIN HFA) 108 (90 Base) MCG/ACT inhaler Inhale 2 puffs into the lungs every 6 hours as needed for shortness of breath / dyspnea or wheezing 8.5 g 3     clobetasol (TEMOVATE) 0.05 % external ointment Apply topically 2 times daily 45 g 1     clobetasol (TEMOVATE) 0.05 % external solution Apply topically 2 times daily as needed For psoriasis on scalp 50 mL 1     clobetasol propionate (CLOBEX) 0.05 % external shampoo Apply to scalp daily as needed 118 mL 3     clobetasol propionate (OLUX) 0.05 % external foam Apply sparingly to affected area twice daily as needed.  Do not apply to face. 600 g 3     furosemide (LASIX) 20 MG tablet Take one tablet in the morning as needed for leg swelling. 90 tablet 3     losartan-hydrochlorothiazide (HYZAAR) 50-12.5 MG tablet Take 1 tablet by mouth daily 90 tablet 3     multivitamin, therapeutic with minerals (MULTI-VITAMIN) TABS Take 1 tablet by mouth daily Every other day       phentermine (ADIPEX-P) 37.5 MG tablet Take 1 tablet (37.5 mg) by mouth every morning (before breakfast) (Patient taking differently: Take 37.5 mg by mouth 2-3 times/week) 30 tablet 1     senna (SENNA LAXATIVE) 8.6 MG tablet Take 1 tablet by mouth daily         Allergies   Allergen Reactions     Lisinopril Cough        Social History     Tobacco Use     Smoking status: Never Smoker     Smokeless tobacco: Never Used   Substance Use Topics     Alcohol use: No     Family History   Problem  "Relation Age of Onset     Blood Disease Mother         anemia     Heart Disease Mother      Hypertension Mother      Hyperlipidemia Mother      Anxiety Disorder Mother      Osteoporosis Mother      Genetic Disorder Mother         Alpha Thalassemia trait     Cerebrovascular Disease Father      Hypertension Father      Diabetes Father         type  2     Breast Cancer Maternal Aunt      Breast Cancer Paternal Aunt      Cancer Paternal Aunt         stomach     Breast Cancer Other      Blood Disease Other         son with alpha thalessemia/mother alpha thalasemia trait     Breast Cancer Other      Breast Cancer Other      Breast Cancer Cousin      History   Drug Use No         Objective     /81   Pulse 86   Temp 97.8  F (36.6  C) (Oral)   Ht 1.645 m (5' 4.76\")   Wt 123.6 kg (272 lb 6.4 oz)   BMI 45.66 kg/m      Physical Exam    GENERAL APPEARANCE: healthy, alert and no distress     EYES: EOMI, PERRL     HENT: ear canals and TM's normal and nose and mouth without ulcers or lesions     NECK: no adenopathy, no asymmetry, masses, or scars and thyroid normal to palpation     RESP: lungs clear to auscultation - no rales, rhonchi or wheezes     CV: regular rates and rhythm, normal S1 S2, no S3 or S4 and no murmur, click or rub     ABDOMEN:  soft, nontender, no HSM or masses and bowel sounds normal     MS: extremities normal- no gross deformities noted, no evidence of inflammation in joints, FROM in all extremities.     SKIN: no suspicious lesions or rashes     NEURO: Normal strength and tone, sensory exam grossly normal, mentation intact and speech normal     PSYCH: mentation appears normal. and affect normal/bright     LYMPHATICS: No cervical adenopathy    Recent Labs   Lab Test 03/27/20  0744 03/28/19  0714    138   POTASSIUM 4.2 4.2   CR 0.66 0.72   A1C 5.8* 6.0*        Diagnostics:  Recent Results (from the past 24 hour(s))   Basic metabolic panel    Collection Time: 12/07/20  4:05 PM   Result Value Ref " Range    Sodium 137 133 - 144 mmol/L    Potassium 3.8 3.4 - 5.3 mmol/L    Chloride 104 94 - 109 mmol/L    Carbon Dioxide 29 20 - 32 mmol/L    Anion Gap 4 3 - 14 mmol/L    Glucose 115 (H) 70 - 99 mg/dL    Urea Nitrogen 11 7 - 30 mg/dL    Creatinine 0.78 0.52 - 1.04 mg/dL    GFR Estimate >90 >60 mL/min/[1.73_m2]    GFR Estimate If Black >90 >60 mL/min/[1.73_m2]    Calcium 9.0 8.5 - 10.1 mg/dL      No EKG required, no history of coronary heart disease, significant arrhythmia, peripheral arterial disease or other structural heart disease.    Revised Cardiac Risk Index (RCRI):  The patient has the following serious cardiovascular risks for perioperative complications:   - No serious cardiac risks = 0 points     RCRI Interpretation: 0 points: Class I (very low risk - 0.4% complication rate)         Assessment & Plan   The proposed surgical procedure is considered INTERMEDIATE risk.    Preop general physical exam  - Basic metabolic panel    Hypertension goal BP (blood pressure) < 140/90  - Well controlled   - Basic metabolic panel  - losartan-hydrochlorothiazide (HYZAAR) 50-12.5 MG tablet; Take 1 tablet by mouth daily    Tailor's bunion of both feet    Psoriasis  - Previously prescribed topical clobetasol in foam form, but insurance will no longer cover.  Sending both ointment and shampoo so pharmacy can run them both through to see which is cheaper   - clobetasol (TEMOVATE) 0.05 % external ointment; Apply topically 2 times daily  - clobetasol propionate (CLOBEX) 0.05 % external shampoo; Apply to scalp daily as needed    Encounter for screening mammogram for breast cancer  - MA Screening Digital Bilateral; Future       Risks and Recommendations:  The patient has the following additional risks and recommendations for perioperative complications:   - No identified additional risk factors other than previously addressed    Medication Instructions:  Hold losartan on the day of surgery  Hold phentermine on the day of  surgery  Hold furosemide on the day of surgery     RECOMMENDATION:  APPROVAL GIVEN to proceed with proposed procedure, without further diagnostic evaluation.    Signed Electronically by: Majo Mott DO    Copy of this evaluation report is provided to requesting physician.    Preop [facility] Preop Guidelines    Revised Cardiac Risk Index

## 2020-12-08 LAB
ANION GAP SERPL CALCULATED.3IONS-SCNC: 4 MMOL/L (ref 3–14)
BUN SERPL-MCNC: 11 MG/DL (ref 7–30)
CALCIUM SERPL-MCNC: 9 MG/DL (ref 8.5–10.1)
CHLORIDE SERPL-SCNC: 104 MMOL/L (ref 94–109)
CO2 SERPL-SCNC: 29 MMOL/L (ref 20–32)
CREAT SERPL-MCNC: 0.78 MG/DL (ref 0.52–1.04)
GFR SERPL CREATININE-BSD FRML MDRD: >90 ML/MIN/{1.73_M2}
GLUCOSE SERPL-MCNC: 115 MG/DL (ref 70–99)
POTASSIUM SERPL-SCNC: 3.8 MMOL/L (ref 3.4–5.3)
SODIUM SERPL-SCNC: 137 MMOL/L (ref 133–144)

## 2020-12-14 ENCOUNTER — ANESTHESIA EVENT (OUTPATIENT)
Dept: SURGERY | Facility: AMBULATORY SURGERY CENTER | Age: 45
End: 2020-12-14

## 2020-12-14 ENCOUNTER — HEALTH MAINTENANCE LETTER (OUTPATIENT)
Age: 45
End: 2020-12-14

## 2020-12-14 NOTE — ANESTHESIA PREPROCEDURE EVALUATION
Anesthesia Pre-Procedure Evaluation    Patient: Raffy Castelan   MRN:     [id number] Gender:   female   Age:    45 year old :      1975        Preoperative Diagnosis: Bunion, left [M21.612]  Tailor's bunion of both feet [M21.621, M21.622]   Procedure(s):  Left first tarsometatarsal joint fusion Left Jacob bunionectomy Left fifth metatarsal Efraín bunionectomy  Left mass removal     LABS:  CBC:   Lab Results   Component Value Date    WBC 6.9 2014    WBC 6.7 2014    HGB 11.7 10/02/2014    HGB 10.7 (L) 2014    HCT 32.9 (L) 2014    HCT 36.2 2014     2014     2014     BMP:   Lab Results   Component Value Date     2020     2020    POTASSIUM 3.8 2020    POTASSIUM 4.2 2020    CHLORIDE 104 2020    CHLORIDE 103 2020    CO2 29 2020    CO2 28 2020    BUN 11 2020    BUN 10 2020    CR 0.78 2020    CR 0.66 2020     (H) 2020    GLC 88 2020     COAGS: No results found for: PTT, INR, FIBR  POC:   Lab Results   Component Value Date    HCG Negative 2014     OTHER:   Lab Results   Component Value Date    A1C 5.8 (H) 2020    CHELSEY 9.0 2020    PHOS 3.6 2014    MAG 2.4 (H) 2014    ALBUMIN 3.1 (L) 2020    PROTTOTAL 8.3 2020    ALT 24 2020    AST 14 2020    ALKPHOS 48 2020    BILITOTAL 0.4 2020    LIPASE 62 2012    AMYLASE 97 2012    TSH 1.67 2020    SED 54 (H) 2012        Preop Vitals    BP Readings from Last 3 Encounters:   20 119/81   20 123/75   20 (!) 132/93    Pulse Readings from Last 3 Encounters:   20 86   20 95   20 90      Resp Readings from Last 3 Encounters:   17 20   16 20   16 20    SpO2 Readings from Last 3 Encounters:   20 97%   20 99%   06/10/19 96%      Temp Readings from Last 1 Encounters:   20 97.8  " F (36.6  C) (Oral)    Ht Readings from Last 1 Encounters:   12/07/20 1.645 m (5' 4.76\")      Wt Readings from Last 1 Encounters:   12/07/20 123.6 kg (272 lb 6.4 oz)    Estimated body mass index is 45.66 kg/m  as calculated from the following:    Height as of 12/7/20: 1.645 m (5' 4.76\").    Weight as of 12/7/20: 123.6 kg (272 lb 6.4 oz).     LDA:  Peripheral IV 05/27/14 Left Lower forearm (Active)   Number of days: 2393        Past Medical History:   Diagnosis Date     Asthma      GERD (gastroesophageal reflux disease)      Hypertension       Past Surgical History:   Procedure Laterality Date     BIOPSY       COLONOSCOPY       ESOPHAGOSCOPY, GASTROSCOPY, DUODENOSCOPY (EGD), COMBINED  7/30/2013    Procedure: COMBINED ESOPHAGOSCOPY, GASTROSCOPY, DUODENOSCOPY (EGD), BIOPSY SINGLE OR MULTIPLE;;  Surgeon: Wilson Fournier MD;  Location:  GI     ESOPHAGOSCOPY, GASTROSCOPY, DUODENOSCOPY (EGD), COMBINED  3/24/2014    Procedure: COMBINED ESOPHAGOSCOPY, GASTROSCOPY, DUODENOSCOPY (EGD);  Surgeon: Wilson Fournier MD;  Location:  GI     HERNIA REPAIR       LAPAROSCOPIC GASTRIC ADJUSTABLE BANDING  5/27/2014    Procedure: LAPAROSCOPIC GASTRIC ADJUSTABLE BANDING;  Surgeon: Wilson Fournier MD;  [facility]     no history of surgery        Allergies   Allergen Reactions     Lisinopril Cough             JZG FV AN PHYSICAL EXAM    Assessment:   ASA SCORE: 3    H&P: History and physical reviewed and following examination; no interval change.         Plan:   Anes. Type:  MAC   Pre-Medication: None   Induction:  N/a   Airway: Native Airway   Access/Monitoring: PIV   Maintenance: N/a     Postop Plan:   Postop Pain: None  Postop Sedation/Airway: Not planned  Disposition: Outpatient     PONV Management: Adult Risk Factors: Female   Prevention: Ondansetron, Dexamethasone     CONSENT: Direct conversation   Plan and risks discussed with: Patient                      Armen Sheldon MD     ANESTHESIA PREOP " EVALUATION    PROCEDURE: Procedure(s):  Left first tarsometatarsal joint fusion Left Jacob bunionectomy Left fifth metatarsal Efraín bunionectomy  Left mass removal    HPI: Raffy Castelan is a 45 year old female who presents for above procedure.    PAST MEDICAL HISTORY:    Past Medical History:   Diagnosis Date     Asthma      GERD (gastroesophageal reflux disease)      Hypertension        PAST SURGICAL HISTORY:    Past Surgical History:   Procedure Laterality Date     BIOPSY       COLONOSCOPY       ESOPHAGOSCOPY, GASTROSCOPY, DUODENOSCOPY (EGD), COMBINED  7/30/2013    Procedure: COMBINED ESOPHAGOSCOPY, GASTROSCOPY, DUODENOSCOPY (EGD), BIOPSY SINGLE OR MULTIPLE;;  Surgeon: Wilson Fournier MD;  Location:  GI     ESOPHAGOSCOPY, GASTROSCOPY, DUODENOSCOPY (EGD), COMBINED  3/24/2014    Procedure: COMBINED ESOPHAGOSCOPY, GASTROSCOPY, DUODENOSCOPY (EGD);  Surgeon: Wilson Fournier MD;  Location:  GI     HERNIA REPAIR       LAPAROSCOPIC GASTRIC ADJUSTABLE BANDING  5/27/2014    Procedure: LAPAROSCOPIC GASTRIC ADJUSTABLE BANDING;  Surgeon: Wilson Fournier MD;  Location:  OR     no history of surgery         SOCIAL HISTORY:       Social History     Tobacco Use     Smoking status: Never Smoker     Smokeless tobacco: Never Used   Substance Use Topics     Alcohol use: No       ALLERGIES:     Allergies   Allergen Reactions     Lisinopril Cough       MEDICATIONS:     (Not in a hospital admission)      Current Outpatient Medications   Medication Sig Dispense Refill     albuterol (PROAIR HFA/PROVENTIL HFA/VENTOLIN HFA) 108 (90 Base) MCG/ACT inhaler Inhale 2 puffs into the lungs every 6 hours as needed for shortness of breath / dyspnea or wheezing 8.5 g 3     clobetasol (TEMOVATE) 0.05 % external ointment Apply topically 2 times daily 45 g 1     clobetasol (TEMOVATE) 0.05 % external solution Apply topically 2 times daily as needed For psoriasis on scalp 50 mL 1     clobetasol propionate (CLOBEX) 0.05 % external  shampoo Apply to scalp daily as needed 118 mL 3     clobetasol propionate (OLUX) 0.05 % external foam Apply sparingly to affected area twice daily as needed.  Do not apply to face. 600 g 3     furosemide (LASIX) 20 MG tablet Take one tablet in the morning as needed for leg swelling. 90 tablet 3     losartan-hydrochlorothiazide (HYZAAR) 50-12.5 MG tablet Take 1 tablet by mouth daily 90 tablet 3     multivitamin, therapeutic with minerals (MULTI-VITAMIN) TABS Take 1 tablet by mouth daily Every other day       phentermine (ADIPEX-P) 37.5 MG tablet Take 1 tablet (37.5 mg) by mouth every morning (before breakfast) (Patient taking differently: Take 37.5 mg by mouth 2-3 times/week) 30 tablet 1     senna (SENNA LAXATIVE) 8.6 MG tablet Take 1 tablet by mouth daily         Current Outpatient Medications Ordered in Epic   Medication Sig Dispense Refill     albuterol (PROAIR HFA/PROVENTIL HFA/VENTOLIN HFA) 108 (90 Base) MCG/ACT inhaler Inhale 2 puffs into the lungs every 6 hours as needed for shortness of breath / dyspnea or wheezing 8.5 g 3     clobetasol (TEMOVATE) 0.05 % external ointment Apply topically 2 times daily 45 g 1     clobetasol (TEMOVATE) 0.05 % external solution Apply topically 2 times daily as needed For psoriasis on scalp 50 mL 1     clobetasol propionate (CLOBEX) 0.05 % external shampoo Apply to scalp daily as needed 118 mL 3     clobetasol propionate (OLUX) 0.05 % external foam Apply sparingly to affected area twice daily as needed.  Do not apply to face. 600 g 3     furosemide (LASIX) 20 MG tablet Take one tablet in the morning as needed for leg swelling. 90 tablet 3     losartan-hydrochlorothiazide (HYZAAR) 50-12.5 MG tablet Take 1 tablet by mouth daily 90 tablet 3     multivitamin, therapeutic with minerals (MULTI-VITAMIN) TABS Take 1 tablet by mouth daily Every other day       phentermine (ADIPEX-P) 37.5 MG tablet Take 1 tablet (37.5 mg) by mouth every morning (before breakfast) (Patient taking  differently: Take 37.5 mg by mouth 2-3 times/week) 30 tablet 1     senna (SENNA LAXATIVE) 8.6 MG tablet Take 1 tablet by mouth daily       No current [facility]-ordered facility-administered medications on file.        PHYSICAL EXAM:    Vitals: T Data Unavailable, P Data Unavailable, BP Data Unavailable, R Data Unavailable, SpO2  , Weight   Wt Readings from Last 2 Encounters:   12/07/20 123.6 kg (272 lb 6.4 oz)   10/19/20 122.5 kg (270 lb)       See doc flowsheet    NPO STATUS: see doc flowsheet    LABS:    BMP:  Recent Labs   Lab Test 12/07/20  1605      POTASSIUM 3.8   CHLORIDE 104   CO2 29   BUN 11   CR 0.78   *   CHELSEY 9.0       LFTs:   Recent Labs   Lab Test 03/27/20  0744   PROTTOTAL 8.3   ALBUMIN 3.1*   BILITOTAL 0.4   ALKPHOS 48   AST 14   ALT 24       CBC:   Recent Labs   Lab Test 10/02/14  0908 05/28/14  0819   WBC  --  6.9   RBC  --  4.24   HGB 11.7 10.7*   HCT  --  32.9*   MCV  --  78   MCH  --  25.2*   MCHC  --  32.5   RDW  --  15.2*   PLT  --  269       Coags:  No results for input(s): INR, PTT, FIBR in the last 22112 hours.    Imaging:  No orders to display       Armen Sheldon MD  Anesthesiology Staff  Pager [phone]    12/14/2020  11:49 AM

## 2020-12-15 ENCOUNTER — ANCILLARY PROCEDURE (OUTPATIENT)
Dept: GENERAL RADIOLOGY | Facility: CLINIC | Age: 45
End: 2020-12-15
Attending: PODIATRIST
Payer: COMMERCIAL

## 2020-12-15 ENCOUNTER — HOSPITAL ENCOUNTER (OUTPATIENT)
Facility: AMBULATORY SURGERY CENTER | Age: 45
Discharge: HOME | End: 2020-12-15
Attending: PODIATRIST | Admitting: PODIATRIST
Payer: COMMERCIAL

## 2020-12-15 ENCOUNTER — ANESTHESIA (OUTPATIENT)
Dept: SURGERY | Facility: AMBULATORY SURGERY CENTER | Age: 45
End: 2020-12-15

## 2020-12-15 VITALS
TEMPERATURE: 97.8 F | HEART RATE: 100 BPM | DIASTOLIC BLOOD PRESSURE: 97 MMHG | SYSTOLIC BLOOD PRESSURE: 145 MMHG | RESPIRATION RATE: 8 BRPM | OXYGEN SATURATION: 96 %

## 2020-12-15 DIAGNOSIS — M21.612 BUNION, LEFT: ICD-10-CM

## 2020-12-15 DIAGNOSIS — M21.621 TAILOR'S BUNION OF BOTH FEET: ICD-10-CM

## 2020-12-15 DIAGNOSIS — M21.622 TAILOR'S BUNION OF BOTH FEET: ICD-10-CM

## 2020-12-15 LAB — HCG UR QL: NEGATIVE

## 2020-12-15 PROCEDURE — 28039 EXC FOOT/TOE TUM SC 1.5 CM/>: CPT | Mod: 59 | Performed by: PODIATRIST

## 2020-12-15 PROCEDURE — 81025 URINE PREGNANCY TEST: CPT | Performed by: STUDENT IN AN ORGANIZED HEALTH CARE EDUCATION/TRAINING PROGRAM

## 2020-12-15 PROCEDURE — 28039 EXC FOOT/TOE TUM SC 1.5 CM/>: CPT | Mod: LT

## 2020-12-15 PROCEDURE — 28730 FUSION OF FOOT BONES: CPT | Mod: 59 | Performed by: PODIATRIST

## 2020-12-15 PROCEDURE — 28110 PART REMOVAL OF METATARSAL: CPT | Mod: LT

## 2020-12-15 PROCEDURE — 88304 TISSUE EXAM BY PATHOLOGIST: CPT | Performed by: PATHOLOGY

## 2020-12-15 PROCEDURE — G8916 PT W IV AB GIVEN ON TIME: HCPCS

## 2020-12-15 PROCEDURE — C1820 GENERATOR NEURO RECHG BAT SY: HCPCS

## 2020-12-15 PROCEDURE — 28296 COR HLX VLGS DSTL MTAR OSTEO: CPT | Mod: LT | Performed by: PODIATRIST

## 2020-12-15 PROCEDURE — G8907 PT DOC NO EVENTS ON DISCHARG: HCPCS

## 2020-12-15 PROCEDURE — 28297 COR HLX VLGS JT ARTHRD: CPT | Mod: TA

## 2020-12-15 PROCEDURE — 28292 COR HLX VLGS RSC PRX PHLX BS: CPT | Mod: 59 | Performed by: PODIATRIST

## 2020-12-15 DEVICE — IMP SCR SYN CORTEX 2.0X12MM SELF TAP SS 201.812: Type: IMPLANTABLE DEVICE | Site: FOOT | Status: FUNCTIONAL

## 2020-12-15 DEVICE — IMP SCR SYN CORTEX 3.5X50MM SELF TAP SS 204.850: Type: IMPLANTABLE DEVICE | Site: FOOT | Status: FUNCTIONAL

## 2020-12-15 RX ORDER — NALOXONE HYDROCHLORIDE 0.4 MG/ML
0.4 INJECTION, SOLUTION INTRAMUSCULAR; INTRAVENOUS; SUBCUTANEOUS
Status: DISCONTINUED | OUTPATIENT
Start: 2020-12-15 | End: 2020-12-16 | Stop reason: HOSPADM

## 2020-12-15 RX ORDER — FENTANYL CITRATE 50 UG/ML
INJECTION, SOLUTION INTRAMUSCULAR; INTRAVENOUS PRN
Status: DISCONTINUED | OUTPATIENT
Start: 2020-12-15 | End: 2020-12-15

## 2020-12-15 RX ORDER — FENTANYL CITRATE 50 UG/ML
25-50 INJECTION, SOLUTION INTRAMUSCULAR; INTRAVENOUS EVERY 5 MIN PRN
Status: DISCONTINUED | OUTPATIENT
Start: 2020-12-15 | End: 2020-12-16 | Stop reason: HOSPADM

## 2020-12-15 RX ORDER — HYDROCODONE BITARTRATE AND ACETAMINOPHEN 5; 325 MG/1; MG/1
1-2 TABLET ORAL EVERY 4 HOURS PRN
Qty: 25 TABLET | Refills: 0 | Status: SHIPPED | OUTPATIENT
Start: 2020-12-15 | End: 2021-07-30

## 2020-12-15 RX ORDER — NALOXONE HYDROCHLORIDE 0.4 MG/ML
0.2 INJECTION, SOLUTION INTRAMUSCULAR; INTRAVENOUS; SUBCUTANEOUS
Status: DISCONTINUED | OUTPATIENT
Start: 2020-12-15 | End: 2020-12-16 | Stop reason: HOSPADM

## 2020-12-15 RX ORDER — PHYSOSTIGMINE SALICYLATE 1 MG/ML
1.2 INJECTION INTRAVENOUS
Status: DISCONTINUED | OUTPATIENT
Start: 2020-12-15 | End: 2020-12-16 | Stop reason: HOSPADM

## 2020-12-15 RX ORDER — MEPERIDINE HYDROCHLORIDE 25 MG/ML
12.5 INJECTION INTRAMUSCULAR; INTRAVENOUS; SUBCUTANEOUS
Status: DISCONTINUED | OUTPATIENT
Start: 2020-12-15 | End: 2020-12-16 | Stop reason: HOSPADM

## 2020-12-15 RX ORDER — HYDROXYZINE HYDROCHLORIDE 25 MG/1
25-50 TABLET, FILM COATED ORAL EVERY 4 HOURS PRN
Qty: 30 TABLET | Refills: 0 | Status: SHIPPED | OUTPATIENT
Start: 2020-12-15 | End: 2021-07-30

## 2020-12-15 RX ORDER — METOPROLOL TARTRATE 1 MG/ML
1-2 INJECTION, SOLUTION INTRAVENOUS EVERY 5 MIN PRN
Status: DISCONTINUED | OUTPATIENT
Start: 2020-12-15 | End: 2020-12-16 | Stop reason: HOSPADM

## 2020-12-15 RX ORDER — FENTANYL CITRATE 50 UG/ML
25-50 INJECTION, SOLUTION INTRAMUSCULAR; INTRAVENOUS
Status: DISCONTINUED | OUTPATIENT
Start: 2020-12-15 | End: 2020-12-16 | Stop reason: HOSPADM

## 2020-12-15 RX ORDER — ONDANSETRON 2 MG/ML
4 INJECTION INTRAMUSCULAR; INTRAVENOUS EVERY 30 MIN PRN
Status: DISCONTINUED | OUTPATIENT
Start: 2020-12-15 | End: 2020-12-16 | Stop reason: HOSPADM

## 2020-12-15 RX ORDER — ONDANSETRON 4 MG/1
4 TABLET, ORALLY DISINTEGRATING ORAL EVERY 30 MIN PRN
Status: DISCONTINUED | OUTPATIENT
Start: 2020-12-15 | End: 2020-12-16 | Stop reason: HOSPADM

## 2020-12-15 RX ORDER — LIDOCAINE HYDROCHLORIDE 20 MG/ML
INJECTION, SOLUTION INFILTRATION; PERINEURAL PRN
Status: DISCONTINUED | OUTPATIENT
Start: 2020-12-15 | End: 2020-12-15

## 2020-12-15 RX ORDER — HYDROMORPHONE HYDROCHLORIDE 1 MG/ML
.3-.5 INJECTION, SOLUTION INTRAMUSCULAR; INTRAVENOUS; SUBCUTANEOUS EVERY 10 MIN PRN
Status: DISCONTINUED | OUTPATIENT
Start: 2020-12-15 | End: 2020-12-16 | Stop reason: HOSPADM

## 2020-12-15 RX ORDER — OXYCODONE HYDROCHLORIDE 5 MG/1
5 TABLET ORAL EVERY 4 HOURS PRN
Status: DISCONTINUED | OUTPATIENT
Start: 2020-12-15 | End: 2020-12-16 | Stop reason: HOSPADM

## 2020-12-15 RX ORDER — CEFAZOLIN SODIUM 1 G/3ML
1 INJECTION, POWDER, FOR SOLUTION INTRAMUSCULAR; INTRAVENOUS SEE ADMIN INSTRUCTIONS
Status: DISCONTINUED | OUTPATIENT
Start: 2020-12-15 | End: 2020-12-16 | Stop reason: HOSPADM

## 2020-12-15 RX ORDER — PROPOFOL 10 MG/ML
INJECTION, EMULSION INTRAVENOUS PRN
Status: DISCONTINUED | OUTPATIENT
Start: 2020-12-15 | End: 2020-12-15

## 2020-12-15 RX ORDER — LIDOCAINE 40 MG/G
CREAM TOPICAL
Status: DISCONTINUED | OUTPATIENT
Start: 2020-12-15 | End: 2020-12-16 | Stop reason: HOSPADM

## 2020-12-15 RX ORDER — EPHEDRINE SULFATE 50 MG/ML
INJECTION, SOLUTION INTRAMUSCULAR; INTRAVENOUS; SUBCUTANEOUS PRN
Status: DISCONTINUED | OUTPATIENT
Start: 2020-12-15 | End: 2020-12-15

## 2020-12-15 RX ORDER — DEXAMETHASONE SODIUM PHOSPHATE 4 MG/ML
INJECTION, SOLUTION INTRA-ARTICULAR; INTRALESIONAL; INTRAMUSCULAR; INTRAVENOUS; SOFT TISSUE PRN
Status: DISCONTINUED | OUTPATIENT
Start: 2020-12-15 | End: 2020-12-15

## 2020-12-15 RX ORDER — HYDRALAZINE HYDROCHLORIDE 20 MG/ML
2.5-5 INJECTION INTRAMUSCULAR; INTRAVENOUS EVERY 10 MIN PRN
Status: DISCONTINUED | OUTPATIENT
Start: 2020-12-15 | End: 2020-12-16 | Stop reason: HOSPADM

## 2020-12-15 RX ORDER — BUPIVACAINE HYDROCHLORIDE 5 MG/ML
INJECTION, SOLUTION PERINEURAL PRN
Status: DISCONTINUED | OUTPATIENT
Start: 2020-12-15 | End: 2020-12-15 | Stop reason: HOSPADM

## 2020-12-15 RX ORDER — ALBUTEROL SULFATE 0.83 MG/ML
2.5 SOLUTION RESPIRATORY (INHALATION) EVERY 4 HOURS PRN
Status: DISCONTINUED | OUTPATIENT
Start: 2020-12-15 | End: 2020-12-16 | Stop reason: HOSPADM

## 2020-12-15 RX ORDER — SODIUM CHLORIDE, SODIUM LACTATE, POTASSIUM CHLORIDE, CALCIUM CHLORIDE 600; 310; 30; 20 MG/100ML; MG/100ML; MG/100ML; MG/100ML
INJECTION, SOLUTION INTRAVENOUS CONTINUOUS
Status: DISCONTINUED | OUTPATIENT
Start: 2020-12-15 | End: 2020-12-16 | Stop reason: HOSPADM

## 2020-12-15 RX ORDER — CEFAZOLIN SODIUM IN 0.9 % NACL 3 G/100 ML
3 INTRAVENOUS SOLUTION, PIGGYBACK (ML) INTRAVENOUS
Status: COMPLETED | OUTPATIENT
Start: 2020-12-15 | End: 2020-12-15

## 2020-12-15 RX ORDER — PROPOFOL 10 MG/ML
INJECTION, EMULSION INTRAVENOUS CONTINUOUS PRN
Status: DISCONTINUED | OUTPATIENT
Start: 2020-12-15 | End: 2020-12-15

## 2020-12-15 RX ORDER — ONDANSETRON 2 MG/ML
INJECTION INTRAMUSCULAR; INTRAVENOUS PRN
Status: DISCONTINUED | OUTPATIENT
Start: 2020-12-15 | End: 2020-12-15

## 2020-12-15 RX ORDER — ACETAMINOPHEN 325 MG/1
975 TABLET ORAL ONCE
Status: COMPLETED | OUTPATIENT
Start: 2020-12-15 | End: 2020-12-15

## 2020-12-15 RX ADMIN — Medication 3 G: at 08:59

## 2020-12-15 RX ADMIN — SODIUM CHLORIDE, SODIUM LACTATE, POTASSIUM CHLORIDE, CALCIUM CHLORIDE: 600; 310; 30; 20 INJECTION, SOLUTION INTRAVENOUS at 08:41

## 2020-12-15 RX ADMIN — PROPOFOL 200 MG: 10 INJECTION, EMULSION INTRAVENOUS at 09:35

## 2020-12-15 RX ADMIN — EPHEDRINE SULFATE 5 MG: 50 INJECTION, SOLUTION INTRAMUSCULAR; INTRAVENOUS; SUBCUTANEOUS at 10:22

## 2020-12-15 RX ADMIN — PROPOFOL 100 MG: 10 INJECTION, EMULSION INTRAVENOUS at 09:05

## 2020-12-15 RX ADMIN — FENTANYL CITRATE 25 MCG: 50 INJECTION, SOLUTION INTRAMUSCULAR; INTRAVENOUS at 09:01

## 2020-12-15 RX ADMIN — FENTANYL CITRATE 50 MCG: 50 INJECTION, SOLUTION INTRAMUSCULAR; INTRAVENOUS at 12:14

## 2020-12-15 RX ADMIN — ONDANSETRON 4 MG: 2 INJECTION INTRAMUSCULAR; INTRAVENOUS at 12:11

## 2020-12-15 RX ADMIN — PROPOFOL 50 MG: 10 INJECTION, EMULSION INTRAVENOUS at 09:16

## 2020-12-15 RX ADMIN — Medication 0.2 MCG/KG/MIN: at 10:10

## 2020-12-15 RX ADMIN — Medication 100 MCG: at 10:01

## 2020-12-15 RX ADMIN — EPHEDRINE SULFATE 5 MG: 50 INJECTION, SOLUTION INTRAMUSCULAR; INTRAVENOUS; SUBCUTANEOUS at 10:20

## 2020-12-15 RX ADMIN — Medication 150 MCG: at 10:07

## 2020-12-15 RX ADMIN — PROPOFOL 75 MCG/KG/MIN: 10 INJECTION, EMULSION INTRAVENOUS at 09:11

## 2020-12-15 RX ADMIN — LIDOCAINE HYDROCHLORIDE 60 MG: 20 INJECTION, SOLUTION INFILTRATION; PERINEURAL at 09:05

## 2020-12-15 RX ADMIN — DEXAMETHASONE SODIUM PHOSPHATE 4 MG: 4 INJECTION, SOLUTION INTRA-ARTICULAR; INTRALESIONAL; INTRAMUSCULAR; INTRAVENOUS; SOFT TISSUE at 09:17

## 2020-12-15 RX ADMIN — FENTANYL CITRATE 75 MCG: 50 INJECTION, SOLUTION INTRAMUSCULAR; INTRAVENOUS at 09:44

## 2020-12-15 RX ADMIN — PROPOFOL 30 MG: 10 INJECTION, EMULSION INTRAVENOUS at 09:07

## 2020-12-15 RX ADMIN — SODIUM CHLORIDE, SODIUM LACTATE, POTASSIUM CHLORIDE, CALCIUM CHLORIDE: 600; 310; 30; 20 INJECTION, SOLUTION INTRAVENOUS at 12:23

## 2020-12-15 RX ADMIN — Medication 1 G: at 10:57

## 2020-12-15 RX ADMIN — ACETAMINOPHEN 975 MG: 325 TABLET ORAL at 08:24

## 2020-12-15 NOTE — ANESTHESIA POSTPROCEDURE EVALUATION
Anesthesia POST Procedure Evaluation    Patient: Raffy Castelan   MRN:     [id number] Gender:   female   Age:    45 year old :      1975        Preoperative Diagnosis: Bunion, left [M21.612]  Tailor's bunion of both feet [M21.621, M21.622]   Procedure(s):  Left first tarsometatarsal joint fusion Left Jacob bunionectomy Left fifth metatarsal Efraín bunionectomy  Left foot mass removal   Postop Comments: No value filed.     Anesthesia Type: MAC       Disposition: Outpatient   Postop Pain Control: Uneventful            Sign Out: Well controlled pain   PONV: No   Neuro/Psych: Uneventful            Sign Out: Acceptable/Baseline neuro status   Airway/Respiratory: Uneventful            Sign Out: Acceptable/Baseline resp. status   CV/Hemodynamics: Uneventful            Sign Out: Acceptable CV status   Other NRE: NONE   DID A NON-ROUTINE EVENT OCCUR? No         Last Anesthesia Record Vitals:  CRNA VITALS  12/15/2020 1221 - 12/15/2020 1321      12/15/2020             Pulse:  101    SpO2:  95 %          Last PACU Vitals:  Vitals Value Taken Time   /92 12/15/20 1315   Temp 97.8  F (36.6  C) 12/15/20 1253   Pulse 101 12/15/20 1326   Resp 0 12/15/20 1326   SpO2 93 % 12/15/20 1326   Temp src     NIBP     Pulse     SpO2     Resp     Temp     Ht Rate     Temp 2     Vitals shown include unvalidated device data.      Electronically Signed By: Armen Sheldon MD, December 15, 2020, 2:20 PM

## 2020-12-15 NOTE — BRIEF OP NOTE
[facility] Brief Operative Note    Pre-operative diagnosis: Left midfoot instability  Bunion, left [M21.612]  Tailor's bunion of both feet [M21.621, M21.622]  Left foot mass   Post-operative diagnosis same   Procedure: Procedure(s):  Left first tarsometatarsal joint fusion Left Jacob bunionectomy Left fifth metatarsal Efraín bunionectomy  Left foot mass removal   Surgeon(s): Surgeon(s) and Role:     * Markus Hart DPM - Primary   Estimated blood loss: 10 mL    Specimens: ID Type Source Tests Collected by Time Destination   A : LEFT FOOT SOFT TISSUE MASS Tissue Foot, Left SURGICAL PATHOLOGY EXAM Markus Hart DPM 12/15/2020 11:27 AM       Findings: none

## 2020-12-15 NOTE — ANESTHESIA PROCEDURE NOTES
Airway   Date/Time: 12/15/2020 9:37 AM   Patient location during procedure: OR    Staff -   CRNA: Karlene Aguilar APRN CRNA  Performed By: CRNA and anesthesiologist    Indications and Patient Condition  Indications for airway management: sol-procedural  Induction type:intravenousMask difficulty assessment: 1 - vent by mask    Final Airway Details  Final airway type: supraglottic airway    Endotracheal Airway Details   Secured with: plastic tape    Post intubation assessment   Placement verified by: capnometry, equal breath sounds and chest rise   Number of attempts at approach: 1  Number of other approaches attempted: 0  Secured with:plastic tape  Ease of procedure: easy  Dentition: Intact

## 2020-12-15 NOTE — DISCHARGE INSTRUCTIONS
[facility]   Adult Discharge Orders & Instructions     For 24 hours after surgery    1. Get plenty of rest.  A responsible adult must stay with you for at least 24 hours after you leave the hospital.   2. Do not drive or use heavy equipment.  If you have weakness or tingling, don't drive or use heavy equipment until this feeling goes away.  3. Do not drink alcohol.  4. Avoid strenuous or risky activities.  Ask for help when climbing stairs.   5. You may feel lightheaded.  IF so, sit for a few minutes before standing.  Have someone help you get up.   6. If you have nausea (feel sick to your stomach): Drink only clear liquids such as apple juice, ginger ale, broth or 7-Up.  Rest may also help.  Be sure to drink enough fluids.  Move to a regular diet as you feel able.  7. You may have a slight fever. Call the doctor if your fever is over 100 F (37.7 C) (taken under the tongue) or lasts longer than 24 hours.  8. You may have a dry mouth, a sore throat, muscle aches or trouble sleeping.  These should go away after 24 hours.  9. Do not make important or legal decisions.     Call your doctor for any of the followin.  Signs of infection (fever, growing tenderness at the surgery site, a large amount of drainage or bleeding, severe pain, foul-smelling drainage, redness, swelling).    2. It has been over 8 to 10 hours since surgery and you are still not able to urinate (pass water).    3.  Headache for over 24 hours.    4.  Numbness, tingling or weakness the day after surgery (if you had spinal anesthesia).                  5. Signs of Covid-19 infection (temperature over 100 degrees, shortness of breath, cough, loss of taste/smell, generalized body aches, persistent headache,                  chills, sore throat, nausea/vomiting/diarrhea).    To contact Dr Hart call:  [phone]_________________________________

## 2020-12-15 NOTE — ANESTHESIA CARE TRANSFER NOTE
Patient: Raffy Castelan    Procedure(s):  Left first tarsometatarsal joint fusion Left Jacob bunionectomy Left fifth metatarsal Efraín bunionectomy  Left foot mass removal    Diagnosis: Bunion, left [M21.612]  Tailor's bunion of both feet [M21.621, M21.622]  Diagnosis Additional Information: No value filed.    Anesthesia Type:   MAC     Note:  Airway :Face Mask  Patient transferred to:PACU  Comments: Prior to atraumatic LMA removal, patient awake, good aeration, SpO2 97%, equal bilateral breath sounds.  Transported to PACU on oxygen 6 lpm.  Patient awake, alert, SpO2 99% in PACU. No apparent anesthesia complications. Handoff Report: Identifed the Patient, Identified the Reponsible Provider, Reviewed the pertinent medical history, Discussed the surgical course, Reviewed Intra-OP anesthesia mangement and issues during anesthesia, Set expectations for post-procedure period and Allowed opportunity for questions and acknowledgement of understanding      Vitals: (Last set prior to Anesthesia Care Transfer)    CRNA VITALS  12/15/2020 1221 - 12/15/2020 1259      12/15/2020             Pulse:  101    SpO2:  95 %                Electronically Signed By: ZIA Grant CRNA  December 15, 2020  12:59 PM

## 2020-12-17 LAB — COPATH REPORT: NORMAL

## 2020-12-17 NOTE — OP NOTE
Procedure Date: 12/15/2020      PREOPERATIVE DIAGNOSES:   1.  Left mid foot instability.   2.  Left bunion.   3.  Left foot mass.   4.  Left bunionette.      POSTOPERATIVE DIAGNOSES:   1.  Left mid foot instability.   2.  Left bunion.   3.  Left foot mass.   4.  Left bunionette.      PROCEDURES:   1.  Left first tarsometatarsal joint fusion.   2.  Left Jacob bunionectomy.   3.  Left mass removal.   4.  Left fifth metatarsal Efraín bunionectomy.      ANESTHESIA:  MAC.      HEMOSTASIS:  Pneumatic ankle tourniquet at 250 mmHg.      INDICATIONS:  This patient has numerous painful foot deformities and elects to have surgical correction.  She understands the possible complications include infection, numbness, poor bone healing, continued pain and joint stiffness.      DESCRIPTION OF PROCEDURE:  The patient was brought to the operating table in a supine position.  She was given sedation by the anesthesiologist, and left first and fifth ray blocks were done.  The foot was then prepped and draped in the normal sterile manner.  Pneumatic ankle tourniquet was placed around the ankle with copious amounts of Webril padding.  The foot was then elevated for complete exsanguination.  The tourniquet was deflated.  The digits were brought down to the operating table, and the foot was brought down to the operating table where the following procedures were performed.  At this time, an incision was made over the dorsum of the left first tarsometatarsal joint.  This was brought down to the deep fascia utilizing blunt and sharp dissection techniques.  All neurovascular structures that were encountered were either Bovie, tied, or retracted to the side.  The periosteum was incised the entire length of the incision, reflected off the joint dorsally and medially.  We resected the joint utilizing combination of curettes, saws and burs.  This temporarily fixated.  Good reduction of the deformity was noted.  Subchondral drilling was done to  encourage healing.  This was then temporarily fixated.  The foot was loaded.  Good reduction of the deformity was noted.  This was confirmed with fluoroscan.  We fixated this with a 3.5 mm screw going from dorsal distal to proximal plantar, utilizing standard AO fixation techniques.  We then made an incision over the left first MTPJ dorsomedial.  This was brought down to the deep fascia utilizing blunt and sharp dissection techniques.  All neurovascular structures that were encountered were either Bovie, tied, or retracted to the side.  Periosteum was incised the entire length of the incision, reflected off medially and dorsal medially.  A first interspace release was done to our satisfaction.  We left the lateral collateral ligament intact.  We removed the medial bump.  All sharp bony prominences were burred smooth.  We loaded the foot.  Good reduction of the deformity was noted.  We then made an incision over the left fifth MTPJ dorsal lateral.  This was brought down to the deep fascia utilizing blunt and sharp dissection techniques.  All neurovascular structures that were encountered were either Bovie, tied, or retracted to the side.  We noted a mass in the subcutaneous tissue plantar lateral.  We dissected this out.  This measured 21 x 16 x 5 mm.  There is no fluid inside of this.  It was whitish in color.  We sent this to pathology for evaluation.  We then incised the periosteum the entire length of the incision, reflected off dorsally and laterally.  We did a long arm Efraín bunionectomy of the fifth metatarsal head.  We transposed this laterally.  We temporarily fixated.  Good reduction of the deformity was noted.  We fixated this with one 2.0 screw from dorsal to plantar, utilizing standard AO fixation techniques.  All sharp bony prominences were burred smooth.  We then loaded the foot.  Good reduction of all deformities was noted.  Final pictures were taken with the fluoroscan confirming good reduction of  all deformities and the hardware in good place.  We then flushed all wounds.  Standard layered closure was done at this time.  We dressed this with Adaptic, 4 x 4s, Ivonne, Kerlix and Coban.  Tourniquet was deflated.  All digits were noted to show preoperative levels of perfusion.  Complications were none.  Estimated blood loss was 10 mL.  The patient tolerated the procedure and anesthesia well.  She was then wheeled from the operating room to the recovery room with vital signs stable and intact sterile dressing.         GIFTY REYNOLDS DPM             D: 2020   T: 2020   MT: SARAH      Name:     ZACK COREAS   MRN:      [id number]        Account:        [id number]   :      1975           Procedure Date: 12/15/2020      Document: [id number]

## 2020-12-18 ENCOUNTER — ANCILLARY PROCEDURE (OUTPATIENT)
Dept: GENERAL RADIOLOGY | Facility: CLINIC | Age: 45
End: 2020-12-18
Attending: PODIATRIST
Payer: COMMERCIAL

## 2020-12-18 ENCOUNTER — OFFICE VISIT (OUTPATIENT)
Dept: PODIATRY | Facility: CLINIC | Age: 45
End: 2020-12-18
Payer: COMMERCIAL

## 2020-12-18 VITALS — OXYGEN SATURATION: 98 % | HEART RATE: 95 BPM

## 2020-12-18 DIAGNOSIS — M21.621 TAILOR'S BUNION OF BOTH FEET: ICD-10-CM

## 2020-12-18 DIAGNOSIS — M21.612 BUNION, LEFT: ICD-10-CM

## 2020-12-18 DIAGNOSIS — M21.612 BUNION, LEFT: Primary | ICD-10-CM

## 2020-12-18 DIAGNOSIS — M21.622 TAILOR'S BUNION OF BOTH FEET: ICD-10-CM

## 2020-12-18 PROCEDURE — 73630 X-RAY EXAM OF FOOT: CPT | Mod: LT | Performed by: RADIOLOGY

## 2020-12-18 PROCEDURE — 99024 POSTOP FOLLOW-UP VISIT: CPT | Performed by: PODIATRIST

## 2020-12-18 NOTE — PATIENT INSTRUCTIONS
We wish you continued good healing. If you have any questions or concerns, please do not hesitate to contact us at [phone]    [employer] (secure e-mail communication and access to your chart) to send a message or to make an appointment.    Please remember to call and schedule a follow up appointment if one was recommended at your earliest convenience.     +++OF MARCH 2020+++ LOCATION AND HOURS HAVE CHANGED    PLEASE CALL CLINICS TO VERIFY DAYS AND TIMES  PODIATRY CLINIC HOURS  TELEPHONE NUMBER    Dr. Markus SALASPJESSICA [facility]        Clinics:  [facility]   Tuesday 1PM-6PM  [place]  Wednesday 745AM-330PM  [place]  Thursday/Friday 745AM-230PM  [place] [facility] APPOINTMENTS  [phone]    [place] APPOINTMENTS  [phone]          If you need a medication refill, please contact us you may need lab work and/or a follow up visit prior to your refill (i.e. Antifungal medications).    If MRI needed please call Imaging at [phone] or [phone]    HOW DO I GET MY KNEE SCOOTER? Knee scooters can be picked up at ANY Medical Supply stores with your knee scooter Prescription.  OR    Bring your signed prescription to an [employer] [facility] showroom.

## 2020-12-18 NOTE — PROGRESS NOTES
Subjective:    Patient is 3 days postopp left midfoot fusion, Jacob bunionectomy, left mass removal, and left fifth metatarsal Efraín bunionectomy done on 12/15/20.  Patient is doing well, admits compliance, denies fevers, chills, nausea or vomiting.  Pain slowly getting better and she has only taken a few pain pills.  She has been nonweightbearing.  She has no other new complaints..    Objective:    Dressings clean, dry and intact.  Incisions are dry, well coapted with no dehiscence or drainage.  Pulses are palpable, sensation to light touch is intact.  Normal postopp edema.  No erythema or ecchymosis on the opperative site.  Toes in good alignment    xrays show fusion site and osteotomy tight, hardware in place with no sign of movement both first and fifth metatarsal.   all deformities nicely reduced    Assessment: Postopp day 3 left foot surgery    Plan:  Xray today left foot.  New dressing placed on foot.  Continue ACE bandage and elevation.  Start gentle range of motion of first MTPJ.  Any increase in erythema, edema, and pain patient to call me immediately.   Gave patient plantarflexed Cam walker today.  We discussed that this is only to protect her foot and not for her to walk in.  We again discussed that she will have to be nonweightbearing for 6 weeks after surgical date.  Discussed she can do range of motion at her ankle and does not need to wear cam walker in bed at night or when sitting on couch.  RETURN TO CLINIC in 11 days for suture removal and xray.    Markus Hart, PIPPA DAS, FACFAS

## 2020-12-18 NOTE — LETTER
12/18/2020         RE: Raffy Castelan  [address]  [place] [id number]        Dear Colleague,    Thank you for referring your patient, Raffy Castelan, to the [facility]. Please see a copy of my visit note below.    Subjective:    Patient is 3 days postopp left midfoot fusion, Jacob bunionectomy, left mass removal, and left fifth metatarsal Efraín bunionectomy done on 12/15/20.  Patient is doing well, admits compliance, denies fevers, chills, nausea or vomiting.  Pain slowly getting better and she has only taken a few pain pills.  She has been nonweightbearing.  She has no other new complaints..    Objective:    Dressings clean, dry and intact.  Incisions are dry, well coapted with no dehiscence or drainage.  Pulses are palpable, sensation to light touch is intact.  Normal postopp edema.  No erythema or ecchymosis on the opperative site.  Toes in good alignment    xrays show fusion site and osteotomy tight, hardware in place with no sign of movement both first and fifth metatarsal.   all deformities nicely reduced    Assessment: Postopp day 3 left foot surgery    Plan:  Xray today left foot.  New dressing placed on foot.  Continue ACE bandage and elevation.  Start gentle range of motion of first MTPJ.  Any increase in erythema, edema, and pain patient to call me immediately.   Gave patient plantarflexed Cam walker today.  We discussed that this is only to protect her foot and not for her to walk in.  We again discussed that she will have to be nonweightbearing for 6 weeks after surgical date.  Discussed she can do range of motion at her ankle and does not need to wear cam walker in bed at night or when sitting on couch.  RETURN TO CLINIC in 11 days for suture removal and xray.    Markus Hart DPM DPM, FACFAS          Again, thank you for allowing me to participate in the care of your patient.        Sincerely,        Markus Hart DPM

## 2021-01-05 ENCOUNTER — OFFICE VISIT (OUTPATIENT)
Dept: PODIATRY | Facility: CLINIC | Age: 46
End: 2021-01-05
Payer: COMMERCIAL

## 2021-01-05 ENCOUNTER — ANCILLARY PROCEDURE (OUTPATIENT)
Dept: GENERAL RADIOLOGY | Facility: CLINIC | Age: 46
End: 2021-01-05
Attending: PODIATRIST
Payer: COMMERCIAL

## 2021-01-05 DIAGNOSIS — M21.622 TAILOR'S BUNION OF BOTH FEET: ICD-10-CM

## 2021-01-05 DIAGNOSIS — M21.612 BUNION, LEFT: ICD-10-CM

## 2021-01-05 DIAGNOSIS — M21.621 TAILOR'S BUNION OF BOTH FEET: ICD-10-CM

## 2021-01-05 DIAGNOSIS — M21.612 BUNION, LEFT: Primary | ICD-10-CM

## 2021-01-05 PROCEDURE — 73630 X-RAY EXAM OF FOOT: CPT | Mod: LT | Performed by: RADIOLOGY

## 2021-01-05 PROCEDURE — 99024 POSTOP FOLLOW-UP VISIT: CPT | Performed by: PODIATRIST

## 2021-01-05 NOTE — PROGRESS NOTES
Subjective:    Patient is 3 weeks postopp left midfoot fusion, Jacob bunionectomy, left mass removal, and left fifth metatarsal Efraín bunionectomy done on 12/15/20.  Patient states she has mostly been nonweightbearing.  She admits she has come down on this foot a couple times.  She states is feeling much better.  Incisions are not draining.  She missed her 2-week appointment because of a snowstorm.  She now has an I walk.  Patient is wondering if she can start walking on her foot today despite being told she would have to be 6 weeks nonweightbearing on a couple of occasions before surgery,    Objective:    Dressings clean, dry and intact.  Incisions are dry, well coapted with no dehiscence or drainage with suture removal.  Pulses are palpable, sensation to light touch is intact.  Normal postopp edema, and decreased from last visit.  No erythema or ecchymosis on the opperative site.  Toes in good alignment.  Dorsiflexion of first MTPJ normal.  No pain with dorsiflexion.  Plantar flexion still somewhat limited.    xrays show fusion site tight, hardware in place with no sign of movement.  Fifth metatarsal osteotomy site shows that head has rotated medial.  The lateral shows no elevation    Assessment: Postopp  left foot surgery    Plan:  Xray today left foot.  We removed the patient's sutures.  She may get this wet now in a shower but will not bathe with this.  Pointed out on x-rays how fifth metatarsal head has rotated medial however no dorsal elevation.  We again discussed importance of nonweightbearing at all times.  Discussed if she walks on this this little bone with a little screw and it will probably dislodge.  Discussed with patient if bone stays where it that she will probably be fine.  Explained that if she walks on this and it becomes dislodged more could end up with chronic pain here and need for more surgery.  Continue range of motion of first MTPJ.  We discussed she especially needs to work on her  plantar flexion were instructed on how to do this.  Return to the clinic in 4 weeks for reevaluation    Markus Hart DPM DPM, FACFAS

## 2021-01-05 NOTE — LETTER
1/5/2021         RE: Raffy Castelan  [address]  [place] [address]        Dear Colleague,    Thank you for referring your patient, Raffy Castelan, to the [facility]. Please see a copy of my visit note below.    Subjective:    Patient is 3 weeks postopp left midfoot fusion, Jacob bunionectomy, left mass removal, and left fifth metatarsal Efraín bunionectomy done on 12/15/20.  Patient states she has mostly been nonweightbearing.  She admits she has come down on this foot a couple times.  She states is feeling much better.  Incisions are not draining.  She missed her 2-week appointment because of a snowstorm.  She now has an I walk.  Patient is wondering if she can start walking on her foot today despite being told she would have to be 6 weeks nonweightbearing on a couple of occasions before surgery,    Objective:    Dressings clean, dry and intact.  Incisions are dry, well coapted with no dehiscence or drainage with suture removal.  Pulses are palpable, sensation to light touch is intact.  Normal postopp edema, and decreased from last visit.  No erythema or ecchymosis on the opperative site.  Toes in good alignment.  Dorsiflexion of first MTPJ normal.  No pain with dorsiflexion.  Plantar flexion still somewhat limited.    xrays show fusion site tight, hardware in place with no sign of movement.  Fifth metatarsal osteotomy site shows that head has rotated medial.  The lateral shows no elevation    Assessment: Postopp  left foot surgery    Plan:  Xray today left foot.  We removed the patient's sutures.  She may get this wet now in a shower but will not bathe with this.  Pointed out on x-rays how fifth metatarsal head has rotated medial however no dorsal elevation.  We again discussed importance of nonweightbearing at all times.  Discussed if she walks on this this little bone with a little screw and it will probably dislodge.  Discussed with patient if bone stays where  it that she will probably be fine.  Explained that if she walks on this and it becomes dislodged more could end up with chronic pain here and need for more surgery.  Continue range of motion of first MTPJ.  We discussed she especially needs to work on her plantar flexion were instructed on how to do this.  Return to the clinic in 4 weeks for reevaluation    Markus Hart DPM DPM, FACFAS          Again, thank you for allowing me to participate in the care of your patient.        Sincerely,        Markus Hart DPM

## 2021-01-05 NOTE — PATIENT INSTRUCTIONS
We wish you continued good healing. If you have any questions or concerns, please do not hesitate to contact us at [phone]    [employer] (secure e-mail communication and access to your chart) to send a message or to make an appointment.    Please remember to call and schedule a follow up appointment if one was recommended at your earliest convenience.     +++OF MARCH 2020+++ LOCATION AND HOURS HAVE CHANGED    PLEASE CALL CLINICS TO VERIFY DAYS AND TIMES  PODIATRY CLINIC HOURS  TELEPHONE NUMBER    Dr. Markus SALASPJESSICA [facility]        Clinics:  [facility]   Tuesday 1PM-6PM  [place]  Wednesday 745AM-330PM  [place]  Thursday/Friday 745AM-230PM  [place] [facility] APPOINTMENTS  [phone]    [place] APPOINTMENTS  [phone]          If you need a medication refill, please contact us you may need lab work and/or a follow up visit prior to your refill (i.e. Antifungal medications).    If MRI needed please call Imaging at [phone] or [phone]    HOW DO I GET MY KNEE SCOOTER? Knee scooters can be picked up at ANY Medical Supply stores with your knee scooter Prescription.  OR    Bring your signed prescription to an [facility] showroom.

## 2021-02-02 ENCOUNTER — OFFICE VISIT (OUTPATIENT)
Dept: PODIATRY | Facility: CLINIC | Age: 46
End: 2021-02-02
Payer: COMMERCIAL

## 2021-02-02 ENCOUNTER — ANCILLARY PROCEDURE (OUTPATIENT)
Dept: GENERAL RADIOLOGY | Facility: CLINIC | Age: 46
End: 2021-02-02
Attending: PODIATRIST
Payer: COMMERCIAL

## 2021-02-02 DIAGNOSIS — M21.621 TAILOR'S BUNION OF BOTH FEET: ICD-10-CM

## 2021-02-02 DIAGNOSIS — M21.612 BUNION, LEFT: Primary | ICD-10-CM

## 2021-02-02 DIAGNOSIS — M21.622 TAILOR'S BUNION OF BOTH FEET: ICD-10-CM

## 2021-02-02 DIAGNOSIS — M21.612 BUNION, LEFT: ICD-10-CM

## 2021-02-02 PROCEDURE — 99024 POSTOP FOLLOW-UP VISIT: CPT | Performed by: PODIATRIST

## 2021-02-02 PROCEDURE — 73630 X-RAY EXAM OF FOOT: CPT | Mod: LT | Performed by: RADIOLOGY

## 2021-02-02 NOTE — PROGRESS NOTES
Pt reports R side spasm - will admin new orders   Subjective:    Patient is 7 weeks postopp left midfoot fusion, Jacob bunionectomy, left mass removal, and left fifth metatarsal Efraín bunionectomy done on 12/15/20.  Patient states she has mostly been nonweightbearing.  She admits she has been walking in the cam walker at times against my suggestions.  She states the swelling is going down.  She denies any pain.  She has been doing range of motion of her first MTPJ.  She has no other new complaints.    Objective:    Dressings clean, dry and intact.  Incisions are well healed.   Pulses are palpable, sensation to light touch is intact.  Normal postopp edema, and decreased from last visit.  No erythema or ecchymosis on the opperative site.  Toes in good alignment.  Dorsiflexion of first MTPJ normal.  No pain with dorsiflexion.  Plantar flexion still somewhat limited, but slightly improved from last visit..    xrays show fusion site tight, hardware in place with no sign of movement.  Fifth metatarsal osteotomy site shows that head has rotated medial but no change from last visit.  Some early bone trabeculation noted across osteotomy and fusion site    Assessment: Postopp  left foot surgery    Plan:  Xray today left foot.  Discussed with patient there is been no change in the osteotomy or fusion site.  We gave her a cam walker today at 90 degrees and she will start walking on this.  She walked towards the first day.  If no edema or pain she will slowly increase.  If any or edema pain go back to nonweightbearing for 5 days and then try again.  She will continue range of motion of her first MTPJ.  We instructed her on how to get more aggressive with plantar flexion.  She will call me if she would like physical therapy for this.  Return to the clinic in 3 weeks for reevaluation    Markus Hart, PIPPA DAS, FACFAS

## 2021-02-02 NOTE — LETTER
2/2/2021         RE: Raffy Castelan  [address]  [place] [id number]        Dear Colleague,    Thank you for referring your patient, Raffy Castelan, to the [facility]. Please see a copy of my visit note below.    Subjective:    Patient is 7 weeks postopp left midfoot fusion, Jacob bunionectomy, left mass removal, and left fifth metatarsal Efraín bunionectomy done on 12/15/20.  Patient states she has mostly been nonweightbearing.  She admits she has been walking in the cam walker at times against my suggestions.  She states the swelling is going down.  She denies any pain.  She has been doing range of motion of her first MTPJ.  She has no other new complaints.    Objective:    Dressings clean, dry and intact.  Incisions are well healed.   Pulses are palpable, sensation to light touch is intact.  Normal postopp edema, and decreased from last visit.  No erythema or ecchymosis on the opperative site.  Toes in good alignment.  Dorsiflexion of first MTPJ normal.  No pain with dorsiflexion.  Plantar flexion still somewhat limited, but slightly improved from last visit..    xrays show fusion site tight, hardware in place with no sign of movement.  Fifth metatarsal osteotomy site shows that head has rotated medial but no change from last visit.  Some early bone trabeculation noted across osteotomy and fusion site    Assessment: Postopp  left foot surgery    Plan:  Xray today left foot.  Discussed with patient there is been no change in the osteotomy or fusion site.  We gave her a cam walker today at 90 degrees and she will start walking on this.  She walked towards the first day.  If no edema or pain she will slowly increase.  If any or edema pain go back to nonweightbearing for 5 days and then try again.  She will continue range of motion of her first MTPJ.  We instructed her on how to get more aggressive with plantar flexion.  She will call me if she would like physical  therapy for this.  Return to the clinic in 3 weeks for reevaluation    Markus Hart DPM DPM, FACFAS          Again, thank you for allowing me to participate in the care of your patient.        Sincerely,        Markus Hart DPM

## 2021-02-02 NOTE — PATIENT INSTRUCTIONS
We wish you continued good healing. If you have any questions or concerns, please do not hesitate to contact us at [phone]    [employer] (secure e-mail communication and access to your chart) to send a message or to make an appointment.    Please remember to call and schedule a follow up appointment if one was recommended at your earliest convenience.     +++OF MARCH 2020+++ LOCATION AND HOURS HAVE CHANGED    PLEASE CALL CLINICS TO VERIFY DAYS AND TIMES  PODIATRY CLINIC HOURS  TELEPHONE NUMBER    Dr. Markus SALASPJESSICA [facility]        Clinics:  [facility]   Tuesday 1PM-6PM  [place]  Wednesday 745AM-330PM  [place]  Thursday/Friday 745AM-230PM  [place] [facility] APPOINTMENTS  [phone]    [place] APPOINTMENTS  [phone]          If you need a medication refill, please contact us you may need lab work and/or a follow up visit prior to your refill (i.e. Antifungal medications).    If MRI needed please call Imaging at [phone] or [phone]    HOW DO I GET MY KNEE SCOOTER? Knee scooters can be picked up at ANY Medical Supply stores with your knee scooter Prescription.  OR    Bring your signed prescription to an [facility] showroom.

## 2021-03-02 ENCOUNTER — ANCILLARY PROCEDURE (OUTPATIENT)
Dept: GENERAL RADIOLOGY | Facility: CLINIC | Age: 46
End: 2021-03-02
Attending: PODIATRIST
Payer: COMMERCIAL

## 2021-03-02 ENCOUNTER — OFFICE VISIT (OUTPATIENT)
Dept: PODIATRY | Facility: CLINIC | Age: 46
End: 2021-03-02
Payer: COMMERCIAL

## 2021-03-02 VITALS
HEIGHT: 65 IN | DIASTOLIC BLOOD PRESSURE: 70 MMHG | BODY MASS INDEX: 46.04 KG/M2 | SYSTOLIC BLOOD PRESSURE: 150 MMHG | HEART RATE: 98 BPM

## 2021-03-02 DIAGNOSIS — M21.612 BUNION, LEFT: Primary | ICD-10-CM

## 2021-03-02 DIAGNOSIS — M21.622 TAILOR'S BUNION OF BOTH FEET: ICD-10-CM

## 2021-03-02 DIAGNOSIS — M21.612 BUNION, LEFT: ICD-10-CM

## 2021-03-02 DIAGNOSIS — M21.621 TAILOR'S BUNION OF BOTH FEET: ICD-10-CM

## 2021-03-02 PROCEDURE — 99024 POSTOP FOLLOW-UP VISIT: CPT | Performed by: PODIATRIST

## 2021-03-02 PROCEDURE — 73630 X-RAY EXAM OF FOOT: CPT | Mod: LT | Performed by: RADIOLOGY

## 2021-03-02 NOTE — LETTER
3/2/2021         RE: Raffy Castelan  [address]  [facility] [address]        Dear Colleague,    Thank you for referring your patient, Raffy Castelan, to the [facility]. Please see a copy of my visit note below.    Subjective:    Patient is 11 weeks postopp left midfoot fusion, Jacob bunionectomy, left mass removal, and left fifth metatarsal Efraín bunionectomy done on 12/15/20.  Patient states she has mostly walking in shoes now.  Last postop visit she was instructed only to walk in the cam walker.  She states she still uses it occasionally.  Sometimes slippers around socks around the house.  She is complains of continued swelling on her foot.  She occasionally has some pain.  She denies ecchymosis or erythema.    Objective:     Incisions are well healed.   Pulses are palpable, sensation to light touch is intact.  Edema on the dorsum of her foot is unchanged since last visit.  No erythema or ecchymosis on the opperative site.  Toes in good alignment.  Dorsiflexion of first MTPJ normal.  No pain with dorsiflexion.  Plantar flexion still somewhat limited, but slightly improved from last visit.  There is no pain on palpation in the area of the fifth MTPJ at this time.  No pain with range of motion of fifth toe..    xrays show fusion site first tarsometatarsal joint left foot still is not yet solid.  There may even be a slight increase or bone resorption here.  Somewhat difficult to be sure since this x-ray is weightbearing.  There is increased bone trabeculation across fifth metatarsal osteotomy.  Not yet completely fused.    Assessment: Postopp  left foot surgery    Plan:  Xray today left foot.  Discussed with patient the left fifth metatarsal osteotomy is stable and healing.  Reviewed x-ray with patient and showed her there is yet to be fusion at the first tarsometatarsal joint.  Discussed with patient walking against my wishes in shoes may have caused increased  pressure here which would impede bone healing.  We explained this to the patient.  I would recommend that she only wear the cam walker while walking.  She seems to be resistant to this.  Discussed that if noncompliant could lead to painful nonunion.  Return to the clinic in 3 to 4 weeks for reevaluation.    Markus Hart DPM DPM, FACFAS          Again, thank you for allowing me to participate in the care of your patient.        Sincerely,        Markus Hart DPM

## 2021-03-02 NOTE — PATIENT INSTRUCTIONS
We wish you continued good healing. If you have any questions or concerns, please do not hesitate to contact us at [phone]    [employer] (secure e-mail communication and access to your chart) to send a message or to make an appointment.    Please remember to call and schedule a follow up appointment if one was recommended at your earliest convenience.     +++OF MARCH 2020+++ LOCATION AND HOURS HAVE CHANGED    PLEASE CALL CLINICS TO VERIFY DAYS AND TIMES  PODIATRY CLINIC HOURS  TELEPHONE NUMBER    Dr. Markus SALASPJESSICA [facility]        Clinics:  [facility]   Tuesday 1PM-6PM  [place]  Wednesday 745AM-330PM  [place]  Thursday/Friday 745AM-230PM  [place] APPOINTMENTS  [phone]    [place] APPOINTMENTS  [phone]          If you need a medication refill, please contact us you may need lab work and/or a follow up visit prior to your refill (i.e. Antifungal medications).    If MRI needed please call Imaging at [phone] or [phone]    HOW DO I GET MY KNEE SCOOTER? Knee scooters can be picked up at ANY Medical Supply stores with your knee scooter Prescription.  OR    Bring your signed prescription to an [facility] Medical Equipment showroom.

## 2021-03-02 NOTE — PROGRESS NOTES
Subjective:    Patient is 11 weeks postopp left midfoot fusion, Jacob bunionectomy, left mass removal, and left fifth metatarsal Efraín bunionectomy done on 12/15/20.  Patient states she has mostly walking in shoes now.  Last postop visit she was instructed only to walk in the cam walker.  She states she still uses it occasionally.  Sometimes slippers around socks around the house.  She is complains of continued swelling on her foot.  She occasionally has some pain.  She denies ecchymosis or erythema.    Objective:     Incisions are well healed.   Pulses are palpable, sensation to light touch is intact.  Edema on the dorsum of her foot is unchanged since last visit.  No erythema or ecchymosis on the opperative site.  Toes in good alignment.  Dorsiflexion of first MTPJ normal.  No pain with dorsiflexion.  Plantar flexion still somewhat limited, but slightly improved from last visit.  There is no pain on palpation in the area of the fifth MTPJ at this time.  No pain with range of motion of fifth toe..    xrays show fusion site first tarsometatarsal joint left foot still is not yet solid.  There may even be a slight increase or bone resorption here.  Somewhat difficult to be sure since this x-ray is weightbearing.  There is increased bone trabeculation across fifth metatarsal osteotomy.  Not yet completely fused.    Assessment: Postopp  left foot surgery    Plan:  Xray today left foot.  Discussed with patient the left fifth metatarsal osteotomy is stable and healing.  Reviewed x-ray with patient and showed her there is yet to be fusion at the first tarsometatarsal joint.  Discussed with patient walking against my wishes in shoes may have caused increased pressure here which would impede bone healing.  We explained this to the patient.  I would recommend that she only wear the cam walker while walking.  She seems to be resistant to this.  Discussed that if noncompliant could lead to painful nonunion.  Return to the  clinic in 3 to 4 weeks for reevaluation.    Markus Hart, DPM DPM, FACFAS

## 2021-03-30 ENCOUNTER — OFFICE VISIT (OUTPATIENT)
Dept: PODIATRY | Facility: CLINIC | Age: 46
End: 2021-03-30
Payer: COMMERCIAL

## 2021-03-30 ENCOUNTER — ANCILLARY PROCEDURE (OUTPATIENT)
Dept: GENERAL RADIOLOGY | Facility: CLINIC | Age: 46
End: 2021-03-30
Attending: PODIATRIST
Payer: COMMERCIAL

## 2021-03-30 DIAGNOSIS — M21.612 BUNION, LEFT: ICD-10-CM

## 2021-03-30 DIAGNOSIS — M21.612 BUNION, LEFT: Primary | ICD-10-CM

## 2021-03-30 PROCEDURE — 73630 X-RAY EXAM OF FOOT: CPT | Mod: LT | Performed by: RADIOLOGY

## 2021-03-30 PROCEDURE — 99213 OFFICE O/P EST LOW 20 MIN: CPT | Performed by: PODIATRIST

## 2021-03-30 NOTE — PATIENT INSTRUCTIONS
We wish you continued good healing. If you have any questions or concerns, please do not hesitate to contact us at [phone]    [employer] (secure e-mail communication and access to your chart) to send a message or to make an appointment.    Please remember to call and schedule a follow up appointment if one was recommended at your earliest convenience.     +++OF MARCH 2020+++ LOCATION AND HOURS HAVE CHANGED    PLEASE CALL CLINICS TO VERIFY DAYS AND TIMES  PODIATRY CLINIC HOURS  TELEPHONE NUMBER    Dr. Markus SALASPJESSICA [facility]        Clinics:  [place] [facility]   Tuesday 1PM-6PM  [place]  Wednesday 745AM-330PM  [place]  Thursday/Friday 745AM-230PM  [place] APPOINTMENTS  [phone]    [place] APPOINTMENTS  [phone]          If you need a medication refill, please contact us you may need lab work and/or a follow up visit prior to your refill (i.e. Antifungal medications).    If MRI needed please call Imaging at [phone] or [phone]    HOW DO I GET MY KNEE SCOOTER? Knee scooters can be picked up at ANY Medical Supply stores with your knee scooter Prescription.  OR    Bring your signed prescription to an [facility] Medical Equipment showroom.

## 2021-03-30 NOTE — LETTER
3/30/2021         RE: Raffy Castelan  [address]  [facility] [address]        Dear Colleague,    Thank you for referring your patient, Raffy Castelan, to the [facility]. Please see a copy of my visit note below.    Subjective:    Patient is 3.5 months postopp left midfoot fusion, Jacob bunionectomy, left mass removal, and left fifth metatarsal Efraín bunionectomy done on 12/15/20.   Last postop visit patient complaining of significant edema and pain and she was instructed only to walk in the cam walker.  She states that she has been more compliant since last postop visit 4 weeks ago and walking mostly in a Cam walker.  She states the edema and pain are decreasing.  She denies any pain in the area of the fifth MTPJ.  She denies any calf pain or shortness of breath.  She has no other new complaints today.    Objective:     Incisions are well healed.   Pulses are palpable, sensation to light touch is intact.  Edema on the dorsum of her foot has greatly decreased since last visit.  No erythema or ecchymosis on the opperative site.  Toes in good alignment.  With loading the first metatarsal head no pain.  Only very slight pain with palpation around the fusion site and hard to tell significance of this.  Dorsiflexion of first MTPJ normal.  No pain with dorsiflexion.  Plantar flexion still somewhat limited, but slightly improved from last visit.  There is no pain on palpation in the area of the fifth MTPJ at this time.  No pain with range of motion of fifth toe..    xrays show fusion site first tarsometatarsal joint left foot still is not yet solid, but on the AP photograph there appears to be decreased conspicuity across the fusion site..    There is increased bone trabeculation across fifth metatarsal osteotomy.  Not yet completely fused.    Assessment: Postopp  left foot surgery    Plan:  Xray today left foot.  Discussed with patient the left fifth metatarsal osteotomy is  stable and healing.  Reviewed x-ray with patient and showed her there is yet to be solid fusion at the first tarsometatarsal joint, but this has improved.  Clinically edema significantly decreased and exam shows almost no pain.  We will have her walk in a good supportive shoe today for 1 to 2 hours.  If no pain or swelling she will slowly increase this.  If she has pain or swelling she will go back to the cam walker for 5 to 6 days and then slowly try walking in a good shoe again.  She will only walk on flat level surfaces.  She will only do gentle walking.  She will slowly increase activities as tolerated.  We will have her return to the clinic in 3 to 4 weeks for reevaluation.    Markus Hart DPM, FACFAS          Again, thank you for allowing me to participate in the care of your patient.        Sincerely,        Markus Hart DPM

## 2021-04-01 NOTE — PROGRESS NOTES
Subjective:    Patient is 3.5 months postopp left midfoot fusion, Jacob bunionectomy, left mass removal, and left fifth metatarsal Efraín bunionectomy done on 12/15/20.   Last postop visit patient complaining of significant edema and pain and she was instructed only to walk in the cam walker.  She states that she has been more compliant since last postop visit 4 weeks ago and walking mostly in a Cam walker.  She states the edema and pain are decreasing.  She denies any pain in the area of the fifth MTPJ.  She denies any calf pain or shortness of breath.  She has no other new complaints today.    Objective:     Incisions are well healed.   Pulses are palpable, sensation to light touch is intact.  Edema on the dorsum of her foot has greatly decreased since last visit.  No erythema or ecchymosis on the opperative site.  Toes in good alignment.  With loading the first metatarsal head no pain.  Only very slight pain with palpation around the fusion site and hard to tell significance of this.  Dorsiflexion of first MTPJ normal.  No pain with dorsiflexion.  Plantar flexion still somewhat limited, but slightly improved from last visit.  There is no pain on palpation in the area of the fifth MTPJ at this time.  No pain with range of motion of fifth toe..    xrays show fusion site first tarsometatarsal joint left foot still is not yet solid, but on the AP photograph there appears to be decreased conspicuity across the fusion site..    There is increased bone trabeculation across fifth metatarsal osteotomy.  Not yet completely fused.    Assessment: Postopp  left foot surgery    Plan:  Xray today left foot.  Discussed with patient the left fifth metatarsal osteotomy is stable and healing.  Reviewed x-ray with patient and showed her there is yet to be solid fusion at the first tarsometatarsal joint, but this has improved.  Clinically edema significantly decreased and exam shows almost no pain.  We will have her walk in a good  supportive shoe today for 1 to 2 hours.  If no pain or swelling she will slowly increase this.  If she has pain or swelling she will go back to the cam walker for 5 to 6 days and then slowly try walking in a good shoe again.  She will only walk on flat level surfaces.  She will only do gentle walking.  She will slowly increase activities as tolerated.  We will have her return to the clinic in 3 to 4 weeks for reevaluation.    Markus Hart DPM, FACFAS

## 2021-04-17 ENCOUNTER — HEALTH MAINTENANCE LETTER (OUTPATIENT)
Age: 46
End: 2021-04-17

## 2021-05-04 ENCOUNTER — OFFICE VISIT (OUTPATIENT)
Dept: PODIATRY | Facility: CLINIC | Age: 46
End: 2021-05-04
Payer: COMMERCIAL

## 2021-05-04 ENCOUNTER — ANCILLARY PROCEDURE (OUTPATIENT)
Dept: GENERAL RADIOLOGY | Facility: CLINIC | Age: 46
End: 2021-05-04
Attending: PODIATRIST
Payer: COMMERCIAL

## 2021-05-04 VITALS — DIASTOLIC BLOOD PRESSURE: 80 MMHG | HEART RATE: 80 BPM | SYSTOLIC BLOOD PRESSURE: 130 MMHG

## 2021-05-04 DIAGNOSIS — M21.621 TAILOR'S BUNION OF BOTH FEET: ICD-10-CM

## 2021-05-04 DIAGNOSIS — M21.622 TAILOR'S BUNION OF BOTH FEET: ICD-10-CM

## 2021-05-04 DIAGNOSIS — M21.612 BUNION, LEFT: ICD-10-CM

## 2021-05-04 DIAGNOSIS — M21.612 BUNION, LEFT: Primary | ICD-10-CM

## 2021-05-04 PROCEDURE — 73630 X-RAY EXAM OF FOOT: CPT | Mod: LT | Performed by: RADIOLOGY

## 2021-05-04 PROCEDURE — 99213 OFFICE O/P EST LOW 20 MIN: CPT | Performed by: PODIATRIST

## 2021-05-04 NOTE — PATIENT INSTRUCTIONS
We wish you continued good healing. If you have any questions or concerns, please do not hesitate to contact us at [phone]    [employer] (secure e-mail communication and access to your chart) to send a message or to make an appointment.    Please remember to call and schedule a follow up appointment if one was recommended at your earliest convenience.     +++OF MARCH 2020+++ LOCATION AND HOURS HAVE CHANGED    PLEASE CALL CLINICS TO VERIFY DAYS AND TIMES  PODIATRY CLINIC HOURS  TELEPHONE NUMBER    Dr. Markus SALASPJESSICA [facility]        Clinics:  [place] [facility]real [facility]   Tuesday 1PM-6PM  [place]  Wednesday 745AM-330PM  [place]  Thursday/Friday 745AM-230PM  [place] APPOINTMENTS  [phone]    [place] APPOINTMENTS  [phone]          If you need a medication refill, please contact us you may need lab work and/or a follow up visit prior to your refill (i.e. Antifungal medications).    If MRI needed please call Imaging at [phone] or [phone]    HOW DO I GET MY KNEE SCOOTER? Knee scooters can be picked up at ANY Medical Supply stores with your knee scooter Prescription.  OR    Bring your signed prescription to an [facility] Medical Equipment showroom.

## 2021-05-04 NOTE — PROGRESS NOTES
Subjective:    Patient is 4.5 months postopp left midfoot fusion, Jacob bunionectomy, left mass removal, and left fifth metatarsal Efraín bunionectomy done on 12/15/20.   Patient states that swelling has gone down.  She has very minimal pain now.  She is walking in shoes now.  She has no other new complaints.    Objective:     Incisions are well healed.   Pulses are palpable, sensation to light touch is intact.  Edema on the dorsum of her foot has greatly decreased since last visit.  No erythema or ecchymosis on the opperative site.  Toes in good alignment.  With loading the first metatarsal head no pain.   No pain with palpation around the fusion site.  Range of motion of the first MTPJ shows normal dorsiflexion and 50% normal plantarflexion.  There is no pain on palpation in the area of the fifth MTPJ at this time.  No pain with range of motion of fifth toe..    xrays show fusion site first tarsometatarsal joint left foot still is not yet solid, however decreased conspicuity across the fusion site..   The fifth metatarsal neck osteotomy is now solid    Assessment: Postopp  left foot surgery    Plan:  Xray today left foot.  Discussed with patient the left fifth metatarsal osteotomy is well-healed.  Discussed there is been no change in the fusion site and this appears to be slowly healing.  Patient will slowly increase activities.  She is wearing poor shoe today.  We give instructions on good stiff supportive shoes at all times and I made suggestions.  Return to the clinic in 2 months for repeat x-rays.    Markus Hart DPM, FACFAS

## 2021-05-04 NOTE — LETTER
5/4/2021         RE: Raffy Castelan  [address]  [facility] [address]        Dear Colleague,    Thank you for referring your patient, Raffy Castelan, to the [facility]. Please see a copy of my visit note below.    Subjective:    Patient is 4.5 months postopp left midfoot fusion, Jacob bunionectomy, left mass removal, and left fifth metatarsal Efraín bunionectomy done on 12/15/20.   Patient states that swelling has gone down.  She has very minimal pain now.  She is walking in shoes now.  She has no other new complaints.    Objective:     Incisions are well healed.   Pulses are palpable, sensation to light touch is intact.  Edema on the dorsum of her foot has greatly decreased since last visit.  No erythema or ecchymosis on the opperative site.  Toes in good alignment.  With loading the first metatarsal head no pain.   No pain with palpation around the fusion site.  Range of motion of the first MTPJ shows normal dorsiflexion and 50% normal plantarflexion.  There is no pain on palpation in the area of the fifth MTPJ at this time.  No pain with range of motion of fifth toe..    xrays show fusion site first tarsometatarsal joint left foot still is not yet solid, however decreased conspicuity across the fusion site..   The fifth metatarsal neck osteotomy is now solid    Assessment: Postopp  left foot surgery    Plan:  Xray today left foot.  Discussed with patient the left fifth metatarsal osteotomy is well-healed.  Discussed there is been no change in the fusion site and this appears to be slowly healing.  Patient will slowly increase activities.  She is wearing poor shoe today.  We give instructions on good stiff supportive shoes at all times and I made suggestions.  Return to the clinic in 2 months for repeat x-rays.    Markus Hart DPM, FACFAS          Again, thank you for allowing me to participate in the care of your patient.        Sincerely,        Markus MOLINA  PIPPA Hart

## 2021-05-18 DIAGNOSIS — E66.813 CLASS 3 SEVERE OBESITY DUE TO EXCESS CALORIES WITH SERIOUS COMORBIDITY AND BODY MASS INDEX (BMI) OF 40.0 TO 44.9 IN ADULT (H): ICD-10-CM

## 2021-05-18 DIAGNOSIS — E66.01 CLASS 3 SEVERE OBESITY DUE TO EXCESS CALORIES WITH SERIOUS COMORBIDITY AND BODY MASS INDEX (BMI) OF 40.0 TO 44.9 IN ADULT (H): ICD-10-CM

## 2021-05-18 NOTE — TELEPHONE ENCOUNTER
PHENTERMINE HCL 37.5MG TABS    Last Written Prescription Date:  9/14/2020  Last Fill Quantity: 30,   # refills: 1  Last Office Visit : 8/31/2020  Future Office visit:  None    Routing refill request to provider for review/approval because:  Clarification of orders.   Is it 1 Tabs before breakfast?   Or is it 1 Tabs 2-3 times a week?  Gap in refills??   Last filled Sept 2020?  Follow up appointment needed?  Refer to Provider for review       Mone Mullins RN  Central Triage Red Flags/Med Refills

## 2021-05-19 RX ORDER — PHENTERMINE HYDROCHLORIDE 37.5 MG/1
TABLET ORAL
Qty: 30 TABLET | OUTPATIENT
Start: 2021-05-19

## 2021-05-19 NOTE — TELEPHONE ENCOUNTER
Rickie, Abiola  You 59 minutes ago (8:08 AM)     Dayron Shore, please refuse the refill and close the encounter. I've sent pt a [employer] message to schedule. Thanks.     Routing comment

## 2021-05-19 NOTE — TELEPHONE ENCOUNTER
Shoshana Bustamante PA-C  You; Abiola Damian 12 hours ago (4:35 PM)     Mone, this is usually 1 tab daily.  It's been so long that we should check to see if she is even still taking this medication.  She should have a follow up visit for refill.     Thanks Shoshana    Message text

## 2021-06-28 ENCOUNTER — ALLIED HEALTH/NURSE VISIT (OUTPATIENT)
Dept: PODIATRY | Facility: CLINIC | Age: 46
End: 2021-06-28
Payer: COMMERCIAL

## 2021-06-28 VITALS — SYSTOLIC BLOOD PRESSURE: 128 MMHG | DIASTOLIC BLOOD PRESSURE: 89 MMHG | HEART RATE: 78 BPM

## 2021-06-28 DIAGNOSIS — I10 HYPERTENSION GOAL BP (BLOOD PRESSURE) < 140/90: Primary | ICD-10-CM

## 2021-06-28 PROCEDURE — 99207 PR NO CHARGE NURSE ONLY: CPT | Performed by: PODIATRIST

## 2021-06-28 NOTE — PROGRESS NOTES
Raffy Castelan was evaluated at [facility] on June 28, 2021 at which time her blood pressure was:    BP Readings from Last 3 Encounters:   06/28/21 128/89   05/04/21 130/80   03/02/21 (!) 150/70     Pulse Readings from Last 3 Encounters:   06/28/21 78   05/04/21 80   03/02/21 98       Reviewed lifestyle modifications for blood pressure control and reduction: including making healthy food choices, managing weight, getting regular exercise, smoking cessation, reducing alcohol consumption, monitoring blood pressure regularly.     Symptoms: None    BP Goal:< 140/90 mmHg    BP Assessment:  BP at goal    Potential Reasons for BP too high: NA - Not applicable    BP Follow-Up Plan: Recheck BP in 6 months at pharmacy    Recommendation to Provider: Continue current medication regimen.    Note completed by: Uma IBARRA, Pharmacy Student  HPI      ROS      Physical Exam

## 2021-07-06 ENCOUNTER — ANCILLARY PROCEDURE (OUTPATIENT)
Dept: GENERAL RADIOLOGY | Facility: CLINIC | Age: 46
End: 2021-07-06
Attending: PODIATRIST
Payer: COMMERCIAL

## 2021-07-06 ENCOUNTER — OFFICE VISIT (OUTPATIENT)
Dept: PODIATRY | Facility: CLINIC | Age: 46
End: 2021-07-06
Payer: COMMERCIAL

## 2021-07-06 VITALS — BODY MASS INDEX: 49.01 KG/M2 | WEIGHT: 290 LBS

## 2021-07-06 DIAGNOSIS — M21.612 BUNION, LEFT: ICD-10-CM

## 2021-07-06 DIAGNOSIS — M21.612 BUNION, LEFT: Primary | ICD-10-CM

## 2021-07-06 DIAGNOSIS — M21.621 TAILOR'S BUNION OF BOTH FEET: ICD-10-CM

## 2021-07-06 DIAGNOSIS — M21.622 TAILOR'S BUNION OF BOTH FEET: ICD-10-CM

## 2021-07-06 PROCEDURE — 99213 OFFICE O/P EST LOW 20 MIN: CPT | Performed by: PODIATRIST

## 2021-07-06 PROCEDURE — 73630 X-RAY EXAM OF FOOT: CPT | Mod: LT | Performed by: RADIOLOGY

## 2021-07-06 NOTE — PATIENT INSTRUCTIONS
We wish you continued good healing. If you have any questions or concerns, please do not hesitate to contact us at [phone]    [employer] (secure e-mail communication and access to your chart) to send a message or to make an appointment.    Please remember to call and schedule a follow up appointment if one was recommended at your earliest convenience.     +++OF MARCH 2020+++ LOCATION AND HOURS HAVE CHANGED    PLEASE CALL CLINICS TO VERIFY DAYS AND TIMES  PODIATRY CLINIC HOURS  TELEPHONE NUMBER    Dr. Markus SALASPJESSICA [facility]        Clinics:  [place] Villar[facility]   Tuesday 1PM-6PM  [place]  Wednesday 745AM-330PM  [place]  Thursday/Friday 745AM-230PM  [place] APPOINTMENTS  [phone]    [place] APPOINTMENTS  [phone]          If you need a medication refill, please contact us you may need lab work and/or a follow up visit prior to your refill (i.e. Antifungal medications).    If MRI needed please call Imaging at [phone] or [phone]    HOW DO I GET MY KNEE SCOOTER? Knee scooters can be picked up at ANY Medical Supply stores with your knee scooter Prescription.  OR    Bring your signed prescription to an [facility] Medical Equipment showroom.

## 2021-07-06 NOTE — LETTER
7/6/2021         RE: Raffy Castelan  [address]  [place] [id number]        Dear Colleague,    Thank you for referring your patient, Raffy Castelan, to the [facility]. Please see a copy of my visit note below.    Subjective:    Pt is seen today approximately 7 months status post left midfoot fusion, Jacob bunionectomy, mass removal, left fifth metatarsal head chevron bunionectomy done on 12/15/2020.  She states she has no pain with walking.  Her swelling is mostly resolved.  She is wearing good shoes now.  She denies weakness or ecchymosis.  She has no other new complaints.    ROS: See above       Allergies   Allergen Reactions     Lisinopril Cough       Current Outpatient Medications   Medication Sig Dispense Refill     albuterol (PROAIR HFA/PROVENTIL HFA/VENTOLIN HFA) 108 (90 Base) MCG/ACT inhaler Inhale 2 puffs into the lungs every 6 hours as needed for shortness of breath / dyspnea or wheezing 8.5 g 3     clobetasol (TEMOVATE) 0.05 % external ointment Apply topically 2 times daily 45 g 1     clobetasol propionate (OLUX) 0.05 % external foam Apply sparingly to affected area twice daily as needed.  Do not apply to face. 600 g 3     furosemide (LASIX) 20 MG tablet Take one tablet in the morning as needed for leg swelling. 90 tablet 3     HYDROcodone-acetaminophen (NORCO) 5-325 MG tablet Take 1-2 tablets by mouth every 4 hours as needed for moderate to severe pain 25 tablet 0     hydrOXYzine (ATARAX) 25 MG tablet Take 1-2 tablets (25-50 mg) by mouth every 4 hours as needed for itching (prn pain and nausea) 30 tablet 0     losartan-hydrochlorothiazide (HYZAAR) 50-12.5 MG tablet Take 1 tablet by mouth daily 90 tablet 3     multivitamin, therapeutic with minerals (MULTI-VITAMIN) TABS Take 1 tablet by mouth daily Every other day       phentermine (ADIPEX-P) 37.5 MG tablet Take 1 tablet (37.5 mg) by mouth every morning (before breakfast) (Patient taking differently: Take  37.5 mg by mouth 2-3 times/week) 30 tablet 1     senna (SENNA LAXATIVE) 8.6 MG tablet Take 1 tablet by mouth daily         Patient Active Problem List   Diagnosis     Chronic constipation     CARDIOVASCULAR SCREENING; LDL GOAL LESS THAN 160     Family history of breast cancer     GERD (gastroesophageal reflux disease)     BMI >40     Vitamin D deficiency     Psoriasis     Bariatric surgery status     H/O laparoscopic adjustable gastric banding     Diaphragmatic hernia     Hypertension goal BP (blood pressure) < 140/90     Anxiety     Impingement syndrome, shoulder, left     Spontaneous vaginal delivery     Advanced maternal age during pregnancy     37+ weeks gestation completed     Bunion, left     Tailor's bunion of both feet       Past Medical History:   Diagnosis Date     Asthma      GERD (gastroesophageal reflux disease)      Hypertension        Past Surgical History:   Procedure Laterality Date     BIOPSY       BUNIONECTOMY LAPIDUS WITH TARSAL METATARSAL (TMT) FUSION Left 12/15/2020    Procedure: Left first tarsometatarsal joint fusion Left Jacob bunionectomy Left fifth metatarsal Efraín bunionectomy;  Surgeon: Markus Hart DPM;  Location: [facility] OR     COLONOSCOPY       ESOPHAGOSCOPY, GASTROSCOPY, DUODENOSCOPY (EGD), COMBINED  7/30/2013    Procedure: COMBINED ESOPHAGOSCOPY, GASTROSCOPY, DUODENOSCOPY (EGD), BIOPSY SINGLE OR MULTIPLE;;  Surgeon: Wilson Fournier MD;  Location: [facility]     ESOPHAGOSCOPY, GASTROSCOPY, DUODENOSCOPY (EGD), COMBINED  3/24/2014    Procedure: COMBINED ESOPHAGOSCOPY, GASTROSCOPY, DUODENOSCOPY (EGD);  Surgeon: Wilson Fournier MD;  Location: [facility]     EXCISE LESION FOOT Left 12/15/2020    Procedure: Left foot mass removal;  Surgeon: Markus Hart DPM;  Location: [facility]     HERNIA REPAIR       LAPAROSCOPIC GASTRIC ADJUSTABLE BANDING  5/27/2014    Procedure: LAPAROSCOPIC GASTRIC ADJUSTABLE BANDING;  Surgeon: Wilson Fournier MD;  Location: [facility]     no history of surgery          Family History   Problem Relation Age of Onset     Blood Disease Mother         anemia     Heart Disease Mother      Hypertension Mother      Hyperlipidemia Mother      Anxiety Disorder Mother      Osteoporosis Mother      Genetic Disorder Mother         Alpha Thalassemia trait     Cerebrovascular Disease Father      Hypertension Father      Diabetes Father         type  2     Deep Vein Thrombosis (DVT) Father      Breast Cancer Maternal Aunt      Breast Cancer Paternal Aunt      Cancer Paternal Aunt         stomach     Breast Cancer Other      Blood Disease Other         son with alpha thalessemia/mother alpha thalasemia trait     Breast Cancer Other      Breast Cancer Other      Breast Cancer Cousin        Social History     Tobacco Use     Smoking status: Never Smoker     Smokeless tobacco: Never Used   Substance Use Topics     Alcohol use: No         Objective:    Vitals: Wt 131.5 kg (290 lb)   BMI 49.01 kg/m    BMI: Body mass index is 49.01 kg/m .  Height: Data Unavailable    Constitutional/ general:  Pt is in no apparent distress, appears well-nourished.  Cooperative with history and physical exam.     Psych:  The patient answered questions appropriately.  Normal affect.  Seems to have reasonable expectations, in terms of treatment.    Eyes:  Visual scanning/ tracking without deficit.    Ears:  Response to auditory stimuli is normal.  .  Auricles in proper alignment.     Lymphatic:  Popliteal lymph nodes not enlarged.     Lungs:  Non labored breathing, non labored speech. No cough.  No audible wheezing. Even, quiet breathing.      Vascular:  Pedal pulses are palpable bilaterally for both the DP and PT arteries.  CFT < 3 sec.  positive edema.  negative varicosities.  positive pedal hair growth noted.     Neuro:  Alert and oriented x 3. Coordinated gait.  Light touch sensation is intact to the L4, L5, S1 distributions. No obvious deficits.  No evidence of neurological-based weakness, spasticity, or  contracture in the lower extremities.     Derm: Normal texture and turgor.  No erythema, ecchymosis, or cyanosis.  No open lesions.     Musculoskeletal:    Left foot incisions well-healed.  Left fifth metatarsal has no pain with palpation or range of motion and no bump noted laterally.  Right first MTPJ has normal range of motion.  Hallux is rectus.  First tarsometatarsal joint is solid and no pain with palpation or vigorous range of motion.        Radiographic Exam:  Xrays reveal healing at fifth metatarsal.  The first tarsometatarsal joint shows increased bone trabeculation across the fusion site and there has been no change.    ASSESSMENT:    Left foot surgery postop    Plan:   X-rays taken today of left foot.  Discussed with patient that the fusion site has now moved and continues to consolidate.  She is doing quite well.  Activities as tolerated.  She is interested in doing the contralateral foot in the future.  Patient states she will try to lose weight first and get in better shape beforehand to make nonweightbearing easier.  RTC as needed      Markus Hart DPM, FACFAS                Again, thank you for allowing me to participate in the care of your patient.        Sincerely,        Markus Hart DPM

## 2021-07-07 NOTE — PROGRESS NOTES
Subjective:    Pt is seen today approximately 7 months status post left midfoot fusion, Jacob bunionectomy, mass removal, left fifth metatarsal head chevron bunionectomy done on 12/15/2020.  She states she has no pain with walking.  Her swelling is mostly resolved.  She is wearing good shoes now.  She denies weakness or ecchymosis.  She has no other new complaints.    ROS: See above       Allergies   Allergen Reactions     Lisinopril Cough       Current Outpatient Medications   Medication Sig Dispense Refill     albuterol (PROAIR HFA/PROVENTIL HFA/VENTOLIN HFA) 108 (90 Base) MCG/ACT inhaler Inhale 2 puffs into the lungs every 6 hours as needed for shortness of breath / dyspnea or wheezing 8.5 g 3     clobetasol (TEMOVATE) 0.05 % external ointment Apply topically 2 times daily 45 g 1     clobetasol propionate (OLUX) 0.05 % external foam Apply sparingly to affected area twice daily as needed.  Do not apply to face. 600 g 3     furosemide (LASIX) 20 MG tablet Take one tablet in the morning as needed for leg swelling. 90 tablet 3     HYDROcodone-acetaminophen (NORCO) 5-325 MG tablet Take 1-2 tablets by mouth every 4 hours as needed for moderate to severe pain 25 tablet 0     hydrOXYzine (ATARAX) 25 MG tablet Take 1-2 tablets (25-50 mg) by mouth every 4 hours as needed for itching (prn pain and nausea) 30 tablet 0     losartan-hydrochlorothiazide (HYZAAR) 50-12.5 MG tablet Take 1 tablet by mouth daily 90 tablet 3     multivitamin, therapeutic with minerals (MULTI-VITAMIN) TABS Take 1 tablet by mouth daily Every other day       phentermine (ADIPEX-P) 37.5 MG tablet Take 1 tablet (37.5 mg) by mouth every morning (before breakfast) (Patient taking differently: Take 37.5 mg by mouth 2-3 times/week) 30 tablet 1     senna (SENNA LAXATIVE) 8.6 MG tablet Take 1 tablet by mouth daily         Patient Active Problem List   Diagnosis     Chronic constipation     CARDIOVASCULAR SCREENING; LDL GOAL LESS THAN 160     Family history  of breast cancer     GERD (gastroesophageal reflux disease)     BMI >40     Vitamin D deficiency     Psoriasis     Bariatric surgery status     H/O laparoscopic adjustable gastric banding     Diaphragmatic hernia     Hypertension goal BP (blood pressure) < 140/90     Anxiety     Impingement syndrome, shoulder, left     Spontaneous vaginal delivery     Advanced maternal age during pregnancy     37+ weeks gestation completed     Bunion, left     Tailor's bunion of both feet       Past Medical History:   Diagnosis Date     Asthma      GERD (gastroesophageal reflux disease)      Hypertension        Past Surgical History:   Procedure Laterality Date     BIOPSY       BUNIONECTOMY LAPIDUS WITH TARSAL METATARSAL (TMT) FUSION Left 12/15/2020    Procedure: Left first tarsometatarsal joint fusion Left Jacob bunionectomy Left fifth metatarsal Efraín bunionectomy;  Surgeon: Markus Hart DPM;  Location: [facility]     COLONOSCOPY       ESOPHAGOSCOPY, GASTROSCOPY, DUODENOSCOPY (EGD), COMBINED  7/30/2013    Procedure: COMBINED ESOPHAGOSCOPY, GASTROSCOPY, DUODENOSCOPY (EGD), BIOPSY SINGLE OR MULTIPLE;;  Surgeon: Wilson Fournier MD;  Location: [facility]     ESOPHAGOSCOPY, GASTROSCOPY, DUODENOSCOPY (EGD), COMBINED  3/24/2014    Procedure: COMBINED ESOPHAGOSCOPY, GASTROSCOPY, DUODENOSCOPY (EGD);  Surgeon: Wilson Fournier MD;  [facility]: [facility]     EXCISE LESION FOOT Left 12/15/2020    Procedure: Left foot mass removal;  Surgeon: Markus Hart DPM;  Location: [facility]     HERNIA REPAIR       LAPAROSCOPIC GASTRIC ADJUSTABLE BANDING  5/27/2014    Procedure: LAPAROSCOPIC GASTRIC ADJUSTABLE BANDING;  Surgeon: Wilson Fournier MD;  Location: [facility]     no history of surgery         Family History   Problem Relation Age of Onset     Blood Disease Mother         anemia     Heart Disease Mother      Hypertension Mother      Hyperlipidemia Mother      Anxiety Disorder Mother      Osteoporosis Mother      Genetic Disorder Mother          Alpha Thalassemia trait     Cerebrovascular Disease Father      Hypertension Father      Diabetes Father         type  2     Deep Vein Thrombosis (DVT) Father      Breast Cancer Maternal Aunt      Breast Cancer Paternal Aunt      Cancer Paternal Aunt         stomach     Breast Cancer Other      Blood Disease Other         son with alpha thalessemia/mother alpha thalasemia trait     Breast Cancer Other      Breast Cancer Other      Breast Cancer Cousin        Social History     Tobacco Use     Smoking status: Never Smoker     Smokeless tobacco: Never Used   Substance Use Topics     Alcohol use: No         Objective:    Vitals: Wt 131.5 kg (290 lb)   BMI 49.01 kg/m    BMI: Body mass index is 49.01 kg/m .  Height: Data Unavailable    Constitutional/ general:  Pt is in no apparent distress, appears well-nourished.  Cooperative with history and physical exam.     Psych:  The patient answered questions appropriately.  Normal affect.  Seems to have reasonable expectations, in terms of treatment.    Eyes:  Visual scanning/ tracking without deficit.    Ears:  Response to auditory stimuli is normal.  .  Auricles in proper alignment.     Lymphatic:  Popliteal lymph nodes not enlarged.     Lungs:  Non labored breathing, non labored speech. No cough.  No audible wheezing. Even, quiet breathing.      Vascular:  Pedal pulses are palpable bilaterally for both the DP and PT arteries.  CFT < 3 sec.  positive edema.  negative varicosities.  positive pedal hair growth noted.     Neuro:  Alert and oriented x 3. Coordinated gait.  Light touch sensation is intact to the L4, L5, S1 distributions. No obvious deficits.  No evidence of neurological-based weakness, spasticity, or contracture in the lower extremities.     Derm: Normal texture and turgor.  No erythema, ecchymosis, or cyanosis.  No open lesions.     Musculoskeletal:    Left foot incisions well-healed.  Left fifth metatarsal has no pain with palpation or range of motion and no  bump noted laterally.  Right first MTPJ has normal range of motion.  Hallux is rectus.  First tarsometatarsal joint is solid and no pain with palpation or vigorous range of motion.        Radiographic Exam:  Xrays reveal healing at fifth metatarsal.  The first tarsometatarsal joint shows increased bone trabeculation across the fusion site and there has been no change.    ASSESSMENT:    Left foot surgery postop    Plan:   X-rays taken today of left foot.  Discussed with patient that the fusion site has now moved and continues to consolidate.  She is doing quite well.  Activities as tolerated.  She is interested in doing the contralateral foot in the future.  Patient states she will try to lose weight first and get in better shape beforehand to make nonweightbearing easier.  RTC as needed      Markus Hart DPM, FACFAS

## 2021-07-30 ENCOUNTER — VIRTUAL VISIT (OUTPATIENT)
Dept: ENDOCRINOLOGY | Facility: CLINIC | Age: 46
End: 2021-07-30
Payer: COMMERCIAL

## 2021-07-30 ENCOUNTER — TELEPHONE (OUTPATIENT)
Dept: LAB | Facility: CLINIC | Age: 46
End: 2021-07-30

## 2021-07-30 VITALS — BODY MASS INDEX: 47.98 KG/M2 | HEIGHT: 65 IN | WEIGHT: 288 LBS

## 2021-07-30 DIAGNOSIS — E66.813 CLASS 3 SEVERE OBESITY DUE TO EXCESS CALORIES WITH SERIOUS COMORBIDITY AND BODY MASS INDEX (BMI) OF 45.0 TO 49.9 IN ADULT (H): Primary | ICD-10-CM

## 2021-07-30 DIAGNOSIS — E66.01 CLASS 3 SEVERE OBESITY DUE TO EXCESS CALORIES WITH SERIOUS COMORBIDITY AND BODY MASS INDEX (BMI) OF 45.0 TO 49.9 IN ADULT (H): Primary | ICD-10-CM

## 2021-07-30 DIAGNOSIS — E66.01 CLASS 3 SEVERE OBESITY DUE TO EXCESS CALORIES WITH SERIOUS COMORBIDITY AND BODY MASS INDEX (BMI) OF 40.0 TO 44.9 IN ADULT (H): ICD-10-CM

## 2021-07-30 DIAGNOSIS — Z98.84 H/O LAPAROSCOPIC ADJUSTABLE GASTRIC BANDING: ICD-10-CM

## 2021-07-30 DIAGNOSIS — E66.813 CLASS 3 SEVERE OBESITY DUE TO EXCESS CALORIES WITH SERIOUS COMORBIDITY AND BODY MASS INDEX (BMI) OF 40.0 TO 44.9 IN ADULT (H): ICD-10-CM

## 2021-07-30 PROCEDURE — 99213 OFFICE O/P EST LOW 20 MIN: CPT | Mod: 95 | Performed by: PHYSICIAN ASSISTANT

## 2021-07-30 RX ORDER — PHENTERMINE HYDROCHLORIDE 37.5 MG/1
18.75 TABLET ORAL
Qty: 15 TABLET | Refills: 3 | Status: SHIPPED | OUTPATIENT
Start: 2021-07-30 | End: 2021-11-10

## 2021-07-30 ASSESSMENT — PAIN SCALES - GENERAL: PAINLEVEL: NO PAIN (0)

## 2021-07-30 ASSESSMENT — MIFFLIN-ST. JEOR: SCORE: 1937.24

## 2021-07-30 NOTE — TELEPHONE ENCOUNTER
Prior Authorization Retail Medication Request    Medication/Dose: phentermine  ICD code (if different than what is on RX):    Previously Tried and Failed:    Rationale:      Insurance Name:  [facility] [employer]  Insurance ID:  [id number]      Pharmacy Information (if different than what is on RX)  Name:    Phone:      Palak Zavala PharmD, [facility]  Pharmacist Manager   [facility]  [phone]

## 2021-07-30 NOTE — NURSING NOTE
"Chief Complaint   Patient presents with     Follow Up     Follow-up Weight Mangement       Vitals:    07/30/21 0750   Weight: 130.6 kg (288 lb)   Height: 1.651 m (5' 5\")       Body mass index is 47.93 kg/m .                            "

## 2021-07-30 NOTE — Clinical Note
Follow up 1 month [place] pharmacist Demetria  Follow up 2 months RD  Follow up in 3 months Shoshana GUERRERO

## 2021-07-30 NOTE — LETTER
2021       RE: Raffy Castelan  [address]  [place] [address]     Dear Colleague,    Thank you for referring your patient, Raffy Castelan, to the [facility] [place] at [facility]. Please see a copy of my visit note below.    Raffy is a 46 year old who is being evaluated via a billable video visit.      How would you like to obtain your AVS? MyChart  If the video visit is dropped, the invitation should be resent by: Text to cell phone: [phone]  Will anyone else be joining your video visit? No    Video Start Time: 8:20 AM     Video-Visit Details    Type of service:  Video Visit    Video End Time:8:36 AM    Originating Location (pt. Location): Home    Distant Location (provider location):  [facility] [place]     Platform used for Video Visit: [employer]     [facility] Medical Weight Management Note     Raffy Castelan  MRN:  [id number]  :  1975  MARIELOS:  2021    Dear Physician No Ref-Primary,    I had the pleasure of seeing your patient Raffy Castelan. She is a 46 year old female who I am continuing to see for treatment of obesity related to:    No flowsheet data found.    15 minutes spent on the date of the encounter doing chart review, history and exam, documentation and further activities per the note    INTERVAL HISTORY:  MWM follow up. Weight up 3 lbs from last visit  Last visit almost 1 year ago  Foot surgery Dec and immobile for months  Feels phentermine not as effective as it used to be and also affects sleep    Lap band:  and weighed 234 lbs, ~5cc in band.  No adjustment    Contrave: discussed at last visit but didn't start  Qsymia: helped with weight loss but brain fog/confusion so stopped  Saxenda: tolerated 3mg dose but not effective, insurance didn't cover a few years ago      PLAN:  Start Wegovy 0.25mg  Decrease phentermine to 18.75mg daily  Follow up 1 month MT  "pharmacist Demetria  Follow up 2 months RD  Follow up in 3 months Shoshana Return MWM      CURRENT WEIGHT:   288 lbs 0 oz    Initial Weight (lbs): 268 lbs  Last Visits Weight: 129.5 kg (285 lb 8 oz)  Cumulative weight loss (lbs): -20  Weight Loss Percentage: -7.46%    Changes and Difficulties 7/30/2021   I have made the following changes to my diet since my last visit: no   With regards to my diet, I am still struggling with: yes weight increase from surgery on her foot   I have made the following changes to my activity/exercise since my last visit: surgery on her foot   With regards to my activity/exercise, I am still struggling with: exercise  hard cause of surgery       VITALS:  Ht 1.651 m (5' 5\")   Wt 130.6 kg (288 lb)   BMI 47.93 kg/m      MEDICATIONS:   Current Outpatient Medications   Medication Sig Dispense Refill     albuterol (PROAIR HFA/PROVENTIL HFA/VENTOLIN HFA) 108 (90 Base) MCG/ACT inhaler Inhale 2 puffs into the lungs every 6 hours as needed for shortness of breath / dyspnea or wheezing 8.5 g 3     clobetasol propionate (OLUX) 0.05 % external foam Apply sparingly to affected area twice daily as needed.  Do not apply to face. 600 g 3     losartan-hydrochlorothiazide (HYZAAR) 50-12.5 MG tablet Take 1 tablet by mouth daily 90 tablet 3     multivitamin, therapeutic with minerals (MULTI-VITAMIN) TABS Take 1 tablet by mouth daily Every other day       phentermine (ADIPEX-P) 37.5 MG tablet Take 1 tablet (37.5 mg) by mouth every morning (before breakfast) (Patient taking differently: Take 37.5 mg by mouth 2-3 times/week) 30 tablet 1     senna (SENNA LAXATIVE) 8.6 MG tablet Take 1 tablet by mouth daily       clobetasol (TEMOVATE) 0.05 % external ointment Apply topically 2 times daily (Patient not taking: Reported on 7/30/2021) 45 g 1     furosemide (LASIX) 20 MG tablet Take one tablet in the morning as needed for leg swelling. (Patient not taking: Reported on 7/30/2021) 90 tablet 3     HYDROcodone-acetaminophen " "(NORCO) 5-325 MG tablet Take 1-2 tablets by mouth every 4 hours as needed for moderate to severe pain (Patient not taking: Reported on 7/30/2021) 25 tablet 0     hydrOXYzine (ATARAX) 25 MG tablet Take 1-2 tablets (25-50 mg) by mouth every 4 hours as needed for itching (prn pain and nausea) (Patient not taking: Reported on 7/30/2021) 30 tablet 0       Weight Loss Medication History Reviewed With Patient 7/30/2021   Which weight loss medications are you currently taking on a regular basis?  Phentermine   Are you having any side effects from the weight loss medication that we have prescribed you? No   If you are having side effects please describe: none       PHYSICAL EXAM:  Objective    Ht 1.651 m (5' 5\")   Wt 130.6 kg (288 lb)   BMI 47.93 kg/m    Vitals - Patient Reported  Pain Score: No Pain (0)        Physical Exam   GENERAL: Healthy, alert and no distress  EYES: Eyes grossly normal to inspection.  No discharge or erythema, or obvious scleral/conjunctival abnormalities.  RESP: No audible wheeze, cough, or visible cyanosis.  No visible retractions or increased work of breathing.    SKIN: Visible skin clear. No significant rash, abnormal pigmentation or lesions.  NEURO: Cranial nerves grossly intact.  Mentation and speech appropriate for age.  PSYCH: Mentation appears normal, affect normal/bright, judgement and insight intact, normal speech and appearance well-groomed.      Sincerely,    Shoshana Bustamante PA-C    "

## 2021-07-30 NOTE — PROGRESS NOTES
Raffy is a 46 year old who is being evaluated via a billable video visit.      How would you like to obtain your AVS? MyChart  If the video visit is dropped, the invitation should be resent by: Text to cell phone: [phone]  Will anyone else be joining your video visit? No    Video Start Time: 8:20 AM     Video-Visit Details    Type of service:  Video Visit    Video End Time:8:36 AM    Originating Location (pt. Location): Home    Distant Location (provider location):  [facility] [place]     Platform used for Video Visit: [facility] Medical Weight Management Note     Raffy Castelan  MRN:  [id number]  :  1975  MARIELOS:  2021    Dear Physician No Ref-Primary,    I had the pleasure of seeing your patient Raffy Castelan. She is a 46 year old female who I am continuing to see for treatment of obesity related to:    No flowsheet data found.    15 minutes spent on the date of the encounter doing chart review, history and exam, documentation and further activities per the note    INTERVAL HISTORY:  MWM follow up. Weight up 3 lbs from last visit  Last visit almost 1 year ago  Foot surgery Dec and immobile for months  Feels phentermine not as effective as it used to be and also affects sleep    Lap band:  and weighed 234 lbs, ~5cc in band.  No adjustment    Contrave: discussed at last visit but didn't start  Qsymia: helped with weight loss but brain fog/confusion so stopped  Saxenda: tolerated 3mg dose but not effective, insurance didn't cover a few years ago      PLAN:  Start Wegovy 0.25mg  Decrease phentermine to 18.75mg daily  Follow up 1 month MTM pharmacist Demetria  Follow up 2 months RD  Follow up in 3 months Shoshana Return MWM      CURRENT WEIGHT:   288 lbs 0 oz    Initial Weight (lbs): 268 lbs  Last Visits Weight: 129.5 kg (285 lb 8 oz)  Cumulative weight loss (lbs): -20  Weight Loss Percentage: -7.46%    Changes and Difficulties 2021   I have made the  "following changes to my diet since my last visit: no   With regards to my diet, I am still struggling with: yes weight increase from surgery on her foot   I have made the following changes to my activity/exercise since my last visit: surgery on her foot   With regards to my activity/exercise, I am still struggling with: exercise  hard cause of surgery       VITALS:  Ht 1.651 m (5' 5\")   Wt 130.6 kg (288 lb)   BMI 47.93 kg/m      MEDICATIONS:   Current Outpatient Medications   Medication Sig Dispense Refill     albuterol (PROAIR HFA/PROVENTIL HFA/VENTOLIN HFA) 108 (90 Base) MCG/ACT inhaler Inhale 2 puffs into the lungs every 6 hours as needed for shortness of breath / dyspnea or wheezing 8.5 g 3     clobetasol propionate (OLUX) 0.05 % external foam Apply sparingly to affected area twice daily as needed.  Do not apply to face. 600 g 3     losartan-hydrochlorothiazide (HYZAAR) 50-12.5 MG tablet Take 1 tablet by mouth daily 90 tablet 3     multivitamin, therapeutic with minerals (MULTI-VITAMIN) TABS Take 1 tablet by mouth daily Every other day       phentermine (ADIPEX-P) 37.5 MG tablet Take 1 tablet (37.5 mg) by mouth every morning (before breakfast) (Patient taking differently: Take 37.5 mg by mouth 2-3 times/week) 30 tablet 1     senna (SENNA LAXATIVE) 8.6 MG tablet Take 1 tablet by mouth daily       clobetasol (TEMOVATE) 0.05 % external ointment Apply topically 2 times daily (Patient not taking: Reported on 7/30/2021) 45 g 1     furosemide (LASIX) 20 MG tablet Take one tablet in the morning as needed for leg swelling. (Patient not taking: Reported on 7/30/2021) 90 tablet 3     HYDROcodone-acetaminophen (NORCO) 5-325 MG tablet Take 1-2 tablets by mouth every 4 hours as needed for moderate to severe pain (Patient not taking: Reported on 7/30/2021) 25 tablet 0     hydrOXYzine (ATARAX) 25 MG tablet Take 1-2 tablets (25-50 mg) by mouth every 4 hours as needed for itching (prn pain and nausea) (Patient not taking: " "Reported on 7/30/2021) 30 tablet 0       Weight Loss Medication History Reviewed With Patient 7/30/2021   Which weight loss medications are you currently taking on a regular basis?  Phentermine   Are you having any side effects from the weight loss medication that we have prescribed you? No   If you are having side effects please describe: none       PHYSICAL EXAM:  Objective    Ht 1.651 m (5' 5\")   Wt 130.6 kg (288 lb)   BMI 47.93 kg/m    Vitals - Patient Reported  Pain Score: No Pain (0)        Physical Exam   GENERAL: Healthy, alert and no distress  EYES: Eyes grossly normal to inspection.  No discharge or erythema, or obvious scleral/conjunctival abnormalities.  RESP: No audible wheeze, cough, or visible cyanosis.  No visible retractions or increased work of breathing.    SKIN: Visible skin clear. No significant rash, abnormal pigmentation or lesions.  NEURO: Cranial nerves grossly intact.  Mentation and speech appropriate for age.  PSYCH: Mentation appears normal, affect normal/bright, judgement and insight intact, normal speech and appearance well-groomed.      Sincerely,    Shoshana Bustamante PA-C  "

## 2021-08-02 ENCOUNTER — TELEPHONE (OUTPATIENT)
Dept: ENDOCRINOLOGY | Facility: CLINIC | Age: 46
End: 2021-08-02
Payer: COMMERCIAL

## 2021-08-02 ENCOUNTER — TELEPHONE (OUTPATIENT)
Dept: ENDOCRINOLOGY | Facility: CLINIC | Age: 46
End: 2021-08-02

## 2021-08-02 NOTE — LETTER
"November 10, 2021    To:   [facility]     RE: Raffy Castelan  [address] [address]  : 1975  MRN: [id number]  Policy #: [id number]  Case/Reference: [id number]    To Whom It May Concern,    I am writing on behalf of my patient, Raffy Castelan to document the medical necessity of Wegovy for the treatment of Obesity. This letter provides information about the patient's medical history and diagnosis and a statement summarizing my treatment rationale.     Summary of Patient History and Diagnosis  Raffy Castelan is a 46 year old female with a diagnosis of obesity (BMI is at least 30 kg/m^2). Patient has been working with [facility] for assistance with weight loss. Patient was started on Wegovy on 3021 and at that time weighed 288 lb. Patient has currently been using Wegovy 1 mg weekly with plans to increase to 1.7 mg weekly this week. Currently the patient weighs 273 lb.     Estimated body mass index is 45.43 kg/m  as calculated from the following:    Height as of 11/10/21: 5' 5\" (1.651 m).    Weight as of 11/10/21: 273 lb (123.8 kg).    Wt Readings from Last 4 Encounters:   11/10/21 273 lb (123.8 kg)   21 288 lb (130.6 kg)   21 290 lb (131.5 kg)   20 272 lb 6.4 oz (123.6 kg)     Treatment Rationale  Patient has been using the Wegovy savings card to pay for the prescription, the savings card will allow for up to 6 months worth of Wegovy with each copay of $25. Patient has had great success with Wegovy and has thus far lost 15 lb or 5.2% from time of starting Wegovy. Prior to Wegovy, patient had tried phentermine but found ineffective for weight loss. Patient had also historically tried diet and exercise for at least 6 months without success. Also tried Qsymia, but caused brain fog. Saxenda was also tried, but was ineffective even at 3 mg daily.  As patient has lost at least 5% of weight loss with use of " Wegovy, requesting approval for coverage of Wegovy.     Duration  12 months.       Summary  In summary, Wegovy is medically necessary for this patient s medical condition. Please call my office at [phone] if I can provide you with any additional information to approve my request. I look forward to receiving your timely response and approval of this request.     Sincerely,    Shoshana Bustamante PA-C and Lauren Bloch, KiaraD    [facility] [place]

## 2021-08-02 NOTE — TELEPHONE ENCOUNTER
Prior Authorization Retail Medication Request    Medication/Dose: WEGOVY  ICD code (if different than what is on RX):  Class 3 severe obesity due to excess calories with serious comorbidity and body mass index (BMI) of 45.0 to 49.9 in adult (H) [E66.01, Z68.42]  - Primary     Previously Tried and Failed:weight loss surgery, history of diet and exercise, Qsymia, Saxenda  Rationale: I had the pleasure of seeing your patient Raffy Castelan. She is a 46 year old female who I am continuing to see for treatment of obesity. Contrave: discussed at last visit but didn't start  Qsymia: helped with weight loss but brain fog/confusion so stopped  Saxenda: tolerated 3mg dose but not effective, insurance didn't cover a few years ago    Insurance Name:    Insurance ID:        Pharmacy Information (if different than what is on RX)  Name:  [facility] [place] - [address].  Phone: [phone]

## 2021-08-02 NOTE — TELEPHONE ENCOUNTER
Central Prior Authorization Team  Phone: [phone]    PA Initiation    Medication: phentermine  Insurance Company: [facility] - Phone [phone] Fax [phone]  Pharmacy Filling the Rx: [facility] - [place], [place] - [address] [place] [address].  Filling Pharmacy Phone: [phone]  Filling Pharmacy Fax:    Start Date: 8/2/2021

## 2021-08-02 NOTE — TELEPHONE ENCOUNTER
Prior Authorization Retail Medication Request    Medication/Dose:   ICD code (if different than what is on RX):  Class 3 severe obesity due to excess calories with serious comorbidity and body mass index (BMI) of 40.0 to 44.9 in adult (H) [E66.01, Z68.41]     Previously Tried and Failed: weight loss surgery, history of diet and exercise, Qsymia, Saxenda  Rationale: I had the pleasure of seeing your patient Raffy Castelan. She is a 46 year old female who I am continuing to see for treatment of obesity. Contrave: discussed at last visit but didn't start  Qsymia: helped with weight loss but brain fog/confusion so stopped  Saxenda: tolerated 3mg dose but not effective, insurance didn't cover a few years ago    Insurance Name:    Insurance ID:        Pharmacy Information (if different than what is on RX)  Name:  [facility] [place] - [place], [place] - [address] [place] [address].  Phone:  [phone]

## 2021-08-02 NOTE — TELEPHONE ENCOUNTER
PRIOR AUTHORIZATION DENIED    Medication: phentermine- DENIED     Denial Date: 8/2/2021    Denial Rational: Patient must maintain a weight loss of greater than or equal to 5% from baseline.      Appeal Information: If provider would like to appeal please provide a letter of medical necessity.

## 2021-08-04 NOTE — TELEPHONE ENCOUNTER
Central Prior Authorization Team   Phone: [phone]      PA Initiation    Medication: Phentermine-PA Initiated  Insurance Company: [facility] - Phone [phone] Fax [phone]  Pharmacy Filling the Rx: [facility] [place] - [place], [place] - [address] [place] [address].  Filling Pharmacy Phone: [phone]  Filling Pharmacy Fax:    Start Date: 8/4/2021

## 2021-08-04 NOTE — TELEPHONE ENCOUNTER
Central Prior Authorization Team   Phone: [phone]      PA Initiation    Medication: WEGOVY-PA initiated  Insurance Company: [facility] - Phone [phone] Fax [phone]  Pharmacy Filling the Rx: [facility] - [place], [place] - [address] [place] [address].  Filling Pharmacy Phone: [phone]  Filling Pharmacy Fax:    Start Date: 8/4/2021

## 2021-08-06 NOTE — TELEPHONE ENCOUNTER
PRIOR AUTHORIZATION DENIED    Medication: WEGOVY-PA denied    Denial Date: 8/6/2021    Denial Rational: must try/fail 2 alternatives-phentermine (which they denied for failure) and Contrave        Appeal Information:

## 2021-08-06 NOTE — TELEPHONE ENCOUNTER
PRIOR AUTHORIZATION DENIED    Medication: Phentermine-PA denied    Denial Date: 8/6/2021    Denial Rational:         Appeal Information:

## 2021-08-25 ENCOUNTER — VIRTUAL VISIT (OUTPATIENT)
Dept: PHARMACY | Facility: CLINIC | Age: 46
End: 2021-08-25
Payer: COMMERCIAL

## 2021-08-25 DIAGNOSIS — L40.9 PSORIASIS: ICD-10-CM

## 2021-08-25 DIAGNOSIS — E66.01 CLASS 3 SEVERE OBESITY DUE TO EXCESS CALORIES WITH SERIOUS COMORBIDITY AND BODY MASS INDEX (BMI) OF 45.0 TO 49.9 IN ADULT (H): Primary | ICD-10-CM

## 2021-08-25 DIAGNOSIS — U07.1 CLINICAL DIAGNOSIS OF COVID-19: ICD-10-CM

## 2021-08-25 DIAGNOSIS — I10 HYPERTENSION GOAL BP (BLOOD PRESSURE) < 140/90: ICD-10-CM

## 2021-08-25 DIAGNOSIS — E66.813 CLASS 3 SEVERE OBESITY DUE TO EXCESS CALORIES WITH SERIOUS COMORBIDITY AND BODY MASS INDEX (BMI) OF 45.0 TO 49.9 IN ADULT (H): Primary | ICD-10-CM

## 2021-08-25 DIAGNOSIS — R60.0 LOWER EXTREMITY EDEMA: ICD-10-CM

## 2021-08-25 DIAGNOSIS — Z98.84 H/O LAPAROSCOPIC ADJUSTABLE GASTRIC BANDING: ICD-10-CM

## 2021-08-25 DIAGNOSIS — K59.00 CONSTIPATION, UNSPECIFIED CONSTIPATION TYPE: ICD-10-CM

## 2021-08-25 PROCEDURE — 99607 MTMS BY PHARM ADDL 15 MIN: CPT | Performed by: PHARMACIST

## 2021-08-25 PROCEDURE — 99605 MTMS BY PHARM NP 15 MIN: CPT | Performed by: PHARMACIST

## 2021-08-25 NOTE — PROGRESS NOTES
Medication Therapy Management (MTM) Encounter    ASSESSMENT:                            Medication Adherence/Access: No issues identified    Obesity: Would benefit from increasing Wegovy to 0.5 mg weekly. Reasonable as of now to use phentermine as needed if tolerating, could consider lower dose phentermine such as 8 mg daily in future otherwise may not be necessary in the regimen if getting enough benefit from Wegovy down the road.      S/P Lap Band: Patient would benefit form getting labs completed. Will refrain from giving recommendations on supplements until after labs to assess status. Could consider going back to multivitamin. Future: calcium intake.     Hypertension/Lower Extremity Edema: Stable. Blood pressure at goal <140/90 mmHg.      Constipation: Stable for now.     Hx COVID: Resolved.     Psoriasis: Stable.     PLAN:                            1. Increase Wegovy to 0.5 mg weekly     2. Get labs completed - fasting.     3. Increase water intake to 64 oz water daily     4. Consider changing back to or adding multivitamin 1 tablet daily     Follow-up: Return in about 4 weeks (around 9/22/2021) for Medication Therapy Management Pharmacist Visit, (scheduled today).    SUBJECTIVE/OBJECTIVE:                          Raffy Castelan is a 46 year old female called for an initial visit. She was referred to me from Shoshana Bustamante PA-C.      Reason for visit: Wegovy and phentermine start.    Allergies/ADRs: Reviewed in chart  Past Medical History: Reviewed in chart  Tobacco: She reports that she has never smoked. She has never used smokeless tobacco.  Alcohol: not currently using  Caffeine: 1 cup coffee daily, 1-2 cups tea per day, no soda.     Medication Adherence/Access:   Patient takes medications directly from bottles.  Patient takes medications 2 time(s) per day.     Obesity:   Phentermine 18.75 mg in AM as needed   Wegovy 0.25 mg weekly on Mondays, on 4th injection    Followed by Shoshana Bustamante PA-C, seen  "7/30/2021 for Return Medical Weight Management, was started on phentermine and Wegovy at that time. used Wegovy savings card and paid out of pocket for phentermine. Was getting a lot of constipation and insomnia. with using phentermine daily and now using more as needed as previous and not having issues of constipation and still getting benefit when taking. She is finding lower appetite with Wegovy but wearing off later in the day.   Diet/Eating Habits: Patient reports normally getting 2 meals per day, usually will skip breakfast. Sometimes small snack in the middle. Trying to keep calories under 2000 calories, helps to keep with the calorie goal with Wegovy. Normally getting 4-6 eight oz water daily.   Exercise/Activity: Patient reports has mobility issues for months since foot surgery in December. She feels that is where some weight regain came back is when she was recovering from this. She is working to walk more and more movement.   Failed medications include: Saxenda: up to 30 mg ineffective; Qsymia: effective, but brain fog/confusion.     Current weight today: 284 lb   Weight when Wegovy started: 288 lb   Cumulative Weight Loss: -4 lb   Initial Consult Weight: 268 lb    Wt Readings from Last 4 Encounters:   07/30/21 288 lb (130.6 kg)   07/06/21 290 lb (131.5 kg)   12/07/20 272 lb 6.4 oz (123.6 kg)   10/19/20 270 lb (122.5 kg)     Estimated body mass index is 47.93 kg/m  as calculated from the following:    Height as of 7/30/21: 5' 5\" (1.651 m).    Weight as of 7/30/21: 288 lb (130.6 kg).    S/P Lap Band:   Hair Nail and Skin Vitamin 2 tablet daily     Patient had lap band in 2014. Weighed 234 lb at that time. Now 5 mL are in band, not been adjusted recently. Reports weight regain over the last years.  She usually does not complain of acid reflux, rarely and dependent on meal if anything. She does not have issues with swallowing food/pills. History of vitamin D deficiency.     Hemoglobin   Date Value Ref Range " "Status   10/02/2014 11.7 11.7 - 15.7 g/dL Final     Ferritin   Date Value Ref Range Status   10/02/2014 22 10 - 120 ng/mL Final     Lab Results   Component Value Date    VITDT 26 07/03/2020     Lab Results   Component Value Date    PTHI 24 10/02/2014     Lab Results   Component Value Date    B12 910 10/02/2014     Lab Results   Component Value Date    KASI 0.49 10/02/2014     Hypertension/Lower Extremity Edema:   Losartan-hydrochlorothiazide 50-12.5 mg daily in AM.      Patient does not self-monitor blood pressure. Reports that when she had done a couple weeks ago blood pressure was 120s/80s. Patient reports no current medication side effects. Also on hydrochlorothiazide for lower leg swelling and finds stable currently.   BP Readings from Last 3 Encounters:   06/28/21 128/89   05/04/21 130/80   03/02/21 (!) 150/70     Constipation:   Senna 8.6 mg daily in PM    She has chronic constipation. Using senna nightly to get most from this. Now finding she is more regular with stopping phentermine.     Hx COVID:   Albuterol inhaler as needed, not used in last year - prescribed when had COVID.     Reports she is \"borderline\" asthmatic. She will otherwise use when she has a cold. Finds that asthma issues decreased with weight. She hardly uses, hasn't used in the last year. Received COVID vaccination x 2 in March 2021.     Psoriasis:   Clobetasol 0.05% external foam daily as needed    Found the ointment annoying to use as psoriasis on back of head. Stopped using, and transitioned back to foam. Foam working well.   ----------------      I spent 36 minutes with this patient today. All changes were made via collaborative practice agreement with Phoebe Ryan CNP. A copy of the visit note was provided to the patient's referring provider.    The patient was sent via [employer] a summary of these recommendations.     Lauren Bloch, PharmD  Medication Therapy Management Pharmacist   [facility] " Center    Telemedicine Visit Details  Type of service:  Telephone visit  Start Time: 1:01 PM  End Time: 1:37 PM  Originating Location (patient location): Home  Distant Location (provider location):  [facility]     Medication Therapy Recommendations  Class 3 severe obesity due to excess calories with serious comorbidity and body mass index (BMI) of 45.0 to 49.9 in adult (H)    Current Medication: Semaglutide,0.25 or 0.5MG/DOS, 2 MG/1.5ML SOPN   Rationale: Dose too low - Dosage too low - Effectiveness   Recommendation: Increase Dose - Wegovy 0.5 MG/0.5ML Soaj - 0.5 mg weekly   Status: Accepted per CPA         H/O laparoscopic adjustable gastric banding    Current Medication: Multiple Vitamins-Minerals (HAIR SKIN & NAILS ADVANCED PO)   Rationale: Medication requires monitoring - Needs additional monitoring   Recommendation: Order Lab - Get labs completed   Status: Patient Agreed - Adherence/Education          Rationale: Preventive therapy - Needs additional medication therapy - Indication   Recommendation: Start Medication - DAILY MULTIVITAMIN PO - 1 tablet daily   Status: Patient Agreed - Adherence/Education

## 2021-08-25 NOTE — PATIENT INSTRUCTIONS
Recommendations from today's MTM visit:                                                    MTM (medication therapy management) is a service provided by a clinical pharmacist designed to help you get the most of out of your medicines.   Today we reviewed what your medicines are for, how to know if they are working, that your medicines are safe and how to make your medicine regimen as easy as possible.      1. Increase Wegovy to 0.5 mg weekly     2. Get labs completed.     3. Increase water intake to 64 oz water daily     4. Consider changing back to or adding multivitamin 1 tablet daily     Follow-up: Return in about 4 weeks (around 9/22/2021) for Medication Therapy Management Pharmacist Visit, (scheduled today).    It was great to speak with you today.  I value your experience and would be very thankful for your time with providing feedback on our clinic survey. You may receive a survey via email or text message in the next few days.       My Clinical Pharmacist's contact information:                                                      Please feel free to contact me with any questions or concerns you have.      Lauren Bloch, PharmD  Medication Therapy Management Pharmacist   [facility] [place]

## 2021-08-25 NOTE — Clinical Note
Post lap band, started on wegovy and titrating up, handling well. Back to taking phen as needed, we will see if she even needs down the road when increasing the weovoy. Told her to get labs done, she had forgotten.

## 2021-09-20 ENCOUNTER — IMMUNIZATION (OUTPATIENT)
Dept: PEDIATRICS | Facility: CLINIC | Age: 46
End: 2021-09-20
Payer: COMMERCIAL

## 2021-09-20 PROCEDURE — 90471 IMMUNIZATION ADMIN: CPT

## 2021-09-20 PROCEDURE — 90686 IIV4 VACC NO PRSV 0.5 ML IM: CPT

## 2021-09-23 ENCOUNTER — VIRTUAL VISIT (OUTPATIENT)
Dept: PHARMACY | Facility: CLINIC | Age: 46
End: 2021-09-23
Payer: COMMERCIAL

## 2021-09-23 DIAGNOSIS — Z98.84 H/O LAPAROSCOPIC ADJUSTABLE GASTRIC BANDING: ICD-10-CM

## 2021-09-23 DIAGNOSIS — E66.813 CLASS 3 SEVERE OBESITY DUE TO EXCESS CALORIES WITH SERIOUS COMORBIDITY AND BODY MASS INDEX (BMI) OF 45.0 TO 49.9 IN ADULT (H): Primary | ICD-10-CM

## 2021-09-23 DIAGNOSIS — E66.01 CLASS 3 SEVERE OBESITY DUE TO EXCESS CALORIES WITH SERIOUS COMORBIDITY AND BODY MASS INDEX (BMI) OF 45.0 TO 49.9 IN ADULT (H): Primary | ICD-10-CM

## 2021-09-23 PROCEDURE — 99607 MTMS BY PHARM ADDL 15 MIN: CPT | Performed by: PHARMACIST

## 2021-09-23 PROCEDURE — 99606 MTMS BY PHARM EST 15 MIN: CPT | Performed by: PHARMACIST

## 2021-09-23 NOTE — PATIENT INSTRUCTIONS
Recommendations from today's MTM visit:                                                       Plan     1. Complete the Labs on October 5th  2. Will follow up in two weeks to assess dose escalation of Wegovy    Follow-up: in two weeks to discuss wegovy dose    It was great to speak with you today.  I value your experience and would be very thankful for your time with providing feedback on our clinic survey. You may receive a survey via email or text message in the next few days.     My Clinical Pharmacist's contact information:                                                      Please feel free to contact me with any questions or concerns you have.      Lauren Bloch, PharmD  Medication Therapy Management Pharmacist   [facility] [place]

## 2021-09-23 NOTE — PROGRESS NOTES
Medication Therapy Management (MTM) Encounter    ASSESSMENT:                            Medication Adherence/Access: No issues identified    Obesity: Stable. Continue the current dose for two more weeks. Could consider increasing the dose to 1 mg weekly after 2 weeks. Due to currently tolerating Wegovy 0.5 mg weekly, dose escalation in future is likely warranted.     S/P Lap Band: Will assess again after lab is completed     PLAN:                            1. Complete the Labs on October 5th  2. Will follow up in two weeks to assess dose escalation of Wegovy, sending in Wegovy 1 mg weekly to ensure availability through pharmacy.     Follow-up: Follow up with PCP     SUBJECTIVE/OBJECTIVE:                          Raffy Castelan is a 46 year old female called for a follow-up visit. She was referred to me from Shoshana Bustamante PA-C.  Today's visit is a follow-up MTM visit from 8/25/21     Reason for visit: Wegovy dose increase follow up     Allergies/ADRs: Reviewed in chart  Past Medical History: Reviewed in chart  Tobacco: She reports that she has never smoked. She has never used smokeless tobacco.  Alcohol: not currently using  Caffeine: 1 cup coffee daily     Medication Adherence/Access: no issues reported.     Obesity:   Phentermine 18.75 mg as needed    Wegovy 0.5 mg weekly, currently on for 2 weeks     Followed by Shoshana Bustamante PA-C, seen 7/30/2021 for Return Medical Weight Management. Patient is experiencing the follow side effects: None. No nausea, no heartburn, no constipation. She reports that's she is feeling somewhat fullness, she is unsure if the second dose has been as effective. She finds when she takes as needed and not every day she finds works better, does give a metallic taste does doesn't like taking every day.   Diet/Eating Habits: atient reports normally getting 2 meals per day. If going to eat breakfast will be egg sandwich otherwise will skip. Lunch: example: rice, chicken, salad and dinner will  be similar. Water intake: at least 6 glass of 8 oz water daily.   Exercise: Patient reports with kids she is more active with recent school start, but reports not necessarily walking daily.     Patient's personalized goal: Patient would like to eat less sugar - cookie or something sweet.     Current weight today: 281  lb   Weight when Wegovy started: 288 lb   Cumulative Weight Loss: -7 lb   Initial Consult Weight: 268 lb    Wt Readings from Last 10 Encounters:   07/30/21 288 lb (130.6 kg)   07/06/21 290 lb (131.5 kg)   12/07/20 272 lb 6.4 oz (123.6 kg)   10/19/20 270 lb (122.5 kg)   08/31/20 285 lb 8 oz (129.5 kg)   07/30/20 283 lb 8 oz (128.6 kg)   07/03/20 263 lb (119.3 kg)   06/10/19 263 lb (119.3 kg)   04/25/19 267 lb (121.1 kg)   06/22/18 264 lb (119.7 kg)     Patient's comorbidities associated with obesity include: Hypertension.    S/P Lap Band:   Hair Nail and Skin Vitamin 2 tablet daily     Patient had lap band in 2014. Weighed 234 lb at that time. Now 5 mL are in band, not been adjusted recently. Reports weight regain over the last years.  She usually does not complain of acid reflux, rarely and dependent on meal if anything. She does not have issues with swallowing food/pills. History of vitamin D deficiency. She hasn't had labs completed yet, has scheduled for October 5.     Hemoglobin   Date Value Ref Range Status   10/02/2014 11.7 11.7 - 15.7 g/dL Final     Ferritin   Date Value Ref Range Status   10/02/2014 22 10 - 120 ng/mL Final     Lab Results   Component Value Date    VITDT 26 07/03/2020     Lab Results   Component Value Date    PTHI 24 10/02/2014     Lab Results   Component Value Date    B12 910 10/02/2014     Lab Results   Component Value Date    KASI 0.49 10/02/2014     ----------------      I spent 18 minutes with this patient today. All changes were made via collaborative practice agreement with Shoshana Bustamante PA-C. A copy of the visit note was provided to the patient's referring  provider.    The patient was sent via Zeel a summary of these recommendations.     Lauren Bloch, PharmD  Medication Therapy Management Pharmacist   [facility]    Mohammednur Yesuf  PharmD student    Telemedicine Visit Details  Type of service:  Telephone visit  Start Time: 3:04 PM  End Time: 3:22 PM  Originating Location (patient location): Home  Distant Location (provider location):  [facility]     Medication Therapy Recommendations  No medication therapy recommendations to display

## 2021-10-03 NOTE — PROGRESS NOTES
"Raffy Castelan is a 46 year old female who is being evaluated via a billable video visit.      The patient has been notified of following:     \"This video visit will be conducted via a call between you and your physician/provider. We have found that certain health care needs can be provided without the need for an in-person physical exam.  This service lets us provide the care you need with a video conversation.  If a prescription is necessary we can send it directly to your pharmacy.  If lab work is needed we can place an order for that and you can then stop by our lab to have the test done at a later time.    Video visits are billed at different rates depending on your insurance coverage.  Please reach out to your insurance provider with any questions.    If during the course of the call the physician/provider feels a video visit is not appropriate, you will not be charged for this service.\"    How would you like to obtain your AVS? MyChart  If the video visit is dropped, the invitation should be resent by: Text to cell phone: [phone]  Will anyone else be joining your video visit? No         Video-Visit Details    Type of service:  Video Visit    Video Start Time: 8:26 AM  Video End Time: 8:56 AM  Originating Location (pt. Location): Home     Distant Location (provider location):  [facility] [place]      Platform used for Video Visit: [employer]     During this virtual visit the patient is located in [place], patient verifies this as the location during the entirety of this visit.     New Weight Management Nutrition Consultation    Raffy Castelan is a 46 year old female presents today for new weight management nutrition consultation.      Patient referred by Shoshana Bustamante 7/30/21. Pt has worked with Shoshana previous to this date as well as with an RD for weight management with our clinic in 2018.    Patient with Co-morbidities of obesity including:  Type II DM no   Renal Failure " "no  Sleep apnea no  Hypertension yes  Dyslipidemia no  Joint pain no  Back pain no  GERD yes    Pt with hx of lap band in  - weighed 234 lbs at that time. ~ 5 mL in band.     Hx of vit D def.     Anthropometrics:  Initial consult weight per chart review: 268 lbs    Estimated body mass index is 47.93 kg/m  as calculated from the following:    Height as of 21: 1.651 m (5' 5\").    Weight as of 21: 130.6 kg (288 lb).   Weight 21 (appt with Lauren Bloch): 281 lbs    Current weight: 280 lbs, pt reported  - Reports     Pt wants to get to ~250 lbs. Needs surgery on her other foot (had one done Dec 2020) per report    Medications for Weight Loss:  Plan per Shoshana 21   Start Wegovy 0.25mg  Decrease phentermine to 18.75mg daily    10/4 - pt just finished 0.5 mg, picking up 1.0 this week. Taking phentermine every few days.     Supplements:   Pre-jayashree MVI daily   Hair, nail, skin - daily    Hx of Vit D def, hx of anemia  Noticing her hair is falling out   Lab appt today, 10/4 at 9:15 am - pt     NUTRITION HISTORY  See PA note for details.    Per Pharm D note 21   \"Patient reports normally getting 2 meals per day. If going to eat breakfast will be egg sandwich otherwise will skip. Lunch: example: rice, chicken, salad and dinner will be similar. Water intake: at least 6 glass of 8 oz water daily.\"    Pt reports weight has creeped up recently. [place] during pandemic and WFH. Foot surgery - decreased PA because of that.     Just got back from business trip - more eating out.  Doing well on wegovy - not able to finish whole plate anymore. Stopping when feeling satisfied.     Eating pattern - 2 meals/day and 1 snack (typically fruit)  Typical meal - 6-8 oz protein, 6-8 oz veg, 1 cup carb   Carbs - mostly rice (basmati or yan)   Portions prior to this were larger per pt.     Discussed diet for inflammation - pt is Filipino and familiar with mediterranean type diet. Discussed foods she could consider " incorporating more of.  Does not eat a lot of red meat    Has a sweet tooth - eats a sweet daily after lunch.  Has been in this habit for years.     Physical Activity:  Pt had foot surgery in Dec, needs surgery on other foot in future.    Additional information:  Son is also trying to lose weight     Nutrition Prescription  Recommended energy/nutrient modification.    Nutrition Diagnosis    Obesity r/t excessive energy intake aeb BMI >30.    Nutrition Intervention  Reviewed current dietary habits and pts history   Discussed long-term goals pt hopes to accomplish in RD appointments  Answered pt questions  Coordination of care - informed pt her lab appt is today. Pt thought it was tomorrow. Plans to go after our appt  Nutrition education - discussed importance of PA, diet recommendations for inflammation/general health, methods for breaking current habits with sugar  AVS and handouts via [employer]    Patient demonstrates understanding.      Nutrition Goals  1) Walk for 30 min while son is at soccer    2) Decrease intake of sugary foods  - Alternatives: oatmeal with toppings, fruit    3) Consider increasing anti-inflammatory foods in diet.     4) Take supplements as recommended    -Take the following after a Laparoscopic Adjustable Band Surgery:    Multivitamin/minerals: adult dose once daily (containing 18 mg of iron)    Calcium containing vitamin D: 500 mg 3 times daily or 600 mg 2 times daily    Resources:     https://www.health.[employer].edu/staying-healthy/foods-that-fight-inflammation     https://www.heart.org/en/healthy-living/healthy-eating/eat-smart/nutrition-basics/mediterranean-diet     Why Take Supplements for Life after Weight Loss Surgery  [email or web address]      Keeping Up Your Diet after Weight Loss Surgery  [email or web address]      Follow-Up:  Prn per pt    Time spent with patient: 30 minutes.  TAMIKO Jeter, RD, LD

## 2021-10-04 ENCOUNTER — VIRTUAL VISIT (OUTPATIENT)
Dept: ENDOCRINOLOGY | Facility: CLINIC | Age: 46
End: 2021-10-04
Payer: COMMERCIAL

## 2021-10-04 ENCOUNTER — LAB (OUTPATIENT)
Dept: LAB | Facility: CLINIC | Age: 46
End: 2021-10-04

## 2021-10-04 DIAGNOSIS — E66.01 CLASS 3 SEVERE OBESITY DUE TO EXCESS CALORIES WITH SERIOUS COMORBIDITY AND BODY MASS INDEX (BMI) OF 45.0 TO 49.9 IN ADULT (H): ICD-10-CM

## 2021-10-04 DIAGNOSIS — E66.813 CLASS 3 SEVERE OBESITY DUE TO EXCESS CALORIES WITH SERIOUS COMORBIDITY AND BODY MASS INDEX (BMI) OF 45.0 TO 49.9 IN ADULT (H): ICD-10-CM

## 2021-10-04 DIAGNOSIS — Z98.84 H/O LAPAROSCOPIC ADJUSTABLE GASTRIC BANDING: ICD-10-CM

## 2021-10-04 DIAGNOSIS — Z71.3 NUTRITIONAL COUNSELING: Primary | ICD-10-CM

## 2021-10-04 LAB
DEPRECATED CALCIDIOL+CALCIFEROL SERPL-MC: 30 UG/L (ref 20–75)
ERYTHROCYTE [DISTWIDTH] IN BLOOD BY AUTOMATED COUNT: 15.1 % (ref 10–15)
HBA1C MFR BLD: 5.8 % (ref 0–5.6)
HCT VFR BLD AUTO: 34.5 % (ref 35–47)
HGB BLD-MCNC: 11 G/DL (ref 11.7–15.7)
MCH RBC QN AUTO: 24.7 PG (ref 26.5–33)
MCHC RBC AUTO-ENTMCNC: 31.9 G/DL (ref 31.5–36.5)
MCV RBC AUTO: 78 FL (ref 78–100)
PLATELET # BLD AUTO: 411 10E3/UL (ref 150–450)
PTH-INTACT SERPL-MCNC: 29 PG/ML (ref 18–80)
RBC # BLD AUTO: 4.45 10E6/UL (ref 3.8–5.2)
WBC # BLD AUTO: 8.3 10E3/UL (ref 4–11)

## 2021-10-04 PROCEDURE — 80061 LIPID PANEL: CPT

## 2021-10-04 PROCEDURE — 83970 ASSAY OF PARATHORMONE: CPT

## 2021-10-04 PROCEDURE — 82306 VITAMIN D 25 HYDROXY: CPT

## 2021-10-04 PROCEDURE — 85027 COMPLETE CBC AUTOMATED: CPT

## 2021-10-04 PROCEDURE — 83036 HEMOGLOBIN GLYCOSYLATED A1C: CPT

## 2021-10-04 PROCEDURE — 36415 COLL VENOUS BLD VENIPUNCTURE: CPT

## 2021-10-04 PROCEDURE — 80053 COMPREHEN METABOLIC PANEL: CPT

## 2021-10-04 PROCEDURE — 97802 MEDICAL NUTRITION INDIV IN: CPT | Mod: 95 | Performed by: DIETITIAN, REGISTERED

## 2021-10-04 NOTE — LETTER
"10/4/2021       RE: Raffy Castelan  [address] [address]     Dear Colleague,    Thank you for referring your patient, Raffy Castelan, to the [facility] [place] at [facility]. Please see a copy of my visit note below.    Raffy Castelan is a 46 year old female who is being evaluated via a billable video visit.      The patient has been notified of following:     \"This video visit will be conducted via a call between you and your physician/provider. We have found that certain health care needs can be provided without the need for an in-person physical exam.  This service lets us provide the care you need with a video conversation.  If a prescription is necessary we can send it directly to your pharmacy.  If lab work is needed we can place an order for that and you can then stop by our lab to have the test done at a later time.    Video visits are billed at different rates depending on your insurance coverage.  Please reach out to your insurance provider with any questions.    If during the course of the call the physician/provider feels a video visit is not appropriate, you will not be charged for this service.\"    How would you like to obtain your AVS? MyChart  If the video visit is dropped, the invitation should be resent by: Text to cell phone: [phone]  Will anyone else be joining your video visit? No         Video-Visit Details    Type of service:  Video Visit    Video Start Time: 8:26 AM  Video End Time: 8:56 AM  Originating Location (pt. Location): Home     Distant Location (provider location):  [facility] [place]      Platform used for Video Visit: [employer]     During this virtual visit the patient is located in [place], patient verifies this as the location during the entirety of this visit.     New Weight Management Nutrition Consultation    Raffy Castelan is a 46 year old " "female presents today for new weight management nutrition consultation.      Patient referred by Shoshana Bustamante 21. Pt has worked with Shoshana previous to this date as well as with an RD for weight management with our clinic in 2018.    Patient with Co-morbidities of obesity including:  Type II DM no   Renal Failure no  Sleep apnea no  Hypertension yes  Dyslipidemia no  Joint pain no  Back pain no  GERD yes    Pt with hx of lap band in  - weighed 234 lbs at that time. ~ 5 mL in band.     Hx of vit D def.     Anthropometrics:  Initial consult weight per chart review: 268 lbs    Estimated body mass index is 47.93 kg/m  as calculated from the following:    Height as of 21: 1.651 m (5' 5\").    Weight as of 21: 130.6 kg (288 lb).   Weight 21 (appt with Lauren Bloch): 281 lbs    Current weight: 280 lbs, pt reported  - Reports     Pt wants to get to ~250 lbs. Needs surgery on her other foot (had one done Dec 2020) per report    Medications for Weight Loss:  Plan per Shoshana 21   Start Wegovy 0.25mg  Decrease phentermine to 18.75mg daily    10/4 - pt just finished 0.5 mg, picking up 1.0 this week. Taking phentermine every few days.     Supplements:   Pre-jayashree MVI daily   Hair, nail, skin - daily    Hx of Vit D def, hx of anemia  Noticing her hair is falling out   Lab appt today, 10/4 at 9:15 am - pt     NUTRITION HISTORY  See PA note for details.    Per Pharm D note 21   \"Patient reports normally getting 2 meals per day. If going to eat breakfast will be egg sandwich otherwise will skip. Lunch: example: rice, chicken, salad and dinner will be similar. Water intake: at least 6 glass of 8 oz water daily.\"    Pt reports weight has creeped up recently. [place] during pandemic and WFH. Foot surgery - decreased PA because of that.     Just got back from business trip - more eating out.  Doing well on wegovy - not able to finish whole plate anymore. Stopping when feeling satisfied.     Eating pattern - " 2 meals/day and 1 snack (typically fruit)  Typical meal - 6-8 oz protein, 6-8 oz veg, 1 cup carb   Carbs - mostly rice (basmati or yan)   Portions prior to this were larger per pt.     Discussed diet for inflammation - pt is Wall[place] and familiar with mediterranean type diet. Discussed foods she could consider incorporating more of.  Does not eat a lot of red meat    Has a sweet tooth - eats a sweet daily after lunch.  Has been in this habit for years.     Physical Activity:  Pt had foot surgery in Dec, needs surgery on other foot in future.    Additional information:  Son is also trying to lose weight     Nutrition Prescription  Recommended energy/nutrient modification.    Nutrition Diagnosis    Obesity r/t excessive energy intake aeb BMI >30.    Nutrition Intervention  Reviewed current dietary habits and pts history   Discussed long-term goals pt hopes to accomplish in RD appointments  Answered pt questions  Coordination of care - informed pt her lab appt is today. Pt thought it was tomorrow. Plans to go after our appt  Nutrition education - discussed importance of PA, diet recommendations for inflammation/general health, methods for breaking current habits with sugar  AVS and handouts via [employer]    Patient demonstrates understanding.      Nutrition Goals  1) Walk for 30 min while son is at soccer    2) Decrease intake of sugary foods  - Alternatives: oatmeal with toppings, fruit    3) Consider increasing anti-inflammatory foods in diet.     4) Take supplements as recommended    -Take the following after a Laparoscopic Adjustable Band Surgery:    Multivitamin/minerals: adult dose once daily (containing 18 mg of iron)    Calcium containing vitamin D: 500 mg 3 times daily or 600 mg 2 times daily    Resources:     https://www.health.[employer].edu/staying-healthy/foods-that-fight-inflammation     https://www.heart.org/en/healthy-living/healthy-eating/eat-smart/nutrition-basics/mediterranean-diet     Why Take  Supplements for Life after Weight Loss Surgery  [email or web address]://[email or web address]      Keeping Up Your Diet after Weight Loss Surgery  [email or web address]      Follow-Up:  Prn per pt    Time spent with patient: 30 minutes.  TAMIKO Jeter, RD, LD

## 2021-10-05 LAB
ALBUMIN SERPL-MCNC: 3.1 G/DL (ref 3.4–5)
ALP SERPL-CCNC: 50 U/L (ref 40–150)
ALT SERPL W P-5'-P-CCNC: 22 U/L (ref 0–50)
ANION GAP SERPL CALCULATED.3IONS-SCNC: 7 MMOL/L (ref 3–14)
AST SERPL W P-5'-P-CCNC: 14 U/L (ref 0–45)
BILIRUB SERPL-MCNC: 0.3 MG/DL (ref 0.2–1.3)
BUN SERPL-MCNC: 9 MG/DL (ref 7–30)
CALCIUM SERPL-MCNC: 9.3 MG/DL (ref 8.5–10.1)
CHLORIDE BLD-SCNC: 101 MMOL/L (ref 94–109)
CHOLEST SERPL-MCNC: 217 MG/DL
CO2 SERPL-SCNC: 26 MMOL/L (ref 20–32)
CREAT SERPL-MCNC: 0.73 MG/DL (ref 0.52–1.04)
FASTING STATUS PATIENT QL REPORTED: YES
GFR SERPL CREATININE-BSD FRML MDRD: >90 ML/MIN/1.73M2
GLUCOSE BLD-MCNC: 87 MG/DL (ref 70–99)
HDLC SERPL-MCNC: 93 MG/DL
LDLC SERPL CALC-MCNC: 100 MG/DL
NONHDLC SERPL-MCNC: 124 MG/DL
POTASSIUM BLD-SCNC: 3.8 MMOL/L (ref 3.4–5.3)
PROT SERPL-MCNC: 8.4 G/DL (ref 6.8–8.8)
SODIUM SERPL-SCNC: 134 MMOL/L (ref 133–144)
TRIGL SERPL-MCNC: 119 MG/DL

## 2021-11-06 DIAGNOSIS — E66.813 CLASS 3 SEVERE OBESITY DUE TO EXCESS CALORIES WITH SERIOUS COMORBIDITY AND BODY MASS INDEX (BMI) OF 45.0 TO 49.9 IN ADULT (H): ICD-10-CM

## 2021-11-06 DIAGNOSIS — E66.01 CLASS 3 SEVERE OBESITY DUE TO EXCESS CALORIES WITH SERIOUS COMORBIDITY AND BODY MASS INDEX (BMI) OF 45.0 TO 49.9 IN ADULT (H): ICD-10-CM

## 2021-11-09 RX ORDER — SEMAGLUTIDE 1 MG/.5ML
1 INJECTION, SOLUTION SUBCUTANEOUS
Qty: 2 ML | Refills: 0 | Status: SHIPPED | OUTPATIENT
Start: 2021-11-09 | End: 2021-11-10

## 2021-11-09 NOTE — TELEPHONE ENCOUNTER
Semaglutide-Weight Management 1 MG/0.5ML SOAJ      Last Written Prescription Date:  9/23/2021  Last Fill Quantity: 2 ml,   # refills: 0  Last Office Visit : 9/23/2021 Pharm D  Future Office visit:  11/10/2021     Routing refill request to provider for review/approval because:  Refill needs review: dose  - prior refill dose was increased   - appt 11/10/2021

## 2021-11-10 ENCOUNTER — TELEPHONE (OUTPATIENT)
Dept: SURGERY | Facility: CLINIC | Age: 46
End: 2021-11-10

## 2021-11-10 ENCOUNTER — VIRTUAL VISIT (OUTPATIENT)
Dept: ENDOCRINOLOGY | Facility: CLINIC | Age: 46
End: 2021-11-10
Payer: COMMERCIAL

## 2021-11-10 ENCOUNTER — TELEPHONE (OUTPATIENT)
Dept: ENDOCRINOLOGY | Facility: CLINIC | Age: 46
End: 2021-11-10

## 2021-11-10 VITALS — BODY MASS INDEX: 45.48 KG/M2 | HEIGHT: 65 IN | WEIGHT: 273 LBS

## 2021-11-10 DIAGNOSIS — E66.813 CLASS 3 SEVERE OBESITY DUE TO EXCESS CALORIES WITH SERIOUS COMORBIDITY AND BODY MASS INDEX (BMI) OF 40.0 TO 44.9 IN ADULT (H): Primary | ICD-10-CM

## 2021-11-10 DIAGNOSIS — E66.01 CLASS 3 SEVERE OBESITY DUE TO EXCESS CALORIES WITH SERIOUS COMORBIDITY AND BODY MASS INDEX (BMI) OF 40.0 TO 44.9 IN ADULT (H): Primary | ICD-10-CM

## 2021-11-10 PROCEDURE — 99213 OFFICE O/P EST LOW 20 MIN: CPT | Mod: 95 | Performed by: PHYSICIAN ASSISTANT

## 2021-11-10 RX ORDER — SEMAGLUTIDE 2.4 MG/.75ML
2.4 INJECTION, SOLUTION SUBCUTANEOUS WEEKLY
Qty: 3 ML | Refills: 0 | Status: SHIPPED | OUTPATIENT
Start: 2021-11-10 | End: 2021-12-21

## 2021-11-10 RX ORDER — SEMAGLUTIDE 1.7 MG/.75ML
1.7 INJECTION, SOLUTION SUBCUTANEOUS WEEKLY
Qty: 3 ML | Refills: 0 | Status: SHIPPED | OUTPATIENT
Start: 2021-11-10 | End: 2021-12-21

## 2021-11-10 ASSESSMENT — PAIN SCALES - GENERAL: PAINLEVEL: NO PAIN (0)

## 2021-11-10 ASSESSMENT — MIFFLIN-ST. JEOR: SCORE: 1869.2

## 2021-11-10 NOTE — TELEPHONE ENCOUNTER
Central Prior Authorization Team   Phone: [phone]      PA Initiation    Medication: WEGOVY 1.7MG/0.75ML - INITIATED  Insurance Company: [employer] [place] - Phone [phone] Fax [phone]  Pharmacy Filling the Rx: [facility] - [place], [place] - [address] [place] [address] TAMI.  Filling Pharmacy Phone: [phone]  Filling Pharmacy Fax: [phone]  Start Date: 11/10/2021

## 2021-11-10 NOTE — LETTER
11/10/2021       RE: Raffy Castelan  [address]  [facility] [address]     Dear Colleague,    Thank you for referring your patient, Raffy Castelan, to the [facility] [place] at [facility]. Please see a copy of my visit note below.    Raffy is a 46 year old who is being evaluated via a billable video visit.      How would you like to obtain your AVS? MyChart  If the video visit is dropped, the invitation should be resent by: Text to cell phone: [phone]  Will anyone else be joining your video visit? No    Video Start Time: 9:43 AM    Video-Visit Details    Type of service:  Video Visit    Video End Time:1000    Originating Location (pt. Location): Home    Distant Location (provider location):  [facility] [place]     Platform used for Video Visit: [employer]       20 minutes spent on the date of the encounter doing chart review, history and exam, documentation and further activities per the note    Return Medical Weight Management Note     Raffy Castelan  MRN:  [id number]  :  1975  MARIELOS:  11/10/2021    Dear Physician No Ref-Primary,    I had the pleasure of seeing your patient Raffy Castelan. She is a 46 year old female who I am continuing to see for treatment of obesity related to:    No flowsheet data found.    Assessment & Plan   Problem List Items Addressed This Visit     None      Visit Diagnoses     Class 3 severe obesity due to excess calories with serious comorbidity and body mass index (BMI) of 40.0 to 44.9 in adult (H)    -  Primary    Relevant Medications    Semaglutide-Weight Management (WEGOVY) 1.7 MG/0.75ML SOAJ    Semaglutide-Weight Management (WEGOVY) 2.4 MG/0.75ML SOAJ             INTERVAL HISTORY:  [facility] follow up.    Lap band:  and weighed 234 lbs, ~5cc in band.  Last visit 21 with me and we started Wegovy and decreased phentermine to 18.75mg daily  Last visit  with Demetria was 8/25/21 and she increased Wegovy to 0.5mg weekly  She is now taking Wegovy 1mg weekly and doing well.  Occasional nausea.  Taking phentermine 18.75mg occasionally.       PLAN:  MWM follow up doing very well on Wegovy and feeling a significant difference and decrease in hunger  Increase Wegovy to 1.7mg weekly. She has lost from >290 lbs highest weight to 273 lbs today which is 5.86% of TBW  In one month we will increase to Wegovy 2.4mg if she has done well on 1.7mg dose.  Can stop phentermine as it was not effective  Follow up MTM 6 weeks Lauren Bloch  Follow up Shoshana 3 months return MW      CURRENT WEIGHT:   273 lbs 0 oz    Highest wt in life >290 lbs  Initial Weight (lbs): 290 lbs  Last Visits Weight: 130.6 kg (288 lb)  Cumulative weight loss (lbs): 17  Weight Loss Percentage: 5.86%    Changes and Difficulties 11/10/2021   I have made the following changes to my diet since my last visit: Less portions   With regards to my diet, I am still struggling with: Sweets   I have made the following changes to my activity/exercise since my last visit: -   With regards to my activity/exercise, I am still struggling with: -         MEDICATIONS:   Current Outpatient Medications   Medication Sig Dispense Refill     albuterol (PROAIR HFA/PROVENTIL HFA/VENTOLIN HFA) 108 (90 Base) MCG/ACT inhaler Inhale 2 puffs into the lungs every 6 hours as needed for shortness of breath / dyspnea or wheezing 8.5 g 3     clobetasol propionate (OLUX) 0.05 % external foam Apply sparingly to affected area twice daily as needed.  Do not apply to face. 600 g 3     losartan-hydrochlorothiazide (HYZAAR) 50-12.5 MG tablet Take 1 tablet by mouth daily 90 tablet 3     Multiple Vitamins-Minerals (HAIR SKIN & NAILS ADVANCED PO) Take 2 tablets by mouth daily       Semaglutide-Weight Management (WEGOVY) 1.7 MG/0.75ML SOAJ Inject 1.7 mg Subcutaneous once a week 3 mL 0     Semaglutide-Weight Management (WEGOVY) 2.4 MG/0.75ML SOAJ Inject 2.4 mg  Subcutaneous once a week 3 mL 0     senna (SENNA LAXATIVE) 8.6 MG tablet Take 1 tablet by mouth daily         Weight Loss Medication History Reviewed With Patient 11/10/2021   Which weight loss medications are you currently taking on a regular basis?  Ozempic   Are you having any side effects from the weight loss medication that we have prescribed you? No   If you are having side effects please describe: -       Lab on 10/04/2021   Component Date Value Ref Range Status     WBC Count 10/04/2021 8.3  4.0 - 11.0 10e3/uL Final     RBC Count 10/04/2021 4.45  3.80 - 5.20 10e6/uL Final     Hemoglobin 10/04/2021 11.0* 11.7 - 15.7 g/dL Final     Hematocrit 10/04/2021 34.5* 35.0 - 47.0 % Final     MCV 10/04/2021 78  78 - 100 fL Final     MCH 10/04/2021 24.7* 26.5 - 33.0 pg Final     MCHC 10/04/2021 31.9  31.5 - 36.5 g/dL Final     RDW 10/04/2021 15.1* 10.0 - 15.0 % Final     Platelet Count 10/04/2021 411  150 - 450 10e3/uL Final     Sodium 10/04/2021 134  133 - 144 mmol/L Final     Potassium 10/04/2021 3.8  3.4 - 5.3 mmol/L Final     Chloride 10/04/2021 101  94 - 109 mmol/L Final     Carbon Dioxide (CO2) 10/04/2021 26  20 - 32 mmol/L Final     Anion Gap 10/04/2021 7  3 - 14 mmol/L Final     Urea Nitrogen 10/04/2021 9  7 - 30 mg/dL Final     Creatinine 10/04/2021 0.73  0.52 - 1.04 mg/dL Final     Calcium 10/04/2021 9.3  8.5 - 10.1 mg/dL Final     Glucose 10/04/2021 87  70 - 99 mg/dL Final     Alkaline Phosphatase 10/04/2021 50  40 - 150 U/L Final     AST 10/04/2021 14  0 - 45 U/L Final     ALT 10/04/2021 22  0 - 50 U/L Final     Protein Total 10/04/2021 8.4  6.8 - 8.8 g/dL Final     Albumin 10/04/2021 3.1* 3.4 - 5.0 g/dL Final     Bilirubin Total 10/04/2021 0.3  0.2 - 1.3 mg/dL Final     GFR Estimate 10/04/2021 >90  >60 mL/min/1.73m2 Final    As of July 11, 2021, eGFR is calculated by the CKD-EPI creatinine equation, without race adjustment. eGFR can be influenced by muscle mass, exercise, and diet. The reported eGFR is an  "estimation only and is only applicable if the renal function is stable.     Hemoglobin A1C 10/04/2021 5.8* 0.0 - 5.6 % Final    Normal <5.7%   Prediabetes 5.7-6.4%    Diabetes 6.5% or higher     Note: Adopted from ADA consensus guidelines.     Cholesterol 10/04/2021 217* <200 mg/dL Final     Triglycerides 10/04/2021 119  <150 mg/dL Final     Direct Measure HDL 10/04/2021 93  >=50 mg/dL Final     LDL Cholesterol Calculated 10/04/2021 100  <=100 mg/dL Final     Non HDL Cholesterol 10/04/2021 124  <130 mg/dL Final     Patient Fasting > 8hrs? 10/04/2021 Yes   Final     Parathyroid Hormone Intact 10/04/2021 29  18 - 80 pg/mL Final     Vitamin D, Total (25-Hydroxy) 10/04/2021 30  20 - 75 ug/L Final       PHYSICAL EXAM:  Objective    Ht 1.651 m (5' 5\")   Wt 123.8 kg (273 lb)   BMI 45.43 kg/m      Vitals - Patient Reported  Pain Score: No Pain (0)        GENERAL: Healthy, alert and no distress  EYES: Eyes grossly normal to inspection.  No discharge or erythema, or obvious scleral/conjunctival abnormalities.  RESP: No audible wheeze, cough, or visible cyanosis.  No visible retractions or increased work of breathing.    SKIN: Visible skin clear. No significant rash, abnormal pigmentation or lesions.  NEURO: Cranial nerves grossly intact.  Mentation and speech appropriate for age.  PSYCH: Mentation appears normal, affect normal/bright, judgement and insight intact, normal speech and appearance well-groomed.        Sincerely,    Shoshana Bustamante PA-C    "

## 2021-11-10 NOTE — Clinical Note
Ok so she has been getting Wegovy as part of the 6 mo coupon thing that Wegovy had when it first started during the summer and that will run out soon.  She said Wegovy was denied under coverage b/c she wasn't losing enough weight on it but I don't think we listed her results correctly bc her weight had gone up and down.  Can you so another PA with info from my plan today indicating she has lost >5% from highest wt and we are still working our way up on wegovy dosing.  Thanks!

## 2021-11-10 NOTE — TELEPHONE ENCOUNTER
Patient's coupon savings card that she is currently using for Wegovy will  after 6 months so trying for appeal as of now to see if can get covered.     Medication Appeal Request    Please initiate an appeal for the requested medication: WEGOVY-PA denied    Has a letter of medical necessity been completed in EPIC?   Yes    Any additional lab values/information to include? No    Would you like to include any research articles? No              If yes please include the hyperlink(s) below or fax to    [phone] for Specialty/Retail               [phone] for Infusion/Clinic Administered.                Include the patients name and MRN on the fax cover sheet.    Lauren Bloch, PharmD  Medication Therapy Management Pharmacist   [facility] [place]

## 2021-11-10 NOTE — TELEPHONE ENCOUNTER
Central Prior Authorization Team   Phone: [phone]      Duplicate request - see previous encounters.    PA's in process.

## 2021-11-10 NOTE — PROGRESS NOTES
Raffy is a 46 year old who is being evaluated via a billable video visit.      How would you like to obtain your AVS? MyChart  If the video visit is dropped, the invitation should be resent by: Text to cell phone: [phone]  Will anyone else be joining your video visit? No    Video Start Time: 9:43 AM    Video-Visit Details    Type of service:  Video Visit    Video End Time:1000    Originating Location (pt. Location): Home    Distant Location (provider location):  [facility]NA[facility] [place]     Platform used for Video Visit: [employer]       20 minutes spent on the date of the encounter doing chart review, history and exam, documentation and further activities per the note    Return Medical Weight Management Note     Raffy Castelan  MRN:  [id number]  :  1975  MARIELOS:  11/10/2021    Dear Physician No Ref-Primary,    I had the pleasure of seeing your patient Raffy Castelan. She is a 46 year old female who I am continuing to see for treatment of obesity related to:    No flowsheet data found.    Assessment & Plan   Problem List Items Addressed This Visit     None      Visit Diagnoses     Class 3 severe obesity due to excess calories with serious comorbidity and body mass index (BMI) of 40.0 to 44.9 in adult (H)    -  Primary    Relevant Medications    Semaglutide-Weight Management (WEGOVY) 1.7 MG/0.75ML SOAJ    Semaglutide-Weight Management (WEGOVY) 2.4 MG/0.75ML SOAJ             INTERVAL HISTORY:  [facility] follow up.    Lap band:  and weighed 234 lbs, ~5cc in band.  Last visit 21 with me and we started Wegovy and decreased phentermine to 18.75mg daily  Last visit with Demetria was 21 and she increased Wegovy to 0.5mg weekly  She is now taking Wegovy 1mg weekly and doing well.  Occasional nausea.  Taking phentermine 18.75mg occasionally.       PLAN:  MW follow up doing very well on Wegovy and feeling a significant difference and decrease in hunger  Increase Wegovy to 1.7mg  weekly. She has lost from >290 lbs highest weight to 273 lbs today which is 5.86% of TBW  In one month we will increase to Wegovy 2.4mg if she has done well on 1.7mg dose.  Can stop phentermine as it was not effective  Follow up MTM 6 weeks Lauren Bloch  Follow up Shoshana 3 months return MWM      CURRENT WEIGHT:   273 lbs 0 oz    Highest wt in life >290 lbs  Initial Weight (lbs): 290 lbs  Last Visits Weight: 130.6 kg (288 lb)  Cumulative weight loss (lbs): 17  Weight Loss Percentage: 5.86%    Changes and Difficulties 11/10/2021   I have made the following changes to my diet since my last visit: Less portions   With regards to my diet, I am still struggling with: Sweets   I have made the following changes to my activity/exercise since my last visit: -   With regards to my activity/exercise, I am still struggling with: -         MEDICATIONS:   Current Outpatient Medications   Medication Sig Dispense Refill     albuterol (PROAIR HFA/PROVENTIL HFA/VENTOLIN HFA) 108 (90 Base) MCG/ACT inhaler Inhale 2 puffs into the lungs every 6 hours as needed for shortness of breath / dyspnea or wheezing 8.5 g 3     clobetasol propionate (OLUX) 0.05 % external foam Apply sparingly to affected area twice daily as needed.  Do not apply to face. 600 g 3     losartan-hydrochlorothiazide (HYZAAR) 50-12.5 MG tablet Take 1 tablet by mouth daily 90 tablet 3     Multiple Vitamins-Minerals (HAIR SKIN & NAILS ADVANCED PO) Take 2 tablets by mouth daily       Semaglutide-Weight Management (WEGOVY) 1.7 MG/0.75ML SOAJ Inject 1.7 mg Subcutaneous once a week 3 mL 0     Semaglutide-Weight Management (WEGOVY) 2.4 MG/0.75ML SOAJ Inject 2.4 mg Subcutaneous once a week 3 mL 0     senna (SENNA LAXATIVE) 8.6 MG tablet Take 1 tablet by mouth daily         Weight Loss Medication History Reviewed With Patient 11/10/2021   Which weight loss medications are you currently taking on a regular basis?  Ozempic   Are you having any side effects from the weight loss  medication that we have prescribed you? No   If you are having side effects please describe: -       Lab on 10/04/2021   Component Date Value Ref Range Status     WBC Count 10/04/2021 8.3  4.0 - 11.0 10e3/uL Final     RBC Count 10/04/2021 4.45  3.80 - 5.20 10e6/uL Final     Hemoglobin 10/04/2021 11.0* 11.7 - 15.7 g/dL Final     Hematocrit 10/04/2021 34.5* 35.0 - 47.0 % Final     MCV 10/04/2021 78  78 - 100 fL Final     MCH 10/04/2021 24.7* 26.5 - 33.0 pg Final     MCHC 10/04/2021 31.9  31.5 - 36.5 g/dL Final     RDW 10/04/2021 15.1* 10.0 - 15.0 % Final     Platelet Count 10/04/2021 411  150 - 450 10e3/uL Final     Sodium 10/04/2021 134  133 - 144 mmol/L Final     Potassium 10/04/2021 3.8  3.4 - 5.3 mmol/L Final     Chloride 10/04/2021 101  94 - 109 mmol/L Final     Carbon Dioxide (CO2) 10/04/2021 26  20 - 32 mmol/L Final     Anion Gap 10/04/2021 7  3 - 14 mmol/L Final     Urea Nitrogen 10/04/2021 9  7 - 30 mg/dL Final     Creatinine 10/04/2021 0.73  0.52 - 1.04 mg/dL Final     Calcium 10/04/2021 9.3  8.5 - 10.1 mg/dL Final     Glucose 10/04/2021 87  70 - 99 mg/dL Final     Alkaline Phosphatase 10/04/2021 50  40 - 150 U/L Final     AST 10/04/2021 14  0 - 45 U/L Final     ALT 10/04/2021 22  0 - 50 U/L Final     Protein Total 10/04/2021 8.4  6.8 - 8.8 g/dL Final     Albumin 10/04/2021 3.1* 3.4 - 5.0 g/dL Final     Bilirubin Total 10/04/2021 0.3  0.2 - 1.3 mg/dL Final     GFR Estimate 10/04/2021 >90  >60 mL/min/1.73m2 Final    As of July 11, 2021, eGFR is calculated by the CKD-EPI creatinine equation, without race adjustment. eGFR can be influenced by muscle mass, exercise, and diet. The reported eGFR is an estimation only and is only applicable if the renal function is stable.     Hemoglobin A1C 10/04/2021 5.8* 0.0 - 5.6 % Final    Normal <5.7%   Prediabetes 5.7-6.4%    Diabetes 6.5% or higher     Note: Adopted from ADA consensus guidelines.     Cholesterol 10/04/2021 217* <200 mg/dL Final     Triglycerides 10/04/2021  "119  <150 mg/dL Final     Direct Measure HDL 10/04/2021 93  >=50 mg/dL Final     LDL Cholesterol Calculated 10/04/2021 100  <=100 mg/dL Final     Non HDL Cholesterol 10/04/2021 124  <130 mg/dL Final     Patient Fasting > 8hrs? 10/04/2021 Yes   Final     Parathyroid Hormone Intact 10/04/2021 29  18 - 80 pg/mL Final     Vitamin D, Total (25-Hydroxy) 10/04/2021 30  20 - 75 ug/L Final       PHYSICAL EXAM:  Objective    Ht 1.651 m (5' 5\")   Wt 123.8 kg (273 lb)   BMI 45.43 kg/m      Vitals - Patient Reported  Pain Score: No Pain (0)        GENERAL: Healthy, alert and no distress  EYES: Eyes grossly normal to inspection.  No discharge or erythema, or obvious scleral/conjunctival abnormalities.  RESP: No audible wheeze, cough, or visible cyanosis.  No visible retractions or increased work of breathing.    SKIN: Visible skin clear. No significant rash, abnormal pigmentation or lesions.  NEURO: Cranial nerves grossly intact.  Mentation and speech appropriate for age.  PSYCH: Mentation appears normal, affect normal/bright, judgement and insight intact, normal speech and appearance well-groomed.        Sincerely,    Shoshana Bustamante PA-C  "

## 2021-11-10 NOTE — TELEPHONE ENCOUNTER
Central Prior Authorization Team   Phone: [phone]      PA Initiation    Medication: WEGOVY 2.4MG/0.75ML - INITIATED  Insurance Company: [employer] [place] - Phone [phone] Fax [phone]  Pharmacy Filling the Rx: [facility] - [place], [place] - [address] [place] [address] TAM[place].  Filling Pharmacy Phone: [phone]  Filling Pharmacy Fax: [phone]  Start Date: 11/10/2021

## 2021-11-10 NOTE — TELEPHONE ENCOUNTER
"Prior Authorization Retail Medication Request    Medication/Dose: Wegovy    ICD code (if different than what is on RX):      Previously Tried and Failed: Lap band, failed Qsymia due to side effects of brain fog and confusion, failed Saxenda due to lack of significant weight loss, diet, exercise     Rationale: Patient has lost >5% of her weight from highest weight and we are still working our way up on wegovy dosing. Joint pain, GERD    Patient qualifies for use of weight loss medication(s) because they have a BMI of at least 30 kg/m^2.     Estimated body mass index is 45.43 kg/m  as calculated from the following:    Height as of an earlier encounter on 11/10/21: 1.651 m (5' 5\").    Weight as of an earlier encounter on 11/10/21: 123.8 kg (273 lb).    Insurance Name:    Insurance ID:        Pharmacy Information (if different than what is on RX)  Name:  [facility] [place] - [place], [place] - [address] [place] [address].  Phone: [phone]     "

## 2021-11-10 NOTE — NURSING NOTE
"Chief Complaint   Patient presents with     RECHECK     Follow-up Weight Management       Vitals:    11/10/21 0931   Weight: 123.8 kg (273 lb)   Height: 1.651 m (5' 5\")       Body mass index is 45.43 kg/m .                       "

## 2021-11-11 NOTE — TELEPHONE ENCOUNTER
Central Prior Authorization Team   Phone: [phone]      PRIOR AUTHORIZATION DENIED    Medication: WEGOVY 2.4MG/0.75ML - DENIED    Denial Date: 11/10/2021    Denial Rational:       Appeal Information:

## 2021-11-11 NOTE — TELEPHONE ENCOUNTER
Central Prior Authorization Team   Phone: [phone]      PRIOR AUTHORIZATION DENIED    Medication: WEGOVY 1.7MG/0.75ML - DENIED    Denial Date: 11/10/2021    Denial Rational:       Appeal Information:

## 2021-11-11 NOTE — TELEPHONE ENCOUNTER
Central Prior Authorization Team   Phone: [phone]      Medication Appeal Initiation    We have initiated an appeal for the requested medication:  Medication: WEGOVY-PA Appeal initiated  Appeal Start Date:  11/11/2021  Insurance Company:    Comments:  Patient has [employer] [place]; [id number]; [facility]; ID-[id number]  Appeal and letter of medical ncessity sent to [employer]

## 2021-11-22 NOTE — TELEPHONE ENCOUNTER
Called to check status of appeal - INS stated they did not receive initial appeal  Re-sent appeal to [facility] appeal dept  Fax# [phone]  Phone: [phone]

## 2021-11-23 NOTE — TELEPHONE ENCOUNTER
I am unable to reach anyone in appeals via phone. The number I'm being transferred to by [employer] ([phone]) is a dead end, saying no one is available to take my call and it hangs up.

## 2021-11-24 NOTE — TELEPHONE ENCOUNTER
Updated patient that the insurance has up to 30 days from date of appeal submission to give answer regarding appeal status. Appeal submitted 11/11/2021.     Lauren Bloch, PharmD  Medication Therapy Management Pharmacist   [facility] [place]

## 2021-11-29 ENCOUNTER — LAB (OUTPATIENT)
Dept: URGENT CARE | Facility: URGENT CARE | Age: 46
End: 2021-11-29
Payer: COMMERCIAL

## 2021-11-29 ENCOUNTER — OFFICE VISIT (OUTPATIENT)
Dept: URGENT CARE | Facility: URGENT CARE | Age: 46
End: 2021-11-29
Payer: COMMERCIAL

## 2021-11-29 DIAGNOSIS — Z20.822 EXPOSURE TO 2019 NOVEL CORONAVIRUS: ICD-10-CM

## 2021-11-29 DIAGNOSIS — Z20.822 EXPOSURE TO 2019 NOVEL CORONAVIRUS: Primary | ICD-10-CM

## 2021-11-29 PROCEDURE — U0003 INFECTIOUS AGENT DETECTION BY NUCLEIC ACID (DNA OR RNA); SEVERE ACUTE RESPIRATORY SYNDROME CORONAVIRUS 2 (SARS-COV-2) (CORONAVIRUS DISEASE [COVID-19]), AMPLIFIED PROBE TECHNIQUE, MAKING USE OF HIGH THROUGHPUT TECHNOLOGIES AS DESCRIBED BY CMS-2020-01-R: HCPCS

## 2021-11-29 PROCEDURE — 99207 PR NO CHARGE LOS: CPT | Performed by: PHYSICIAN ASSISTANT

## 2021-11-29 PROCEDURE — U0005 INFEC AGEN DETEC AMPLI PROBE: HCPCS

## 2021-11-29 NOTE — PROGRESS NOTES
Patient just wants covid testing due to exposure.  Does not need to see a provider.  Will switch over to covid curbside testing.  Recommend testing 5-7days after exposure to reduce risk of false negative results.  Tylenol as needed for pain/fever, rest, fluids, chicken soup.  Recommend self quarantine pending results.  To the urgent care/ER if worsening cough, shortness of breath, wheezing, fevers or chest pain.    Teri See OMAIRA Mike

## 2021-11-30 LAB — SARS-COV-2 RNA RESP QL NAA+PROBE: NEGATIVE

## 2021-12-05 ENCOUNTER — MYC MEDICAL ADVICE (OUTPATIENT)
Dept: FAMILY MEDICINE | Facility: CLINIC | Age: 46
End: 2021-12-05
Payer: COMMERCIAL

## 2021-12-05 DIAGNOSIS — I10 HYPERTENSION GOAL BP (BLOOD PRESSURE) < 140/90: ICD-10-CM

## 2021-12-06 RX ORDER — LOSARTAN POTASSIUM AND HYDROCHLOROTHIAZIDE 12.5; 5 MG/1; MG/1
1 TABLET ORAL DAILY
Qty: 30 TABLET | Refills: 0 | Status: SHIPPED | OUTPATIENT
Start: 2021-12-06 | End: 2021-12-31

## 2021-12-06 NOTE — TELEPHONE ENCOUNTER
Routing to Dr. Tavares- see mychart mesg    Rx pending approval if appropriate    Cally Black RN

## 2021-12-06 NOTE — TELEPHONE ENCOUNTER
See separate refill request.     Sonya Kennedy, RN, BSN, PHN  [facility]: [place]

## 2021-12-06 NOTE — TELEPHONE ENCOUNTER
Pt has an appointment scheduled for Dec 12th. Needs enough to get to appointment. Pt is out of medication.

## 2021-12-07 RX ORDER — LOSARTAN POTASSIUM AND HYDROCHLOROTHIAZIDE 12.5; 5 MG/1; MG/1
1 TABLET ORAL DAILY
Qty: 90 TABLET | Refills: 0 | Status: SHIPPED | OUTPATIENT
Start: 2021-12-07 | End: 2022-01-12

## 2021-12-07 NOTE — TELEPHONE ENCOUNTER
Patient picked up her medication yesterday. She stated she will be out of her medications before her next appt on 1/12/22. Informed patient writer can postpone encounter about a week prior to her medications being out and routed to refill pool and if she does not hear a week before medications being out to call the clinic.    Teri Nieto MA

## 2021-12-07 NOTE — TELEPHONE ENCOUNTER
Patient has not been seen in over a year.  Has a physical scheduled for 1/12.  Will send refills to cover her until her appt.

## 2021-12-14 NOTE — TELEPHONE ENCOUNTER
Automated system says PA status is only on [employer]. No representatives are available at this time. I have refaxed the appeal for the 3rd time asking for an immediate response.

## 2021-12-16 NOTE — TELEPHONE ENCOUNTER
I attempted to call insurance again and got the same situation where no one is available to take my call and hangs up on me. I refaxed for the 4th time.

## 2021-12-20 NOTE — TELEPHONE ENCOUNTER
Again I am unable to get through to the phone number we have for Appeals. I refaxed this to both [phone] and [phone] asking for immediate response.

## 2021-12-21 ENCOUNTER — MYC REFILL (OUTPATIENT)
Dept: ENDOCRINOLOGY | Facility: CLINIC | Age: 46
End: 2021-12-21
Payer: COMMERCIAL

## 2021-12-21 ENCOUNTER — MYC MEDICAL ADVICE (OUTPATIENT)
Dept: ENDOCRINOLOGY | Facility: CLINIC | Age: 46
End: 2021-12-21
Payer: COMMERCIAL

## 2021-12-21 ENCOUNTER — TELEPHONE (OUTPATIENT)
Dept: SURGERY | Facility: CLINIC | Age: 46
End: 2021-12-21

## 2021-12-21 DIAGNOSIS — E66.813 CLASS 3 SEVERE OBESITY DUE TO EXCESS CALORIES WITH SERIOUS COMORBIDITY AND BODY MASS INDEX (BMI) OF 40.0 TO 44.9 IN ADULT (H): ICD-10-CM

## 2021-12-21 DIAGNOSIS — E66.01 CLASS 3 SEVERE OBESITY DUE TO EXCESS CALORIES WITH SERIOUS COMORBIDITY AND BODY MASS INDEX (BMI) OF 40.0 TO 44.9 IN ADULT (H): ICD-10-CM

## 2021-12-21 DIAGNOSIS — E66.813 CLASS 3 SEVERE OBESITY DUE TO EXCESS CALORIES WITH SERIOUS COMORBIDITY AND BODY MASS INDEX (BMI) OF 40.0 TO 44.9 IN ADULT (H): Primary | ICD-10-CM

## 2021-12-21 DIAGNOSIS — E66.01 CLASS 3 SEVERE OBESITY DUE TO EXCESS CALORIES WITH SERIOUS COMORBIDITY AND BODY MASS INDEX (BMI) OF 40.0 TO 44.9 IN ADULT (H): Primary | ICD-10-CM

## 2021-12-21 RX ORDER — NALTREXONE HYDROCHLORIDE AND BUPROPION HYDROCHLORIDE 8; 90 MG/1; MG/1
TABLET, EXTENDED RELEASE ORAL
Qty: 120 TABLET | Refills: 0 | Status: SHIPPED | OUTPATIENT
Start: 2021-12-21 | End: 2022-05-31

## 2021-12-21 RX ORDER — SEMAGLUTIDE 2.4 MG/.75ML
2.4 INJECTION, SOLUTION SUBCUTANEOUS WEEKLY
Qty: 3 ML | Refills: 0 | Status: SHIPPED | OUTPATIENT
Start: 2021-12-21 | End: 2022-05-31

## 2021-12-21 RX ORDER — SEMAGLUTIDE 2.4 MG/.75ML
2.4 INJECTION, SOLUTION SUBCUTANEOUS WEEKLY
Qty: 3 ML | Refills: 0 | Status: CANCELLED | OUTPATIENT
Start: 2021-12-21

## 2021-12-21 NOTE — TELEPHONE ENCOUNTER
MEDICATION APPEAL DENIED    Medication: WEGOVY-PA Appeal denied    Denial Date: 12/10/2021    Denial Rational: Excluded        Second Level Appeal Information: Second level appeals will be managed by the clinic staff and provider. Please contact the [facility] Prior Authorization Team if additional information about the denial is needed.

## 2021-12-22 NOTE — TELEPHONE ENCOUNTER
Prior Authorization Approval    Authorization Effective Date: 12/21/2021  Authorization Expiration Date: 4/21/2022  Medication: naltrexone-bupropion (CONTRAVE) 8-90 MG per 12 hr tablet--APPROVED  Approved Dose/Quantity:   Reference #:     Insurance Company: [employer] - Phone [phone] Fax [phone]  Expected CoPay:       CoPay Card Available:      Foundation Assistance Needed:    Which Pharmacy is filling the prescription (Not needed for infusion/clinic administered): [facility] - [place], [place] - [address] [place] [address].  Pharmacy Notified: Yes  Patient Notified: Yes **Instructed pharmacy to notify patient when script is ready to /ship.**

## 2021-12-22 NOTE — TELEPHONE ENCOUNTER
PA Initiation    Medication: naltrexone-bupropion (CONTRAVE) 8-90 MG per 12 hr tablet   Insurance Company: [employer] - Phone [phone] Fax [phone]  Pharmacy Filling the Rx: [facility] - [place], [place] - [address] [place] [address].  Filling Pharmacy Phone: [phone]  Filling Pharmacy Fax: [phone]  Start Date: 12/22/2021

## 2021-12-31 RX ORDER — LOSARTAN POTASSIUM AND HYDROCHLOROTHIAZIDE 12.5; 5 MG/1; MG/1
1 TABLET ORAL DAILY
Qty: 30 TABLET | Refills: 0 | Status: SHIPPED | OUTPATIENT
Start: 2021-12-31 | End: 2022-01-12

## 2021-12-31 NOTE — TELEPHONE ENCOUNTER
Routing refill request to provider for review/approval because:  Drug interaction warning    Scheduled for next visit 1/12/22    Cally Black RN

## 2022-01-03 NOTE — TELEPHONE ENCOUNTER
Medication refill denied. Refill was sent to pharmacy on 12/21/21. Patient will no longer be taking Wegovy, as the cost is too high. She is switched to Contrave.

## 2022-01-07 DIAGNOSIS — L40.9 PSORIASIS: ICD-10-CM

## 2022-01-07 RX ORDER — CLOBETASOL PROPIONATE 0.5 MG/G
AEROSOL, FOAM TOPICAL
Qty: 600 G | Refills: 3 | Status: SHIPPED | OUTPATIENT
Start: 2022-01-07 | End: 2023-04-13

## 2022-01-07 NOTE — TELEPHONE ENCOUNTER
Routing refill request to provider for review/approval because:  Drug not on the [facility] refill protocol     Pending Prescriptions:                       Disp   Refills    clobetasol propionate (OLUX) 0.05 % extern*600 g  3        Sig: Apply sparingly to affected area twice daily as           needed.  Do not apply to face.        Leela Lanza RN

## 2022-01-12 ENCOUNTER — OFFICE VISIT (OUTPATIENT)
Dept: FAMILY MEDICINE | Facility: CLINIC | Age: 47
End: 2022-01-12
Payer: COMMERCIAL

## 2022-01-12 VITALS
HEIGHT: 65 IN | WEIGHT: 266.2 LBS | TEMPERATURE: 98.3 F | DIASTOLIC BLOOD PRESSURE: 88 MMHG | BODY MASS INDEX: 44.35 KG/M2 | SYSTOLIC BLOOD PRESSURE: 130 MMHG | OXYGEN SATURATION: 100 % | HEART RATE: 98 BPM

## 2022-01-12 DIAGNOSIS — D64.9 ANEMIA, UNSPECIFIED TYPE: ICD-10-CM

## 2022-01-12 DIAGNOSIS — L03.319 CELLULITIS AND ABSCESS OF TRUNK: ICD-10-CM

## 2022-01-12 DIAGNOSIS — L73.2 HIDRADENITIS SUPPURATIVA: ICD-10-CM

## 2022-01-12 DIAGNOSIS — I10 HYPERTENSION GOAL BP (BLOOD PRESSURE) < 140/90: ICD-10-CM

## 2022-01-12 DIAGNOSIS — L40.9 PSORIASIS: ICD-10-CM

## 2022-01-12 DIAGNOSIS — L02.219 CELLULITIS AND ABSCESS OF TRUNK: ICD-10-CM

## 2022-01-12 DIAGNOSIS — Z00.00 ROUTINE GENERAL MEDICAL EXAMINATION AT A HEALTH CARE FACILITY: Primary | ICD-10-CM

## 2022-01-12 DIAGNOSIS — Z12.31 ENCOUNTER FOR SCREENING MAMMOGRAM FOR BREAST CANCER: ICD-10-CM

## 2022-01-12 DIAGNOSIS — Z12.4 CERVICAL CANCER SCREENING: ICD-10-CM

## 2022-01-12 LAB
BASOPHILS # BLD AUTO: 0 10E3/UL (ref 0–0.2)
BASOPHILS NFR BLD AUTO: 0 %
EOSINOPHIL # BLD AUTO: 0.3 10E3/UL (ref 0–0.7)
EOSINOPHIL NFR BLD AUTO: 3 %
ERYTHROCYTE [DISTWIDTH] IN BLOOD BY AUTOMATED COUNT: 15.8 % (ref 10–15)
HCT VFR BLD AUTO: 34.3 % (ref 35–47)
HGB BLD-MCNC: 10.8 G/DL (ref 11.7–15.7)
LYMPHOCYTES # BLD AUTO: 3.1 10E3/UL (ref 0.8–5.3)
LYMPHOCYTES NFR BLD AUTO: 35 %
MCH RBC QN AUTO: 24.4 PG (ref 26.5–33)
MCHC RBC AUTO-ENTMCNC: 31.5 G/DL (ref 31.5–36.5)
MCV RBC AUTO: 78 FL (ref 78–100)
MONOCYTES # BLD AUTO: 0.6 10E3/UL (ref 0–1.3)
MONOCYTES NFR BLD AUTO: 7 %
NEUTROPHILS # BLD AUTO: 5 10E3/UL (ref 1.6–8.3)
NEUTROPHILS NFR BLD AUTO: 56 %
PLATELET # BLD AUTO: 430 10E3/UL (ref 150–450)
RBC # BLD AUTO: 4.42 10E6/UL (ref 3.8–5.2)
WBC # BLD AUTO: 8.9 10E3/UL (ref 4–11)

## 2022-01-12 PROCEDURE — 87624 HPV HI-RISK TYP POOLED RSLT: CPT | Performed by: FAMILY MEDICINE

## 2022-01-12 PROCEDURE — G0145 SCR C/V CYTO,THINLAYER,RESCR: HCPCS | Performed by: FAMILY MEDICINE

## 2022-01-12 PROCEDURE — 99396 PREV VISIT EST AGE 40-64: CPT | Mod: 25 | Performed by: FAMILY MEDICINE

## 2022-01-12 PROCEDURE — 90471 IMMUNIZATION ADMIN: CPT | Performed by: FAMILY MEDICINE

## 2022-01-12 PROCEDURE — 36415 COLL VENOUS BLD VENIPUNCTURE: CPT | Performed by: FAMILY MEDICINE

## 2022-01-12 PROCEDURE — 85025 COMPLETE CBC W/AUTO DIFF WBC: CPT | Performed by: FAMILY MEDICINE

## 2022-01-12 PROCEDURE — 90714 TD VACC NO PRESV 7 YRS+ IM: CPT | Performed by: FAMILY MEDICINE

## 2022-01-12 PROCEDURE — 99214 OFFICE O/P EST MOD 30 MIN: CPT | Mod: 25 | Performed by: FAMILY MEDICINE

## 2022-01-12 RX ORDER — SULFAMETHOXAZOLE/TRIMETHOPRIM 800-160 MG
1 TABLET ORAL 2 TIMES DAILY
Qty: 14 TABLET | Refills: 0 | Status: SHIPPED | OUTPATIENT
Start: 2022-01-12 | End: 2022-01-19

## 2022-01-12 RX ORDER — CLINDAMYCIN PHOSPHATE 10 MG/G
GEL TOPICAL 2 TIMES DAILY PRN
Qty: 60 G | Refills: 3 | Status: SHIPPED | OUTPATIENT
Start: 2022-01-12 | End: 2022-09-01

## 2022-01-12 RX ORDER — LOSARTAN POTASSIUM AND HYDROCHLOROTHIAZIDE 12.5; 5 MG/1; MG/1
1 TABLET ORAL DAILY
Qty: 90 TABLET | Refills: 3 | Status: ON HOLD | OUTPATIENT
Start: 2022-01-12 | End: 2023-01-11

## 2022-01-12 ASSESSMENT — ENCOUNTER SYMPTOMS
HEMATURIA: 0
DIARRHEA: 0
MYALGIAS: 0
PALPITATIONS: 0
SORE THROAT: 0
NERVOUS/ANXIOUS: 0
BREAST MASS: 0
ABDOMINAL PAIN: 0
EYE PAIN: 0
NAUSEA: 0
HEADACHES: 0
FREQUENCY: 0
FEVER: 0
CHILLS: 0
DIZZINESS: 0
JOINT SWELLING: 0
SHORTNESS OF BREATH: 0
ARTHRALGIAS: 0
COUGH: 0
CONSTIPATION: 1
PARESTHESIAS: 0
DYSURIA: 0
HEARTBURN: 0
WEAKNESS: 0
HEMATOCHEZIA: 0

## 2022-01-12 ASSESSMENT — MIFFLIN-ST. JEOR: SCORE: 1834.61

## 2022-01-12 ASSESSMENT — PAIN SCALES - GENERAL: PAINLEVEL: NO PAIN (0)

## 2022-01-12 NOTE — PROGRESS NOTES
SUBJECTIVE:   CC: Raffy Castelan is an 46 year old woman who presents for preventive health visit.     Patient has been advised of split billing requirements and indicates understanding: Yes  Healthy Habits:     Getting at least 3 servings of Calcium per day:  Yes    Bi-annual eye exam:  NO    Dental care twice a year:  Yes    Sleep apnea or symptoms of sleep apnea:  None    Diet:  Regular (no restrictions)    Frequency of exercise:  None    Taking medications regularly:  Yes    Barriers to taking medications:  None    Medication side effects:  Other    PHQ-2 Total Score: 0    Additional concerns today:  Yes    -Alternative for clobetasol foam as it is getting expensive.  -Has been working with [facility] and has lost over 20 lbs.    - Hgb noted to be low three months ago.  Started taking a prenatal vitamin with iron         Today's PHQ-2 Score:   PHQ-2 ( 1999 Pfizer) 1/12/2022   Q1: Little interest or pleasure in doing things 0   Q2: Feeling down, depressed or hopeless 0   PHQ-2 Score 0   PHQ-2 Total Score (12-17 Years)- Positive if 3 or more points; Administer PHQ-A if positive -   Q1: Little interest or pleasure in doing things Not at all   Q2: Feeling down, depressed or hopeless Not at all   PHQ-2 Score 0       Abuse: Current or Past (Physical, Sexual or Emotional) - No  Do you feel safe in your environment? Yes      Social History     Tobacco Use     Smoking status: Never Smoker     Smokeless tobacco: Never Used   Substance Use Topics     Alcohol use: No         Alcohol Use 1/12/2022   Prescreen: >3 drinks/day or >7 drinks/week? No   Prescreen: >3 drinks/day or >7 drinks/week? -       Reviewed orders with patient.  Reviewed health maintenance and updated orders accordingly - Yes  Patient Active Problem List   Diagnosis     Chronic constipation     CARDIOVASCULAR SCREENING; LDL GOAL LESS THAN 160     Family history of breast cancer     GERD (gastroesophageal reflux disease)     BMI >40     Vitamin D  deficiency     Psoriasis     Bariatric surgery status     H/O laparoscopic adjustable gastric banding     Diaphragmatic hernia     Hypertension goal BP (blood pressure) < 140/90     Anxiety     Impingement syndrome, shoulder, left     Spontaneous vaginal delivery     Advanced maternal age during pregnancy     37+ weeks gestation completed     Bunion, left     Tailor's bunion of both feet     Past Surgical History:   Procedure Laterality Date     BIOPSY       BUNIONECTOMY LAPIDUS WITH TARSAL METATARSAL (TMT) FUSION Left 12/15/2020    Procedure: Left first tarsometatarsal joint fusion Left Jacob bunionectomy Left fifth metatarsal Efraín bunionectomy;  Surgeon: Markus Hart DPM;  Location: [facility]     COLONOSCOPY       ESOPHAGOSCOPY, GASTROSCOPY, DUODENOSCOPY (EGD), COMBINED  7/30/2013    Procedure: COMBINED ESOPHAGOSCOPY, GASTROSCOPY, DUODENOSCOPY (EGD), BIOPSY SINGLE OR MULTIPLE;;  Surgeon: Wilson Fournier MD;  Location: [facility]     ESOPHAGOSCOPY, GASTROSCOPY, DUODENOSCOPY (EGD), COMBINED  3/24/2014    Procedure: COMBINED ESOPHAGOSCOPY, GASTROSCOPY, DUODENOSCOPY (EGD);  Surgeon: Wilson Fournier MD;  Location: [facility]     EXCISE LESION FOOT Left 12/15/2020    Procedure: Left foot mass removal;  Surgeon: Markus Hart DPM;  Location: [facility]     HERNIA REPAIR       LAPAROSCOPIC GASTRIC ADJUSTABLE BANDING  5/27/2014    Procedure: LAPAROSCOPIC GASTRIC ADJUSTABLE BANDING;  Surgeon: Wilson Fournier MD;  Location: [facility]     no history of surgery         Social History     Tobacco Use     Smoking status: Never Smoker     Smokeless tobacco: Never Used   Substance Use Topics     Alcohol use: No     Family History   Problem Relation Age of Onset     Blood Disease Mother         anemia     Heart Disease Mother      Hypertension Mother      Hyperlipidemia Mother      Anxiety Disorder Mother      Osteoporosis Mother      Genetic Disorder Mother         Alpha Thalassemia trait     Cerebrovascular Disease Father       Hypertension Father      Diabetes Father         type  2     Deep Vein Thrombosis (DVT) Father      Breast Cancer Maternal Aunt      Breast Cancer Paternal Aunt      Cancer Paternal Aunt         stomach     Breast Cancer Other      Blood Disease Other         son with alpha thalessemia/mother alpha thalasemia trait     Breast Cancer Other      Breast Cancer Other      Breast Cancer Cousin            Breast Cancer Screening:    FHS-7:   Breast CA Risk Assessment (FHS-7) 1/12/2022   Did any of your first-degree relatives have breast or ovarian cancer? No   Did any of your relatives have bilateral breast cancer? Yes   Did any man in your family have breast cancer? No   Did any woman in your family have breast and ovarian cancer? Yes   Did any woman in your family have breast cancer before age 50 y? Yes   Do you have 2 or more relatives with breast and/or ovarian cancer? Yes   Do you have 2 or more relatives with breast and/or bowel cancer? Yes       Mammogram Screening: Recommended annual mammography  Pertinent mammograms are reviewed under the imaging tab.    History of abnormal Pap smear: NO - age 30-65 PAP every 5 years with negative HPV co-testing recommended  PAP / HPV Latest Ref Rng & Units 2/2/2018 10/13/2014 5/9/2011   PAP (Historical) - NIL NIL NIL   HPV16 NEG:Negative Negative - -   HPV18 NEG:Negative Negative - -   HRHPV NEG:Negative Negative - -     Review of Systems   Constitutional: Negative for chills and fever.   HENT: Negative for congestion, ear pain, hearing loss and sore throat.    Eyes: Negative for pain and visual disturbance.   Respiratory: Negative for cough and shortness of breath.    Cardiovascular: Negative for chest pain, palpitations and peripheral edema.   Gastrointestinal: Positive for constipation. Negative for abdominal pain, diarrhea, heartburn, hematochezia and nausea.   Breasts:  Negative for tenderness, breast mass and discharge.   Genitourinary: Negative for dysuria, frequency,  "genital sores, hematuria, pelvic pain, urgency, vaginal bleeding and vaginal discharge.   Musculoskeletal: Negative for arthralgias, joint swelling and myalgias.   Skin: Negative for rash.   Neurological: Negative for dizziness, weakness, headaches and paresthesias.   Psychiatric/Behavioral: Negative for mood changes. The patient is not nervous/anxious.         OBJECTIVE:   /88 (BP Location: Right arm, Patient Position: Chair, Cuff Size: Adult Large)   Pulse 98   Temp 98.3  F (36.8  C) (Oral)   Ht 1.645 m (5' 4.76\")   Wt 120.7 kg (266 lb 3.2 oz)   LMP 12/26/2021 (Approximate)   SpO2 100%   BMI 44.62 kg/m    Physical Exam  GENERAL: healthy, alert and no distress  EYES: Eyes grossly normal to inspection, PERRL and conjunctivae and sclerae normal  HENT: ear canals and TM's normal, nose and mouth without ulcers or lesions  NECK: no adenopathy, no asymmetry, masses, or scars and thyroid normal to palpation  RESP: lungs clear to auscultation - no rales, rhonchi or wheezes  BREAST: normal without masses, tenderness or nipple discharge and no palpable axillary masses or adenopathy  CV: regular rates and rhythm, normal S1 S2, no S3 or S4 and no murmur, click or rub  ABDOMEN: soft, nontender, without hepatosplenomegaly or masses   (female): normal female external genitalia, normal urethral meatus , vaginal mucosa pink, moist, well rugated and normal cervix, adnexae, and uterus without masses.  MS: no gross musculoskeletal defects noted, no edema  SKIN: Small abscess with surrounding cellulitis under left breast   NEURO: Normal strength and tone, mentation intact and speech normal  PSYCH: mentation appears normal, affect normal/bright    ASSESSMENT/PLAN:       ICD-10-CM    1. Routine general medical examination at a health care facility  Z00.00    2. Psoriasis  L40.9    3. Hypertension goal BP (blood pressure) < 140/90  I10 losartan-hydrochlorothiazide (HYZAAR) 50-12.5 MG tablet   4. Hidradenitis suppurativa  " "L73.2 clindamycin (CLINDAMAX) 1 % external gel   5. Cellulitis and abscess of trunk  L03.319 sulfamethoxazole-trimethoprim (BACTRIM DS) 800-160 MG tablet    L02.219    6. Encounter for screening mammogram for breast cancer  Z12.31 MA Screening Digital Bilateral   7. Cervical cancer screening  Z12.4 Pap screen with HPV - recommended age 30 - 65 years   8. Anemia, unspecified type  D64.9 CBC with platelets and differential     CBC with platelets and differential     - Unable to find an alternative foam-based steroid for her psoriasis.  She will continue to use her current Rx.    - HTN is well controlled.  Meds refills  - Skin findings under her left breast are suspicious for hidradenitis.  There is a small abscess with surrounding cellulitis that needs oral abx.  Advised PRN clindamycin ointment to prevent this in the future.  May need a dermatology referral if not improving    Patient has been advised of split billing requirements and indicates understanding: Yes  COUNSELING:  Reviewed preventive health counseling, as reflected in patient instructions    Estimated body mass index is 44.62 kg/m  as calculated from the following:    Height as of this encounter: 1.645 m (5' 4.76\").    Weight as of this encounter: 120.7 kg (266 lb 3.2 oz).    Weight management plan: Pt is working with the weight management clinic    She reports that she has never smoked. She has never used smokeless tobacco.      Counseling Resources:  ATP IV Guidelines  Pooled Cohorts Equation Calculator  Breast Cancer Risk Calculator  BRCA-Related Cancer Risk Assessment: FHS-7 Tool  FRAX Risk Assessment  ICSI Preventive Guidelines  Dietary Guidelines for Americans, 2010  USDA's MyPlate  ASA Prophylaxis  Lung CA Screening    Majo Tavares DO  [facility]  "

## 2022-01-12 NOTE — PATIENT INSTRUCTIONS
Preventive Health Recommendations  Female Ages 40 to 49    Yearly exam:     See your health care provider every year in order to  1. Review health changes.   2. Discuss preventive care.    3. Review your medicines if your doctor prescribed any.      Get a Pap test every three years (unless you have an abnormal result and your provider advises testing more often).      If you get Pap tests with HPV test, you only need to test every 5 years, unless you have an abnormal result. You do not need a Pap test if your uterus was removed (hysterectomy) and you have not had cancer.      You should be tested each year for STDs (sexually transmitted diseases), if you're at risk.     Ask your doctor if you should have a mammogram.      Have a colonoscopy (test for colon cancer) if someone in your family has had colon cancer or polyps before age 50.       Have a cholesterol test every 5 years.       Have a diabetes test (fasting glucose) after age 45. If you are at risk for diabetes, you should have this test every 3 years.    Shots: Get a flu shot each year. Get a tetanus shot every 10 years.     Nutrition:     Eat at least 5 servings of fruits and vegetables each day.    Eat whole-grain bread, whole-wheat pasta and brown rice instead of white grains and rice.    Get adequate Calcium and Vitamin D.      Lifestyle    Exercise at least 150 minutes a week (an average of 30 minutes a day, 5 days a week). This will help you control your weight and prevent disease.    Limit alcohol to one drink per day.    No smoking.     Wear sunscreen to prevent skin cancer.    See your dentist every six months for an exam and cleaning.  Patient Education     Understanding Hidradenitis Suppurativa  Hidradenitis suppurativa is a long-term (chronic) skin disease. It causes painful bumps and sores (abscesses) to form around a hair follicle. Follicles are the tiny holes from which hair grows out of your skin. The disease occurs on parts of the body  where skin rubs together. It most often appears in the armpits, the groin area, and under the breasts. It's more common in women.    How to say it  [id number]  What causes hidradenitis suppurativa?  This skin disease happens when hair follicles become clogged with keratin. Keratin is the protein that makes up your hair and nails. The follicles then burst and become inflamed . Pressure or rubbing on the skin can clog the follicles. Or it can further irritate them.   The disease tends to run in families. It s also more likely to occur in people who are obese, have diabetes, or smoke. Hormones or the immune system may also play a part.   Symptoms of hidradenitis suppurativa  This skin disease causes one or more painful red bumps on the skin. These bumps become inflamed and drain pus. They may also itch or burn. In severe cases, sinus tracts may form. These are narrow channels that run under the skin. Blood or a bad-smelling pus may ooze from these bumps or sinus tracts. Bands of scarring often occur.   Treatment for hidradenitis suppurativa  Treatment for this skin disease is most successful when started early. But it may be hard to diagnose. It may be mistaken for other skin conditions. The painful bumps also often return. So stopping new bumps and limiting scarring is important. Treatment options include:   Warm compress. Putting a warm, wet washcloth on the affected skin may help.  Lifestyle changes. Your symptoms may get better if you lose weight or stop smoking, if needed. Also avoid shaving or other irritants, such as deodorant or perfume.  Antibiotics. For mild cases, an antibiotic for the skin (topical) may help. You may need oral antibiotics if you have a severe case. They can help prevent further infection.  Other oral medicines. Over-the-counter pain medicines can ease pain and inflammation. You may need stronger medicines for a severe case. These medicines include corticosteroids  or a retinoid. These may cause side effects.  Injected medicines. A steroid may be injected into the bump to ease pain. A newer biologic medicine may be injected to ease severe symptoms.  Surgery. Surgery can drain and remove the painful bumps. For severe cases, the doctor may cut out the entire area of affected skin or destroy it with a laser.  Possible complications of hidradenitis suppurativa   These include:  Arthritis  Depression  Lymphedema  Scarring of skin  Skin cancer  When to call your healthcare provider  Call your healthcare provider right away if you have any of these:  Fever of 100.4 F (38 C) or higher, or as directed by your healthcare provider  Redness, swelling, or fluid leaking from your rash that gets worse  Pain that gets worse  Symptoms that don t get better, or get worse  New symptoms  [employer] last reviewed this educational content on 6/1/2019 2000-2021 [employer]. All rights reserved. This information is not intended as a substitute for professional medical care. Always follow your healthcare professional's instructions.

## 2022-01-14 LAB
BKR LAB AP GYN ADEQUACY: NORMAL
BKR LAB AP GYN INTERPRETATION: NORMAL
BKR LAB AP HPV REFLEX: NORMAL
BKR LAB AP PREVIOUS ABNORMAL: NORMAL
PATH REPORT.COMMENTS IMP SPEC: NORMAL
PATH REPORT.COMMENTS IMP SPEC: NORMAL
PATH REPORT.RELEVANT HX SPEC: NORMAL

## 2022-01-18 LAB
HUMAN PAPILLOMA VIRUS 16 DNA: NEGATIVE
HUMAN PAPILLOMA VIRUS 18 DNA: NEGATIVE
HUMAN PAPILLOMA VIRUS FINAL DIAGNOSIS: NORMAL
HUMAN PAPILLOMA VIRUS OTHER HR: NEGATIVE

## 2022-01-19 ENCOUNTER — ANCILLARY PROCEDURE (OUTPATIENT)
Dept: MAMMOGRAPHY | Facility: CLINIC | Age: 47
End: 2022-01-19
Attending: FAMILY MEDICINE
Payer: COMMERCIAL

## 2022-01-19 DIAGNOSIS — Z12.31 ENCOUNTER FOR SCREENING MAMMOGRAM FOR BREAST CANCER: ICD-10-CM

## 2022-01-19 PROCEDURE — 77067 SCR MAMMO BI INCL CAD: CPT | Mod: TC | Performed by: RADIOLOGY

## 2022-01-21 ENCOUNTER — ALLIED HEALTH/NURSE VISIT (OUTPATIENT)
Dept: FAMILY MEDICINE | Facility: CLINIC | Age: 47
End: 2022-01-21
Payer: COMMERCIAL

## 2022-01-21 VITALS — DIASTOLIC BLOOD PRESSURE: 83 MMHG | SYSTOLIC BLOOD PRESSURE: 121 MMHG

## 2022-01-21 DIAGNOSIS — Z01.30 BP CHECK: Primary | ICD-10-CM

## 2022-01-21 PROCEDURE — 99207 PR NO CHARGE NURSE ONLY: CPT | Performed by: FAMILY MEDICINE

## 2022-01-21 NOTE — PROGRESS NOTES
Raffy Castelan was evaluated at [facility] on January 21, 2022 at which time her blood pressure was:    BP Readings from Last 3 Encounters:   01/21/22 121/83   01/12/22 130/88   06/28/21 128/89     Pulse Readings from Last 3 Encounters:   01/12/22 98   06/28/21 78   05/04/21 80       Reviewed lifestyle modifications for blood pressure control and reduction: including making healthy food choices, managing weight, getting regular exercise, smoking cessation, reducing alcohol consumption, monitoring blood pressure regularly.     Symptoms: None    BP Goal:< 140/90 mmHg    BP Assessment:  BP at goal    Potential Reasons for BP too high: NA - Not applicable    BP Follow-Up Plan: Recheck BP in 6 months at pharmacy    Recommendation to Provider: None    Note completed by: Micah

## 2022-03-15 ENCOUNTER — MYC MEDICAL ADVICE (OUTPATIENT)
Dept: FAMILY MEDICINE | Facility: CLINIC | Age: 47
End: 2022-03-15
Payer: COMMERCIAL

## 2022-03-15 NOTE — TELEPHONE ENCOUNTER
Routing to PCP- see mily ross    Is this something you would see the pt for or would you refer?    Cally Black RN

## 2022-05-31 ENCOUNTER — VIRTUAL VISIT (OUTPATIENT)
Dept: FAMILY MEDICINE | Facility: CLINIC | Age: 47
End: 2022-05-31
Payer: COMMERCIAL

## 2022-05-31 DIAGNOSIS — I10 HYPERTENSION GOAL BP (BLOOD PRESSURE) < 140/90: ICD-10-CM

## 2022-05-31 DIAGNOSIS — E66.01 CLASS 3 SEVERE OBESITY DUE TO EXCESS CALORIES WITH SERIOUS COMORBIDITY AND BODY MASS INDEX (BMI) OF 40.0 TO 44.9 IN ADULT (H): ICD-10-CM

## 2022-05-31 DIAGNOSIS — E66.813 CLASS 3 SEVERE OBESITY DUE TO EXCESS CALORIES WITH SERIOUS COMORBIDITY AND BODY MASS INDEX (BMI) OF 40.0 TO 44.9 IN ADULT (H): ICD-10-CM

## 2022-05-31 DIAGNOSIS — U07.1 INFECTION DUE TO 2019 NOVEL CORONAVIRUS: Primary | ICD-10-CM

## 2022-05-31 PROCEDURE — 99213 OFFICE O/P EST LOW 20 MIN: CPT | Mod: 95 | Performed by: FAMILY MEDICINE

## 2022-05-31 NOTE — PROGRESS NOTES
Raffy is a 46 year old who is being evaluated via a billable video visit.      How would you like to obtain your AVS? MyChart  If the video visit is dropped, the invitation should be resent by:   Will anyone else be joining your video visit? No      Video Start Time: 3:14 PM    Assessment & Plan     Infection due to 2019 novel coronavirus: high risk due to obesity, hypertension. Discussed risks/benefits of oral COVID-19 therapy. Reviewed medication list for possible interactions and none were found. Her kidney function is normal. Rx sent to pharmacy. Continue other symptomatic cares at home as helpful. Follow up in 1 week if symptoms not improving.  - nirmatrelvir and ritonavir (PAXLOVID) therapy pack; Take 3 tablets by mouth 2 times daily for 5 days Take 2 Nirmatrelvir tablets and 1 Ritonavir tablet twice daily for 5 days.    Class 3 severe obesity due to excess calories with serious comorbidity and body mass index (BMI) of 40.0 to 44.9 in adult (H)    Hypertension goal BP (blood pressure) < 140/90      Return in 1 week (on 6/7/2022) for follow up if symptoms not improving.    Srikanth Cote, DO  [facility]    [facility]   Raffy is a 46 year old who presents for the following health issues.    HPI     COVID-19 Symptom Review - Covid Positive Home test 5/30/2022  How many days ago did these symptoms start? 5/29/2022    Are any of the following symptoms significant for you?    New or worsening difficulty breathing? No    Worsening cough? Yes, I am coughing up mucus.    Fever or chills? Yes, I felt feverish or had chills. 101.0F    Headache: no    Sore throat: no    Congestion: YES- nasal     Chest Pain: NO    Diarrhea: no    Body aches? no    What treatments has patient tried? Acetaminophen and DayQuil & NighQuil   Does patient live in a nursing home, group home, or shelter? no  Does patient have a way to get food/medications during quarantined? Yes, I have a friend or family member who can  help me.          Review of Systems   Constitutional, HEENT, cardiovascular, pulmonary, gi and gu systems are negative, except as otherwise noted.      Objective         Vitals:  No vitals were obtained today due to virtual visit.    Physical Exam   GENERAL: Healthy, alert and no distress  EYES: Eyes grossly normal to inspection.  No discharge or erythema, or obvious scleral/conjunctival abnormalities.  RESP: No audible wheeze, cough, or visible cyanosis.  No visible retractions or increased work of breathing.    SKIN: Visible skin clear. No significant rash, abnormal pigmentation or lesions.  NEURO: Cranial nerves grossly intact.  Mentation and speech appropriate for age.  PSYCH: Mentation appears normal, affect normal/bright, judgement and insight intact, normal speech and appearance well-groomed.        Video-Visit Details    Type of service:  Video Visit    Video End Time:3:23 PM    Originating Location (pt. Location): Home    Distant Location (provider location):  [facility]     Platform used for Video Visit: Verenice

## 2022-05-31 NOTE — PATIENT INSTRUCTIONS

## 2022-08-03 VITALS — BODY MASS INDEX: 47.32 KG/M2 | HEIGHT: 65 IN | WEIGHT: 284 LBS

## 2022-08-03 NOTE — PROGRESS NOTES
Virtual Visit Check-In    During this virtual visit the patient is located in [place], patient verifies this as the location during the entirety of this visit.     Raffy is a 47 year old who is being evaluated via a billable video visit.      How would you like to obtain your AVS? MyChart  If the video visit is dropped, the invitation should be resent by: Text to cell phone:  [phone]  Will anyone else be joining your video visit? No    Video-Visit Details    Video Start Time: 9:12 AM    Type of service:  Video Visit    Video End Time:945    Originating Location (pt. Location): Home    Distant Location (provider location):  [facility] [place]     Platform used for Video Visit: Jess Sorensen NREMT      30 minutes spent on the date of the encounter doing chart review, history and exam, documentation and further activities per the note    Return Medical Weight Management Note     Raffy Castelan  MRN:  [id number]  :  1975  MARIELOS:  2022    Dear Majo Tavares DO,    I had the pleasure of seeing your patient Raffy Castelan. She is a 47 year old female who I am continuing to see for treatment of obesity related to:    No flowsheet data found.    Assessment & Plan   Problem List Items Addressed This Visit        Endocrine Diagnoses    Class 3 severe obesity due to excess calories with serious comorbidity and body mass index (BMI) of 45.0 to 49.9 in adult (H)    Relevant Medications    Tirzepatide (MOUNJARO) 2.5 MG/0.5ML SOPN       Other    H/O laparoscopic adjustable gastric banding - Primary    Relevant Orders    XR Upper GI without KUB             INTERVAL HISTORY:  [facility] follow up. Hx of lap band  with weight loss to 240's.  Last visit with me was 11/10/21 and she was taking Wegovy and we stopped phentermine due to not feeling it was effective.  -she took Wegovy for 6 mo but coupon ran out.  She felt Wegovy worked well.     Since last visit:   -struggling  with weight regain since off Wegovy ( no longer covered)  -GERD worse with weight gain  -A1C 5.8    Lap band: Last UGI 2020    PLAN:  Interested in possible lap band removal to sleeve gastrectomy due to ineffective weight loss with the lap band and weight regain  Need updated Upper GI with band protocol to evaluate band. Call radiology to schedule For scheduling at [facility] ([facility] [place]), call [phone] or [phone]   Start tirzepatide.2.5mg weekly, check online coupon  Discussed sleeve gastrectomy and benefits and risks, need to watch online seminar. Please view our Weight Loss Surgery Seminar is at the following website.   [email or web address]    3 RD visits (each monthly)  Call to schedule psych roger colesp  Meet with Dr Ritter to discuss possible lap band to sleeve in person or video    Follow up MTM 1 month to follow up tirzepatide start phone visit  Follow up Shoshana 3 months return [facility] video      CURRENT WEIGHT:   284 lbs 0 oz    Highest wt in life: 298 lbs  Initial Weight (lbs): 290 lbs  Last Visits Weight: 123.8 kg (273 lb)  Cumulative weight loss (lbs): 6  Weight Loss Percentage: 2.07%    Changes and Difficulties 8/1/2022   I have made the following changes to my diet since my last visit: -   With regards to my diet, I am still struggling with: portion sizes and sweets   I have made the following changes to my activity/exercise since my last visit: -   With regards to my activity/exercise, I am still struggling with: -         MEDICATIONS:   Current Outpatient Medications   Medication Sig Dispense Refill     Tirzepatide (MOUNJARO) 2.5 MG/0.5ML SOPN Inject 2.5 mg Subcutaneous every 7 days 2 mL 0     albuterol (PROAIR HFA/PROVENTIL HFA/VENTOLIN HFA) 108 (90 Base) MCG/ACT inhaler Inhale 2 puffs into the lungs every 6 hours as needed for shortness of breath / dyspnea or wheezing 8.5 g 3     clindamycin (CLINDAMAX) 1 % external  gel Apply topically 2 times daily as needed (cysts under breasts) 60 g 3     clobetasol propionate (OLUX) 0.05 % external foam APPLY SPARINGLY TO AFFECTED AREA TWICE DAILY AS NEEDED DO NOT APPLY TO FACE 600 g 3     losartan-hydrochlorothiazide (HYZAAR) 50-12.5 MG tablet Take 1 tablet by mouth daily 90 tablet 3     Multiple Vitamins-Minerals (HAIR SKIN & NAILS ADVANCED PO) Take 2 tablets by mouth daily       Prenatal Vit-Fe Fumarate-FA (PRENATAL VITAMINS PO)        senna (SENNA LAXATIVE) 8.6 MG tablet Take 1 tablet by mouth daily         Weight Loss Medication History Reviewed With Patient 8/1/2022   Which weight loss medications are you currently taking on a regular basis?  None   If you are not taking a weight loss medication that was prescribed to you, please indicate why: The cost is too much for me, Other   Are you having any side effects from the weight loss medication that we have prescribed you? Yes   If you are having side effects please describe: Constipation       Office Visit on 01/12/2022   Component Date Value Ref Range Status     Interpretation 01/12/2022 Negative for Intraepithelial Lesion or Malignancy (NILM)    Final     Comment 01/12/2022    Final                    Value:This result contains rich text formatting which cannot be displayed here.     Specimen Adequacy 01/12/2022 Satisfactory for evaluation, endocervical/transformation zone component present   Final     Clinical Information 01/12/2022    Final                    Value:This result contains rich text formatting which cannot be displayed here.     Reflex Testing 01/12/2022 Yes regardless of result   Final     Previous Abnormal? 01/12/2022    Final                    Value:This result contains rich text formatting which cannot be displayed here.     Performing Labs 01/12/2022    Final                    Value:This result contains rich text formatting which cannot be displayed here.     WBC Count 01/12/2022 8.9  4.0 - 11.0 10e3/uL Final  "    RBC Count 01/12/2022 4.42  3.80 - 5.20 10e6/uL Final     Hemoglobin 01/12/2022 10.8 (A) 11.7 - 15.7 g/dL Final     Hematocrit 01/12/2022 34.3 (A) 35.0 - 47.0 % Final     MCV 01/12/2022 78  78 - 100 fL Final     MCH 01/12/2022 24.4 (A) 26.5 - 33.0 pg Final     MCHC 01/12/2022 31.5  31.5 - 36.5 g/dL Final     RDW 01/12/2022 15.8 (A) 10.0 - 15.0 % Final     Platelet Count 01/12/2022 430  150 - 450 10e3/uL Final     % Neutrophils 01/12/2022 56  % Final     % Lymphocytes 01/12/2022 35  % Final     % Monocytes 01/12/2022 7  % Final     % Eosinophils 01/12/2022 3  % Final     % Basophils 01/12/2022 0  % Final     Absolute Neutrophils 01/12/2022 5.0  1.6 - 8.3 10e3/uL Final     Absolute Lymphocytes 01/12/2022 3.1  0.8 - 5.3 10e3/uL Final     Absolute Monocytes 01/12/2022 0.6  0.0 - 1.3 10e3/uL Final     Absolute Eosinophils 01/12/2022 0.3  0.0 - 0.7 10e3/uL Final     Absolute Basophils 01/12/2022 0.0  0.0 - 0.2 10e3/uL Final     Other HR HPV 01/12/2022 Negative  Negative Final     HPV16 DNA 01/12/2022 Negative  Negative Final     HPV18 DNA 01/12/2022 Negative  Negative Final     FINAL DIAGNOSIS 01/12/2022    Final                    Value:This result contains rich text formatting which cannot be displayed here.       PHYSICAL EXAM:  Objective    Ht 1.651 m (5' 5\")   Wt 128.8 kg (284 lb)   BMI 47.26 kg/m             Vitals:  No vitals were obtained today due to virtual visit.    GENERAL: Healthy, alert and no distress  EYES: Eyes grossly normal to inspection.  No discharge or erythema, or obvious scleral/conjunctival abnormalities.  RESP: No audible wheeze, cough, or visible cyanosis.  No visible retractions or increased work of breathing.    SKIN: Visible skin clear. No significant rash, abnormal pigmentation or lesions.  NEURO: Cranial nerves grossly intact.  Mentation and speech appropriate for age.  PSYCH: Mentation appears normal, affect normal/bright, judgement and insight intact, normal speech and appearance " well-groomed.        Sincerely,    Shoshana Bustamante PA-C

## 2022-08-03 NOTE — NURSING NOTE
Chief Complaint   Patient presents with     Follow Up     Return weight management.         Vitals:    08/03/22 1622   Weight: 284 lb       Body mass index is 47.61 kg/m .      Jatinder Sorensen, EMT  Surgery Clinic

## 2022-08-04 ENCOUNTER — TELEPHONE (OUTPATIENT)
Dept: GASTROENTEROLOGY | Facility: CLINIC | Age: 47
End: 2022-08-04

## 2022-08-04 ENCOUNTER — TELEPHONE (OUTPATIENT)
Dept: ENDOCRINOLOGY | Facility: CLINIC | Age: 47
End: 2022-08-04

## 2022-08-04 ENCOUNTER — VIRTUAL VISIT (OUTPATIENT)
Dept: ENDOCRINOLOGY | Facility: CLINIC | Age: 47
End: 2022-08-04
Payer: COMMERCIAL

## 2022-08-04 DIAGNOSIS — E66.813 CLASS 3 SEVERE OBESITY DUE TO EXCESS CALORIES WITH SERIOUS COMORBIDITY AND BODY MASS INDEX (BMI) OF 45.0 TO 49.9 IN ADULT (H): ICD-10-CM

## 2022-08-04 DIAGNOSIS — Z98.84 H/O LAPAROSCOPIC ADJUSTABLE GASTRIC BANDING: Primary | ICD-10-CM

## 2022-08-04 DIAGNOSIS — E66.01 CLASS 3 SEVERE OBESITY DUE TO EXCESS CALORIES WITH SERIOUS COMORBIDITY AND BODY MASS INDEX (BMI) OF 45.0 TO 49.9 IN ADULT (H): ICD-10-CM

## 2022-08-04 PROCEDURE — 99214 OFFICE O/P EST MOD 30 MIN: CPT | Mod: 95 | Performed by: PHYSICIAN ASSISTANT

## 2022-08-04 RX ORDER — TIRZEPATIDE 2.5 MG/.5ML
2.5 INJECTION, SOLUTION SUBCUTANEOUS
Qty: 2 ML | Refills: 0 | Status: SHIPPED | OUTPATIENT
Start: 2022-08-04 | End: 2022-09-01 | Stop reason: DRUGHIGH

## 2022-08-04 NOTE — TELEPHONE ENCOUNTER
PA Initiation    Medication: Mounjaro  Insurance Company: [facility] - Phone [phone] Fax [phone]  Pharmacy Filling the Rx:    Filling Pharmacy Phone:    Filling Pharmacy Fax:    Start Date: 8/4/2022

## 2022-08-04 NOTE — Clinical Note
TAMI soon and in 1 month Dr Ritter soon Thursday in person  MTM 1 month phone to follow up shania Anna 3 months video medication follow up

## 2022-08-04 NOTE — TELEPHONE ENCOUNTER
Prior Authorization Retail Medication Request    Medication/Dose: Tirzepatide/Mounjaro  ICD code (if different than what is on RX):  Class 3 severe obesity due to excess calories with serious comorbidity and body mass index (BMI) of 45.0 to 49.9 in adult (H) [E66.01, Z68.42]     Previously Tried and Failed:  Weight loss surgery, history of diet and exercise, Phentermine  Rationale:  I had the pleasure of seeing your patient Raffy Castelan. She is a 47 year old female who I am continuing to see for treatment of obesity.     Hx of lap band 2014 with weight loss to 240's.  Last visit with me was 11/10/21 and she was taking Wegovy and we stopped phentermine due to not feeling it was effective.  -she took Wegovy for 6 mo but coupon ran out.  She felt Wegovy worked well.     PLAN:  Interested in possible lap band removal to sleeve gastrectomy due to ineffective weight loss with the lap band and weight regain.     Insurance Name:    Insurance ID:        Pharmacy Information (if different than what is on RX)  Name:  [facility] - [place], [place] - [address] [place] [address].  Phone:  [phone]

## 2022-08-04 NOTE — LETTER
2022       RE: Raffy Castelan  [address]  [place] [address]     Dear Colleague,    Thank you for referring your patient, Raffy Castelan, to the [facility]NAG[facility] [place] at [facility]. Please see a copy of my visit note below.    Virtual Visit Check-In    During this virtual visit the patient is located in [place], patient verifies this as the location during the entirety of this visit.     Raffy is a 47 year old who is being evaluated via a billable video visit.      How would you like to obtain your AVS? MyChart  If the video visit is dropped, the invitation should be resent by: Text to cell phone:  [phone]  Will anyone else be joining your video visit? No    Video-Visit Details    Video Start Time: 9:12 AM    Type of service:  Video Visit    Video End Time:945    Originating Location (pt. Location): Home    Distant Location (provider location):  [facility] [place]     Platform used for Video Visit: Jess Sorensen NREMT      30 minutes spent on the date of the encounter doing chart review, history and exam, documentation and further activities per the note    Return Medical Weight Management Note     Raffy Castelan  MRN:  [id number]  :  1975  MARIELOS:  2022    Dear Majo Tavares, ,    I had the pleasure of seeing your patient aRffy Castelan. She is a 47 year old female who I am continuing to see for treatment of obesity related to:    No flowsheet data found.    Assessment & Plan   Problem List Items Addressed This Visit        Endocrine Diagnoses    Class 3 severe obesity due to excess calories with serious comorbidity and body mass index (BMI) of 45.0 to 49.9 in adult (H)    Relevant Medications    Tirzepatide (MOUNJARO) 2.5 MG/0.5ML SOPN       Other    H/O laparoscopic adjustable gastric banding - Primary    Relevant Orders    XR Upper GI without KUB          INTERVAL HISTORY:  [facility] follow up. Hx of lap band 2014 with weight loss to 240's.  Last visit with me was 11/10/21 and she was taking Wegovy and we stopped phentermine due to not feeling it was effective.  -she took Wegovy for 6 mo but coupon ran out.  She felt Wegovy worked well.     Since last visit:   -struggling with weight regain since off Wegovy ( no longer covered)  -GERD worse with weight gain  -A1C 5.8    Lap band: Last UGI 2020    PLAN:  Interested in possible lap band removal to sleeve gastrectomy due to ineffective weight loss with the lap band and weight regain  Need updated Upper GI with band protocol to evaluate band. Call radiology to schedule For scheduling at [facility] ([facility]), call [phone] or [phone]   Start tirzepatide.2.5mg weekly, check online coupon  Discussed sleeve gastrectomy and benefits and risks, need to watch online seminar. Please view our Weight Loss Surgery Seminar is at the following website.   [email or web address]    3 RD visits (each monthly)  Call to schedule psych eval asap  Meet with Dr Ritter to discuss possible lap band to sleeve in person or video    Follow up MTM 1 month to follow up tirzepatide start phone visit  Follow up Shoshana 3 months return [facility] video      CURRENT WEIGHT:   284 lbs 0 oz    Highest wt in life: 298 lbs  Initial Weight (lbs): 290 lbs  Last Visits Weight: 123.8 kg (273 lb)  Cumulative weight loss (lbs): 6  Weight Loss Percentage: 2.07%    Changes and Difficulties 8/1/2022   I have made the following changes to my diet since my last visit: -   With regards to my diet, I am still struggling with: portion sizes and sweets   I have made the following changes to my activity/exercise since my last visit: -   With regards to my activity/exercise, I am still struggling with: -         MEDICATIONS:   Current Outpatient Medications   Medication Sig Dispense Refill      Tirzepatide (MOUNJARO) 2.5 MG/0.5ML SOPN Inject 2.5 mg Subcutaneous every 7 days 2 mL 0     albuterol (PROAIR HFA/PROVENTIL HFA/VENTOLIN HFA) 108 (90 Base) MCG/ACT inhaler Inhale 2 puffs into the lungs every 6 hours as needed for shortness of breath / dyspnea or wheezing 8.5 g 3     clindamycin (CLINDAMAX) 1 % external gel Apply topically 2 times daily as needed (cysts under breasts) 60 g 3     clobetasol propionate (OLUX) 0.05 % external foam APPLY SPARINGLY TO AFFECTED AREA TWICE DAILY AS NEEDED DO NOT APPLY TO FACE 600 g 3     losartan-hydrochlorothiazide (HYZAAR) 50-12.5 MG tablet Take 1 tablet by mouth daily 90 tablet 3     Multiple Vitamins-Minerals (HAIR SKIN & NAILS ADVANCED PO) Take 2 tablets by mouth daily       Prenatal Vit-Fe Fumarate-FA (PRENATAL VITAMINS PO)        senna (SENNA LAXATIVE) 8.6 MG tablet Take 1 tablet by mouth daily         Weight Loss Medication History Reviewed With Patient 8/1/2022   Which weight loss medications are you currently taking on a regular basis?  None   If you are not taking a weight loss medication that was prescribed to you, please indicate why: The cost is too much for me, Other   Are you having any side effects from the weight loss medication that we have prescribed you? Yes   If you are having side effects please describe: Constipation     Office Visit on 01/12/2022   Component Date Value Ref Range Status     Interpretation 01/12/2022 Negative for Intraepithelial Lesion or Malignancy (NILM)    Final     Comment 01/12/2022    Final                    Value:This result contains rich text formatting which cannot be displayed here.     Specimen Adequacy 01/12/2022 Satisfactory for evaluation, endocervical/transformation zone component present   Final     Clinical Information 01/12/2022    Final                    Value:This result contains rich text formatting which cannot be displayed here.     Reflex Testing 01/12/2022 Yes regardless of result   Final     Previous  "Abnormal? 01/12/2022    Final                    Value:This result contains rich text formatting which cannot be displayed here.     Performing Labs 01/12/2022    Final                    Value:This result contains rich text formatting which cannot be displayed here.     WBC Count 01/12/2022 8.9  4.0 - 11.0 10e3/uL Final     RBC Count 01/12/2022 4.42  3.80 - 5.20 10e6/uL Final     Hemoglobin 01/12/2022 10.8 (A) 11.7 - 15.7 g/dL Final     Hematocrit 01/12/2022 34.3 (A) 35.0 - 47.0 % Final     MCV 01/12/2022 78  78 - 100 fL Final     MCH 01/12/2022 24.4 (A) 26.5 - 33.0 pg Final     MCHC 01/12/2022 31.5  31.5 - 36.5 g/dL Final     RDW 01/12/2022 15.8 (A) 10.0 - 15.0 % Final     Platelet Count 01/12/2022 430  150 - 450 10e3/uL Final     % Neutrophils 01/12/2022 56  % Final     % Lymphocytes 01/12/2022 35  % Final     % Monocytes 01/12/2022 7  % Final     % Eosinophils 01/12/2022 3  % Final     % Basophils 01/12/2022 0  % Final     Absolute Neutrophils 01/12/2022 5.0  1.6 - 8.3 10e3/uL Final     Absolute Lymphocytes 01/12/2022 3.1  0.8 - 5.3 10e3/uL Final     Absolute Monocytes 01/12/2022 0.6  0.0 - 1.3 10e3/uL Final     Absolute Eosinophils 01/12/2022 0.3  0.0 - 0.7 10e3/uL Final     Absolute Basophils 01/12/2022 0.0  0.0 - 0.2 10e3/uL Final     Other HR HPV 01/12/2022 Negative  Negative Final     HPV16 DNA 01/12/2022 Negative  Negative Final     HPV18 DNA 01/12/2022 Negative  Negative Final     FINAL DIAGNOSIS 01/12/2022    Final                    Value:This result contains rich text formatting which cannot be displayed here.       PHYSICAL EXAM:  Objective    Ht 1.651 m (5' 5\")   Wt 128.8 kg (284 lb)   BMI 47.26 kg/m       Vitals:  No vitals were obtained today due to virtual visit.    GENERAL: Healthy, alert and no distress  EYES: Eyes grossly normal to inspection.  No discharge or erythema, or obvious scleral/conjunctival abnormalities.  RESP: No audible wheeze, cough, or visible cyanosis.  No visible retractions " or increased work of breathing.    SKIN: Visible skin clear. No significant rash, abnormal pigmentation or lesions.  NEURO: Cranial nerves grossly intact.  Mentation and speech appropriate for age.  PSYCH: Mentation appears normal, affect normal/bright, judgement and insight intact, normal speech and appearance well-groomed.    Shoshana Bustamante PA-C

## 2022-08-04 NOTE — PATIENT INSTRUCTIONS
PLAN:  Interested in possible lap band removal to sleeve gastrectomy due to ineffective weight loss with the lap band and weight regain  Need updated Upper GI with band protocol to evaluate band. Call radiology to schedule For scheduling at [facility] ([facility], [facility], [place]), call [phone] or [phone]   Start tirzepatide.2.5mg weekly, check online coupon  Discussed sleeve gastrectomy, need to watch online seminar. Please view our Weight Loss Surgery Seminar is at the following website.   [email or web address]    3 RD visits (each monthly)  Call to schedule psych eval asap  Meet with Dr Ritter to discuss possible lap band to sleeve in person or video    Follow up MTM 1 month to follow up tirzepatide start phone visit  Follow up Shoshana 3 months return [facility] video    Pre-Bariatric Surgery Psychological Assessment Providers    When you set up the appointment, ask for a pre-bariatric surgery evaluation and MMPI testing. Have report faxed to our office at [phone]. They may be booked several weeks in advance, plan ahead. Check with your insurance to see if the psychologist is covered, if not, ask your insurance company for a referral.    Check for a virtual visit.    [facility]  Leeann Argueta, PhD, LP                                 [phone]  Chio Courtney PhD (cannot accept Medicare) [phone]  Majo Patricio, PhD, LP                                      [phone]    [facility]  Jin Evans, PhD, LP   [phone]  [place]  Gabriela Marsh, PsyD, LP  [phone]  [facility]  Beth Love, PsyD, LP  [phone]   Khris Chun, PhD, LP   [phone]  [place]  Naty Bell, PsyD, ABPP, LP   [phone]  [facility]  Chavo Bell, RavinderyD,LP,ABPP  [phone]  Wilberto Cervantes, PhD, LP               [phone]  Tonya Hamilton, PhD, LP  [phone]    Adair Baxter PsyD, KAYLA  [phone]   [place] (in person)  Tono Myrick, PhD. LP  Call your Bariatric Team     [facility] as of 11/3/21 (Referral needed)  As of 11/03/21, booking out in March and April 2022 for initial visit.  Available for pre-bariatric surgery health assessments and pre- and post- therapy.                                                                            [phone]  Jin Rausch, PhD   Danielito Bush (Moira Chacon PsyD  [place], [facility]  Mariam Ashford, PhD [place]  Patel Baig PsyD [place] / Kervin Baugh PsyD, LP [place]          Call updates to AVILA Lopez     [phone] ( leave a message)  Last updated: 04/02/2020, 02/25/2021, 3/25/21,7/27/21, 11/03/21, 5/3/22     Dear Raffy Castelan,     I am sending you some preliminary patient education items to review after your initial visit with us.    First, if you haven't already done so, please view the Weight Loss Surgery Seminar at the following link.    [email or web address]    Go to the bottom of the webpage to view the 6 short videos.     Second, review the following handouts at these links:    Making Your Decision, Understanding Weight Loss Surgery    [email or web address]    A Roadmap to Your Weight Loss Surgery    [email or web address]    GENERAL INFORMATION  Please check with your insurance company to see if weight loss surgery is a covered service.  Your insurance will dictate how many dietician visits are required.  You will be required to meet with your dietician monthly until surgery.  Start making diet changes now!  3 small meals per day, 48-64 ounces of fluids each day, eliminate sugars/soda/pasta/rice/potatoes.  Avoid alcohol.  Start working on your mental health and relationship to food now.  Make changes that will support you positively on your weight loss  journey.  Make these changes now, not just before or after surgery.  This is a lifelong change!  Start an exercise program.  Even if you have limited mobility, it is important to start moving and find exercise that fits into your lifestyle.  Our team is here to support you in your efforts.  We have many resources available, all you have to do is ask!    LABS  Lab orders have been ordered.  Labs are to be done within 30 days of your initial visit with your provider.    Please make an appointment to have them drawn at your convenience.  If you wish to have your labs drawn at a non-[facility] lab, please notify our office so we can fax the orders.  Depending on your results, you may be required to have additional labs.    CLEARANCES  Every patient is required to have a psych clearance and a letter of support from their primary care provider.  A list of Psych Clearance providers form the [facility] will be provided.   Based on medical history, additional clearances may be required.  A copy of the specialty clearance letter will be sent to you. Clearance letters are the responsibility of the patient to obtain from their providers.  Some providers may require additional visits/tests in order to fully clear a patient for surgery.    MEDICATIONS  After surgery, you will not be able to use non-steroidal anti-inflammatory (NSAID) medications.  These include aspirin, ibuprofen, naproxen, etc.  If you are on NSAIDS for a chronic condition, you will want to discuss this with your provider and primary care provider.    SMOKING CESSATION  You must be smoke-free for at least 3 months before surgery and off nicotine-replacement for at least months before surgery.  You must have a negative nicotine test at least 1 month before surgery.  Do not start smoking after surgery.  This can lead to ulcers in the stomach.    TASK LIST  Please review your task list for the labs, clearance letters and additional appointments that are  "required.       GET WELL LOOP  You will be signed up for the GetWell Loop once you have a surgery date assigned.  The Get Well Loop is a great place to access all of the postop resources, track your fluid intake and receive information regarding frequently asked postop questions.    [email or web address]      SUPPORT GROUP INFORMATION       We offer support groups for patients who are working on weight loss and considering, preparing for or have had weight loss surgery.   There is no cost for this opportunity.  You are invited to attend the?Virtual Support Groups?provided by any of the following locations:       [facility] via Microsoft Teams with Marcie Johnson RN  2.   [place] via Cart[employer] with Tono Myrick, PhD, LP  3.   [place] via Cartesian with Anh Jeong RN  4.   [facility] via Microsoft Teams with Anh Black [facility]     The following Support Group information can also be found on our website:    [email or web address]       [facility] Weight Loss Surgery Support Group     [facility] Weight Loss Surgery Support Group  The support group is a patient-lead forum that meets monthly to share experiences, encouragement and education. It is open to those who have had weight loss surgery, are scheduled for surgery, and those who are considering surgery.   WHEN: This group meets on the 3rd Wednesday of each month from 5:00PM - 6:00PM virtually using Microsoft Teams.   FACILITATOR: Led by Marcie Johnson, the program's Clinical Coordinator.   TO REGISTER: Please contact the clinic via [employer] or call the nurse line directly at [phone] to inform our staff that you would like an invite sent to you and to let us know the email you would like the invite sent to. Prior to the meeting, a link with directions on how to join the meeting will be sent to you.    2022 Meetings  Lynn 15: \"Let's " "Talk\" a time for the group to share.  July 20: \"Let's Talk\" a time for the group to share.  August 17 \"Let's Talk\" a time for the group to share.  September 21 \"Let's Talk\" a time for the group to share.  October 19: Guest Speaker: Dr Juan Murillo MD Pulmonologist and Sleep Medicine Physician, \"Getting a Good Night's Sleep\".  November 16 \"Let's Talk\" a time for the group to share.  December 21 \"Let's Talk\" a time for the group to share.     [facility] Support Groups     Connections: Bariatric Care Support Group?  This is open to all [facility] (and those external to this program) pre- and post- operative bariatric surgery patients as well as their support system.   WHEN: This group meets the 2nd Tuesday of each month from 6:30 PM - 8:00 PM virtually using Microsoft Teams.   FACILITATOR: Led by Tono Myrick, Ph.D who is a Licensed Psychologist with the [facility] Comprehensive Weight Management Program.   TO REGISTER: Please send an email to Tono Myrick, Ph.D., LP at?[email or web address]?if you would like an invitation to the group and to learn about using Microsoft Teams.        Connections: Post-Operative Bariatric Surgery Support Group  This is a support group for [facility] bariatric patients (and those external to [facility]) who have had bariatric surgery and are at least 3 months post-surgery.  WHEN: This support group meets the 4th Wednesday of the month from 11:00 AM - 12:00 PM virtually using Microsoft Teams.   FACILITATOR: Led by Certified Bariatric Nurse, Anh Jeong RN.   TO REGISTER: Please send an email to Anh at [email or web address] if you would like an invitation to the group and to learn about using Microsoft Teams.    2022 Meetings  June 14: Queenie Cullen, TAMI, LD at [facility], \"Nutritional Labeling\"  July 12 August 2 (Please Note Date Change)  September 13 October 11 November 8 December 13        [facility] Healthy Lifestyle Virtual Support Group     Healthy Lifestyle Virtual Support Group?  This is 60 minutes of small group guided discussion, support and resources. All are welcome who want a healthy lifestyle.  WHEN: This group meets monthly on a Friday from 12:30 PM - to 1:30 PM virtually using Microsoft Teams.   FACILITATOR: Led by National Board Certified Health , Anh Black [facility].   TO REGISTER: Please send an email to Anh at? [email or web address] to receive monthly invites to the group or if you have any questions about having a health .  Prior to the meeting, a link with directions on how to join the meeting will be sent to you.    2022 Meetings  June 22 July 27 August 24 September 28 October 26 November 23 December 28       RELEASE OF INFORMATION    For Release of Information to transfer info to or from  [employer], go to this website for the forms:    [email or web address]    Please reach out if you have any additional questions.  Keep us updated as you obtain each clearance as well as when you have completed your labs. We look forward to helping you on your weight loss journey.

## 2022-08-05 ENCOUNTER — TELEPHONE (OUTPATIENT)
Dept: SURGERY | Facility: CLINIC | Age: 47
End: 2022-08-05

## 2022-08-05 NOTE — TELEPHONE ENCOUNTER
8/5 left  for staff message:    !! We have Slick's permission to grab a Thur 8/18 Gen Surg slot for Dr. Ritter. Note that BLUE means not available, being used in his CWMP sched.      Shoshana Bustamante PA-C  P [facility] Bariatric Scheduling Registration Pool  RD soon and in 1 month   Dr Ritter soon Thursday in person   MTM 1 month phone to follow up tirzepatide radhames Anna 3 months video medication follow up

## 2022-08-08 NOTE — TELEPHONE ENCOUNTER
PRIOR AUTHORIZATION DENIED    Medication: Mounjaro -PA Denied    Denial Date: 8/6/2022    Denial Rational:     Appeal Information:

## 2022-08-10 ENCOUNTER — TELEPHONE (OUTPATIENT)
Dept: ENDOCRINOLOGY | Facility: CLINIC | Age: 47
End: 2022-08-10

## 2022-08-10 NOTE — TELEPHONE ENCOUNTER
Called [facility] [place] to check if policy would have coverage for lap band removal (88791) with conversion to laparoscopic sleeve gastrectomy (30723). There is coverage.

## 2022-08-18 ENCOUNTER — OFFICE VISIT (OUTPATIENT)
Dept: SURGERY | Facility: CLINIC | Age: 47
End: 2022-08-18
Payer: COMMERCIAL

## 2022-08-18 VITALS
BODY MASS INDEX: 47.97 KG/M2 | HEART RATE: 96 BPM | SYSTOLIC BLOOD PRESSURE: 134 MMHG | HEIGHT: 65 IN | WEIGHT: 287.9 LBS | DIASTOLIC BLOOD PRESSURE: 91 MMHG | OXYGEN SATURATION: 98 %

## 2022-08-18 DIAGNOSIS — E66.01 MORBID OBESITY (H): Primary | ICD-10-CM

## 2022-08-18 PROCEDURE — 99214 OFFICE O/P EST MOD 30 MIN: CPT | Performed by: SURGERY

## 2022-08-18 ASSESSMENT — ENCOUNTER SYMPTOMS
CONSTIPATION: 1
ORTHOPNEA: 0
HYPERTENSION: 1
HEMATURIA: 0
ABDOMINAL PAIN: 0
FLANK PAIN: 0
EXERCISE INTOLERANCE: 1
BREAST PAIN: 0
JAUNDICE: 0
DIARRHEA: 0
LIGHT-HEADEDNESS: 0
BLOATING: 0
RECTAL PAIN: 0
SLEEP DISTURBANCES DUE TO BREATHING: 0
SYNCOPE: 0
BLOOD IN STOOL: 0
LEG PAIN: 0
VOMITING: 0
DIFFICULTY URINATING: 0
PALPITATIONS: 0
NAUSEA: 0
BREAST MASS: 0
HYPOTENSION: 0
DYSURIA: 0
BOWEL INCONTINENCE: 0

## 2022-08-18 ASSESSMENT — PAIN SCALES - GENERAL: PAINLEVEL: NO PAIN (0)

## 2022-08-18 NOTE — LETTER
8/18/2022       RE: Raffy Castelan  [address]  [place] [address]     Dear Colleague,    Thank you for referring your patient, Raffy Castelan, to the [facility] GENERAL SURGERY [facility] [place] [facility]. Please see a copy of my visit note below.    Answers for HPI/ROS submitted by the patient on 8/18/2022  General Symptoms: No  Skin Symptoms: No  HENT Symptoms: No  EYE SYMPTOMS: No  HEART SYMPTOMS: Yes  LUNG SYMPTOMS: No  INTESTINAL SYMPTOMS: Yes  URINARY SYMPTOMS: Yes  GYNECOLOGIC SYMPTOMS: No  BREAST SYMPTOMS: Yes  SKELETAL SYMPTOMS: No  BLOOD SYMPTOMS: No  NERVOUS SYSTEM SYMPTOMS: No  MENTAL HEALTH SYMPTOMS: No  Chest pain or pressure: No  Fast or irregular heartbeat: No  Pain in legs with walking: No  Trouble breathing while lying down: No  Fingers or toes appear blue: No  High blood pressure: Yes  Low blood pressure: No  Fainting: No  Murmurs: No  Pacemaker: No  Varicose veins: No  Edema or swelling: No  Wake up at night with shortness of breath: No  Light-headedness: No  Exercise intolerance: Yes  Nausea: No  Vomiting: No  Abdominal pain: No  Bloating: No  Constipation: Yes  Diarrhea: No  Blood in stool: No  Black stools: No  Rectal or Anal pain: No  Fecal incontinence: No  Yellowing of skin or eyes: No  Vomit with blood: No  Change in stools: No  Trouble holding urine or incontinence: Yes  Pain or burning: No  Trouble starting or stopping: No  Increased frequency of urination: No  Blood in urine: No  Decreased frequency of urination: No  Frequent nighttime urination: Yes  Flank pain: No  Difficulty emptying bladder: No  Discharge: Yes  Lumps: No  Pain: No  Nipple retraction: No      Dear Dr. Tavares,       I had the pleasure of meeting with your patient, Raffy Castelan, in our weight loss surgery office.  This patient is a 47 year old female who has been undergoing our thorough preoperative screening process in anticipation of potential  "bariatric surgery.    I have met her previously, ~ 1 yr ago and added fluid to her lapband at that time.    She underwent lapband surgery in May 2014 by Dr. Fournier with hiatal hernia repair.  Had debated potentially having sleeve gastrectomy surgery at that time, but decided ultimately on the lapband.    She is interested now in lapband removal and sleeve gastrectomy surgery.    I think this is feasible, though I would want her lapband emptied first and a follow-up UGI demonstrating no evidence of increased pressure above the lapband.    She will have Shoshana see her for lapband adjustment at some point in October and then I will review a post-decompression UGI to assess if lapband removal and sleeve gastrectomy can be done in one step or if it will require two operations.    At initial evaluation we recorded Raffy Castelan's Height: 165.1 cm (5' 5\"), Initial Weight (lbs): 290 lbs and Initial BMI: 37.67.  Due to lack of success in achieving weight loss through other methods, she is interested in undergoing revisional bariatric surgery.    PREVIOUS WEIGHT LOSS ATTEMPTS:  noted    CO-MORBIDITIES OF OBESITY INCLUDE:  No flowsheet data found.    VITALS:  BP (!) 134/91 (BP Location: Left arm, Patient Position: Sitting, Cuff Size: Adult Large)   Pulse 96   Ht 1.651 m (5' 5\")   Wt 130.6 kg (287 lb 14.4 oz)   SpO2 98%   BMI 47.91 kg/m      PE:  GENERAL: Alert and oriented x3. NAD  HEENT exam: Sclerae not icteric. Hearing good. Head normocephalic and atraumatic.   CARDIOVASCULAR: No JVD  RESPIRATORY: Breathing unlabored  GASTROINTESTINAL: Obese  LOWER EXTREMITIES: no deformities  MUSCULOSKELETAL: Normal gait, Moves all 4 extremities equal and strong  NEUROLOGIC: no gross defect  SKIN: warm and dry to touch     In summary, she has undergone an appropriate medical evaluation, dietitian evaluation, as well as psychologic screening. The patient appears to be an appropriate candidate for bariatric surgery.    I will " discuss risks of surgery at a follow-up visit after lapband decompression and UGI.      At present we are going to present your patient's file for prior authorization to insurance.  Pending prior authorization, I anticipate a surgical date in the near future.  We will keep you updated on any progress.  If you have questions regarding care please feel free to contact me.  Total time spent in the clinic was 45 minutes with greater than 50% in face-to-face consultation.        Sincerely,    Gurinder Ritter MD    Please route or send letter to:  Primary Care Provider (PCP) and Referring Provider

## 2022-08-18 NOTE — NURSING NOTE
"(   Chief Complaint   Patient presents with     RECHECK     Meet and greet    )    ( Weight: 130.6 kg (287 lb 14.4 oz) )  ( Height: 165.1 cm (5' 5\") )  ( BMI (Calculated): 47.91 )  (   )  (   )  (   )  (   )  (   )  (   )    ( BP: (!) 134/91 )  (   )  (   )  (   )  ( Pulse: 96 )  (   )  ( SpO2: 98 % )    (   Patient Active Problem List   Diagnosis     Chronic constipation     CARDIOVASCULAR SCREENING; LDL GOAL LESS THAN 160     Family history of breast cancer     GERD (gastroesophageal reflux disease)     Class 3 severe obesity due to excess calories with serious comorbidity and body mass index (BMI) of 45.0 to 49.9 in adult (H)     Vitamin D deficiency     Psoriasis     Bariatric surgery status     H/O laparoscopic adjustable gastric banding     Diaphragmatic hernia     Hypertension goal BP (blood pressure) < 140/90     Anxiety     Impingement syndrome, shoulder, left     Spontaneous vaginal delivery     Advanced maternal age during pregnancy     37+ weeks gestation completed     Bunion, left     Tailor's bunion of both feet    )  (   Current Outpatient Medications   Medication Sig Dispense Refill     albuterol (PROAIR HFA/PROVENTIL HFA/VENTOLIN HFA) 108 (90 Base) MCG/ACT inhaler Inhale 2 puffs into the lungs every 6 hours as needed for shortness of breath / dyspnea or wheezing 8.5 g 3     clindamycin (CLINDAMAX) 1 % external gel Apply topically 2 times daily as needed (cysts under breasts) 60 g 3     clobetasol propionate (OLUX) 0.05 % external foam APPLY SPARINGLY TO AFFECTED AREA TWICE DAILY AS NEEDED DO NOT APPLY TO FACE 600 g 3     losartan-hydrochlorothiazide (HYZAAR) 50-12.5 MG tablet Take 1 tablet by mouth daily 90 tablet 3     Multiple Vitamins-Minerals (HAIR SKIN & NAILS ADVANCED PO) Take 2 tablets by mouth daily       Prenatal Vit-Fe Fumarate-FA (PRENATAL VITAMINS PO)        senna (SENOKOT) 8.6 MG tablet Take 1 tablet by mouth daily       Tirzepatide (MOUNJARO) 2.5 MG/0.5ML SOPN Inject 2.5 mg " Subcutaneous every 7 days 2 mL 0    )  ( Diabetes Eval:    )    ( Pain Eval:  No Pain (0) )    ( Wound Eval:       )    (   History   Smoking Status     Never Smoker   Smokeless Tobacco     Never Used    )    ( Signed By:  Cassandra Chiang, EMT; August 18, 2022; 9:13 AM )

## 2022-08-18 NOTE — PROGRESS NOTES
Answers for HPI/ROS submitted by the patient on 8/18/2022  General Symptoms: No  Skin Symptoms: No  HENT Symptoms: No  EYE SYMPTOMS: No  HEART SYMPTOMS: Yes  LUNG SYMPTOMS: No  INTESTINAL SYMPTOMS: Yes  URINARY SYMPTOMS: Yes  GYNECOLOGIC SYMPTOMS: No  BREAST SYMPTOMS: Yes  SKELETAL SYMPTOMS: No  BLOOD SYMPTOMS: No  NERVOUS SYSTEM SYMPTOMS: No  MENTAL HEALTH SYMPTOMS: No  Chest pain or pressure: No  Fast or irregular heartbeat: No  Pain in legs with walking: No  Trouble breathing while lying down: No  Fingers or toes appear blue: No  High blood pressure: Yes  Low blood pressure: No  Fainting: No  Murmurs: No  Pacemaker: No  Varicose veins: No  Edema or swelling: No  Wake up at night with shortness of breath: No  Light-headedness: No  Exercise intolerance: Yes  Nausea: No  Vomiting: No  Abdominal pain: No  Bloating: No  Constipation: Yes  Diarrhea: No  Blood in stool: No  Black stools: No  Rectal or Anal pain: No  Fecal incontinence: No  Yellowing of skin or eyes: No  Vomit with blood: No  Change in stools: No  Trouble holding urine or incontinence: Yes  Pain or burning: No  Trouble starting or stopping: No  Increased frequency of urination: No  Blood in urine: No  Decreased frequency of urination: No  Frequent nighttime urination: Yes  Flank pain: No  Difficulty emptying bladder: No  Discharge: Yes  Lumps: No  Pain: No  Nipple retraction: No      Dear Dr. Tavares,       I had the pleasure of meeting with your patient, Raffy Castelan, in our weight loss surgery office.  This patient is a 47 year old female who has been undergoing our thorough preoperative screening process in anticipation of potential bariatric surgery.    I have met her previously, ~ 1 yr ago and added fluid to her lapband at that time.    She underwent lapband surgery in May 2014 by Dr. Fournier with hiatal hernia repair.  Had debated potentially having sleeve gastrectomy surgery at that time, but decided ultimately on the lapband.    She is  "interested now in lapband removal and sleeve gastrectomy surgery.    I think this is feasible, though I would want her lapband emptied first and a follow-up UGI demonstrating no evidence of increased pressure above the lapband.    She will have Shoshana see her for lapband adjustment at some point in October and then I will review a post-decompression UGI to assess if lapband removal and sleeve gastrectomy can be done in one step or if it will require two operations.    At initial evaluation we recorded Raffy Castelan's Height: 165.1 cm (5' 5\"), Initial Weight (lbs): 290 lbs and Initial BMI: 37.67.  Due to lack of success in achieving weight loss through other methods, she is interested in undergoing revisional bariatric surgery.    PREVIOUS WEIGHT LOSS ATTEMPTS:  noted    CO-MORBIDITIES OF OBESITY INCLUDE:  No flowsheet data found.    VITALS:  BP (!) 134/91 (BP Location: Left arm, Patient Position: Sitting, Cuff Size: Adult Large)   Pulse 96   Ht 1.651 m (5' 5\")   Wt 130.6 kg (287 lb 14.4 oz)   SpO2 98%   BMI 47.91 kg/m      PE:  GENERAL: Alert and oriented x3. NAD  HEENT exam: Sclerae not icteric. Hearing good. Head normocephalic and atraumatic.   CARDIOVASCULAR: No JVD  RESPIRATORY: Breathing unlabored  GASTROINTESTINAL: Obese  LOWER EXTREMITIES: no deformities  MUSCULOSKELETAL: Normal gait, Moves all 4 extremities equal and strong  NEUROLOGIC: no gross defect  SKIN: warm and dry to touch     In summary, she has undergone an appropriate medical evaluation, dietitian evaluation, as well as psychologic screening. The patient appears to be an appropriate candidate for bariatric surgery.    I will discuss risks of surgery at a follow-up visit after lapband decompression and UGI.      At present we are going to present your patient's file for prior authorization to insurance.  Pending prior authorization, I anticipate a surgical date in the near future.  We will keep you updated on any progress.  If you have " questions regarding care please feel free to contact me.  Total time spent in the clinic was 45 minutes with greater than 50% in face-to-face consultation.        Sincerely,    Gurinder Ritter MD    Please route or send letter to:  Primary Care Provider (PCP) and Referring Provider

## 2022-08-26 ENCOUNTER — TELEPHONE (OUTPATIENT)
Dept: NEUROPSYCHOLOGY | Facility: CLINIC | Age: 47
End: 2022-08-26

## 2022-08-26 NOTE — TELEPHONE ENCOUNTER
Patient called to schedule psychological evaluation with Dr. Leeann Argueta. Patient recently saw Dr. Ritter regarding the possibility of band removal with conversion to sleeve gastrectomy.   Scheduled video visit on 11/28 at 8:30 a.m., to plan on 2 hours for interview followed by MMPI testing.

## 2022-09-01 ENCOUNTER — VIRTUAL VISIT (OUTPATIENT)
Dept: PHARMACY | Facility: CLINIC | Age: 47
End: 2022-09-01
Payer: COMMERCIAL

## 2022-09-01 DIAGNOSIS — Z78.9 TAKES DIETARY SUPPLEMENTS: ICD-10-CM

## 2022-09-01 DIAGNOSIS — R06.02 SOB (SHORTNESS OF BREATH): ICD-10-CM

## 2022-09-01 DIAGNOSIS — I10 HYPERTENSION GOAL BP (BLOOD PRESSURE) < 140/90: ICD-10-CM

## 2022-09-01 DIAGNOSIS — E66.01 CLASS 3 SEVERE OBESITY DUE TO EXCESS CALORIES WITH SERIOUS COMORBIDITY AND BODY MASS INDEX (BMI) OF 45.0 TO 49.9 IN ADULT (H): Primary | ICD-10-CM

## 2022-09-01 DIAGNOSIS — L40.9 PSORIASIS: ICD-10-CM

## 2022-09-01 DIAGNOSIS — E66.813 CLASS 3 SEVERE OBESITY DUE TO EXCESS CALORIES WITH SERIOUS COMORBIDITY AND BODY MASS INDEX (BMI) OF 45.0 TO 49.9 IN ADULT (H): Primary | ICD-10-CM

## 2022-09-01 DIAGNOSIS — K59.00 CONSTIPATION, UNSPECIFIED CONSTIPATION TYPE: ICD-10-CM

## 2022-09-01 PROCEDURE — 99607 MTMS BY PHARM ADDL 15 MIN: CPT | Performed by: PHARMACIST

## 2022-09-01 PROCEDURE — 99605 MTMS BY PHARM NP 15 MIN: CPT | Performed by: PHARMACIST

## 2022-09-01 NOTE — PROGRESS NOTES
Medication Therapy Management (MTM) Encounter    ASSESSMENT:                            Medication Adherence/Access: No issues identified    Obesity: Would benefit from increase Mounjaro to 5 mg weekly for now.     Constipation: Discussed long term use of senna can cause the bowels to stop functioning normally and might cause dependence on laxative. Discussed considering alternative such as miralax + fiber daily. Patient declined for now. Discussed going back to implementation of more fiber in diet to decrease senna use.     Hypertension: Singular elevated blood pressure above goal < 140/90 mmHg. Due to singular blood pressure will have patient repeat blood pressure and remain on current medication without change.     Supplements: Stable.     Psoriasis: Stable.     SOB: Stable.     PLAN:                            1. Increase Mounjaro to 5 mg weekly. Approved by Shoshana Bustamante PA-C. Can reach out in between appointments with Shoshana Bustamante PA-C if interested in increasing Mounjaro sooner than next appointment.     2. Try implementing more dietary fiber in your diet to try and decrease the amount of senna you are using.    Follow-up: 2 months with Shoshana Bustamante PA-C, as needed with Demet[facility] pharmacist     SUBJECTIVE/OBJECTIVE:                          Raffy Castelan is a 47 year old female called for a follow-up visit. Today's visit is a follow-up MTM visit from 9/2021    Reason for visit: Mounjaro start follow up. comprehensive review of medications due.     Allergies/ADRs: Reviewed in chart  Past Medical History: Reviewed in chart  Tobacco: She reports that she has never smoked. She has never used smokeless tobacco.  Alcohol: not currently using    Medication Adherence/Access: no issues reported    Obesity:   Mounjaro 2.5 mg weekly      Followed by Shoshana Bustamante PA-C, seen 8/4/2022 for [facility]. Patient is experiencing the follow side effects: mild constipation - see below, similar to  "baseline. She is working on getting in adequate water intake. She is trying to ensure she is getting adequate fiber in diet. Eating 2 meals per day. She was previously on Wegovy for weight loss then insurance stopped covering. Is using [employer] savings card for Mounjaro. She is working on eating less sugar in diet. She reports losing 3 lb since starting Mounjaro. Unsure if she is noticing fullness benefits with the introduction Mounjaro dose.    Wt Readings from Last 4 Encounters:   08/18/22 287 lb 14.4 oz (130.6 kg)   08/03/22 284 lb (128.8 kg)   01/12/22 266 lb 3.2 oz (120.7 kg)   11/10/21 273 lb (123.8 kg)     Estimated body mass index is 47.91 kg/m  as calculated from the following:    Height as of 8/18/22: 5' 5\" (1.651 m).    Weight as of 8/18/22: 287 lb 14.4 oz (130.6 kg).    Constipation:   Senna 8.6 mg tablet: 2 tablets in AM and 4 tablets in PM     She reports that she tried fiber powder and was not helpful. She does find that eating more greens is very helpful. She doesn't like using other laxatives like miralax. She reports she has been taking senna in this fashion for \"well over a decade\".      Hypertension:   Losartan-hydrochlorothiazide 50-12.5 mg daily in AM.      Patient does self-monitor blood pressure. Has not checked recently. Patient reports no current medication side effects. She does notice more lower extremity swelling when sitting for long periods of time.   BP Readings from Last 3 Encounters:   09/20/22 128/85   08/18/22 (!) 134/91   01/21/22 121/83     Supplements:   Hair skin and nails 2 tablets daily  Prenatal nightly     For general health and hair care.  History of lap band and working on potential lap band removal and transitioning to Sleeve Gastrectomy. Working with Shoshana Bustamante PA-C and surgeon dr. Ritter regarding this. She longstanding takes the above supplements due to band, no other supplements.     Hemoglobin   Date Value Ref Range Status   01/12/2022 10.8 (L) 11.7 - 15.7 g/dL " Final   10/02/2014 11.7 11.7 - 15.7 g/dL Final     Ferritin   Date Value Ref Range Status   10/02/2014 22 10 - 120 ng/mL Final     Lab Results   Component Value Date    VITDT 30 10/04/2021     Lab Results   Component Value Date    PTHI 29 10/04/2021     Lab Results   Component Value Date    B12 910 10/02/2014     Lab Results   Component Value Date    KASI 0.49 10/02/2014     Psoriasis:   Clobetasol 0.05% foam     Will use 1-2 times per month if anything to help with breakthrough eczema.     SOB:   Albuterol HFA - not using    Was given during when she had covid and now using when sick if needed, wished for this to remain on medication list. Hasn't used in months.     ----------------    I spent 20 minutes with this patient today. All changes were made via collaborative practice agreement with Shoshana Bustamante PA-C. A copy of the visit note was provided to the patient's provider(s).    The patient was sent via [employer] a summary of these recommendations.     Lauren Bloch, PharmD, BCACP   Medication Therapy Management Pharmacist   [facility] [place]    Telemedicine Visit Details  Type of service:  Telephone visit  Start Time: 11:00 AM  End Time: 11:20 AM  Originating Location (patient location): Home  Distant Location (provider location):  [facility]     Medication Therapy Recommendations  Class 3 severe obesity due to excess calories with serious comorbidity and body mass index (BMI) of 45.0 to 49.9 in adult (H)    Rationale: Dose too low - Dosage too low - Effectiveness   Recommendation: Increase Dose - Tirzepatide 5 MG/0.5ML Sopn   Status: Accepted per Provider

## 2022-09-03 ENCOUNTER — HEALTH MAINTENANCE LETTER (OUTPATIENT)
Age: 47
End: 2022-09-03

## 2022-09-20 ENCOUNTER — ALLIED HEALTH/NURSE VISIT (OUTPATIENT)
Dept: FAMILY MEDICINE | Facility: CLINIC | Age: 47
End: 2022-09-20
Payer: COMMERCIAL

## 2022-09-20 VITALS — SYSTOLIC BLOOD PRESSURE: 128 MMHG | DIASTOLIC BLOOD PRESSURE: 85 MMHG

## 2022-09-20 DIAGNOSIS — Z01.30 BP CHECK: Primary | ICD-10-CM

## 2022-09-20 PROCEDURE — 99207 PR NO CHARGE NURSE ONLY: CPT | Performed by: FAMILY MEDICINE

## 2022-09-20 NOTE — PROGRESS NOTES
Raffy Castelan was evaluated at [facility] on September 20, 2022 at which time her blood pressure was:    BP Readings from Last 3 Encounters:   09/20/22 128/85   08/18/22 (!) 134/91   01/21/22 121/83     Pulse Readings from Last 3 Encounters:   08/18/22 96   01/12/22 98   06/28/21 78       Reviewed lifestyle modifications for blood pressure control and reduction: including making healthy food choices, managing weight, getting regular exercise, smoking cessation, reducing alcohol consumption, monitoring blood pressure regularly.     Symptoms: None    BP Goal:< 140/90 mmHg    BP Assessment:  BP at goal    Potential Reasons for BP too high: NA - Not applicable    BP Follow-Up Plan: Recheck BP in 6 months at pharmacy    Recommendation to Provider: none    Note completed by: lexy

## 2022-09-21 NOTE — PATIENT INSTRUCTIONS
"Recommendations from today's MT visit:                                                       1. Increase Mounjaro to 5 mg weekly. Can reach out in between appointments with Shoshana Bustamante PA-C if interested in increasing Mounjaro sooner than next appointment.     2. Try implementing more dietary fiber in your diet to try and decrease the amount of senna you are using.    Follow-up: 2 months with Shoshana Bustamante PA-C, as needed with Demetria [facility] pharmacist     It was great speaking with you today.  I value your experience and would be very thankful for your time in providing feedback in our clinic survey. In the next few days, you may receive an email or text message from [facility] [employer] with a link to a survey related to your  clinical pharmacist.\"     My Clinical Pharmacist's contact information:                                                      Please feel free to contact me with any questions or concerns you have.      Lauren Bloch, PharmD  Medication Therapy Management Pharmacist   [facility] [place]             "

## 2022-10-11 ENCOUNTER — VIRTUAL VISIT (OUTPATIENT)
Dept: ENDOCRINOLOGY | Facility: CLINIC | Age: 47
End: 2022-10-11
Payer: COMMERCIAL

## 2022-10-11 DIAGNOSIS — E66.813 CLASS 3 SEVERE OBESITY DUE TO EXCESS CALORIES WITH SERIOUS COMORBIDITY AND BODY MASS INDEX (BMI) OF 45.0 TO 49.9 IN ADULT (H): ICD-10-CM

## 2022-10-11 DIAGNOSIS — Z98.84 H/O LAPAROSCOPIC ADJUSTABLE GASTRIC BANDING: ICD-10-CM

## 2022-10-11 DIAGNOSIS — E66.01 CLASS 3 SEVERE OBESITY DUE TO EXCESS CALORIES WITH SERIOUS COMORBIDITY AND BODY MASS INDEX (BMI) OF 45.0 TO 49.9 IN ADULT (H): ICD-10-CM

## 2022-10-11 DIAGNOSIS — Z71.3 NUTRITIONAL COUNSELING: Primary | ICD-10-CM

## 2022-10-11 PROCEDURE — 97803 MED NUTRITION INDIV SUBSEQ: CPT | Mod: 95 | Performed by: DIETITIAN, REGISTERED

## 2022-10-11 NOTE — PROGRESS NOTES
"Raffy Castelan is a 47 year old female who is being evaluated via a billable video visit.      The patient has been notified of following:     \"This video visit will be conducted via a call between you and your physician/provider. We have found that certain health care needs can be provided without the need for an in-person physical exam.  This service lets us provide the care you need with a video conversation.  If a prescription is necessary we can send it directly to your pharmacy.  If lab work is needed we can place an order for that and you can then stop by our lab to have the test done at a later time.    Video visits are billed at different rates depending on your insurance coverage.  Please reach out to your insurance provider with any questions.    If during the course of the call the physician/provider feels a video visit is not appropriate, you will not be charged for this service.\"    Patient has given verbal consent for Video visit? Yes  How would you like to obtain your AVS? MyChart  If you are dropped from the video visit, the video invite should be resent to: Send to e-mail at: [email or web address]  Will anyone else be joining your video visit? No  {If patient encounters technical issues they should call [phone]      Video-Visit Details    Type of service:  Video Visit    Video Start Time: 12:45 PM  Video End Time: 1:07 PM    Originating Location (pt. Location): Home    Distant Location (provider location):  [facility] MANAGEMENT CLINIC [place]     Platform used for Video Visit: [facility]    During this virtual visit the patient is located in [place], patient verifies this as the location during the entirety of this visit.       New Bariatric Nutrition Consultation Note    Reason For Visit: Nutrition Assessment    Raffy Castelan is a 47 year old presenting today for new bariatric nutrition consult.   Pt is interested in laparoscopic sleeve gastrectomy with Dr. Ritter expected surgery in " "TBD.  Patient is accompanied by self.  This is pt's first of 3 required nutrition visits prior to surgery.     Pt referred by NOEMI Palacios on 2022.  CO-MORBIDITIES OF OBESITY INCLUDE:    No flowsheet data found.    SUPPORT:  No flowsheet data found.    ANTHROPOMETRICS:  Estimated body mass index is 47.91 kg/m  as calculated from the following:    Height as of 22: 1.651 m (5' 5\").    Weight as of 22: 130.6 kg (287 lb 14.4 oz).     Current weight: 284 lbs per pt    Required weight loss goal pre-op: TBD lbs from initial consult weight (goal weight TBD lbs or less before surgery)    No flowsheet data found.    No flowsheet data found.    SUPPLEMENT INFORMATION:  MVI    NUTRITION HISTORY:  Pt has seen Ivelisse Singh prior. Wanting to persue surgery now. Decreasing portion sizes and sugar consumptino. Struggles the most with portion sizes. States she knows what she needs to do but is lacking motivation.      No flowsheet data found.    No flowsheet data found.    No flowsheet data found.      EXERCISE:    No flowsheet data found.        NUTRITION DIAGNOSIS:  Obesity r/t long history of positive energy balance aeb BMI >30 kg/m2.    INTERVENTION:  Intervention Provided/Education Provided on post-op diet guidelines, vitamins/minerals essential post-operatively, GI anatomy of bariatric surgeries, ways to help prepare for post-op diet guidelines pre-operatively, portion/calorie-control, mindful eating and sources of protein.  Patient demonstrates understanding. Provided pt with list of goals RD contact information.      No flowsheet data found.    Expected Engagement: good    GOALS:  Relating To Eatin) Eat slowly (20-30 minutes per meal), chewing foods well (25 chews per bite/applesauce consistency)  2) 9\" Plate method (1/2 plate non-starchy vegetables/fruit, 1/4 plate lean protein, 1/4 plate whole grain starch - no more than 1/2 cup carb/meal)  3) Consume 60-90 g protein per day    Relating to " activity:  1) Increase activity as able    Your Stage 1 Diet: Clear Liquids  [email or web address]     Your Stage 2 Diet: Low-fat Full Liquids  [email or web address]     Your Stage 3 Diet: Pureed Foods  [email or web address]     Pureed Pleasures  [email or web address]    Your Stage 4 Diet: Soft Foods  [email or web address]    Your Stage 5 Diet: Regular Foods  [email or web address]    Supplements after Weight Loss Surgery  [email or web address]       Follow up: 1 month, prn     Time spent with patient: 22 minutes.  MARCO A NESS RD, LD

## 2022-10-11 NOTE — PATIENT INSTRUCTIONS
"Hi Abeer!    Follow-up with RD in 1 month    Thank you,    Cintia Galvez, TAMI, LD  If you would like to schedule or reschedule an appointment with the RD, please call [phone]    Nutrition Goals  Relating To Eatin) Eat slowly (20-30 minutes per meal), chewing foods well (25 chews per bite/applesauce consistency)  2) 9\" Plate method (1/2 plate non-starchy vegetables/fruit, 1/4 plate lean protein, 1/4 plate whole grain starch - no more than 1/2 cup carb/meal)  3) Consume 60-90 g protein per day    Relating to activity:  1) Increase activity as able    Your Stage 1 Diet: Clear Liquids  [email or web address]     Your Stage 2 Diet: Low-fat Full Liquids  [email or web address]     Your Stage 3 Diet: Pureed Foods  [email or web address]     Pureed Pleasures  [email or web address]    Your Stage 4 Diet: Soft Foods  [email or web address]    Your Stage 5 Diet: Regular Foods  [email or web address]    Supplements after Weight Loss Surgery  [email or web address]       Interested in working with a health ? Health coaches work with you to improve your overall health and wellbeing. They look at the whole person, and may involve discussion of different areas of life, including, but not limited to the four pillars of health (sleep, exercise, nutrition, and stress management). Discuss with your care team if you would like to start working a health .    Health Coaching-3 Pack:    $99 for three health coaching visits    Visits may be done in person or via phone    Coaching is a partnership between the  and the client; Coaches do not prescribe or diagnose    Coaching helps inspire the client to reach his/her personal goals    COMPREHENSIVE WEIGHT MANAGEMENT PROGRAM  VIRTUAL SUPPORT GROUPS    For Support Group Information:      We offer support groups for patients who are working on weight loss and considering, preparing for or have had weight loss surgery.   " "There is no cost for this opportunity.  You are invited to attend the?Virtual Support Groups?provided by any of the following locations:    [facility] via Microsoft Teams with Marcie Johnson RN  2.   [place] via [employer] with Tono Myrick, PhD, LP  3.   [place] via [employer] with Anh Jeong RN  4.   [facility] via [employer] Teams with Anh Black [facility]    The following Support Group information can also be found on our website:  [email or web address]      [facility] Weight Loss Surgery Support Group    [facility] Weight Loss Surgery Support Group  The support group is a patient-lead forum that meets monthly to share experiences, encouragement and education. It is open to those who have had weight loss surgery, are scheduled for surgery, and those who are considering surgery.   WHEN: This group meets on the 3rd Wednesday of each month from 5:00PM - 6:00PM virtually using Microsoft Teams.   FACILITATOR: Led by Marcie Johnson RD, LD, RN, the program's Clinical Coordinator.   TO REGISTER: Please contact the clinic via [employer] or call the nurse line directly at [phone] to inform our staff that you would like an invite sent to you and to let us know the email you would like the invite sent to. Prior to the meeting, a link with directions on how to join the meeting will be sent to you.    2022 Meetings  January 19: \"Let's Talk\" a time for the group to share.  February 16: \"Let's Talk\" a time for the group to share.  March 16: Guest Speakers: Psychologists, Gely Weiss, PhD,LP and Gala Frey, PsyD,LP  April 20: Guest Speaker: Health , Paradise Fuller, [facility],CHES, CPT  May 18: Guest Speaker: DietitianDelfino, TAMI, LP  Lynn 15: \"Let's Talk\" a time for the group to share.  July 20: \"Let's Talk\" a time for the group to share.  August 17: TBA  September 21: TBA  October 19: Guest Speaker: Dr Martinez " "MD Chidi Pulmonologist and Sleep Medicine Physician, \"Getting a Good Night's Sleep\".  November 16: TBA  December 21: TBA    [facility] Support Groups    Connections: Bariatric Care Support Group?  This is open to all [facility] (and those external to this program) pre- and post- operative bariatric surgery patients as well as their support system.   WHEN: This group meets the 2nd Tuesday of each month from 6:30 PM - 8:00 PM virtually using Microsoft Teams.   FACILITATOR: Led by Tono Myrick, Ph.D who is a Licensed Psychologist with the [facility] Comprehensive Weight Management Program.   TO REGISTER: Please send an email to Tono Myrick, Ph.D., LP at?[email or web address]?if you would like an invitation to the group and to learn about using Microsoft Teams.    2022 Meetings  January 11: Nohemi Moy, PharmD, Pharmacy Resident at [facility], \"Medications and Bariatric Surgery\".  February 8: Open Forum  March 8  April 12  May 10  Lynn 14    Connections: Post-Operative Bariatric Surgery Support Group  This is a support group for [facility] bariatric patients (and those external to [facility]) who have had bariatric surgery and are at least 3 months post-surgery.  WHEN: This support group meets the 4th Wednesday of the month from 11:00 AM - 12:00 PM virtually using Microsoft Teams.   FACILITATOR: Led by Certified Bariatric Nurse, Anh Jeong RN.   TO REGISTER: Please send an email to Anh at [email or web address] if you would like an invitation to the group and to learn about using Microsoft Teams.    2022 Meetings  January 26  February 23  March 23  April 27  May 25  Lynn 22    [facility] Healthy Lifestyle Virtual Support Group    Healthy Lifestyle Virtual Support Group?  This is 60 minutes of small group guided discussion, support and resources. All are welcome who want a healthy " "lifestyle.  WHEN: This group meets monthly on a Friday from 12:30 PM - 1:30 PM virtually using Microsoft Teams.   FACILITATOR: Led by National Board Certified Health and , Anh Black [facility].   TO REGISTER: Please send an email to Anh at?[email or web address] to receive monthly invites to the group or if you have any questions about having a health .  Prior to the meeting, a link with directions on how to join the meeting will be sent to you.    2022 Meetings  January 21: Shelly Vieira MS, RN, CIC, CBN, \"Healthy Habits\"  February 25: Open Forum  March 18: \"Setting Limits and Boundaries\"  April 29: Ivelisse Singh RD, \"Meal Planning Made Easy\"  May 20: Open Forum  June: To be determined                "

## 2022-10-11 NOTE — LETTER
"10/11/2022       RE: Raffy Csatelan  [address]  [place] [address]     Dear Colleague,    Thank you for referring your patient, Raffy Castelan, to the [facility]NAGEMENT [facility]INIC [place] at [facility]. Please see a copy of my visit note below.    Raffy Castelan is a 47 year old female who is being evaluated via a billable video visit.      The patient has been notified of following:     \"This video visit will be conducted via a call between you and your physician/provider. We have found that certain health care needs can be provided without the need for an in-person physical exam.  This service lets us provide the care you need with a video conversation.  If a prescription is necessary we can send it directly to your pharmacy.  If lab work is needed we can place an order for that and you can then stop by our lab to have the test done at a later time.    Video visits are billed at different rates depending on your insurance coverage.  Please reach out to your insurance provider with any questions.    If during the course of the call the physician/provider feels a video visit is not appropriate, you will not be charged for this service.\"    Patient has given verbal consent for Video visit? Yes  How would you like to obtain your AVS? MyChart  If you are dropped from the video visit, the video invite should be resent to: Send to e-mail at: [email or web address]  Will anyone else be joining your video visit? No  {If patient encounters technical issues they should call [phone]      Video-Visit Details    Type of service:  Video Visit    Video Start Time: 12:45 PM  Video End Time: 1:07 PM    Originating Location (pt. Location): Home    Distant Location (provider location):  [facility] WEIGHT MANAGEMENT [facility]     Platform used for Video Visit: [employer]    During this virtual visit the patient is located in [place], patient verifies " "this as the location during the entirety of this visit.       New Bariatric Nutrition Consultation Note    Reason For Visit: Nutrition Assessment    Raffy Castelan is a 47 year old presenting today for new bariatric nutrition consult.   Pt is interested in laparoscopic sleeve gastrectomy with Dr. Ritter expected surgery in [place].  Patient is accompanied by self.  This is pt's first of 3 required nutrition visits prior to surgery.     Pt referred by NOEMI Palacios on October 11, 2022.  CO-MORBIDITIES OF OBESITY INCLUDE:    No flowsheet data found.    SUPPORT:  No flowsheet data found.    ANTHROPOMETRICS:  Estimated body mass index is 47.91 kg/m  as calculated from the following:    Height as of 8/18/22: 1.651 m (5' 5\").    Weight as of 8/18/22: 130.6 kg (287 lb 14.4 oz).     Current weight: 284 lbs per pt    Required weight loss goal pre-op: TBD lbs from initial consult weight (goal weight TBD lbs or less before surgery)    No flowsheet data found.    No flowsheet data found.    SUPPLEMENT INFORMATION:  MVI    NUTRITION HISTORY:  Pt has seen Ivelisse Singh prior. Wanting to persue surgery now. Decreasing portion sizes and sugar consumptino. Struggles the most with portion sizes. States she knows what she needs to do but is lacking motivation.      No flowsheet data found.    No flowsheet data found.    No flowsheet data found.      EXERCISE:    No flowsheet data found.        NUTRITION DIAGNOSIS:  Obesity r/t long history of positive energy balance aeb BMI >30 kg/m2.    INTERVENTION:  Intervention Provided/Education Provided on post-op diet guidelines, vitamins/minerals essential post-operatively, GI anatomy of bariatric surgeries, ways to help prepare for post-op diet guidelines pre-operatively, portion/calorie-control, mindful eating and sources of protein.  Patient demonstrates understanding. Provided pt with list of goals RD contact information.      No flowsheet data found.    Expected Engagement: " "good    GOALS:  Relating To Eatin) Eat slowly (20-30 minutes per meal), chewing foods well (25 chews per bite/applesauce consistency)  2) 9\" Plate method (1/2 plate non-starchy vegetables/fruit, 1/4 plate lean protein, 1/4 plate whole grain starch - no more than 1/2 cup carb/meal)  3) Consume 60-90 g protein per day    Relating to activity:  1) Increase activity as able    Your Stage 1 Diet: Clear Liquids  [email or web address]     Your Stage 2 Diet: Low-fat Full Liquids  [email or web address]     Your Stage 3 Diet: Pureed Foods  [email or web address]     Pureed Pleasures  [email or web address]    Your Stage 4 Diet: Soft Foods  [email or web address]    Your Stage 5 Diet: Regular Foods  [email or web address]    Supplements after Weight Loss Surgery  [email or web address]       Follow up: 1 month, prn     Time spent with patient: 22 minutes.  MARCO A NESS RD, LD      "

## 2022-10-31 ENCOUNTER — CARE COORDINATION (OUTPATIENT)
Dept: ENDOCRINOLOGY | Facility: CLINIC | Age: 47
End: 2022-10-31

## 2022-10-31 ENCOUNTER — OFFICE VISIT (OUTPATIENT)
Dept: ENDOCRINOLOGY | Facility: CLINIC | Age: 47
End: 2022-10-31
Payer: COMMERCIAL

## 2022-10-31 ENCOUNTER — TELEPHONE (OUTPATIENT)
Dept: SURGERY | Facility: CLINIC | Age: 47
End: 2022-10-31

## 2022-10-31 VITALS
BODY MASS INDEX: 47.68 KG/M2 | HEART RATE: 87 BPM | HEIGHT: 65 IN | OXYGEN SATURATION: 100 % | DIASTOLIC BLOOD PRESSURE: 90 MMHG | SYSTOLIC BLOOD PRESSURE: 136 MMHG | WEIGHT: 286.2 LBS

## 2022-10-31 DIAGNOSIS — E66.01 CLASS 3 SEVERE OBESITY DUE TO EXCESS CALORIES WITH SERIOUS COMORBIDITY AND BODY MASS INDEX (BMI) OF 45.0 TO 49.9 IN ADULT (H): Primary | ICD-10-CM

## 2022-10-31 DIAGNOSIS — E66.813 CLASS 3 SEVERE OBESITY DUE TO EXCESS CALORIES WITH SERIOUS COMORBIDITY AND BODY MASS INDEX (BMI) OF 45.0 TO 49.9 IN ADULT (H): Primary | ICD-10-CM

## 2022-10-31 PROCEDURE — S2083 ADJUSTMENT GASTRIC BAND: HCPCS | Performed by: PHYSICIAN ASSISTANT

## 2022-10-31 RX ORDER — SEMAGLUTIDE 2.4 MG/.75ML
2.4 INJECTION, SOLUTION SUBCUTANEOUS WEEKLY
Qty: 3 ML | Refills: 3 | Status: SHIPPED | OUTPATIENT
Start: 2022-10-31 | End: 2022-12-23

## 2022-10-31 ASSESSMENT — PAIN SCALES - GENERAL: PAINLEVEL: NO PAIN (0)

## 2022-10-31 NOTE — LETTER
10/31/2022       RE: Raffy Castelan  [address]  [place] [address]     Dear Colleague,    Thank you for referring your patient, Raffy Castelan, to the [facility] [place] [facility]. Please see a copy of my visit note below.    Band Assessment Visit    Date: 10/31/2022  Name: Raffy Castelan  MR#: [id number]  : 1975    VITALS:          Weight: 286 lbs 3.2 oz         BMI: Body mass index is 47.63 kg/m .                        SUBJECTIVE:  Patient comes to the clinic today for band assessment.  In regards to the patient's band, the patient feels they need fluid removed from their band.     Here today for removal of all lap band fluid.  Upper GI planned 11/15 and if stomach reduced on UGI after fluid removal we may be able to do lap band removal and sleeve at the same time.  She will follow up with Dr Ritter to discuss after 11/15.    Removed all fluid 6cc today.    Mounjaro 7.5mg weekly doesn't feel that it is working as well as Wegovy, wants to switch back to Wegovy if possible    WEIGHT SELF ASSESSMENT:    10/27/2022   Are you satisfied with your weight or weight loss? No   Please check the boxes (1-3 boxes) which you think have been influencing your weight in the past month. I have not been active or exercising       CURRENT EXERCISE AND ACTIVITY:    10/27/2022   I am exercising 3x weekly or more.  No   Please select the statement that best describes your ability to exercise and be active in the past 4 weeks or since your last clinic visit: I should be more active but I just have not gotten around to it       BAND ROS:    10/27/2022   I am hungry between meals? Yes   I am eating between meals? Yes   I am eating > 1 cup of food at meals? Yes   I am not losing 1-2 lbs a week? Yes   I do not feel a sense of restriction? Yes         10/27/2022   I have pain when swallowing foods or liquids. Yes   I have heart burn,  vomiting, or reflux. Yes   I have night cough or hiccups. Yes   I am making poor food choices (smoothies, shakes, chips). Yes   I am unable to eat chicken, steak, and bread. No         10/27/2022   Are you pregnant? No   Will you be traveling to remote areas? No   Will you be having major surgery soon? No       PROCEDURE: Adjustment of gastric band performed by Shoshana Bustamante PA-C    PROCEDURE DETAILS: In the clinic exam room, the patient was placed in supine position on the exam table. The area over the access port was prepped with an alcohol swab, gloves were donned, and a Tillman needle and syringe were directed into the port under palpation guidance. A small amount of saline was aspirated to verify location, and 6 mls(all fluid) was removed from the port. Access needle was withdrawn and bandaid was applied. Patient sat up and drank 4 ounces of lukewarm water without difficulty. She should return to a liquid diet and advance as tolerated. Tight band warning signs were reviewed.  She left home in a stable and ambulatory condition.       ASSESSMENT/PLAN:    1. I removed all fluid, 6cc from band today  2. UGI as planned  3. Follow up with Dr Ritter after UGI to discuss plan for lap band removal and sleeve  4. Change Mounjaro to Kemar Bustamante PA-C    I spent a total of 20 minutes face to face with Raffy Castelan during today's office visit.  Over 50% of this time was spent counseling the patient and/or coordinating care.

## 2022-10-31 NOTE — TELEPHONE ENCOUNTER
PA needed for Wegovy. Please submit and let Liaison know when Approved / Denied    [employer]  BIN: [id number]  ID: [id number]  GROUP: [id number]  PCN: [id number]

## 2022-10-31 NOTE — PATIENT INSTRUCTIONS
Slick Fontenot [phone] scheduling and     Move RD appt for Nov up a week and Dec up a week.    Remove all fluid today    UGI is 11/15/22     See Dr Ritter after 11/15 by video visit      WEGOVY (semaglutide)    What is Wegovy?    Wegovy (semaglutide) injection 2.4 mg is an injectable prescription medicine used for adults with obesity (BMI ?30) or overweight (excess weight) (BMI ?27) who also have weight-related medical problems to help them lose weight and keep the weight off.    1.  Start Wegovy (semaglutide) 0.25 mg once weekly for 4 weeks, then if tolerating increase to 0.5 mg weekly for 4 weeks, then if tolerating increase to 1 mg weekly for 4 weeks, then if tolerating increase to 1.7 mg weekly for 4 weeks, then if tolerating increase to 2.4 mg weekly thereafter.    -Each Wegovy pen is a once weekly single-dose prefilled pen with a pen injector already built within the pen. Discard the Wegovy pen after use in sharps container.     2. Storage: make sure that when you get the prescription that you store the prescription in the refrigerator until it is time to use the Wegovy pen.  Once it is time to use the Wegovy pen, you can keep the pen at room temperature and it is good for up to 28 days at room temperature.     3.  Potential common side effects: nausea, headache, diarrhea, stomach upset.  If these become unmanageable or concerning symptoms, please make sure to call or mychart.      Go to site: Wegovy video to learn more and watch instruction videos.      For any questions or concerns please send a Poptip message to our team or call our weight management call center at [phone] during regular business hours. For questions during evenings or weekends your messages will be addressed during the next business day.  For emergencies please call 911 or seek immediate medical care.

## 2022-10-31 NOTE — PROGRESS NOTES
Tasklist reviewed with patient, updated and sent to patient via Jobr.    Bariatric Task List  Fax:  Please fax all paperwork to: [phone] -     Status:  Is patient a candidate for bariatric surgery?:  patient is a candidate for bariatric surgery -     Cleared to schedule surgeon consult?:  cleared to schedule surgeon consult - 12/1/22 See Dr Ritter to see if surgery will be one OR day or 2 OR days. Depends on result of 11/15/22 Upper GI xray. bks   Status:  surgery evaluation in process -     Surgeon: Dr Gurinder Ritter -     Tentative surgery month/year: To be determined. -        Insurance: Insurance:  [facility] -        Patient Info: Initial Weight:  290 -     Date of Initial Weight/Height:    -     Goal Weight (lbs):  280 -     Required Weight Loss:  10 lbs -     Surgery Type:  other bariatric surgery - Gastric band removal, then sleeve gastrectomy. May or may nor be on the same day. To discuss with Dr Ritter on 12/1/22. bks      Dietician Visits: Structured weight loss required by insurance?:  structured weight loss required - At least one a month for 3 months.bks   Dietician Visit 1:  Completed - 10/11/22 bks   Dietician Visit 2:  Needed - 11/8/22 appt. bks   Dietician Visit 3:  Needed - 11/29/22 appt - call and change to first available in December. bks   Dietician Visit 4:    -     Dietician Visit additional:  Needed - Monthly until surgery for weight loss and postop diet teaching. bks      Psychological Evaluation: Psych eval:  Needed - 11/28/22 Dr Argueta appt. bks      Lab Work: Complete Blood Count:  Completed - 10/4/21. bks   Comprehensive Metabolic Panel:  Completed - 10/4/21 bks   Vitamin D:  Completed - 10/4/21. bks   PTH:  Completed - 10/4/22 bks   Hgb A1c:  Completed - 10/4/21. bks    Lipids: Completed - 10/4/21. bks      Testing: UGI:  Needed - 11/15/22 appt  bks      PCP: Follow up with PCP:    -     PCP letter of support:  Needed - 10/31/22 Sent letter to Majo Tavares DO. bks     "  Patient Education:  Information Session:  Needed -     Given \"Making your decision\" handout?:  Yes -     Given \"A Roadmap to you Weight Loss Surgery\" handout?: Yes -     Given \"Get Well Loop\" information?: Yes -     Given support group information?:    -  Yes   Attended support group?:    -     Support plan in place?:  Needed -        Additional Surgery Requirements: Other: Covid test 4 days before surgery. bks -     Other: Call Center to schedule the 1 week and 31+ days postop appts. bks -        Final Tasks:  Before surgery online class:  Needed -     Before surgery online class website link:  [email or web address]   After surgery online class:  Needed -     After surgery online class website link:  [email or web address]   Nurse visit per clinic:  Needed -     History and Physical per clinic:   -  To be done at the PreAssessment Clinic.   Final labs per clinic: Needed -        Notes: Please register for the Sleeve Gastrectomy Get Well Loop when you get an email invitation after you get a surgery date.   The Get Well Loop will give you information via email or text messages that can help you be more successful before and after surgery for up to one year after surgery.  It can also help answer any questions you may have.   Get Well Loop Handout - [email or web address]            "

## 2022-10-31 NOTE — PROGRESS NOTES
Band Assessment Visit    Date: 10/31/2022  Name: Raffy Castelan  MR#: [id number]  : 1975    VITALS:          Weight: 286 lbs 3.2 oz         BMI: Body mass index is 47.63 kg/m .                        SUBJECTIVE:  Patient comes to the clinic today for band assessment.  In regards to the patient's band, the patient feels they need fluid removed from their band.     Here today for removal of all lap band fluid.  Upper GI planned 11/15 and if stomach reduced on UGI after fluid removal we may be able to do lap band removal and sleeve at the same time.  She will follow up with Dr Ritter to discuss after 11/15.    Removed all fluid 6cc today.    Mounjaro 7.5mg weekly doesn't feel that it is working as well as Wegovy, wants to switch back to Wegovy if possible    WEIGHT SELF ASSESSMENT:    10/27/2022   Are you satisfied with your weight or weight loss? No   Please check the boxes (1-3 boxes) which you think have been influencing your weight in the past month. I have not been active or exercising       CURRENT EXERCISE AND ACTIVITY:    10/27/2022   I am exercising 3x weekly or more.  No   Please select the statement that best describes your ability to exercise and be active in the past 4 weeks or since your last clinic visit: I should be more active but I just have not gotten around to it       BAND ROS:    10/27/2022   I am hungry between meals? Yes   I am eating between meals? Yes   I am eating > 1 cup of food at meals? Yes   I am not losing 1-2 lbs a week? Yes   I do not feel a sense of restriction? Yes         10/27/2022   I have pain when swallowing foods or liquids. Yes   I have heart burn, vomiting, or reflux. Yes   I have night cough or hiccups. Yes   I am making poor food choices (smoothies, shakes, chips). Yes   I am unable to eat chicken, steak, and bread. No         10/27/2022   Are you pregnant? No   Will you be traveling to remote areas? No   Will you be having major surgery soon? No       PROCEDURE:  Adjustment of gastric band performed by Shoshana Bustamante PA-C    PROCEDURE DETAILS: In the clinic exam room, the patient was placed in supine position on the exam table. The area over the access port was prepped with an alcohol swab, gloves were donned, and a Tillman needle and syringe were directed into the port under palpation guidance. A small amount of saline was aspirated to verify location, and 6 mls(all fluid) was removed from the port. Access needle was withdrawn and bandaid was applied. Patient sat up and drank 4 ounces of lukewarm water without difficulty. She should return to a liquid diet and advance as tolerated. Tight band warning signs were reviewed.  She left home in a stable and ambulatory condition.       ASSESSMENT/PLAN:    1. I removed all fluid, 6cc from band today  2. UGI as planned  3. Follow up with Dr Ritter after UGI to discuss plan for lap band removal and sleeve  4. Change Mounjaro to Kemar Bustamante PA-C    I spent a total of 20 minutes face to face with Raffy Castelan during today's office visit.  Over 50% of this time was spent counseling the patient and/or coordinating care.

## 2022-10-31 NOTE — NURSING NOTE
"(   Chief Complaint   Patient presents with     RECHECK     Band assessment    )    ( Weight: 129.8 kg (286 lb 3.2 oz) )  ( Height: 165.1 cm (5' 5\") )  ( BMI (Calculated): 47.63 )  (   )  (   )  (   )  (   )  (   )  (   )    ( BP: (!) 136/90 )  (   )  (   )  (   )  ( Pulse: 87 )  (   )  ( SpO2: 100 % )    (   Patient Active Problem List   Diagnosis     Chronic constipation     CARDIOVASCULAR SCREENING; LDL GOAL LESS THAN 160     Family history of breast cancer     GERD (gastroesophageal reflux disease)     Class 3 severe obesity due to excess calories with serious comorbidity and body mass index (BMI) of 45.0 to 49.9 in adult (H)     Vitamin D deficiency     Psoriasis     Bariatric surgery status     H/O laparoscopic adjustable gastric banding     Diaphragmatic hernia     Hypertension goal BP (blood pressure) < 140/90     Anxiety     Impingement syndrome, shoulder, left     Spontaneous vaginal delivery     Advanced maternal age during pregnancy     37+ weeks gestation completed     Bunion, left     Tailor's bunion of both feet    )  (   Current Outpatient Medications   Medication Sig Dispense Refill     clobetasol propionate (OLUX) 0.05 % external foam APPLY SPARINGLY TO AFFECTED AREA TWICE DAILY AS NEEDED DO NOT APPLY TO FACE 600 g 3     losartan-hydrochlorothiazide (HYZAAR) 50-12.5 MG tablet Take 1 tablet by mouth daily 90 tablet 3     Multiple Vitamins-Minerals (HAIR SKIN & NAILS ADVANCED PO) Take 2 tablets by mouth daily       Prenatal Vit-Fe Fumarate-FA (PRENATAL VITAMINS PO) Take 1 tablet by mouth At Bedtime       senna (SENOKOT) 8.6 MG tablet Take 2 tablets by mouth 2 times daily as needed       Tirzepatide (MOUNJARO) 7.5 MG/0.5ML SOPN Inject 7.5 mg Subcutaneous once a week 2 mL 0     albuterol (PROAIR HFA/PROVENTIL HFA/VENTOLIN HFA) 108 (90 Base) MCG/ACT inhaler Inhale 2 puffs into the lungs every 6 hours as needed for shortness of breath / dyspnea or wheezing (Patient not taking: Reported on 9/1/2022) 8.5 g " 3    )  ( Diabetes Eval:    )    ( Pain Eval:  No Pain (0) )    ( Wound Eval:       )    (   History   Smoking Status     Never   Smokeless Tobacco     Never    )    ( Signed By:  Cassandra Chiang, EMT; October 31, 2022; 9:06 AM )

## 2022-11-01 ENCOUNTER — DOCUMENTATION ONLY (OUTPATIENT)
Dept: FAMILY MEDICINE | Facility: CLINIC | Age: 47
End: 2022-11-01

## 2022-11-01 NOTE — TELEPHONE ENCOUNTER
Central Prior Authorization Team   Phone: [phone]    PA Initiation    Medication: Wegovy  Insurance Company: [employer] [place] - Phone [phone] Fax [phone]  Pharmacy Filling the Rx: [facility] - [place], [place] - [address] [place] [address].  Filling Pharmacy Phone: [phone]  Filling Pharmacy Fax: [phone]  Start Date: 11/1/2022

## 2022-11-07 NOTE — TELEPHONE ENCOUNTER
Prior Authorization Approval    Authorization Effective Date: 11/5/2022  Authorization Expiration Date: 11/5/2023  Medication: Wegovy-PA APPROVED   Approved Dose/Quantity:   Reference #:     Insurance Company: [employer] [place] - Phone [phone] Fax [phone]  Expected CoPay:       CoPay Card Available:      Foundation Assistance Needed:    Which Pharmacy is filling the prescription (Not needed for infusion/clinic administered): [facility] - [place], [place] - [address] [place] [address]  Pharmacy Notified: Yes- Pharmacy is requesting script be sent to filling pharmacy to process and will notify the patient when medication is ready for .   Patient Notified: Yes

## 2022-11-08 ENCOUNTER — VIRTUAL VISIT (OUTPATIENT)
Dept: ENDOCRINOLOGY | Facility: CLINIC | Age: 47
End: 2022-11-08
Payer: COMMERCIAL

## 2022-11-08 DIAGNOSIS — E66.01 CLASS 3 SEVERE OBESITY DUE TO EXCESS CALORIES WITH SERIOUS COMORBIDITY AND BODY MASS INDEX (BMI) OF 45.0 TO 49.9 IN ADULT (H): ICD-10-CM

## 2022-11-08 DIAGNOSIS — Z71.3 NUTRITIONAL COUNSELING: Primary | ICD-10-CM

## 2022-11-08 DIAGNOSIS — E66.813 CLASS 3 SEVERE OBESITY DUE TO EXCESS CALORIES WITH SERIOUS COMORBIDITY AND BODY MASS INDEX (BMI) OF 45.0 TO 49.9 IN ADULT (H): ICD-10-CM

## 2022-11-08 PROCEDURE — 97803 MED NUTRITION INDIV SUBSEQ: CPT | Mod: 95 | Performed by: DIETITIAN, REGISTERED

## 2022-11-08 NOTE — PATIENT INSTRUCTIONS
"Hi Abeer!    Follow-up with RD in 1 month    Thank you,    Cintia Galvez, TAMI, LD  If you would like to schedule or reschedule an appointment with the RD, please call [phone]    Nutrition Goals  Relating To Eatin) Eat slowly (20-30 minutes per meal), chewing foods well (25 chews per bite/applesauce consistency)   2) 9\" Plate method (1/2 plate non-starchy vegetables/fruit, 1/4 plate lean protein, 1/4 plate whole grain starch - no more than 1/2 cup carb/meal) \  3) Consume 60-90 g protein per day     Relating to activity:  1) Increase activity as able    Post-op Diet Advancement Schedule:  Clear Liquid Diet (stage 1):  - 1/10  *No RED/PURPLE, PROTEIN, or PULP day before surgery  Low-Fat Full Liquid Diet (stage 2):  -   Pureed Diet (stage 3):  -   Soft Diet (stage 4): -3/8  Regular Diet (stage 5): 3/9    Your Stage 1 Diet: Clear Liquids  [email or web address]     Your Stage 2 Diet: Low-fat Full Liquids  [email or web address]     Your Stage 3 Diet: Pureed Foods  [email or web address]     Pureed Pleasures  [email or web address]    Your Stage 4 Diet: Soft Foods  [email or web address]    Your Stage 5 Diet: Regular Foods  [email or web address]    Supplements after Weight Loss Surgery  [email or web address]       Interested in working with a health ? Health coaches work with you to improve your overall health and wellbeing. They look at the whole person, and may involve discussion of different areas of life, including, but not limited to the four pillars of health (sleep, exercise, nutrition, and stress management). Discuss with your care team if you would like to start working a health .    Health Coaching-3 Pack:    $99 for three health coaching visits    Visits may be done in person or via phone    Coaching is a partnership between the  and the client; Coaches do not prescribe or diagnose    Coaching helps inspire the " "client to reach his/her personal goals    COMPREHENSIVE WEIGHT MANAGEMENT PROGRAM  VIRTUAL SUPPORT GROUPS    For Support Group Information:      We offer support groups for patients who are working on weight loss and considering, preparing for or have had weight loss surgery.   There is no cost for this opportunity.  You are invited to attend the?Virtual Support Groups?provided by any of the following locations:    [facility] via Microsoft Teams with Marcie Johnson RN  2.   [facility] via [employer]Inc.com with Tono Myrick, PhD, LP  3.   [place] via [employer] with Anh Jeong RN  4.   [facility] via [employer] Teams with Anh Black [facility]    The following Support Group information can also be found on our website:  [email or web address]      [facility] Weight Loss Surgery Support Group    [facility] Weight Loss Surgery Support Group  The support group is a patient-lead forum that meets monthly to share experiences, encouragement and education. It is open to those who have had weight loss surgery, are scheduled for surgery, and those who are considering surgery.   WHEN: This group meets on the 3rd Wednesday of each month from 5:00PM - 6:00PM virtually using Microsoft Teams.   FACILITATOR: Led by Marcie Johnson RD, LD, RN, the program's Clinical Coordinator.   TO REGISTER: Please contact the clinic via [employer] or call the nurse line directly at [phone] to inform our staff that you would like an invite sent to you and to let us know the email you would like the invite sent to. Prior to the meeting, a link with directions on how to join the meeting will be sent to you.    2022 Meetings  January 19: \"Let's Talk\" a time for the group to share.  February 16: \"Let's Talk\" a time for the group to share.  March 16: Guest Speakers: Psychologists, Gely Weiss, PhD,LP and Gala Frey PsyD,LP  April 20: Guest Speaker: " "Health , Paradise Fuller, [facility],CHES, CPT  May 18: Guest Speaker: DietitianDelfino, TAMI, LP  Lynn 15: \"Let's Talk\" a time for the group to share.  July 20: \"Let's Talk\" a time for the group to share.  August 17: TBA  September 21: TBA  October 19: Guest Speaker: Dr Juan Murillo MD Pulmonologist and Sleep Medicine Physician, \"Getting a Good Night's Sleep\".  November 16: TBA  December 21: TBA    [facility] Support Groups    Connections: Bariatric Care Support Group?  This is open to all [facility] (and those external to this program) pre- and post- operative bariatric surgery patients as well as their support system.   WHEN: This group meets the 2nd Tuesday of each month from 6:30 PM - 8:00 PM virtually using Microsoft Teams.   FACILITATOR: Led by Tono Myrick, Ph.D who is a Licensed Psychologist with the [facility] Comprehensive Weight Management Program.   TO REGISTER: Please send an email to Tono Myrick, Ph.D., LP at?[email or web address]?if you would like an invitation to the group and to learn about using Microsoft Teams.    2022 Meetings  January 11: Nohemi Moy, PharmD, Pharmacy Resident at [facility], \"Medications and Bariatric Surgery\".  February 8: Open Forum  March 8  April 12  May 10  Lynn 14    Connections: Post-Operative Bariatric Surgery Support Group  This is a support group for [facility] bariatric patients (and those external to [facility]) who have had bariatric surgery and are at least 3 months post-surgery.  WHEN: This support group meets the 4th Wednesday of the month from 11:00 AM - 12:00 PM virtually using Microsoft Teams.   FACILITATOR: Led by Certified Bariatric Nurse, Anh Jeong RN.   TO REGISTER: Please send an email to Anh at [email or web address] if you would like an invitation to the group and to learn about using Microsoft Teams.    2022 Meetings January 26 February 23 March 23 April " "27  May 25  Lynn 22    [facility] Healthy Lifestyle Virtual Support Group    Healthy Lifestyle Virtual Support Group?  This is 60 minutes of small group guided discussion, support and resources. All are welcome who want a healthy lifestyle.  WHEN: This group meets monthly on a Friday from 12:30 PM - 1:30 PM virtually using Microsoft Teams.   FACILITATOR: Led by National Board Certified Health and , Anh Black [facility].   TO REGISTER: Please send an email to Anh [email or web address] to receive monthly invites to the group or if you have any questions about having a health .  Prior to the meeting, a link with directions on how to join the meeting will be sent to you.    2022 Meetings  January 21: Shelly Vieira MS, RN, CIC, CBN, \"Healthy Habits\"  February 25: Open Forum  March 18: \"Setting Limits and Boundaries\"  April 29: Ivelisse Singh RD, \"Meal Planning Made Easy\"  May 20: Open Forum  June: To be determined                "

## 2022-11-08 NOTE — PROGRESS NOTES
"Raffy Castelan is a 47 year old female who is being evaluated via a billable video visit.      The patient has been notified of following:     \"This video visit will be conducted via a call between you and your physician/provider. We have found that certain health care needs can be provided without the need for an in-person physical exam.  This service lets us provide the care you need with a video conversation.  If a prescription is necessary we can send it directly to your pharmacy.  If lab work is needed we can place an order for that and you can then stop by our lab to have the test done at a later time.    Video visits are billed at different rates depending on your insurance coverage.  Please reach out to your insurance provider with any questions.    If during the course of the call the physician/provider feels a video visit is not appropriate, you will not be charged for this service.\"    Patient has given verbal consent for Video visit? Yes  How would you like to obtain your AVS? MyChart  If you are dropped from the video visit, the video invite should be resent to: Send to e-mail at: [email or web address]  Will anyone else be joining your video visit? No  {If patient encounters technical issues they should call [phone]      Video-Visit Details    Type of service:  Video Visit    Video Start Time: 4:30 PM  Video End Time: 4:43 PM    Originating Location (pt. Location): Home    Distant Location (provider location):  [facility] [place]     Platform used for Video Visit: BloggersBase    During this virtual visit the patient is located in [place], patient verifies this as the location during the entirety of this visit.       Return Bariatric Nutrition Consultation Note    Reason For Visit: Nutrition Assessment    Raffy Castelan is a 47 year old presenting today for a return bariatric nutrition consult.   Pt is interested in laparoscopic sleeve gastrectomy with Dr. Ritter expected " "surgery in [place].  Patient is accompanied by self.  This is pt's second of 3 required nutrition visits prior to surgery.     Pt referred by NOEMI Palacios on 2022.  CO-MORBIDITIES OF OBESITY INCLUDE:    No flowsheet data found.    SUPPORT:  No flowsheet data found.    ANTHROPOMETRICS:  Estimated body mass index is 47.63 kg/m  as calculated from the following:    Height as of 10/31/22: 1.651 m (5' 5\").    Weight as of 10/31/22: 129.8 kg (286 lb 3.2 oz).     Current weight: 280 lbs per pt    Required weight loss goal pre-op: -10 lbs from initial consult weight (goal weight 280 lbs or less before surgery)    No flowsheet data found.    No flowsheet data found.    SUPPLEMENT INFORMATION:  MVI    NUTRITION HISTORY:  Pt has seen Ivelisse Singh prior. Wanting to persue surgery now. Decreasing portion sizes and sugar consumptino. Struggles the most with portion sizes. States she knows what she needs to do but is lacking motivation.      22: Pt has lost weight since last visit. Medications are not covered by insurance. Has been focusing on portion sizes and reducing amount of food at night time. Still struggling with sweet tooth. Has been intermittent fasting until around 11 AM. Has been having a salad at each meal. Has been slowly decreasing caffeine consumption.     No flowsheet data found.    No flowsheet data found.    No flowsheet data found.      EXERCISE:    No flowsheet data found.    Progress on Previous Goals  Relating To Eatin) Eat slowly (20-30 minutes per meal), chewing foods well (25 chews per bite/applesauce consistency) met, continues  2) 9\" Plate method (1/2 plate non-starchy vegetables/fruit, 1/4 plate lean protein, 1/4 plate whole grain starch - no more than 1/2 cup carb/meal) met, continues  3) Consume 60-90 g protein per day met, continues    Relating to activity:  1) Increase activity as able met, continues    NUTRITION DIAGNOSIS:  Obesity r/t long history of positive energy balance " "aeb BMI >30 kg/m2.    INTERVENTION:  Intervention Provided/Education Provided on post-op diet guidelines, vitamins/minerals essential post-operatively, GI anatomy of bariatric surgeries, ways to help prepare for post-op diet guidelines pre-operatively, portion/calorie-control, mindful eating and sources of protein.  Patient demonstrates understanding. Provided pt with list of goals RD contact information.      No flowsheet data found.    Expected Engagement: good    GOALS:    Relating To Eatin) Eat slowly (20-30 minutes per meal), chewing foods well (25 chews per bite/applesauce consistency)   2) 9\" Plate method (1/2 plate non-starchy vegetables/fruit, 1/4 plate lean protein, 1/4 plate whole grain starch - no more than 1/2 cup carb/meal) \  3) Consume 60-90 g protein per day     Relating to activity:  1) Increase activity as able    Post-op Diet Advancement Schedule:  Clear Liquid Diet (stage 1):  - 1/10  *No RED/PURPLE, PROTEIN, or PULP day before surgery  Low-Fat Full Liquid Diet (stage 2):  -   Pureed Diet (stage 3):  -   Soft Diet (stage 4): -3/8  Regular Diet (stage 5): 3/9    Your Stage 1 Diet: Clear Liquids  [email or web address]     Your Stage 2 Diet: Low-fat Full Liquids  [email or web address]     Your Stage 3 Diet: Pureed Foods  [email or web address]     Pureed Pleasures  [email or web address]    Your Stage 4 Diet: Soft Foods  [email or web address]    Your Stage 5 Diet: Regular Foods  [email or web address]    Supplements after Weight Loss Surgery  [email or web address]       Follow up: 1 month, prn     Time spent with patient: 13 minutes.  MARCO A NESS RD, LD        "

## 2022-11-08 NOTE — LETTER
"11/8/2022       RE: Raffy Castelan  [address]  [place] [address]     Dear Colleague,    Thank you for referring your patient, Raffy Castelan, to the [facility] [place] [facility]. Please see a copy of my visit note below.    Raffy Castelan is a 47 year old female who is being evaluated via a billable video visit.      The patient has been notified of following:     \"This video visit will be conducted via a call between you and your physician/provider. We have found that certain health care needs can be provided without the need for an in-person physical exam.  This service lets us provide the care you need with a video conversation.  If a prescription is necessary we can send it directly to your pharmacy.  If lab work is needed we can place an order for that and you can then stop by our lab to have the test done at a later time.    Video visits are billed at different rates depending on your insurance coverage.  Please reach out to your insurance provider with any questions.    If during the course of the call the physician/provider feels a video visit is not appropriate, you will not be charged for this service.\"    Patient has given verbal consent for Video visit? Yes  How would you like to obtain your AVS? MyChart  If you are dropped from the video visit, the video invite should be resent to: Send to e-mail at: [email or web address]  Will anyone else be joining your video visit? No  {If patient encounters technical issues they should call [phone]      Video-Visit Details    Type of service:  Video Visit    Video Start Time: 4:30 PM  Video End Time: 4:43 PM    Originating Location (pt. Location): Home    Distant Location (provider location):  [facility]     Platform used for Video Visit: JellyCloud    During this virtual visit the patient is located in [place], patient verifies this " "as the location during the entirety of this visit.       Return Bariatric Nutrition Consultation Note    Reason For Visit: Nutrition Assessment    Raffy Castelan is a 47 year old presenting today for a return bariatric nutrition consult.   Pt is interested in laparoscopic sleeve gastrectomy with Dr. Ritter expected surgery in [facility].  Patient is accompanied by self.  This is pt's second of 3 required nutrition visits prior to surgery.     Pt referred by NOEMI Palacios on 2022.  CO-MORBIDITIES OF OBESITY INCLUDE:    No flowsheet data found.    SUPPORT:  No flowsheet data found.    ANTHROPOMETRICS:  Estimated body mass index is 47.63 kg/m  as calculated from the following:    Height as of 10/31/22: 1.651 m (5' 5\").    Weight as of 10/31/22: 129.8 kg (286 lb 3.2 oz).     Current weight: 280 lbs per pt    Required weight loss goal pre-op: -10 lbs from initial consult weight (goal weight 280 lbs or less before surgery)    No flowsheet data found.    No flowsheet data found.    SUPPLEMENT INFORMATION:  MVI    NUTRITION HISTORY:  Pt has seen Ivelisse Singh prior. Wanting to persue surgery now. Decreasing portion sizes and sugar consumptino. Struggles the most with portion sizes. States she knows what she needs to do but is lacking motivation.      22: Pt has lost weight since last visit. Medications are not covered by insurance. Has been focusing on portion sizes and reducing amount of food at night time. Still struggling with sweet tooth. Has been intermittent fasting until around 11 AM. Has been having a salad at each meal. Has been slowly decreasing caffeine consumption.     No flowsheet data found.    No flowsheet data found.    No flowsheet data found.      EXERCISE:    No flowsheet data found.    Progress on Previous Goals  Relating To Eatin) Eat slowly (20-30 minutes per meal), chewing foods well (25 chews per bite/applesauce consistency) met, continues  2) 9\" Plate method (1/2 plate non-starchy " "vegetables/fruit, 1/4 plate lean protein, 1/4 plate whole grain starch - no more than 1/2 cup carb/meal) met, continues  3) Consume 60-90 g protein per day met, continues    Relating to activity:  1) Increase activity as able met, continues    NUTRITION DIAGNOSIS:  Obesity r/t long history of positive energy balance aeb BMI >30 kg/m2.    INTERVENTION:  Intervention Provided/Education Provided on post-op diet guidelines, vitamins/minerals essential post-operatively, GI anatomy of bariatric surgeries, ways to help prepare for post-op diet guidelines pre-operatively, portion/calorie-control, mindful eating and sources of protein.  Patient demonstrates understanding. Provided pt with list of goals RD contact information.      No flowsheet data found.    Expected Engagement: good    GOALS:    Relating To Eatin) Eat slowly (20-30 minutes per meal), chewing foods well (25 chews per bite/applesauce consistency)   2) 9\" Plate method (1/2 plate non-starchy vegetables/fruit, 1/4 plate lean protein, 1/4 plate whole grain starch - no more than 1/2 cup carb/meal) \  3) Consume 60-90 g protein per day     Relating to activity:  1) Increase activity as able    Post-op Diet Advancement Schedule:  Clear Liquid Diet (stage 1):  - 1/10  *No RED/PURPLE, PROTEIN, or PULP day before surgery  Low-Fat Full Liquid Diet (stage 2):  -   Pureed Diet (stage 3):  -   Soft Diet (stage 4): -3/8  Regular Diet (stage 5): 3/9    Your Stage 1 Diet: Clear Liquids  [email or web address]     Your Stage 2 Diet: Low-fat Full Liquids  [email or web address]     Your Stage 3 Diet: Pureed Foods  [email or web address]     Pureed Pleasures  [email or web address]    Your Stage 4 Diet: Soft Foods  [email or web address]    Your Stage 5 Diet: Regular Foods  [email or web address]    Supplements after Weight Loss Surgery  [email or web address]       Follow up: 1 month, prn     Time spent " with patient: 13 minutes.  MARCO A NESS RD, LD

## 2022-11-15 ENCOUNTER — ANCILLARY PROCEDURE (OUTPATIENT)
Dept: GENERAL RADIOLOGY | Facility: CLINIC | Age: 47
End: 2022-11-15
Attending: PHYSICIAN ASSISTANT
Payer: COMMERCIAL

## 2022-11-15 DIAGNOSIS — Z98.84 H/O LAPAROSCOPIC ADJUSTABLE GASTRIC BANDING: ICD-10-CM

## 2022-11-15 PROCEDURE — 74240 X-RAY XM UPR GI TRC 1CNTRST: CPT | Mod: GC | Performed by: RADIOLOGY

## 2022-11-22 ENCOUNTER — ALLIED HEALTH/NURSE VISIT (OUTPATIENT)
Dept: FAMILY MEDICINE | Facility: CLINIC | Age: 47
End: 2022-11-22
Payer: COMMERCIAL

## 2022-11-22 VITALS — DIASTOLIC BLOOD PRESSURE: 78 MMHG | SYSTOLIC BLOOD PRESSURE: 118 MMHG

## 2022-11-22 DIAGNOSIS — Z01.30 BP CHECK: Primary | ICD-10-CM

## 2022-11-22 PROCEDURE — 99207 PR NO CHARGE NURSE ONLY: CPT | Performed by: FAMILY MEDICINE

## 2022-11-22 NOTE — PROGRESS NOTES
Raffy Castelan was evaluated at [facility] on November 22, 2022 at which time her blood pressure was:    BP Readings from Last 3 Encounters:   11/22/22 118/78   10/31/22 (!) 136/90   09/20/22 128/85     Pulse Readings from Last 3 Encounters:   10/31/22 87   08/18/22 96   01/12/22 98       Reviewed lifestyle modifications for blood pressure control and reduction: including making healthy food choices, managing weight, getting regular exercise, smoking cessation, reducing alcohol consumption, monitoring blood pressure regularly.     Symptoms: None    BP Goal:< 140/90 mmHg    BP Assessment:  BP at goal    Potential Reasons for BP too high: NA - Not applicable    BP Follow-Up Plan: Recheck BP in 6 months at pharmacy    Recommendation to Provider: None    Note completed by: Micah

## 2022-11-28 ENCOUNTER — VIRTUAL VISIT (OUTPATIENT)
Dept: NEUROPSYCHOLOGY | Facility: CLINIC | Age: 47
End: 2022-11-28
Payer: COMMERCIAL

## 2022-11-28 DIAGNOSIS — Z01.818 PREPROCEDURAL EXAMINATION: Primary | ICD-10-CM

## 2022-11-28 DIAGNOSIS — F54 PSYCHOLOGICAL FACTORS AFFECTING MEDICAL CONDITION: ICD-10-CM

## 2022-11-28 DIAGNOSIS — E66.01 MORBID OBESITY (H): ICD-10-CM

## 2022-11-28 PROCEDURE — 90791 PSYCH DIAGNOSTIC EVALUATION: CPT | Mod: 95

## 2022-11-28 PROCEDURE — 96138 PSYCL/NRPSYC TECH 1ST: CPT | Mod: 95

## 2022-11-28 PROCEDURE — 96139 PSYCL/NRPSYC TST TECH EA: CPT | Mod: 95

## 2022-11-28 PROCEDURE — 96130 PSYCL TST EVAL PHYS/QHP 1ST: CPT | Mod: 95
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
## 2022-11-28 NOTE — NURSING NOTE
Pt was seen for neuropsychological evaluation at the request of Gurinder Ritter MD for the purposes of diagnostic clarification and treatment planning. 71 minutes of test administration and scoring were provided by this writer. Please see Dr. Leeann Argueta's report for a full interpretation of the findings.    Video Start 1: 8:31AM  Video Stop 1: 8:34AM    Video Start 2: 9:13AM  Video Stop 2: 10:18AM    Diann Lozano  Psychometrist

## 2022-12-01 ENCOUNTER — VIRTUAL VISIT (OUTPATIENT)
Dept: ENDOCRINOLOGY | Facility: CLINIC | Age: 47
End: 2022-12-01
Payer: COMMERCIAL

## 2022-12-01 ENCOUNTER — MYC MEDICAL ADVICE (OUTPATIENT)
Dept: ENDOCRINOLOGY | Facility: CLINIC | Age: 47
End: 2022-12-01

## 2022-12-01 ENCOUNTER — PREP FOR PROCEDURE (OUTPATIENT)
Dept: ENDOCRINOLOGY | Facility: CLINIC | Age: 47
End: 2022-12-01

## 2022-12-01 ENCOUNTER — VIRTUAL VISIT (OUTPATIENT)
Dept: SURGERY | Facility: CLINIC | Age: 47
End: 2022-12-01
Payer: COMMERCIAL

## 2022-12-01 VITALS — BODY MASS INDEX: 47.65 KG/M2 | WEIGHT: 286 LBS | HEIGHT: 65 IN

## 2022-12-01 DIAGNOSIS — E66.01 CLASS 3 SEVERE OBESITY DUE TO EXCESS CALORIES WITH SERIOUS COMORBIDITY AND BODY MASS INDEX (BMI) OF 45.0 TO 49.9 IN ADULT (H): ICD-10-CM

## 2022-12-01 DIAGNOSIS — E66.01 MORBID OBESITY (H): Primary | ICD-10-CM

## 2022-12-01 DIAGNOSIS — Z01.818 PREOPERATIVE TESTING: ICD-10-CM

## 2022-12-01 DIAGNOSIS — Z98.84 H/O LAPAROSCOPIC ADJUSTABLE GASTRIC BANDING: ICD-10-CM

## 2022-12-01 DIAGNOSIS — E66.813 CLASS 3 SEVERE OBESITY DUE TO EXCESS CALORIES WITH SERIOUS COMORBIDITY AND BODY MASS INDEX (BMI) OF 45.0 TO 49.9 IN ADULT (H): ICD-10-CM

## 2022-12-01 DIAGNOSIS — Z71.3 NUTRITIONAL COUNSELING: Primary | ICD-10-CM

## 2022-12-01 PROCEDURE — 99213 OFFICE O/P EST LOW 20 MIN: CPT | Mod: 95 | Performed by: SURGERY

## 2022-12-01 PROCEDURE — 97803 MED NUTRITION INDIV SUBSEQ: CPT | Mod: 95 | Performed by: DIETITIAN, REGISTERED

## 2022-12-01 RX ORDER — ONDANSETRON 2 MG/ML
4 INJECTION INTRAMUSCULAR; INTRAVENOUS
Status: CANCELLED | OUTPATIENT
Start: 2022-12-01

## 2022-12-01 RX ORDER — ENOXAPARIN SODIUM 100 MG/ML
40 INJECTION SUBCUTANEOUS
Status: CANCELLED | OUTPATIENT
Start: 2022-12-01

## 2022-12-01 RX ORDER — ACETAMINOPHEN 325 MG/1
975 TABLET ORAL ONCE
Status: CANCELLED | OUTPATIENT
Start: 2022-12-01 | End: 2022-12-01

## 2022-12-01 RX ORDER — CEFAZOLIN SODIUM IN 0.9 % NACL 3 G/100 ML
3 INTRAVENOUS SOLUTION, PIGGYBACK (ML) INTRAVENOUS
Status: CANCELLED | OUTPATIENT
Start: 2022-12-01

## 2022-12-01 RX ORDER — CEFAZOLIN SODIUM IN 0.9 % NACL 3 G/100 ML
3 INTRAVENOUS SOLUTION, PIGGYBACK (ML) INTRAVENOUS SEE ADMIN INSTRUCTIONS
Status: CANCELLED | OUTPATIENT
Start: 2022-12-01

## 2022-12-01 ASSESSMENT — PAIN SCALES - GENERAL: PAINLEVEL: NO PAIN (0)

## 2022-12-01 NOTE — PROGRESS NOTES
"Raffy Castelan is a 47 year old female who is being evaluated via a billable video visit.      The patient has been notified of following:     \"This video visit will be conducted via a call between you and your physician/provider. We have found that certain health care needs can be provided without the need for an in-person physical exam.  This service lets us provide the care you need with a video conversation.  If a prescription is necessary we can send it directly to your pharmacy.  If lab work is needed we can place an order for that and you can then stop by our lab to have the test done at a later time.    Video visits are billed at different rates depending on your insurance coverage.  Please reach out to your insurance provider with any questions.    If during the course of the call the physician/provider feels a video visit is not appropriate, you will not be charged for this service.\"    Patient has given verbal consent for Video visit? Yes  How would you like to obtain your AVS? MyChart  If you are dropped from the video visit, the video invite should be resent to: Send to e-mail at: [email or web address]  Will anyone else be joining your video visit? No  {If patient encounters technical issues they should call [phone]      Video-Visit Details    Type of service:  Video Visit    Video Start Time: 1:00 PM  Video End Time: 1:23 PM    Originating Location (pt. Location): Home    Distant Location (provider location):  [facility] [place]     Platform used for Video Visit: [employer]    During this virtual visit the patient is located in [place], patient verifies this as the location during the entirety of this visit.       Return Bariatric Nutrition Consultation Note    Reason For Visit: Nutrition Assessment    Raffy Castelan is a 47 year old presenting today for a return bariatric nutrition consult.   Pt is interested in laparoscopic sleeve gastrectomy with Dr. Ritter expected " "surgery in [place].  Patient is accompanied by self.  This is pt's second of 3 required nutrition visits prior to surgery.     Pt referred by NOEMI Palacios on 2022.  CO-MORBIDITIES OF OBESITY INCLUDE:    No flowsheet data found.    SUPPORT:  No flowsheet data found.    ANTHROPOMETRICS:  Estimated body mass index is 47.63 kg/m  as calculated from the following:    Height as of 10/31/22: 1.651 m (5' 5\").    Weight as of 10/31/22: 129.8 kg (286 lb 3.2 oz).     Current weight: 280 lbs per pt    Required weight loss goal pre-op: -10 lbs from initial consult weight (goal weight 280 lbs or less before surgery)    No flowsheet data found.    No flowsheet data found.    SUPPLEMENT INFORMATION:  MVI    NUTRITION HISTORY:  Pt has seen Ivelisse Singh prior. Wanting to persue surgery now. Decreasing portion sizes and sugar consumptino. Struggles the most with portion sizes. States she knows what she needs to do but is lacking motivation.      22: Pt has lost weight since last visit. Medications are not covered by insurance. Has been focusing on portion sizes and reducing amount of food at night time. Still struggling with sweet tooth. Has been intermittent fasting until around 11 AM. Has been having a salad at each meal. Has been slowly decreasing caffeine consumption.     22: Pt doing well- preparing for surgery. Has continued diet and intermittent fasting.    No flowsheet data found.    No flowsheet data found.    No flowsheet data found.      EXERCISE:    No flowsheet data found.    Progress on Previous Goals  Relating To Eatin) Eat slowly (20-30 minutes per meal), chewing foods well (25 chews per bite/applesauce consistency) met, continues   2) 9\" Plate method (1/2 plate non-starchy vegetables/fruit, 1/4 plate lean protein, 1/4 plate whole grain starch - no more than 1/2 cup carb/meal) met, continues  3) Consume 60-90 g protein per day met, continues    Relating to activity:  1) Increase activity as " able met, continues    NUTRITION DIAGNOSIS:  Obesity r/t long history of positive energy balance aeb BMI >30 kg/m2.    INTERVENTION:  Intervention Provided/Education Provided on post-op diet guidelines, vitamins/minerals essential post-operatively, GI anatomy of bariatric surgeries, ways to help prepare for post-op diet guidelines pre-operatively, portion/calorie-control, mindful eating and sources of protein.  Patient demonstrates understanding. Provided pt with list of goals RD contact information.      No flowsheet data found.    Expected Engagement: good    GOALS:  1) Caffeine free beverages: herbal tea (search caffeine-free teas in [employer]), Body Armor Lite, Hint Water, Crystal Light, JUST Water  Teccino Tea:   https://www.[employer]/dp/[email or web address]?pd_rd_i=[email or web address]&pf_rd_p=[id number]&pf_rd_r=[id number]&pd_rd_wg=GXxvn&pd_rd_w=XNoQI&pd_rd_r=[id number]     Post-op Diet Advancement Schedule:  Clear Liquid Diet (stage 1): 1/09 - 1/10  *No RED/PURPLE, PROTEIN, or PULP day before surgery  Low-Fat Full Liquid Diet (stage 2): 1/11 - 1/23  Pureed Diet (stage 3): 1/24 - 2/7  Soft Diet (stage 4): 2/8-3/8  Regular Diet (stage 5): 3/9    Your Stage 1 Diet: Clear Liquids  [email or web address]     Your Stage 2 Diet: Low-fat Full Liquids  [email or web address]     Your Stage 3 Diet: Pureed Foods  [email or web address]     Pureed Pleasures  [email or web address]    Your Stage 4 Diet: Soft Foods  [email or web address]    Your Stage 5 Diet: Regular Foods  [email or web address]    Supplements after Weight Loss Surgery  [email or web address]       Follow up: 1 month, prn     Time spent with patient: 23 minutes.  MARCO A NESS RD, LD

## 2022-12-01 NOTE — LETTER
"12/1/2022       RE: Raffy Castelan  [address]  [place] [address]     Dear Colleague,    Thank you for referring your patient, Raffy Castelan, to the [facility] [place] at [facility]. Please see a copy of my visit note below.    Raffy Castelan is a 47 year old who is being evaluated via a billable video visit.      How would you like to obtain your AVS? MyChart  If the video visit is dropped, the invitation should be resent by: Text to cell phone: [phone]  Will anyone else be joining your video visit? No  If patient encounters technical issues they should call [phone]    During this virtual visit the patient is located in [place], patient verifies this as the location during the entirety of this visit.     Video-Visit Details  Video Start Time: 1207    Type of service:  Video Visit    Video End Time:1223    Originating Location (pt. Location): Home    Distant Location (provider location):  On-site    Platform used for Video Visit: TAZ Sy 12/1/2022      11:24 AM      Dear Dr. Tavares,     I had the pleasure of meeting with your patient Raffy Castelan in our weight loss surgery office.  This patient is a 47 year old female who has been undergoing our thorough preoperative screening process in anticipation of potential bariatric surgery.    At initial evaluation we recorded Raffy Castelan's Height: 165.1 cm (5' 5\"), Initial Weight (lbs): 290 lbs and BMI 47.7 kg/m2.  The patient has been unsuccessful with other methods of permanent weight loss and suffers from multiple weight related medical conditions.  Due to lack of success in achieving weight loss through other methods, she is interested in undergoing bariatric surgery.    Had prior lapband.  Had all fluid removed by Shoshana recently.    Follow-up UGI reviewed by me today in clinic.    This looks fine for lapband removal and conversion to sleeve gastrectomy " "in one procedure.    Can schedule in January given current progress towards surgery.  Last RD visit tomorrow.      PREVIOUS WEIGHT LOSS ATTEMPTS:  No flowsheet data found.    CO-MORBIDITIES OF OBESITY INCLUDE:  No flowsheet data found.    VITALS:  Ht 1.651 m (5' 5\")   Wt 129.7 kg (286 lb)   BMI 47.59 kg/m      PE:  GENERAL: Alert and oriented x3. NAD    RESPIRATORY: Breathing unlabored    Rest of exam deferred due to virtual visit      In summary, she has undergone an appropriate medical evaluation, dietitian evaluation, as well as psychologic screening. The patient appears to be an appropriate candidate for bariatric surgery.    In the office today, I discussed the laparoscopic removal of lapband and laparoscopic gastric sleeve surgery.  Risks, benefits and anticipated outcomes were outlined including the risk of death, staple line leak (1-2%), PE, DVT, ulcer, worsening GERD, N/V, stricture, hernia, wound infection, weight regain, and vitamin deficiencies. This patient has a good chance of sustaining permanent weight loss due to this procedure.  This should also allow improvement if not resolution of his/her weight related medical conditions.    At present we are going to present your patient's file for prior authorization to insurance.  Pending prior authorization, I anticipate a surgical date in the near future.  We will keep you updated on any progress.  If you have questions regarding care please feel free to contact me.  Total time spent in the clinic was 30 minutes with greater than 50% in face-to-face consultation.        Please route or send letter to:  Primary Care Provider (PCP) and Referring Provider        Again, thank you for allowing me to participate in the care of your patient.      Sincerely,    Gurinder Ritter MD      "

## 2022-12-01 NOTE — PROGRESS NOTES
"Raffy Castelan is a 47 year old who is being evaluated via a billable video visit.      How would you like to obtain your AVS? MyChart  If the video visit is dropped, the invitation should be resent by: Text to cell phone: [phone]  Will anyone else be joining your video visit? No  If patient encounters technical issues they should call [phone]    During this virtual visit the patient is located in [place], patient verifies this as the location during the entirety of this visit.     Video-Visit Details  Video Start Time: 1207    Type of service:  Video Visit    Video End Time:1223    Originating Location (pt. Location): Home    Distant Location (provider location):  On-site    Platform used for Video Visit: TAZ Sy 12/1/2022      11:24 AM      Dear Dr. Tavares,     I had the pleasure of meeting with your patient Raffy Castelan in our weight loss surgery office.  This patient is a 47 year old female who has been undergoing our thorough preoperative screening process in anticipation of potential bariatric surgery.    At initial evaluation we recorded Raffy Castelan's Height: 165.1 cm (5' 5\"), Initial Weight (lbs): 290 lbs and BMI 47.7 kg/m2.  The patient has been unsuccessful with other methods of permanent weight loss and suffers from multiple weight related medical conditions.  Due to lack of success in achieving weight loss through other methods, she is interested in undergoing bariatric surgery.    Had prior lapband.  Had all fluid removed by Shoshana recently.    Follow-up UGI reviewed by me today in clinic.    This looks fine for lapband removal and conversion to sleeve gastrectomy in one procedure.    Can schedule in January given current progress towards surgery.  Last RD visit tomorrow.      PREVIOUS WEIGHT LOSS ATTEMPTS:  No flowsheet data found.    CO-MORBIDITIES OF OBESITY INCLUDE:  No flowsheet data found.    VITALS:  Ht 1.651 m (5' 5\")   Wt 129.7 kg (286 lb)   BMI 47.59 kg/m  "     PE:  GENERAL: Alert and oriented x3. NAD    RESPIRATORY: Breathing unlabored    Rest of exam deferred due to virtual visit      In summary, she has undergone an appropriate medical evaluation, dietitian evaluation, as well as psychologic screening. The patient appears to be an appropriate candidate for bariatric surgery.    In the office today, I discussed the laparoscopic removal of lapband and laparoscopic gastric sleeve surgery.  Risks, benefits and anticipated outcomes were outlined including the risk of death, staple line leak (1-2%), PE, DVT, ulcer, worsening GERD, N/V, stricture, hernia, wound infection, weight regain, and vitamin deficiencies. This patient has a good chance of sustaining permanent weight loss due to this procedure.  This should also allow improvement if not resolution of his/her weight related medical conditions.    At present we are going to present your patient's file for prior authorization to insurance.  Pending prior authorization, I anticipate a surgical date in the near future.  We will keep you updated on any progress.  If you have questions regarding care please feel free to contact me.  Total time spent in the clinic was 30 minutes with greater than 50% in face-to-face consultation.        Sincerely,    Gurinder Ritter MD    Please route or send letter to:  Primary Care Provider (PCP) and Referring Provider

## 2022-12-01 NOTE — LETTER
"12/1/2022       RE: Raffy Castelan  [address]  [place] [address]     Dear Colleague,    Thank you for referring your patient, Raffy Castelan, to the [facility]NA[facility] [place] at [facility]. Please see a copy of my visit note below.    Raffy Castelan is a 47 year old female who is being evaluated via a billable video visit.      The patient has been notified of following:     \"This video visit will be conducted via a call between you and your physician/provider. We have found that certain health care needs can be provided without the need for an in-person physical exam.  This service lets us provide the care you need with a video conversation.  If a prescription is necessary we can send it directly to your pharmacy.  If lab work is needed we can place an order for that and you can then stop by our lab to have the test done at a later time.    Video visits are billed at different rates depending on your insurance coverage.  Please reach out to your insurance provider with any questions.    If during the course of the call the physician/provider feels a video visit is not appropriate, you will not be charged for this service.\"    Patient has given verbal consent for Video visit? Yes  How would you like to obtain your AVS? MyChart  If you are dropped from the video visit, the video invite should be resent to: Send to e-mail at: [email or web address]  Will anyone else be joining your video visit? No  {If patient encounters technical issues they should call [phone]      Video-Visit Details    Type of service:  Video Visit    Video Start Time: 1:00 PM  Video End Time: 1:23 PM    Originating Location (pt. Location): Home    Distant Location (provider location):  [facility] WEIGHT MANAGEMENT [facility]     Platform used for Video Visit: CONWE[employer]ER    During this virtual visit the patient is located in [place], patient verifies this " "as the location during the entirety of this visit.       Return Bariatric Nutrition Consultation Note    Reason For Visit: Nutrition Assessment    Raffy Castelan is a 47 year old presenting today for a return bariatric nutrition consult.   Pt is interested in laparoscopic sleeve gastrectomy with Dr. Ritter expected surgery in [facility].  Patient is accompanied by self.  This is pt's second of 3 required nutrition visits prior to surgery.     Pt referred by NOEMI Palacios on 2022.  CO-MORBIDITIES OF OBESITY INCLUDE:    No flowsheet data found.    SUPPORT:  No flowsheet data found.    ANTHROPOMETRICS:  Estimated body mass index is 47.63 kg/m  as calculated from the following:    Height as of 10/31/22: 1.651 m (5' 5\").    Weight as of 10/31/22: 129.8 kg (286 lb 3.2 oz).     Current weight: 280 lbs per pt    Required weight loss goal pre-op: -10 lbs from initial consult weight (goal weight 280 lbs or less before surgery)    No flowsheet data found.    No flowsheet data found.    SUPPLEMENT INFORMATION:  MVI    NUTRITION HISTORY:  Pt has seen Ivelisse Singh prior. Wanting to persue surgery now. Decreasing portion sizes and sugar consumptino. Struggles the most with portion sizes. States she knows what she needs to do but is lacking motivation.      22: Pt has lost weight since last visit. Medications are not covered by insurance. Has been focusing on portion sizes and reducing amount of food at night time. Still struggling with sweet tooth. Has been intermittent fasting until around 11 AM. Has been having a salad at each meal. Has been slowly decreasing caffeine consumption.     22: Pt doing well- preparing for surgery. Has continued diet and intermittent fasting.    No flowsheet data found.    No flowsheet data found.    No flowsheet data found.      EXERCISE:    No flowsheet data found.    Progress on Previous Goals  Relating To Eatin) Eat slowly (20-30 minutes per meal), chewing foods well (25 chews " "per bite/applesauce consistency) met, continues   2) 9\" Plate method (1/2 plate non-starchy vegetables/fruit, 1/4 plate lean protein, 1/4 plate whole grain starch - no more than 1/2 cup carb/meal) met, continues  3) Consume 60-90 g protein per day met, continues    Relating to activity:  1) Increase activity as able met, continues    NUTRITION DIAGNOSIS:  Obesity r/t long history of positive energy balance aeb BMI >30 kg/m2.    INTERVENTION:  Intervention Provided/Education Provided on post-op diet guidelines, vitamins/minerals essential post-operatively, GI anatomy of bariatric surgeries, ways to help prepare for post-op diet guidelines pre-operatively, portion/calorie-control, mindful eating and sources of protein.  Patient demonstrates understanding. Provided pt with list of goals RD contact information.      No flowsheet data found.    Expected Engagement: good    GOALS:  1) Caffeine free beverages: herbal tea (search caffeine-free teas in [employer]), Body Armor Lite, Hint Water, Crystal Light, JUST Water  Teccino Tea:   https://www.[employer]/[email or web address]?pd_rd_i=[email or web address]&pf_rd_p=[id number]&pf_rd_r=[id number]&pd_rd_wg=GXxvn&pd_rd_w=XNoQI&pd_rd_r=[id number]     Post-op Diet Advancement Schedule:  Clear Liquid Diet (stage 1): 1/09 - 1/10  *No RED/PURPLE, PROTEIN, or PULP day before surgery  Low-Fat Full Liquid Diet (stage 2): 1/11 - 1/23  Pureed Diet (stage 3): 1/24 - 2/7  Soft Diet (stage 4): 2/8-3/8  Regular Diet (stage 5): 3/9    Your Stage 1 Diet: Clear Liquids  [email or web address]     Your Stage 2 Diet: Low-fat Full Liquids  [email or web address]     Your Stage 3 Diet: Pureed Foods  [email or web address]     Pureed Pleasures  [email or web address]    Your Stage 4 Diet: Soft Foods  [email or web address]    Your Stage 5 Diet: Regular Foods  [email or web address]    Supplements after Weight Loss " Surgery  [email or web address]       Follow up: 1 month, prn     Time spent with patient: 23 minutes.  MARCO A NESS RD, LD

## 2022-12-02 ENCOUNTER — MYC MEDICAL ADVICE (OUTPATIENT)
Dept: ENDOCRINOLOGY | Facility: CLINIC | Age: 47
End: 2022-12-02

## 2022-12-02 NOTE — TELEPHONE ENCOUNTER
Sent patient final tasks to prepare for 1/10/23 sleeve gastrectomy with Dr Ritter.  Also called today to answer questions and remind her to schedule the postop appts.  PAC and preop classes are scheduled.  No need for covid test at this time.  Instructed to call us if covid symptoms prior to surgery.

## 2022-12-02 NOTE — TELEPHONE ENCOUNTER
FUTURE VISIT INFORMATION      SURGERY INFORMATION:    Date: 01/10/23 Dr. Ritter     Consult: virtual visit     RECORDS REQUESTED FROM:       [facility] Care Provider[facility]    Pertinent Medical History: hypertension    Most recent EKG+ Tracin17    Most recent ECHO: 12/15/17

## 2022-12-02 NOTE — PATIENT INSTRUCTIONS
Hi Abeer!     Follow-up with RD in 1 month    Thank you,    Cintia Galvez, RD, LD  If you would like to schedule or reschedule an appointment with the RD, please call [phone]    Nutrition Goals  1) Caffeine free beverages: herbal tea (search caffeine-free teas in [employer]), Body Armor Lite, Hint Water, Crystal Light, JUST Water  Teccino Tea:   [email or web address]_[email or web address]_rd_p=[email or web address]_rd_[id number]_rd_wg=[email or web address]_rd_w=[email or web address]&pd_rd_r=[email or web address]     Post-op Diet Advancement Schedule:  Clear Liquid Diet (stage 1): 1/09 - 1/10  *No RED/PURPLE, PROTEIN, or PULP day before surgery  Low-Fat Full Liquid Diet (stage 2): 1/11 - 1/23  Pureed Diet (stage 3): 1/24 - 2/7  Soft Diet (stage 4): 2/8-3/8  Regular Diet (stage 5): 3/9    Your Stage 1 Diet: Clear Liquids  [email or web address]     Your Stage 2 Diet: Low-fat Full Liquids  [email or web address]     Your Stage 3 Diet: Pureed Foods  [email or web address]     Pureed Pleasures  [email or web address]    Your Stage 4 Diet: Soft Foods  [email or web address]    Your Stage 5 Diet: Regular Foods  [email or web address]    Supplements after Weight Loss Surgery  [email or web address]     Interested in working with a health ? Health coaches work with you to improve your overall health and wellbeing. They look at the whole person, and may involve discussion of different areas of life, including, but not limited to the four pillars of health (sleep, exercise, nutrition, and stress management). Discuss with your care team if you would like to start working a health .    Health Coaching-3 Pack:    $99 for three health coaching visits    Visits may be done in person or via phone    Coaching is a partnership between the  and the client; Coaches do not prescribe or diagnose    Coaching helps inspire the client to reach  "his/her personal goals    COMPREHENSIVE WEIGHT MANAGEMENT PROGRAM  VIRTUAL SUPPORT GROUPS    For Support Group Information:      We offer support groups for patients who are working on weight loss and considering, preparing for or have had weight loss surgery.   There is no cost for this opportunity.  You are invited to attend the?Virtual Support Groups?provided by any of the following locations:    [facility] via Microsoft Teams with Marcie Johnson RN  2.   [place] via [employer] with Tono Myrick, PhD, LP  3.   [place] via [employer] with Anh Jeong RN  4.   [facility] via [employer] Teams with Anh Black [facility]    The following Support Group information can also be found on our website:  [email or web address]      [facility] Weight Loss Surgery Support Group    [facility] Weight Loss Surgery Support Group  The support group is a patient-lead forum that meets monthly to share experiences, encouragement and education. It is open to those who have had weight loss surgery, are scheduled for surgery, and those who are considering surgery.   WHEN: This group meets on the 3rd Wednesday of each month from 5:00PM - 6:00PM virtually using Microsoft Teams.   FACILITATOR: Led by Marcie Johnson RD, LD, RN, the program's Clinical Coordinator.   TO REGISTER: Please contact the clinic via Scripted or call the nurse line directly at [phone] to inform our staff that you would like an invite sent to you and to let us know the email you would like the invite sent to. Prior to the meeting, a link with directions on how to join the meeting will be sent to you.    2022 Meetings  January 19: \"Let's Talk\" a time for the group to share.  February 16: \"Let's Talk\" a time for the group to share.  March 16: Guest Speakers: Psychologists, Gely Weiss, PhD,LP and Gala Frye PsyD,LP  April 20: Guest Speaker: [facility] " "[place], [facility],CHES, CPT  May 18: Guest Speaker: DietitianDelfino, TAMI, LP  Lynn 15: \"Let's Talk\" a time for the group to share.  July 20: \"Let's Talk\" a time for the group to share.  August 17: TBA  September 21: TBA  October 19: Guest Speaker: Dr Juan Murillo MD Pulmonologist and Sleep Medicine Physician, \"Getting a Good Night's Sleep\".  November 16: TBA  December 21: TBA    [facility] Support Groups    Connections: Bariatric Care Support Group?  This is open to all [facility] (and those external to this program) pre- and post- operative bariatric surgery patients as well as their support system.   WHEN: This group meets the 2nd Tuesday of each month from 6:30 PM - 8:00 PM virtually using Microsoft Teams.   FACILITATOR: Led by Tono Myrick, Ph.D who is a Licensed Psychologist with the [facility] Comprehensive Weight Management Program.   TO REGISTER: Please send an email to Tono Myrick, Ph.D.,  at?[email or web address]?if you would like an invitation to the group and to learn about using Microsoft Teams.    2022 Meetings  January 11: Nohemi Moy, PharmD, Pharmacy Resident at [facility], \"Medications and Bariatric Surgery\".  February 8: Open Forum  March 8  April 12  May 10  Lynn 14    Connections: Post-Operative Bariatric Surgery Support Group  This is a support group for [facility] bariatric patients (and those external to [facility]) who have had bariatric surgery and are at least 3 months post-surgery.  WHEN: This support group meets the 4th Wednesday of the month from 11:00 AM - 12:00 PM virtually using Microsoft Teams.   FACILITATOR: Led by Certified Bariatric Nurse, Anh Jeong RN.   TO REGISTER: Please send an email to Anh at [email or web address] if you would like an invitation to the group and to learn about using Microsoft Teams.    2022 Meetings  January 26  February 23  March 23  April 27  May 25  Lynn " "22    [facility] Healthy Lifestyle Virtual Support Group    Healthy Lifestyle Virtual Support Group?  This is 60 minutes of small group guided discussion, support and resources. All are welcome who want a healthy lifestyle.  WHEN: This group meets monthly on a Friday from 12:30 PM - 1:30 PM virtually using Microsoft Teams.   FACILITATOR: Led by National Board Certified Health and , Anh Black [facility].   TO REGISTER: Please send an email to Anh at?[email or web address] to receive monthly invites to the group or if you have any questions about having a health .  Prior to the meeting, a link with directions on how to join the meeting will be sent to you.    2022 Meetings  January 21: Shelly Vieira MS, RN, CIC, CBN, \"Healthy Habits\"  February 25: Open Forum  March 18: \"Setting Limits and Boundaries\"  April 29: Ivelisse Singh RD, \"Meal Planning Made Easy\"  May 20: Open Forum  June: To be determined                "

## 2022-12-07 ENCOUNTER — TELEPHONE (OUTPATIENT)
Dept: ENDOCRINOLOGY | Facility: CLINIC | Age: 47
End: 2022-12-07

## 2022-12-07 NOTE — TELEPHONE ENCOUNTER
Shelly Vieira RN  [facility] Bariatric Scheduling Registration Pool 20 minutes ago (11:05 AM)     STACY Marrufo     Raffy Castelan is having a sleeve gastrectomy with Dr Ritter on 1/10/2023     Please call to schedule:     POSTOP APPTS   ___Post-op appointments for 1 week and 31+ days after surgery with their dietitian.   ___Post-op appointments for 1 week and 31+ days after surgery with a provider.   Please schedule the 1 week provider appointment as an in-person visit with Phoebe Ryan CNP or Shoshana Bustamante PA-C.   If they request a video visit (due to distance), they must have a weight check and vital signs checked at their PCP clinic and faxed to us at [phone] prior to the visit.     PRE-SURGERY APPTS   _set-up__ PREOP WLS CLASS [[id number]] in [facility] WEIGHT [facility] at 10:00am on 12/27/22. To check in 15 minutes prior to verify attendance at the class.  Microsoft Teams meeting sign-on information to be sent to patient one week before (Please confirm their email). Patient has been informed of this date and time.   Set-up___Pre-surgery Dietitian visit to review the postop diet.         Feel free to contact me if you have any questions or updates.     Sincerely,   AVILA Lopez

## 2022-12-07 NOTE — TELEPHONE ENCOUNTER
Raffy Green called to sched post ops. Told her we'd call her back as soon as we get your staff message.  Thanks.

## 2022-12-19 ENCOUNTER — CARE COORDINATION (OUTPATIENT)
Dept: ENDOCRINOLOGY | Facility: CLINIC | Age: 47
End: 2022-12-19

## 2022-12-22 ENCOUNTER — OFFICE VISIT (OUTPATIENT)
Dept: FAMILY MEDICINE | Facility: CLINIC | Age: 47
End: 2022-12-22
Payer: COMMERCIAL

## 2022-12-22 VITALS
HEART RATE: 98 BPM | RESPIRATION RATE: 20 BRPM | WEIGHT: 289 LBS | TEMPERATURE: 97.7 F | HEIGHT: 65 IN | SYSTOLIC BLOOD PRESSURE: 137 MMHG | DIASTOLIC BLOOD PRESSURE: 88 MMHG | OXYGEN SATURATION: 99 % | BODY MASS INDEX: 48.15 KG/M2

## 2022-12-22 DIAGNOSIS — Z12.11 SCREEN FOR COLON CANCER: ICD-10-CM

## 2022-12-22 DIAGNOSIS — N63.0 MASS OF BREAST, UNSPECIFIED LATERALITY: Primary | ICD-10-CM

## 2022-12-22 LAB
PROLACTIN SERPL 3RD IS-MCNC: 14 NG/ML (ref 5–23)
TSH SERPL DL<=0.005 MIU/L-ACNC: 1.12 MU/L (ref 0.4–4)

## 2022-12-22 PROCEDURE — 36415 COLL VENOUS BLD VENIPUNCTURE: CPT | Performed by: FAMILY MEDICINE

## 2022-12-22 PROCEDURE — 99213 OFFICE O/P EST LOW 20 MIN: CPT | Performed by: FAMILY MEDICINE

## 2022-12-22 PROCEDURE — 84146 ASSAY OF PROLACTIN: CPT | Performed by: FAMILY MEDICINE

## 2022-12-22 PROCEDURE — 84443 ASSAY THYROID STIM HORMONE: CPT | Performed by: FAMILY MEDICINE

## 2022-12-22 NOTE — PROGRESS NOTES
"  Assessment & Plan     Mass of breast, unspecified laterality  Referral   - MA Diagnostic Digital Bilateral; Future-Pt to make appointment   - Prolactin; Future  - TSH with free T4 reflex; Future  - Prolactin  - TSH with free T4 reflex    Screen for colon cancer  Advised colonoscopy or   - COLOGUARD(EXACT SCIENCES); Future    Return in about 1 month (around 1/22/2023) for recheck/ sooner if worse or New symptoms.    Dulce Gao MD  [facility] TABITHA Akbar is a 47 year old  presenting for the following health issues:  lumps in both breasts      History of Present Illness       Reason for visit:  Breast lump  Symptom onset:  More than a month  Symptoms include:  Lumps in both breasts  Symptom intensity:  Mild  Symptom progression:  Staying the same  Had these symptoms before:  Yes  Has tried/received treatment for these symptoms:  Yes  Previous treatment was successful:  Yes  Prior treatment description:  Ultrasound found benign lumps  What makes it worse:  No  What makes it better:  No    She eats 2-3 servings of fruits and vegetables daily.She consumes 1 sweetened beverage(s) daily.She exercises with enough effort to increase her heart rate 9 or less minutes per day.  She exercises with enough effort to increase her heart rate 3 or less days per week.   She is taking medications regularly.     Not on any Hormones  Has a clear Nipple discharge Right Breast  No tenderness   Family history of Breast ca on Paternal aunt and maternal aunt and a cousin    Review of Systems   Rest of the ROS is Negative except see above and Problem list [stable]        Objective    /88 (BP Location: Right arm, Patient Position: Chair, Cuff Size: Adult Large)   Pulse 98   Temp 97.7  F (36.5  C) (Oral)   Resp 20   Ht 1.651 m (5' 5\")   Wt 131.1 kg (289 lb)   SpO2 99%   BMI 48.09 kg/m    Body mass index is 48.09 kg/m .  Physical Exam   GENERAL: healthy, alert and no distress  BREAST: normal without " masses, tenderness or nipple discharge and no palpable axillary masses or adenopathy-Pt says she feels some mass Both Breast on sitting Up  ? Mass left Upper though it feels More Like Breast  Tissue

## 2022-12-23 ENCOUNTER — VIRTUAL VISIT (OUTPATIENT)
Dept: SURGERY | Facility: CLINIC | Age: 47
End: 2022-12-23
Attending: SURGERY
Payer: COMMERCIAL

## 2022-12-23 ENCOUNTER — PRE VISIT (OUTPATIENT)
Dept: SURGERY | Facility: CLINIC | Age: 47
End: 2022-12-23

## 2022-12-23 ENCOUNTER — ANESTHESIA EVENT (OUTPATIENT)
Dept: SURGERY | Facility: CLINIC | Age: 47
End: 2022-12-23

## 2022-12-23 DIAGNOSIS — E66.01 MORBID OBESITY (H): ICD-10-CM

## 2022-12-23 DIAGNOSIS — Z01.818 PRE-OP EXAMINATION: Primary | ICD-10-CM

## 2022-12-23 PROCEDURE — 99213 OFFICE O/P EST LOW 20 MIN: CPT | Mod: 95 | Performed by: PHYSICIAN ASSISTANT

## 2022-12-23 ASSESSMENT — PAIN SCALES - GENERAL: PAINLEVEL: NO PAIN (0)

## 2022-12-23 ASSESSMENT — ENCOUNTER SYMPTOMS: SEIZURES: 0

## 2022-12-23 NOTE — PATIENT INSTRUCTIONS
Preparing for Your Surgery      Name:  Raffy Castelan   MRN:  [id number]   :  1975   Today's Date:  2022       Arriving for surgery:  Surgery date:  1/10/22  Arrival time:  06:00 am     Surgeries and procedures: Adult patients can have 2 visitors all through the surgery process.     Visiting hours: 8 a.m. to 8:30 p.m.     Hospital: Adult patients and children under age 18 can have 4 visitor at a time     No visitors under the age of 5 are allowed for hospital patients.  Double occupancy rooms: Patients can have only two visitors at a time.     Patients with disabilities: Can have a support person with them (family member, service provider     Or someone well informed about their needs) plus the allowed number of visitors     Patients confirmed or suspected to have symptoms of COVID 19 or flu:     No visitors allowed for adult patients.   Children (under age 18) can have 1 named visitor.     People who are sick or showing symptoms of COVID 19 or flu:    Are not allowed to visit patients--we can only make exceptions in special situations.       Please follow these guidelines for your visit:   Arrive wearing a mask over your mouth and nose; we will give you a medical mask to wear    If you arrive wearing a cloth mask.   Keep it on during your entire visit, even when in patient's room.   If you don't wear a mask we'll ask you to leave.     Clean your hands with alcohol hand . Do this when you arrive at and leave the building and patient room,    And again after you touch your mask or anything in the room.     You can t visit if you have a fever, cough, shortness of breath, muscle aches, headaches, sore throat    Or diarrhea      Stay 6 feet away from others during your visit and between visits     Go directly to and from the room you are visiting.     Stay in the patient s room during your visit. Limit going to other places in the hospital as much as possible     Leave bags and jackets at home or  in the car.     For everyone s health, please don t come and go during your visit. That includes for smoking   during your visit.     Please come to:     [facility] [address]  [address] [place]  [address]    - ? parking is available in front of the hospital      -    Please proceed to [address] on the 3rd floor. [phone]?     - ?If you are in need of directions, wheelchair or escort please stop at the Information Desk in the lobby.  Inform the information person that you are here for surgery; a wheelchair and escort to [address] will be provided.?     What can I eat or drink?  -  You may eat per instructions from the surgeon - start clear liquids according to surgeon.  -  You may have clear liquids until 2 hours prior to arrival time.     Examples of clear liquids:  Water  Clear broth  Juices (apple, white grape, white cranberry  and cider) without pulp  Noncarbonated, powder based beverages  (lemonade and Samuel-Aid)  Sodas (Sprite, 7-Up, ginger ale and seltzer)  Coffee or tea (without milk or cream)  Gatorade    -  No Alcohol for at least 24 hours before surgery.     Which medicines can I take?  Hold Aspirin for 7 days before surgery.   Hold Multivitamins for 7 days before surgery.  Hold Supplements for 7 days before surgery.  Hold Ibuprofen (Advil, Motrin) for 1 day before surgery--unless otherwise directed by surgeon.  Hold Naproxen (Aleve) for 4 days before surgery.  -  DO NOT take these medications the day of surgery:  Hyzaar + senna.  -  PLEASE TAKE these medications the day of surgery:  Tylenol if needed; take other morning medications.    How do I prepare myself?  - Please take 2 showers before surgery using Scrubcare or Hibiclens soap.    Use this soap only from the neck to your toes.     Leave the soap on your skin for one minute--then rinse thoroughly.      You may use your own shampoo and conditioner. No other hair products.   -  Please remove all jewelry and body piercings.  - No lotions, deodorants or fragrance.  - No makeup or fingernail polish.   - Bring your ID and insurance card.    -If you have a Deep Brain Stimulator, Spinal Cord Stimulator, or any Neuro Stimulator device---you must bring the remote control to the hospital.      ALL PATIENTS GOING HOME THE SAME DAY OF SURGERY ARE REQUIRED TO HAVE A RESPONSIBLE ADULT TO DRIVE AND BE IN ATTENDANCE WITH THEM FOR 24 HOURS FOLLOWING SURGERY.    Covid testing policy as of 12/06/2022  Your surgeon will notify and schedule you for a COVID test if one is needed before surgery--please direct any questions or COVID symptoms to your surgeon      Questions or Concerns:    - For any questions regarding the day of surgery or your hospital stay, please contact the Pre Admission Nursing Office at [phone].       - If you have health changes between today and your surgery, please call your surgeon.       - For questions after surgery, please call your surgeons office.

## 2022-12-23 NOTE — H&P
Pre-Operative H & P     CC:  Preoperative exam to assess for increased cardiopulmonary risk while undergoing surgery and anesthesia.    Date of Encounter: 12/23/2022  Primary Care Physician:  Majo Tavares     Reason for visit:   Encounter Diagnoses   Name Primary?     Morbid obesity (H)      Pre-op examination Yes       HPI  Raffy Castelan is a 47 year old female who presents for pre-operative H & P in preparation for  Procedure Information     Date/Time: 1/10/23     Procedure: REMOVAL, GASTRIC BAND, ADJUSTABLE, and components,  LAPAROSCOPIC, GASTRECTOMY, SLEEVE, LAPAROSCOPIC, possible HERNIORRHAPHY, HIATAL, LAPAROSCOPIC    Anesthesia type: General     Pre-op diagnosis: Morbid obesity    Location: [facility]    Providers: Dr. Ritter          The patient is a 47-year-old woman with a past medical history significant for hypertension, asthma, morbid obesity, GERD, constipation, psoriasis and anxiety.  The patient underwent a lap band and hiatal hernia repair in May 2014 but is continue to struggle with her weight.  She has met with the bariatric team and is now scheduled for the procedure as above.    History is obtained from the patient and chart review    Hx of abnormal bleeding or anti-platelet use: none    Menstrual history: Patient's last menstrual period was 12/09/2022 (approximate).:      Past Medical History  Past Medical History:   Diagnosis Date     Asthma      Constipation      GERD (gastroesophageal reflux disease)      Hypertension      Morbid obesity (H)      Psoriasis        Past Surgical History  Past Surgical History:   Procedure Laterality Date     BIOPSY       BUNIONECTOMY LAPIDUS WITH TARSAL METATARSAL (TMT) FUSION Left 12/15/2020    Procedure: Left first tarsometatarsal joint fusion Left Jacob bunionectomy Left fifth metatarsal Efraín bunionectomy;  Surgeon: Markus Hart DPM;  Location: [facility] OR     COLONOSCOPY       ESOPHAGOSCOPY,  GASTROSCOPY, DUODENOSCOPY (EGD), COMBINED  07/30/2013    Procedure: COMBINED ESOPHAGOSCOPY, GASTROSCOPY, DUODENOSCOPY (EGD), BIOPSY SINGLE OR MULTIPLE;;  Surgeon: Wilson Fournier MD;  [facility]     ESOPHAGOSCOPY, GASTROSCOPY, DUODENOSCOPY (EGD), COMBINED  03/24/2014    Procedure: COMBINED ESOPHAGOSCOPY, GASTROSCOPY, DUODENOSCOPY (EGD);  Surgeon: Wilson Fournier MD;  Location:  GI     EXCISE LESION FOOT Left 12/15/2020    Procedure: Left foot mass removal;  Surgeon: Markus Hart DPM;  Location: [facility] OR     LAPAROSCOPIC GASTRIC ADJUSTABLE BANDING  05/27/2014    Procedure: LAPAROSCOPIC GASTRIC ADJUSTABLE BANDING;  Surgeon: Wilson Fournier MD;  Location: [facility]       Prior to Admission Medications  Current Outpatient Medications   Medication Sig Dispense Refill     albuterol (PROAIR HFA/PROVENTIL HFA/VENTOLIN HFA) 108 (90 Base) MCG/ACT inhaler Inhale 2 puffs into the lungs every 6 hours as needed for shortness of breath / dyspnea or wheezing 8.5 g 3     losartan-hydrochlorothiazide (HYZAAR) 50-12.5 MG tablet Take 1 tablet by mouth daily (Patient taking differently: Take 1 tablet by mouth every morning) 90 tablet 3     Multiple Vitamins-Minerals (HAIR SKIN & NAILS ADVANCED PO) Take 2 tablets by mouth every morning       Prenatal Vit-Fe Fumarate-FA (PRENATAL VITAMINS PO) Take 1 tablet by mouth At Bedtime       senna (SENOKOT) 8.6 MG tablet Take 2 tablets by mouth At Bedtime       clobetasol propionate (OLUX) 0.05 % external foam APPLY SPARINGLY TO AFFECTED AREA TWICE DAILY AS NEEDED DO NOT APPLY TO FACE 600 g 3       Allergies  Allergies   Allergen Reactions     Lisinopril Cough       Social History  Social History     Socioeconomic History     Marital status:      Spouse name: Not on file     Number of children: 2     Years of education: Not on file     Highest education level: Not on file   Occupational History     Occupation: Bio-     Employer: [employer]   Tobacco Use     Smoking status: Never     Smokeless tobacco: Never   Vaping Use     Vaping Use: Never used   Substance and Sexual Activity     Alcohol use: No     Drug use: No     Sexual activity: Not Currently     Partners: Male     Birth control/protection: I.U.D.   Other Topics Concern     Parent/sibling w/ CABG, MI or angioplasty before 65F 55M? No   Social History Narrative     Not on file     Social Determinants of Health     Financial Resource Strain: Not on file   Food Insecurity: Not on file   Transportation Needs: Not on file   Physical Activity: Not on file   Stress: Not on file   Social Connections: Not on file   Intimate Partner Violence: Not on file   Housing Stability: Not on file       Family History  Family History   Problem Relation Age of Onset     Blood Disease Mother         anemia     Heart Disease Mother      Hypertension Mother      Hyperlipidemia Mother      Anxiety Disorder Mother      Osteoporosis Mother      Genetic Disorder Mother         Alpha Thalassemia trait     Cerebrovascular Disease Father      Hypertension Father      Diabetes Father         type  2     Deep Vein Thrombosis (DVT) Father      Breast Cancer Maternal Aunt      Breast Cancer Paternal Aunt      Cancer Paternal Aunt         stomach     Breast Cancer Cousin      Breast Cancer Other      Blood Disease Other         son with alpha thalessemia/mother alpha thalasemia trait     Breast Cancer Other      Breast Cancer Other      Anesthesia Reaction No family hx of        Review of Systems  The complete review of systems is negative other than noted in the HPI or here.   Anesthesia Evaluation   Pt has had prior anesthetic. Type: General and MAC.    No history of anesthetic complications       ROS/MED HX  ENT/Pulmonary:     (+) Intermittent, asthma recent URI, URI with congestion:     Neurologic:  - neg neurologic ROS  (-) no seizures, no CVA and no TIA   Cardiovascular:     (+) hypertension-----Previous cardiac  testing   Echo: Date: 2017 Results:    Stress Test: Date: Results:    ECG Reviewed: Date: 2017 Results:  Sinus rhythm  Cath: Date: Results:      METS/Exercise Tolerance: 4 - Raking leaves, gardening    Hematologic:  - neg hematologic  ROS     Musculoskeletal:  - neg musculoskeletal ROS     GI/Hepatic: Comment: Constipation    S/p lap band and hiatal hernia repair in 2014    (+) GERD, Symptomatic,     Renal/Genitourinary:  - neg Renal ROS     Endo:     (+) Obesity,     Psychiatric/Substance Use:  - neg psychiatric ROS     Infectious Disease:  - neg infectious disease ROS     Malignancy:  - neg malignancy ROS     Other: Comment: Psoriasis            Virtual visit -  No vitals were obtained    Physical Exam  Constitutional: Awake, alert, cooperative, no apparent distress, and appears stated age.  Eyes: Pupils equal  HENT: Normocephalic  Respiratory: non labored breathing   Neurologic: Awake, alert, oriented to name, place and time.   Neuropsychiatric: Calm, cooperative. Normal affect.      Prior Labs/Diagnostic Studies   All labs and imaging personally reviewed     EKG 2017  Sinus rhythm      Echo 2017  Interpretation Summary     Normal left ventricular size, thickness, and function. EF 55-60%.  Normal right ventricular size and function.  No significant valvular pathology.  No pericardial effusion.     No prior study for comparison.    The patient's records and results personally reviewed by this provider.     Outside records reviewed from: Care Everywhere      Assessment      Raffy Castelan is a 47 year old female seen as a PAC referral for risk assessment and optimization for anesthesia.    Plan/Recommendations  Pt will be optimized for the proposed procedure.  See below for details on the assessment, risk, and preoperative recommendations    NEUROLOGY  - No history of TIA, CVA or seizure  -Post Op delirium risk factors:  No risk identified    ENT  - No current airway concerns.  Will need to be reassessed day of  "surgery.  Mallampati: Unable to assess  TM: Unable to assess    CARDIAC  - Hypertension  Well controlled  - METS (Metabolic Equivalents)  Patient performs 4 or more METS exercise without symptoms            Total Score: 0      RCRI-Very low risk: Class 1 0.4% complication rate            Total Score: 0    The patient is able to go up 2 flights of stairs and shovel snow. She denies any cardiac symptoms.     PULMONARY  - Obstructive Sleep Apnea  No current risk of obstructive sleep apnea   ROSE Low Risk            Total Score: 2    ROSE: Hypertension    ROSE: BMI over 35 kg/m2       ~ The patient reports she developed a cold 4 days ago. She is at the end of symptoms with just nasal congestion. She denies fevers, chills or productive cough. She has 18 days prior to surgery. If she develops worsening symptoms close to her surgery date she was advised to let her surgical team know.   - Asthma  Well controlled  - Tobacco History      History   Smoking Status     Never   Smokeless Tobacco     Never       GI  - GERD  The patient reports after her 2014 hiatal hernia repair she didn't have GERD symptoms. However, as her weight has gone back up she is having more symptoms which are food related  - Constipation  ~ S/p lap band and hiatal hernia repair in    PONV High Risk  Total Score: 3           1 AN PONV: Pt is Female    1 AN PONV: Patient is not a current smoker    1 AN PONV: Intended Post Op Opioids        /RENAL  - Baseline Creatinine  0.73    ENDOCRINE    - BMI: Estimated body mass index is 48.09 kg/m  as calculated from the following:    Height as of 22: 1.651 m (5' 5\").    Weight as of 22: 131.1 kg (289 lb).  Class 3 Obesity (BMI > 40)  - No history of Diabetes Mellitus    HEME  VTE Medium Risk 1.8%            Total Score: 5    VTE: BMI greater than 39    VTE: Family Hx of VTE    ~ The patient reports her father  of a stroke and blood clot but isn't sure if he had a DVT specifically.   - No history " of abnormal bleeding or antiplatelet use.  - Anemia - will check CBC with reflex to iron studies.   Recommend perioperative use of blood conservation techniques intraoperatively and close monitoring for postoperative bleeding.  A type and screen has not been done and deferred to the team day of surgery    OTHER  ~ Psoriasis - on scalp.       Different anesthesia methods/types have been discussed with the patient, but they are aware that the final plan will be decided by the assigned anesthesia provider on the date of service.    The patient is optimized for their procedure. AVS with information on surgery time/arrival time, meds and NPO status given by nursing staff. No further diagnostic testing indicated.    Please refer to the physical examination documented by the anesthesiologist in the anesthesia record on the day of surgery.    Video-Visit Details    Type of service:  Video Visit    Provider received verbal consent for a Video Visit from the patient? Yes     Originating Location (pt. Location): Home    Distant Location (provider location):  Off-site  Mode of Communication:  Video Conference via [employer]  On the day of service:     Prep time: 7 minutes  Visit time: 14 minutes  Documentation time: 6 minutes  ------------------------------------------  Total time: 27 minutes      Janice Dick PA-C  Preoperative Assessment Center  [facility]  [facility] Center  Phone: [phone]  Fax: [phone]

## 2022-12-23 NOTE — PROGRESS NOTES
Abchristelle is a 47 year old who is being evaluated via a billable video visit.      How would you like to obtain your AVS? MyChart      HPI         Review of Systems               Physical Exam       ASHLEY Robledo LPN

## 2022-12-23 NOTE — PROGRESS NOTES
Called patient to see if she is interested in the bariatric studies, she is, I will pass this on to the coordinators.     Cassandra Chiang, EMT

## 2022-12-26 ENCOUNTER — TELEPHONE (OUTPATIENT)
Dept: ENDOCRINOLOGY | Facility: CLINIC | Age: 47
End: 2022-12-26

## 2022-12-26 NOTE — TELEPHONE ENCOUNTER
Received the prior authorization from [facility] for laparoscopic removal of a gastric band with conversion to sleeve gastrectomy, effective 12/9/2022 - 06/06/2023, ref # [id number] per letter dated 12/15/2022.

## 2022-12-27 ENCOUNTER — ALLIED HEALTH/NURSE VISIT (OUTPATIENT)
Dept: ENDOCRINOLOGY | Facility: CLINIC | Age: 47
End: 2022-12-27
Payer: COMMERCIAL

## 2022-12-27 DIAGNOSIS — E66.01 MORBID OBESITY (H): Primary | ICD-10-CM

## 2022-12-27 DIAGNOSIS — Z91.89 AT HIGH RISK FOR POSTOPERATIVE COMPLICATIONS: ICD-10-CM

## 2022-12-27 PROCEDURE — 99207 PR NO CHARGE NURSE ONLY: CPT

## 2022-12-27 RX ORDER — ONDANSETRON 4 MG/1
4 TABLET, ORALLY DISINTEGRATING ORAL EVERY 8 HOURS PRN
Qty: 15 TABLET | Refills: 0 | Status: ON HOLD | OUTPATIENT
Start: 2022-12-27 | End: 2023-01-10

## 2022-12-27 RX ORDER — HYOSCYAMINE SULFATE 0.125 MG
0.12 TABLET ORAL EVERY 4 HOURS PRN
Qty: 30 TABLET | Refills: 1 | Status: SHIPPED | OUTPATIENT
Start: 2022-12-27 | End: 2023-01-19

## 2022-12-27 RX ORDER — AMOXICILLIN 250 MG
2 CAPSULE ORAL DAILY PRN
Qty: 30 TABLET | Refills: 1 | Status: SHIPPED | OUTPATIENT
Start: 2022-12-27 | End: 2023-04-11

## 2022-12-27 NOTE — PATIENT INSTRUCTIONS
Raffy Castelan,    Below is a recap of our conversation regarding your upcoming Sleeve Gastrectomy on 1/10/23  at 8 AM with Dr. Tereso Ritter.  You should receive a call from the hospital surgery department 1-2 days before surgery confirming you surgery and arrival time.    SURGERY CANCELLATION  If something occurs in which you need to cancel your surgery, please contact [facility] at [phone], as soon as possible.        BEFORE SURGERY    DAY BEFORE YOUR SURGERY  Starting in the morning, follow a clear liquid diet.  Stay away from red liquids and liquids with pulp.  Ensure you are well hydrated all day!  Strive for at least 64 ounces.  Nothing after midnight.  You may have sips of water up to 3 hours before your surgery.   Take your medications as directed from the PAC clinic.    SHOWER  You will take a shower the night before surgery and a shower the morning of surgery.  Follow instructions for pre-op shower using the required soap (4% CHG, Hibiclens, Exidine, chlorhexidine).  Let the soap sit on your skin for a minute and then rinse.  After your evening shower, dry off with a clean towel, put on clean pajamas and get into a bed with clean sheets.  After your morning shower, dry off with a clean towel and put on clean comfortable clothes.  The soap can be very drying.  Do not put any deodorant, lotion or powder on after your shower.    TRANSPORTATION & POSTOP CARE  You will need a  to take you to the hospital the day of surgery.  You will need a  to pick you up from the hospital the day of discharge (usually the afternoon/evening the day after surgery).  You will need someone to stay with you for the first couple of days after surgery.  If you live greater than an hour from the hospital, you will need to stop every 50-60 minutes to get out of the car and walk around.         PRESCRIPTIONS FROM YOUR PHARMACY:   Please plan to pick these up before surgery and have them at home for when you are  discharged. Do not bring them to the hospital with you!    Prilosec (omeprazole) OR Alternative:   This medication is for control of stomach acid.  You will take this medication daily for the first three months after surgery.  TO TAKE: Open the capsule and put the beads in applesauce.  Take every morning.  If you are currently on a medication for GERD or Reflux, you will just continue that medication.    Levsin (hyoscyamine) or Alternative:   This medication is to help control spasms/cramping in the stomach and intestines.    Take one tablet every 4 hours as needed for cramping.    It may be helpful to take in the morning before taking any other medications.    Zofran (ondansetron) or Alternative:    This medication is for nausea.    To Take: Place one tablet on the tongue and let it dissolve.  You can take this every 8 hours, as needed.    Senna:   This medication is a stool softener:  Take as directed to help avoid constipation.  It is important to take this medication if you are taking a narcotic pain medicine as the pain medication can be constipating.    If you experience diarrhea, please stop taking the Senna.    Pain Medication: This prescription will be given to you at the time of discharge.  Your pain medication prescription at discharge is a different dosage and timing than what you were taking in the hospital!  Follow the new directions.  Take the minimal amount as directed for severe pain. The pain medicine can cause constipation.  Do not drive while taking narcotic pain medication.    For mild to moderate pain, you can take Tylenol or Extra Strength Tylenol per the bottle instructions.  You will need someone to stay with you for the first 1-2 days after surgery, especially if you are taking prescription pain medicine.  Please share the information regarding fluid intake and activity with your caregiver so they can help support you in your efforts.      IN THE HOSPITAL  Use your Incentive Spirometer  You  should be up walking at least 3 times (or more) each day while in the hospital  Wear your abdominal binder to help with tummy support  Sip through your fluids!  Frequent sips - about 1 tsp every couple of minutes.  Drinking more than that at a time can contribute to pain and cramping.       AFTER SURGERY    REGULARLY SCHEDULED MEDICATIONS    Depending on the size and number of medications you take, you may need to space/change the time you take your medications, so you do not overfill your stomach.  Make sure you follow-up with your primary provider to make medication changes needed.  DIABETES: If you have diabetes, monitor your blood sugars more frequently after surgery.  Your blood sugars may normalize quickly.  Please contact your prescribing provider if you need your medication adjusted.  HIGH BLOOD PRESSURE: If you have high blood pressure, monitor your blood pressure after surgery.  Your blood pressure may normalize quickly.  Please contact your prescribing provider if you need your medication adjusted.      POSTOPERATIVE MEDICATIONS  Take your postop medications as prescribed.  You can start a chewable Multivitamin when you get home.  Do not start any additional vitamins until you meet with your provider and dietician to receive further instructions.        PAIN CONTROL  Your pain medication prescription at discharge is a different dosage and timing than what you were taking in the hospital!  Follow the new directions.Take your pain medicine as needed, as directed.  Start with the minimal amount prescribed and supplement with Tylenol or Extra Strength Tylenol.  You can use Tylenol or Tylenol Extra Strength if you are mild to moderate pain.  DO NOT TAKE NSAIDS: IBUPROFEN, ASPIRIN OR NAPROXEN.  ICE: Use an ice pack on the incisions for the first 24 - 48 hours:  Use a barrier between the ice pack and the skin.  Do not leave the ice on longer than 20 minutes at each time.    Change positions frequently.  Walking  around once an hour will also help.  HEAT: You may also try a heating pad on your abdomen to help soothe cramping.  Use a barrier between the heating pad and your skin.  Do not leave on longer than 20 minutes at each time.      DIET  For Dr. Ritter Patients:  You will be following the Stage 2 (Full Liquid Diet) - upon discharge.  Ensure you have a variety of proper full liquids at home to support you in taking in the proper nutrition.  Please refer to the Stage 2 - Full Liquid diet handout provided by the Dietician.  Stage 3 (Pureed) Diet Start Date: 1/24/23  Stage 4 (Soft Foods) Diet Start Date: 2/7/23  Stage 5 (Regular) Diet Start Date: 3/7/23       HYDRATION  Your goal is 48 to 64 ounces each day (4-6 ounces each hour).  This amount will help prevent dehydration.    It is important to measure and keep a tally sheet of your intake.  Keep your tally sheet next to you and pepe each time you drink one ounce of fluid.  You should track your fluids for the first month after surgery and if you are ill.  All fluids count in your fluid intake!  Keep a water bottle or thermal bottle by your bed.  Drink a little before going to sleep, if you wake in the middle of the night, and in the morning before getting out of bed.  Keep an eye on your urine.  It is a good indicator of your hydration status.  Your urine should be clear yellow or clear light yellow.    You will learn about advancing to a pureed diet at your first postop Dietician appointment.     BOWELS/CONSTIPATION   Movement is important to keep your bowels working.  Get up and walk at least each hour while you are awake.  It is not unusual to not have a bowel movement for a few days after surgery.  You should be passing gas each day.   Keep taking the SENNA until you have had a regular bowel movement and are off the narcotic pain medication.   If you haven't had a bowel movement by the 4th day after surgery, take one dose of Miralax (according to package directions).   Repeat dose if no results.  If you still don't have any results, use a Fleet Enema.  If still no results, call your provider's office.   It is normal to have a little blood in your first couple of bowel movements.  If the blood continues, please contact our office.  If you are having gas pains and bloating, you can try Gas-X.    BREATHING EXERCISES  Use your incentive spirometer each hour while awake.  Sitting up or standing, take 5 - 10 slow, deep breaths each time, focusing on expanding your lungs.  This should be done for the first couple of weeks after surgery.  These exercises will help eliminate gases from your system (anesthesia and surgical).      ACTIVITY & EXERCISE  Get up and walk around each hour while you are awake - at least 10 minutes.  Walking will help with circulation, bowel regulation and will help you feel better.  Do not lift anything greater than 10-15 lb for the first 4 weeks.  The only exercise you can start right after surgery is WALKING.  Walking is good for the gut, the circulation and your breathing!   Seated Exercises for Arms and Legs (can be done before or after surgery) [email or web address]  Exercise Guidelines after Weight Loss Surgery (1st 4-6 weeks): [email or web address]  Exercises after Weight Loss Surgery (strengthening, when no weightlifting restrictions - after 4-6 weeks) :  [email or web address]       WOUND CARE  You will have steri-strips over your incision after surgery. They will fall off over the first 7-10 days after surgery.  If the steri strips fall off before your incisions are healed, replace them with a bandaid to pull the incision together.   You may have bruising around your wounds. This is normal. It will go away on its own. The skin around your incisions may be a little red. This is normal, too.   Showering: you may shower the day you get home unless your surgeon tells you differently.  Wash gently around incisions with warm  soapy water, rinse well, and gently pat dry.    DO NOT wear tight clothing that rubs against your incisions while they heal.   DO NOT soak in a bathtub, swimming pool, or hot tub until your incisions are healed completely, usually around 7-14 days. No tub baths or swimming until all incisions are healed.      GET WELL LOOP  Track your fluid intake!  Reminders to set up follow up appointments.  Notifications of frequently asked questions.  Please use [employer] for any concerns regarding your health.    FOLLOW-UP CALL  You will receive a post-op phone call two to three days after surgery to check in and see how you are doing (If your surgery is on a Friday, your phone call will be on the Monday following your surgery).    You can always send us a message via [employer] if you have any questions or concerns.      CALL YOUR PROVIDER IF:      You have more redness, pain, warmth, swelling, or bleeding around your incision.     The wound is larger or deeper or looks dark or dried out.     The drainage from your incision does not decrease in 3 to 5 days or increases.     The drainage becomes thick, tan or yellow and has a bad smell (pus).     Your temperature is above 100 F (37.7 C) for more than 4 hours.     You have pain that your pain medicine is not helping.     You have chest and/or belly pain.     You have trouble breathing.     You have a cough that does not go away.     You cannot drink or eat.     You have a fast heartbeat.     You are dizzy or lightheaded.     You are not passing gas and have not had a bowel movement after following instructions above.     Your stools are loose, or you have diarrhea.     You are vomiting after eating.         Please let us know if you have any additional questions.    Sincerely,    Johanne Fishman RN, BSN  RN Care Coordinator  Bariatric Surgery and Comprehensive Weight Management  Phone: [phone]

## 2022-12-27 NOTE — PROGRESS NOTES
PREOP NURSE VISIT     This patient is having laparoscopic gastric sleeve with Dr. Tereso Ritter on 1/10/23.    The following handouts were reviewed with the patient:  Before Your Surgery, Patient Checklist, Weight Loss Surgery Pre-operative Class, Preop Recommendations Quick Reference Guide, History and Physical, Medications to Avoid, Shower or Bathing Before Surgery, Bowel Preparation, Powerful Choices and [place] [facility] Directive.  Questions were addressed and understanding of content was verbalized.  Contact information was provided.    WEIGHT CHECK:  Patient goal weight: 280    Weight today: 285    Surgery Date and Time: 1/10/23 at 8 am    Call [phone] [facility] to confirm surgery date.     PAC Visit: 12/23/22    Call [phone] to schedule your pre-operative history and physical with our PAC clinic.    MEDICAL CONDITIONS REVIEW:  Diabetic? No.     Diabetics who are on medications instructed to monitor blood sugar levels closely after surgery and contact prescribing provider if levels run low as they may need their medications adjusted.    On high blood pressure medications? Yes.     Patients on high blood pressure medications instructed to monitor blood pressure levels closely after surgery and contact prescribing provider if pressure are running low as they may need their medications adjusted.    Patient currently taking opioid/narcotic pain medications? No.     Patient lives greater than 3 hours from hospital?  No.   Patient planning on staying in the area after surgery?  NA     Patients instructed to take breaks each hour on car ride home.  To be out of vehicle, stretch and walk for at least 10 minutes.  To do ankle pump exercises in car.      SUPPORT SYSTEM  Mother will be helping patient with postop care.    PAPERWORK  FMLA/Time OFF:  Patient is anticipating taking 1.5 weeks off after surgery.      Patient instructed to have paperwork faxed to [phone] at the attention of the  surgeon.

## 2022-12-30 ENCOUNTER — LAB (OUTPATIENT)
Dept: LAB | Facility: CLINIC | Age: 47
End: 2022-12-30
Payer: COMMERCIAL

## 2022-12-30 DIAGNOSIS — Z01.818 PRE-OP EXAMINATION: ICD-10-CM

## 2022-12-30 DIAGNOSIS — E66.01 MORBID OBESITY (H): ICD-10-CM

## 2022-12-30 LAB
ALBUMIN UR-MCNC: NEGATIVE MG/DL
ANION GAP SERPL CALCULATED.3IONS-SCNC: 11 MMOL/L (ref 7–15)
APPEARANCE UR: CLEAR
BACTERIA #/AREA URNS HPF: ABNORMAL /HPF
BILIRUB UR QL STRIP: NEGATIVE
BUN SERPL-MCNC: 9.7 MG/DL (ref 6–20)
CALCIUM SERPL-MCNC: 9.4 MG/DL (ref 8.6–10)
CHLORIDE SERPL-SCNC: 100 MMOL/L (ref 98–107)
COLOR UR AUTO: YELLOW
CREAT SERPL-MCNC: 0.67 MG/DL (ref 0.51–0.95)
DEPRECATED HCO3 PLAS-SCNC: 26 MMOL/L (ref 22–29)
ERYTHROCYTE [DISTWIDTH] IN BLOOD BY AUTOMATED COUNT: 15.8 % (ref 10–15)
FERRITIN SERPL-MCNC: 21 NG/ML (ref 6–175)
GFR SERPL CREATININE-BSD FRML MDRD: >90 ML/MIN/1.73M2
GLUCOSE SERPL-MCNC: 90 MG/DL (ref 70–99)
GLUCOSE UR STRIP-MCNC: NEGATIVE MG/DL
HBA1C MFR BLD: 6.1 % (ref 0–5.6)
HCT VFR BLD AUTO: 35.6 % (ref 35–47)
HGB BLD-MCNC: 11.1 G/DL (ref 11.7–15.7)
HGB UR QL STRIP: ABNORMAL
HOLD SPECIMEN: NORMAL
IRON BINDING CAPACITY (ROCHE): 353 UG/DL (ref 240–430)
IRON SATN MFR SERPL: 14 % (ref 15–46)
IRON SERPL-MCNC: 50 UG/DL (ref 37–145)
KETONES UR STRIP-MCNC: NEGATIVE MG/DL
LEUKOCYTE ESTERASE UR QL STRIP: NEGATIVE
MCH RBC QN AUTO: 24.7 PG (ref 26.5–33)
MCHC RBC AUTO-ENTMCNC: 31.2 G/DL (ref 31.5–36.5)
MCV RBC AUTO: 79 FL (ref 78–100)
NITRATE UR QL: NEGATIVE
PH UR STRIP: 6.5 [PH] (ref 5–7)
PLATELET # BLD AUTO: 407 10E3/UL (ref 150–450)
POTASSIUM SERPL-SCNC: 4 MMOL/L (ref 3.4–5.3)
RBC # BLD AUTO: 4.5 10E6/UL (ref 3.8–5.2)
RBC #/AREA URNS AUTO: ABNORMAL /HPF
SODIUM SERPL-SCNC: 137 MMOL/L (ref 136–145)
SP GR UR STRIP: 1.02 (ref 1–1.03)
SQUAMOUS #/AREA URNS AUTO: ABNORMAL /LPF
UROBILINOGEN UR STRIP-ACNC: 0.2 E.U./DL
WBC # BLD AUTO: 8.4 10E3/UL (ref 4–11)
WBC #/AREA URNS AUTO: ABNORMAL /HPF

## 2022-12-30 PROCEDURE — 83550 IRON BINDING TEST: CPT

## 2022-12-30 PROCEDURE — 81001 URINALYSIS AUTO W/SCOPE: CPT

## 2022-12-30 PROCEDURE — 36415 COLL VENOUS BLD VENIPUNCTURE: CPT

## 2022-12-30 PROCEDURE — 83036 HEMOGLOBIN GLYCOSYLATED A1C: CPT

## 2022-12-30 PROCEDURE — 80048 BASIC METABOLIC PNL TOTAL CA: CPT

## 2022-12-30 PROCEDURE — 85027 COMPLETE CBC AUTOMATED: CPT

## 2022-12-30 PROCEDURE — 82728 ASSAY OF FERRITIN: CPT

## 2022-12-30 PROCEDURE — 83540 ASSAY OF IRON: CPT

## 2023-01-05 ENCOUNTER — ANCILLARY PROCEDURE (OUTPATIENT)
Dept: MAMMOGRAPHY | Facility: CLINIC | Age: 48
End: 2023-01-05
Attending: FAMILY MEDICINE
Payer: COMMERCIAL

## 2023-01-05 DIAGNOSIS — N63.0 MASS OF BREAST, UNSPECIFIED LATERALITY: ICD-10-CM

## 2023-01-05 PROCEDURE — 76642 ULTRASOUND BREAST LIMITED: CPT | Mod: RT | Performed by: STUDENT IN AN ORGANIZED HEALTH CARE EDUCATION/TRAINING PROGRAM

## 2023-01-05 PROCEDURE — 77066 DX MAMMO INCL CAD BI: CPT | Performed by: STUDENT IN AN ORGANIZED HEALTH CARE EDUCATION/TRAINING PROGRAM

## 2023-01-05 PROCEDURE — G0279 TOMOSYNTHESIS, MAMMO: HCPCS | Performed by: STUDENT IN AN ORGANIZED HEALTH CARE EDUCATION/TRAINING PROGRAM

## 2023-01-05 NOTE — PROGRESS NOTES
"Raffy Castelan is a 47 year old female who is being evaluated via a billable video visit.      The patient has been notified of following:     \"This video visit will be conducted via a call between you and your physician/provider. We have found that certain health care needs can be provided without the need for an in-person physical exam.  This service lets us provide the care you need with a video conversation.  If a prescription is necessary we can send it directly to your pharmacy.  If lab work is needed we can place an order for that and you can then stop by our lab to have the test done at a later time.    Video visits are billed at different rates depending on your insurance coverage.  Please reach out to your insurance provider with any questions.    If during the course of the call the physician/provider feels a video visit is not appropriate, you will not be charged for this service.\"    Patient has given verbal consent for Video visit? Yes  How would you like to obtain your AVS? MyChart  If you are dropped from the video visit, the video invite should be resent to: Text to cell phone: [phone]  Will anyone else be joining your video visit? No  {If patient encounters technical issues they should call [phone]      Video-Visit Details    Type of service:  Video Visit    Video Start Time: 10:20 AM  Video End Time: 10:32 AM    Originating Location (pt. Location): Home    Distant Location (provider location):  Offsite (providers home)     Platform used for Video Visit: Grow[employer]    During this virtual visit the patient is located in [place], patient verifies this as the location during the entirety of this visit.       Bariatric Nutrition Consultation Note    Reason For Visit: Nutrition Assessment    Raffy Castelan is a 47 year old presenting today for bariatric nutrition consult and nutrition education regarding clear and low-fat full liquid diet for post-bariatric surgery (1/10/23).   Pt is interested in " laparoscopic sleeve gastrectomy with Dr. Ritter.  Patient is accompanied by self.  Pt has met required nutrition visits prior to surgery. Pt referred by NOEMI Palacios.     Patient with Co-morbidities of obesity including:  GERD, Hyperlipidemia, HBP    ANTHROPOMETRICS:  Initial Consult Weight: 290 lbs   Current Weight: 285 lbs    Required weight loss goal pre-op: -10 lbs from initial consult weight (goal weight 280 lbs or less before surgery)    Diet History:  Pt reports no history of receiving clear and low-fat full liquid diet education after bariatric surgery in the past.    SUPPLEMENT INFORMATION:  Purchased Cele[employer] MVI      NUTRITION DIAGNOSIS:  Obesity r/t positive energy balance aeb BMI >30 kg/m2.  And  Food- and Nutrition-related knowledge deficit r/t lack of prior exposure to information AEB pt scheduled for upcoming bariatric surgery and pt interest in diet education/review    INTERVENTION:  Intervention Provided/Education Provided/Reviewed previous goals and encouraged patient to continue goals prior to surgery.     Provided instruction on bariatric clear and low-fat full liquid diets.  Provided the following handouts: Diet Guidelines for Bariatric Surgery, Your Stage 1-5 Diet, Keeping Track of Your Fluids, list of recommended vitamin/mineral supplementation after sleeve gastrectomy surgery and RD contact information.       Patient Engagement: good    Goals:  1) Modified Liquid Diet (2 liquid meals, 1 meal, 2 snacks)  [email or web address]     Goals after surgery:   1. Follow the bariatric post-op diet advancement schedule (see below)  2. Sip on 48-64 oz (or greater) fluids daily, recording intake to help stay on-track.  - Drink at least 1-2 oz of fluid every 15-30 min.  3. Stop vitamins/minerals 1 week before surgery. We will discuss starting a chewable multivitamin at the one week post-op visit.   4. Work towards consuming 60 gm protein daily.       Post-op Diet Advancement  Schedule:  Clear Liquid Diet (stage 1): 1/09 - 1/10  *No RED/PURPLE, PROTEIN, or PULP day before surgery  Low-Fat Full Liquid Diet (stage 2): 1/11 - 1/23  Pureed Diet (stage 3): 1/24 - 2/7  Soft Diet (stage 4): 2/8-3/8  Regular Diet (stage 5): 3/9     Post-op Diet Handouts:  Diet Guidelines after Weight-loss Surgery  [email or web address]     Your Stage 1 Diet: Clear Liquids  [email or web address]     Your Stage 2 Diet: Low-fat Full Liquids  [email or web address]     Your Stage 3 Diet: Pureed Foods  [email or web address]     Pureed Recipes  [email or web address]    Your Stage 4 Diet: Soft Foods  [email or web address]    Your Stage 5 Diet: Regular Foods  [email or web address]    Supplements after Sleeve Gastrectomy, Gastric Bypass or Single Anastomosis Duodenal Switch  [email or web address]    Keeping Track of Fluids  [email or web address]    Follow up: 1 week post op, prn     Time spent with patient: 12 minutes.  MARCO A NESS RD, LD

## 2023-01-06 ENCOUNTER — VIRTUAL VISIT (OUTPATIENT)
Dept: ENDOCRINOLOGY | Facility: CLINIC | Age: 48
End: 2023-01-06
Payer: COMMERCIAL

## 2023-01-06 DIAGNOSIS — E66.01 CLASS 3 SEVERE OBESITY DUE TO EXCESS CALORIES WITH SERIOUS COMORBIDITY AND BODY MASS INDEX (BMI) OF 45.0 TO 49.9 IN ADULT (H): ICD-10-CM

## 2023-01-06 DIAGNOSIS — E66.813 CLASS 3 SEVERE OBESITY DUE TO EXCESS CALORIES WITH SERIOUS COMORBIDITY AND BODY MASS INDEX (BMI) OF 45.0 TO 49.9 IN ADULT (H): ICD-10-CM

## 2023-01-06 DIAGNOSIS — Z71.3 NUTRITIONAL COUNSELING: Primary | ICD-10-CM

## 2023-01-06 PROCEDURE — 97803 MED NUTRITION INDIV SUBSEQ: CPT | Mod: 95 | Performed by: DIETITIAN, REGISTERED

## 2023-01-06 NOTE — PATIENT INSTRUCTIONS
Hi Abeer!    Follow-up with RD 1 week post op    Thank you,    Cintia Galvez, RD, LD  If you would like to schedule or reschedule an appointment with the RD, please call [phone]    Nutrition Goals  1) Modified Liquid Diet (2 liquid meals, 1 meal, 2 snacks)  [email or web address]     Goals after surgery:   1. Follow the bariatric post-op diet advancement schedule (see below)  2. Sip on 48-64 oz (or greater) fluids daily, recording intake to help stay on-track.  - Drink at least 1-2 oz of fluid every 15-30 min.  3. Stop vitamins/minerals 1 week before surgery. We will discuss starting a chewable multivitamin at the one week post-op visit.   4. Work towards consuming 60 gm protein daily.       Post-op Diet Advancement Schedule:  Clear Liquid Diet (stage 1): 1/09 - 1/10  *No RED/PURPLE, PROTEIN, or PULP day before surgery  Low-Fat Full Liquid Diet (stage 2): 1/11 - 1/23  Pureed Diet (stage 3): 1/24 - 2/7  Soft Diet (stage 4): 2/8-3/8  Regular Diet (stage 5): 3/9     Post-op Diet Handouts:  Diet Guidelines after Weight-loss Surgery  [email or web address]     Your Stage 1 Diet: Clear Liquids  [email or web address]     Your Stage 2 Diet: Low-fat Full Liquids  [email or web address]     Your Stage 3 Diet: Pureed Foods  [email or web address]     Pureed Recipes  [email or web address]    Your Stage 4 Diet: Soft Foods  [email or web address]    Your Stage 5 Diet: Regular Foods  [email or web address]    Supplements after Sleeve Gastrectomy, Gastric Bypass or Single Anastomosis Duodenal Switch  [email or web address]    Keeping Track of Fluids  [email or web address]      Interested in working with a health ? Health coaches work with you to improve your overall health and wellbeing. They look at the whole person, and may involve discussion of different areas of life, including, but not limited to the four pillars of health (sleep,  exercise, nutrition, and stress management). Discuss with your care team if you would like to start working a health .    Health Coaching-3 Pack:    $99 for three health coaching visits    Visits may be done in person or via phone    Coaching is a partnership between the  and the client; Coaches do not prescribe or diagnose    Coaching helps inspire the client to reach his/her personal goals    COMPREHENSIVE WEIGHT MANAGEMENT PROGRAM  VIRTUAL SUPPORT GROUPS    For Support Group Information:      We offer support groups for patients who are working on weight loss and considering, preparing for or have had weight loss surgery.   There is no cost for this opportunity.  You are invited to attend the?Virtual Support Groups?provided by any of the following locations:    [facility] via Microsoft Teams with Marcie Johnson RN  2.   [facility] via [employer] with Tono Myrick, PhD, LP  3.   [facility] via [employer] with Anh Jeong RN  4.   [facility] via Microsoft Teams with Anh Black [facility]    The following Support Group information can also be found on our website:  [email or web address]      [facility] Weight Loss Surgery Support Group    [facility] Weight Loss Surgery Support Group  The support group is a patient-lead forum that meets monthly to share experiences, encouragement and education. It is open to those who have had weight loss surgery, are scheduled for surgery, and those who are considering surgery.   WHEN: This group meets on the 3rd Wednesday of each month from 5:00PM - 6:00PM virtually using Microsoft Teams.   FACILITATOR: Led by Marcie Johnson, TAMI, LD, RN, the program's Clinical Coordinator.   TO REGISTER: Please contact the clinic via [employer] or call the nurse line directly at [phone] to inform our staff that you would like an invite sent to you and to let us know the email you would like  "the invite sent to. Prior to the meeting, a link with directions on how to join the meeting will be sent to you.    2022 Meetings January 19: \"Let's Talk\" a time for the group to share.  February 16: \"Let's Talk\" a time for the group to share.  March 16: Guest Speakers: Psychologists, Gely Weiss, PhD,LP and Gala Frey PsGus,LP  April 20: Guest Speaker: Health , Paradise Fuller, [facility],CHES, CPT  May 18: Guest Speaker: DietitianDelfino, TAMI, LP  Lynn 15: \"Let's Talk\" a time for the group to share.  July 20: \"Let's Talk\" a time for the group to share.  August 17: TBA  September 21: TBA  October 19: Guest Speaker: Dr Juan Murillo MD Pulmonologist and Sleep Medicine Physician, \"Getting a Good Night's Sleep\".  November 16: TBA  December 21: TBA    [facility] Support Groups    Connections: Bariatric Care Support Group?  This is open to all [facility] (and those external to this program) pre- and post- operative bariatric surgery patients as well as their support system.   WHEN: This group meets the 2nd Tuesday of each month from 6:30 PM - 8:00 PM virtually using Microsoft Teams.   FACILITATOR: Led by Tono Myrick, Ph.D who is a Licensed Psychologist with the [facility] Comprehensive Weight Management Program.   TO REGISTER: Please send an email to Tono Myrick, Ph.D.,  at?[email or web address]?if you would like an invitation to the group and to learn about using Microsoft Teams.    2022 Meetings  January 11: Nohemi Moy, PharmD, Pharmacy Resident at [facility], \"Medications and Bariatric Surgery\".  February 8: Open Forum  March 8  April 12  May 10  Lynn 14    Connections: Post-Operative Bariatric Surgery Support Group  This is a support group for [facility] bariatric patients (and those external to [facility]) who have had bariatric surgery and are at least 3 months post-surgery.  WHEN: This support group meets the 4th " "Wednesday of the month from 11:00 AM - 12:00 PM virtually using Microsoft Teams.   FACILITATOR: Led by Certified Bariatric Nurse, Anh Jeong RN.   TO REGISTER: Please send an email to Anh at [email or web address] if you would like an invitation to the group and to learn about using Microsoft Teams.    2022 Meetings  January 26  February 23  March 23  April 27  May 25  Lynn 22    [facility] Healthy Lifestyle Virtual Support Group    Healthy Lifestyle Virtual Support Group?  This is 60 minutes of small group guided discussion, support and resources. All are welcome who want a healthy lifestyle.  WHEN: This group meets monthly on a Friday from 12:30 PM - 1:30 PM virtually using Microsoft Teams.   FACILITATOR: Led by National Board Certified Health and , Anh Black [facility].   TO REGISTER: Please send an email to Anh at?[email or web address] to receive monthly invites to the group or if you have any questions about having a health .  Prior to the meeting, a link with directions on how to join the meeting will be sent to you.    2022 Meetings  January 21: Shelly Vieira MS, RN, CIC, CBN, \"Healthy Habits\"  February 25: Open Forum  March 18: \"Setting Limits and Boundaries\"  April 29: Ivelisse Singh RD, \"Meal Planning Made Easy\"  May 20: Open Forum  June: To be determined                "

## 2023-01-06 NOTE — LETTER
"1/6/2023       RE: Raffy Castelan  [address]  [place] [address]     Dear Colleague,    Thank you for referring your patient, Raffy Castelan, to the [facility] [place] at [facility]. Please see a copy of my visit note below.    Raffy Castelan is a 47 year old female who is being evaluated via a billable video visit.      The patient has been notified of following:     \"This video visit will be conducted via a call between you and your physician/provider. We have found that certain health care needs can be provided without the need for an in-person physical exam.  This service lets us provide the care you need with a video conversation.  If a prescription is necessary we can send it directly to your pharmacy.  If lab work is needed we can place an order for that and you can then stop by our lab to have the test done at a later time.    Video visits are billed at different rates depending on your insurance coverage.  Please reach out to your insurance provider with any questions.    If during the course of the call the physician/provider feels a video visit is not appropriate, you will not be charged for this service.\"    Patient has given verbal consent for Video visit? Yes  How would you like to obtain your AVS? MyChart  If you are dropped from the video visit, the video invite should be resent to: Text to cell phone: [phone]  Will anyone else be joining your video visit? No  {If patient encounters technical issues they should call [phone]      Video-Visit Details    Type of service:  Video Visit    Video Start Time: 10:20 AM  Video End Time: 10:32 AM    Originating Location (pt. Location): Home    Distant Location (provider location):  Offsite (providers home)     Platform used for Video Visit: [employer]    During this virtual visit the patient is located in [place], patient verifies this as the location during the " entirety of this visit.       Bariatric Nutrition Consultation Note    Reason For Visit: Nutrition Assessment    Raffy Castelan is a 47 year old presenting today for bariatric nutrition consult and nutrition education regarding clear and low-fat full liquid diet for post-bariatric surgery (1/10/23).   Pt is interested in laparoscopic sleeve gastrectomy with Dr. Ritter.  Patient is accompanied by self.  Pt has met required nutrition visits prior to surgery. Pt referred by NOEMI Palacios.     Patient with Co-morbidities of obesity including:  GERD, Hyperlipidemia, HBP    ANTHROPOMETRICS:  Initial Consult Weight: 290 lbs   Current Weight: 285 lbs    Required weight loss goal pre-op: -10 lbs from initial consult weight (goal weight 280 lbs or less before surgery)    Diet History:  Pt reports no history of receiving clear and low-fat full liquid diet education after bariatric surgery in the past.    SUPPLEMENT INFORMATION:  Purchased Celebrate MVI      NUTRITION DIAGNOSIS:  Obesity r/t positive energy balance aeb BMI >30 kg/m2.  And  Food- and Nutrition-related knowledge deficit r/t lack of prior exposure to information AEB pt scheduled for upcoming bariatric surgery and pt interest in diet education/review    INTERVENTION:  Intervention Provided/Education Provided/Reviewed previous goals and encouraged patient to continue goals prior to surgery.     Provided instruction on bariatric clear and low-fat full liquid diets.  Provided the following handouts: Diet Guidelines for Bariatric Surgery, Your Stage 1-5 Diet, Keeping Track of Your Fluids, list of recommended vitamin/mineral supplementation after sleeve gastrectomy surgery and RD contact information.       Patient Engagement: good    Goals:  1) Modified Liquid Diet (2 liquid meals, 1 meal, 2 snacks)  [email or web address]     Goals after surgery:   1. Follow the bariatric post-op diet advancement schedule (see below)  2. Sip on 48-64 oz (or greater) fluids  daily, recording intake to help stay on-track.  - Drink at least 1-2 oz of fluid every 15-30 min.  3. Stop vitamins/minerals 1 week before surgery. We will discuss starting a chewable multivitamin at the one week post-op visit.   4. Work towards consuming 60 gm protein daily.       Post-op Diet Advancement Schedule:  Clear Liquid Diet (stage 1): 1/09 - 1/10  *No RED/PURPLE, PROTEIN, or PULP day before surgery  Low-Fat Full Liquid Diet (stage 2): 1/11 - 1/23  Pureed Diet (stage 3): 1/24 - 2/7  Soft Diet (stage 4): 2/8-3/8  Regular Diet (stage 5): 3/9     Post-op Diet Handouts:  Diet Guidelines after Weight-loss Surgery  [email or web address]     Your Stage 1 Diet: Clear Liquids  [email or web address]     Your Stage 2 Diet: Low-fat Full Liquids  [email or web address]     Your Stage 3 Diet: Pureed Foods  [email or web address]     Pureed Recipes  [email or web address]    Your Stage 4 Diet: Soft Foods  [email or web address]    Your Stage 5 Diet: Regular Foods  [email or web address]    Supplements after Sleeve Gastrectomy, Gastric Bypass or Single Anastomosis Duodenal Switch  [email or web address]    Keeping Track of Fluids  [email or web address]    Follow up: 1 week post op, prn     Time spent with patient: 12 minutes.  MARCO A NESS RD, LD

## 2023-01-09 ENCOUNTER — ANESTHESIA EVENT (OUTPATIENT)
Dept: SURGERY | Facility: CLINIC | Age: 48
End: 2023-01-09
Payer: COMMERCIAL

## 2023-01-09 NOTE — ANESTHESIA PREPROCEDURE EVALUATION
Anesthesia Pre-Procedure Evaluation    Patient: Raffy Castelan   MRN: [id number] : 1975        Procedure : Procedure(s):  REMOVAL, GASTRIC BAND, ADJUSTABLE, and components,  LAPAROSCOPIC  GASTRECTOMY, SLEEVE, LAPAROSCOPIC  possible HERNIORRHAPHY, HIATAL, LAPAROSCOPIC          Past Medical History:   Diagnosis Date     Asthma      Constipation      GERD (gastroesophageal reflux disease)      Hypertension      Morbid obesity (H)      Psoriasis       Past Surgical History:   Procedure Laterality Date     BIOPSY       BUNIONECTOMY LAPIDUS WITH TARSAL METATARSAL (TMT) FUSION Left 12/15/2020    Procedure: Left first tarsometatarsal joint fusion Left Jacob bunionectomy Left fifth metatarsal Efraín bunionectomy;  Surgeon: Markus Hart DPM;  Location: [facility]     COLONOSCOPY       ESOPHAGOSCOPY, GASTROSCOPY, DUODENOSCOPY (EGD), COMBINED  2013    Procedure: COMBINED ESOPHAGOSCOPY, GASTROSCOPY, DUODENOSCOPY (EGD), BIOPSY SINGLE OR MULTIPLE;;  Surgeon: Wilson Fournier MD;  Location: [facility]     ESOPHAGOSCOPY, GASTROSCOPY, DUODENOSCOPY (EGD), COMBINED  2014    Procedure: COMBINED ESOPHAGOSCOPY, GASTROSCOPY, DUODENOSCOPY (EGD);  Surgeon: Wilson Fournier MD;  Location: [facility]     EXCISE LESION FOOT Left 12/15/2020    Procedure: Left foot mass removal;  Surgeon: Markus Hart DPM;  Location: [facility]     LAPAROSCOPIC GASTRIC ADJUSTABLE BANDING  2014    Procedure: LAPAROSCOPIC GASTRIC ADJUSTABLE BANDING;  Surgeon: Wilson Fournier MD;  Location: [facility]      Allergies   Allergen Reactions     Lisinopril Cough      Social History     Tobacco Use     Smoking status: Never     Smokeless tobacco: Never   Substance Use Topics     Alcohol use: No      Wt Readings from Last 1 Encounters:   22 131.1 kg (289 lb)        Anesthesia Evaluation            ROS/MED HX  ENT/Pulmonary:     (+) Intermittent, asthma     Neurologic:       Cardiovascular:     (+) hypertension----- (-) murmur and wheezes    METS/Exercise Tolerance:     Hematologic:       Musculoskeletal:       GI/Hepatic:     (+) GERD, Asymptomatic on medication,     Renal/Genitourinary:       Endo:     (+) Obesity,     Psychiatric/Substance Use:       Infectious Disease:       Malignancy:       Other:            Physical Exam    Airway        Mallampati: II   TM distance: > 3 FB   Neck ROM: full   Mouth opening: > 3 cm    Respiratory Devices and Support         Dental  no notable dental history         Cardiovascular          Rhythm and rate: regular and normal (-) no systolic click and no murmur    Pulmonary   pulmonary exam normal        breath sounds clear to auscultation   (-) no wheezes        OUTSIDE LABS:  CBC:   Lab Results   Component Value Date    WBC 8.4 12/30/2022    WBC 8.9 01/12/2022    HGB 11.1 (L) 12/30/2022    HGB 10.8 (L) 01/12/2022    HCT 35.6 12/30/2022    HCT 34.3 (L) 01/12/2022     12/30/2022     01/12/2022     BMP:   Lab Results   Component Value Date     12/30/2022     10/04/2021    POTASSIUM 4.0 12/30/2022    POTASSIUM 3.8 10/04/2021    CHLORIDE 100 12/30/2022    CHLORIDE 101 10/04/2021    CO2 26 12/30/2022    CO2 26 10/04/2021    BUN 9.7 12/30/2022    BUN 9 10/04/2021    CR 0.67 12/30/2022    CR 0.73 10/04/2021    GLC 90 12/30/2022    GLC 87 10/04/2021     COAGS: No results found for: PTT, INR, FIBR  POC:   Lab Results   Component Value Date    HCG Negative 12/15/2020     HEPATIC:   Lab Results   Component Value Date    ALBUMIN 3.1 (L) 10/04/2021    PROTTOTAL 8.4 10/04/2021    ALT 22 10/04/2021    AST 14 10/04/2021    ALKPHOS 50 10/04/2021    BILITOTAL 0.3 10/04/2021     OTHER:   Lab Results   Component Value Date    A1C 6.1 (H) 12/30/2022    CHELSEY 9.4 12/30/2022    PHOS 3.6 05/28/2014    MAG 2.4 (H) 05/28/2014    LIPASE 62 09/13/2012    AMYLASE 97 09/13/2012    TSH 1.12 12/22/2022    SED 54 (H) 09/13/2012       Anesthesia Plan    ASA Status:  3   NPO Status:  NPO Appropriate    Anesthesia Type:  General.     - Airway: ETT   Induction: Intravenous.   Maintenance: Inhalation.        Consents    Anesthesia Plan(s) and associated risks, benefits, and realistic alternatives discussed. Questions answered and patient/representative(s) expressed understanding.    - Discussed:     - Discussed with:  Patient      - Extended Intubation/Ventilatory Support Discussed: Yes.      - Patient is DNR/DNI Status: No    Use of blood products discussed: Yes.     - Discussed with: Patient.     Postoperative Care    Pain management: IV analgesics.   PONV prophylaxis: Ondansetron (or other 5HT-3), Dexamethasone or Solumedrol     Comments:                Christopher J. Behrens, MD

## 2023-01-10 ENCOUNTER — HOSPITAL ENCOUNTER (INPATIENT)
Facility: CLINIC | Age: 48
LOS: 1 days | Discharge: HOME | End: 2023-01-11
Attending: SURGERY | Admitting: SURGERY
Payer: COMMERCIAL

## 2023-01-10 ENCOUNTER — ANESTHESIA (OUTPATIENT)
Dept: SURGERY | Facility: CLINIC | Age: 48
End: 2023-01-10
Payer: COMMERCIAL

## 2023-01-10 DIAGNOSIS — Z98.84 S/P LAPAROSCOPIC SLEEVE GASTRECTOMY: Primary | ICD-10-CM

## 2023-01-10 DIAGNOSIS — E66.01 MORBID OBESITY (H): ICD-10-CM

## 2023-01-10 DIAGNOSIS — I10 HYPERTENSION GOAL BP (BLOOD PRESSURE) < 140/90: ICD-10-CM

## 2023-01-10 LAB
CREAT SERPL-MCNC: 0.71 MG/DL (ref 0.51–0.95)
GFR SERPL CREATININE-BSD FRML MDRD: >90 ML/MIN/1.73M2
GLUCOSE BLDC GLUCOMTR-MCNC: 118 MG/DL (ref 70–99)
PLATELET # BLD AUTO: 397 10E3/UL (ref 150–450)
SARS-COV-2 RNA RESP QL NAA+PROBE: NEGATIVE

## 2023-01-10 PROCEDURE — 82565 ASSAY OF CREATININE: CPT | Performed by: NURSE PRACTITIONER

## 2023-01-10 PROCEDURE — 250N000011 HC RX IP 250 OP 636: Performed by: SURGERY

## 2023-01-10 PROCEDURE — 250N000009 HC RX 250: Performed by: NURSE ANESTHETIST, CERTIFIED REGISTERED

## 2023-01-10 PROCEDURE — 250N000011 HC RX IP 250 OP 636: Performed by: NURSE ANESTHETIST, CERTIFIED REGISTERED

## 2023-01-10 PROCEDURE — 258N000003 HC RX IP 258 OP 636: Performed by: ANESTHESIOLOGY

## 2023-01-10 PROCEDURE — 250N000011 HC RX IP 250 OP 636: Performed by: ANESTHESIOLOGY

## 2023-01-10 PROCEDURE — 370N000017 HC ANESTHESIA TECHNICAL FEE, PER MIN: Performed by: SURGERY

## 2023-01-10 PROCEDURE — 999N000141 HC STATISTIC PRE-PROCEDURE NURSING ASSESSMENT: Performed by: SURGERY

## 2023-01-10 PROCEDURE — 250N000013 HC RX MED GY IP 250 OP 250 PS 637: Performed by: NURSE PRACTITIONER

## 2023-01-10 PROCEDURE — 250N000011 HC RX IP 250 OP 636: Performed by: NURSE PRACTITIONER

## 2023-01-10 PROCEDURE — 85049 AUTOMATED PLATELET COUNT: CPT | Performed by: NURSE PRACTITIONER

## 2023-01-10 PROCEDURE — 43775 LAP SLEEVE GASTRECTOMY: CPT | Performed by: SURGERY

## 2023-01-10 PROCEDURE — 120N000002 HC R&B MED SURG/OB UMMC

## 2023-01-10 PROCEDURE — 88305 TISSUE EXAM BY PATHOLOGIST: CPT | Mod: 26 | Performed by: PATHOLOGY

## 2023-01-10 PROCEDURE — 710N000009 HC RECOVERY PHASE 1, LEVEL 1, PER MIN: Performed by: SURGERY

## 2023-01-10 PROCEDURE — 272N000001 HC OR GENERAL SUPPLY STERILE: Performed by: SURGERY

## 2023-01-10 PROCEDURE — 88300 SURGICAL PATH GROSS: CPT | Mod: 26 | Performed by: PATHOLOGY

## 2023-01-10 PROCEDURE — 250N000009 HC RX 250: Performed by: NURSE PRACTITIONER

## 2023-01-10 PROCEDURE — 258N000003 HC RX IP 258 OP 636: Performed by: NURSE PRACTITIONER

## 2023-01-10 PROCEDURE — 250N000025 HC SEVOFLURANE, PER MIN: Performed by: SURGERY

## 2023-01-10 PROCEDURE — 258N000003 HC RX IP 258 OP 636: Performed by: NURSE ANESTHETIST, CERTIFIED REGISTERED

## 2023-01-10 PROCEDURE — 250N000013 HC RX MED GY IP 250 OP 250 PS 637: Performed by: SURGERY

## 2023-01-10 PROCEDURE — 0DB64Z3 EXCISION OF STOMACH, PERCUTANEOUS ENDOSCOPIC APPROACH, VERTICAL: ICD-10-PCS | Performed by: SURGERY

## 2023-01-10 PROCEDURE — 88300 SURGICAL PATH GROSS: CPT | Mod: TC | Performed by: SURGERY

## 2023-01-10 PROCEDURE — 0DP64CZ REMOVAL OF EXTRALUMINAL DEVICE FROM STOMACH, PERCUTANEOUS ENDOSCOPIC APPROACH: ICD-10-PCS | Performed by: SURGERY

## 2023-01-10 PROCEDURE — 360N000077 HC SURGERY LEVEL 4, PER MIN: Performed by: SURGERY

## 2023-01-10 PROCEDURE — 36415 COLL VENOUS BLD VENIPUNCTURE: CPT | Performed by: NURSE PRACTITIONER

## 2023-01-10 PROCEDURE — 250N000024 HC ISOFLURANE, PER MIN: Performed by: SURGERY

## 2023-01-10 PROCEDURE — U0005 INFEC AGEN DETEC AMPLI PROBE: HCPCS | Performed by: SURGERY

## 2023-01-10 RX ORDER — ONDANSETRON 4 MG/1
4 TABLET, ORALLY DISINTEGRATING ORAL EVERY 30 MIN PRN
Status: DISCONTINUED | OUTPATIENT
Start: 2023-01-10 | End: 2023-01-10 | Stop reason: HOSPADM

## 2023-01-10 RX ORDER — LOSARTAN POTASSIUM 50 MG/1
50 TABLET ORAL DAILY
Status: DISCONTINUED | OUTPATIENT
Start: 2023-01-11 | End: 2023-01-11 | Stop reason: HOSPADM

## 2023-01-10 RX ORDER — ENOXAPARIN SODIUM 100 MG/ML
40 INJECTION SUBCUTANEOUS EVERY 12 HOURS
Status: DISCONTINUED | OUTPATIENT
Start: 2023-01-11 | End: 2023-01-11 | Stop reason: HOSPADM

## 2023-01-10 RX ORDER — KETOROLAC TROMETHAMINE 30 MG/ML
INJECTION, SOLUTION INTRAMUSCULAR; INTRAVENOUS PRN
Status: DISCONTINUED | OUTPATIENT
Start: 2023-01-10 | End: 2023-01-10

## 2023-01-10 RX ORDER — KETOROLAC TROMETHAMINE 30 MG/ML
30 INJECTION, SOLUTION INTRAMUSCULAR; INTRAVENOUS EVERY 6 HOURS
Status: DISPENSED | OUTPATIENT
Start: 2023-01-10 | End: 2023-01-11

## 2023-01-10 RX ORDER — ONDANSETRON 2 MG/ML
4 INJECTION INTRAMUSCULAR; INTRAVENOUS EVERY 6 HOURS PRN
Status: DISCONTINUED | OUTPATIENT
Start: 2023-01-10 | End: 2023-01-11 | Stop reason: HOSPADM

## 2023-01-10 RX ORDER — ALBUTEROL SULFATE 90 UG/1
2 AEROSOL, METERED RESPIRATORY (INHALATION) EVERY 6 HOURS PRN
Status: DISCONTINUED | OUTPATIENT
Start: 2023-01-10 | End: 2023-01-11 | Stop reason: HOSPADM

## 2023-01-10 RX ORDER — FENTANYL CITRATE 50 UG/ML
25 INJECTION, SOLUTION INTRAMUSCULAR; INTRAVENOUS
Status: DISCONTINUED | OUTPATIENT
Start: 2023-01-10 | End: 2023-01-10 | Stop reason: HOSPADM

## 2023-01-10 RX ORDER — CEFAZOLIN SODIUM/WATER 3 G/30 ML
3 SYRINGE (ML) INTRAVENOUS
Status: COMPLETED | OUTPATIENT
Start: 2023-01-10 | End: 2023-01-10

## 2023-01-10 RX ORDER — OXYCODONE HYDROCHLORIDE 10 MG/1
10 TABLET ORAL
Status: DISCONTINUED | OUTPATIENT
Start: 2023-01-10 | End: 2023-01-11 | Stop reason: HOSPADM

## 2023-01-10 RX ORDER — DIPHENHYDRAMINE HYDROCHLORIDE 50 MG/ML
25 INJECTION INTRAMUSCULAR; INTRAVENOUS EVERY 6 HOURS PRN
Status: DISCONTINUED | OUTPATIENT
Start: 2023-01-10 | End: 2023-01-11 | Stop reason: HOSPADM

## 2023-01-10 RX ORDER — HYDROMORPHONE HCL IN WATER/PF 6 MG/30 ML
0.2 PATIENT CONTROLLED ANALGESIA SYRINGE INTRAVENOUS
Status: DISCONTINUED | OUTPATIENT
Start: 2023-01-10 | End: 2023-01-11 | Stop reason: HOSPADM

## 2023-01-10 RX ORDER — HYDROMORPHONE HCL IN WATER/PF 6 MG/30 ML
0.4 PATIENT CONTROLLED ANALGESIA SYRINGE INTRAVENOUS EVERY 5 MIN PRN
Status: DISCONTINUED | OUTPATIENT
Start: 2023-01-10 | End: 2023-01-10 | Stop reason: HOSPADM

## 2023-01-10 RX ORDER — GLYCOPYRROLATE 0.2 MG/ML
INJECTION, SOLUTION INTRAMUSCULAR; INTRAVENOUS PRN
Status: DISCONTINUED | OUTPATIENT
Start: 2023-01-10 | End: 2023-01-10

## 2023-01-10 RX ORDER — NALOXONE HYDROCHLORIDE 0.4 MG/ML
0.4 INJECTION, SOLUTION INTRAMUSCULAR; INTRAVENOUS; SUBCUTANEOUS
Status: DISCONTINUED | OUTPATIENT
Start: 2023-01-10 | End: 2023-01-11 | Stop reason: HOSPADM

## 2023-01-10 RX ORDER — ACETAMINOPHEN 325 MG/1
975 TABLET ORAL ONCE
Status: COMPLETED | OUTPATIENT
Start: 2023-01-10 | End: 2023-01-10

## 2023-01-10 RX ORDER — ONDANSETRON 4 MG/1
4 TABLET, ORALLY DISINTEGRATING ORAL EVERY 6 HOURS PRN
Status: DISCONTINUED | OUTPATIENT
Start: 2023-01-10 | End: 2023-01-11 | Stop reason: HOSPADM

## 2023-01-10 RX ORDER — CEFAZOLIN SODIUM/WATER 3 G/30 ML
3 SYRINGE (ML) INTRAVENOUS SEE ADMIN INSTRUCTIONS
Status: DISCONTINUED | OUTPATIENT
Start: 2023-01-10 | End: 2023-01-10 | Stop reason: HOSPADM

## 2023-01-10 RX ORDER — DEXAMETHASONE SODIUM PHOSPHATE 4 MG/ML
INJECTION, SOLUTION INTRA-ARTICULAR; INTRALESIONAL; INTRAMUSCULAR; INTRAVENOUS; SOFT TISSUE PRN
Status: DISCONTINUED | OUTPATIENT
Start: 2023-01-10 | End: 2023-01-10

## 2023-01-10 RX ORDER — DIPHENHYDRAMINE HCL 25 MG
25 CAPSULE ORAL EVERY 6 HOURS PRN
Status: DISCONTINUED | OUTPATIENT
Start: 2023-01-10 | End: 2023-01-11 | Stop reason: HOSPADM

## 2023-01-10 RX ORDER — NALOXONE HYDROCHLORIDE 0.4 MG/ML
0.2 INJECTION, SOLUTION INTRAMUSCULAR; INTRAVENOUS; SUBCUTANEOUS
Status: DISCONTINUED | OUTPATIENT
Start: 2023-01-10 | End: 2023-01-11 | Stop reason: HOSPADM

## 2023-01-10 RX ORDER — MEPERIDINE HYDROCHLORIDE 25 MG/ML
12.5 INJECTION INTRAMUSCULAR; INTRAVENOUS; SUBCUTANEOUS
Status: DISCONTINUED | OUTPATIENT
Start: 2023-01-10 | End: 2023-01-10 | Stop reason: HOSPADM

## 2023-01-10 RX ORDER — LIDOCAINE HYDROCHLORIDE 20 MG/ML
INJECTION, SOLUTION INFILTRATION; PERINEURAL PRN
Status: DISCONTINUED | OUTPATIENT
Start: 2023-01-10 | End: 2023-01-10

## 2023-01-10 RX ORDER — LABETALOL HYDROCHLORIDE 5 MG/ML
10 INJECTION, SOLUTION INTRAVENOUS ONCE
Status: COMPLETED | OUTPATIENT
Start: 2023-01-10 | End: 2023-01-10

## 2023-01-10 RX ORDER — ENALAPRILAT 1.25 MG/ML
1.25 INJECTION INTRAVENOUS EVERY 6 HOURS PRN
Status: DISCONTINUED | OUTPATIENT
Start: 2023-01-10 | End: 2023-01-10

## 2023-01-10 RX ORDER — SODIUM CHLORIDE, SODIUM LACTATE, POTASSIUM CHLORIDE, CALCIUM CHLORIDE 600; 310; 30; 20 MG/100ML; MG/100ML; MG/100ML; MG/100ML
INJECTION, SOLUTION INTRAVENOUS CONTINUOUS
Status: DISCONTINUED | OUTPATIENT
Start: 2023-01-10 | End: 2023-01-11 | Stop reason: HOSPADM

## 2023-01-10 RX ORDER — SCOLOPAMINE TRANSDERMAL SYSTEM 1 MG/1
1 PATCH, EXTENDED RELEASE TRANSDERMAL
Status: DISCONTINUED | OUTPATIENT
Start: 2023-01-10 | End: 2023-01-11 | Stop reason: HOSPADM

## 2023-01-10 RX ORDER — PROPOFOL 10 MG/ML
INJECTION, EMULSION INTRAVENOUS PRN
Status: DISCONTINUED | OUTPATIENT
Start: 2023-01-10 | End: 2023-01-10

## 2023-01-10 RX ORDER — ONDANSETRON 2 MG/ML
4 INJECTION INTRAMUSCULAR; INTRAVENOUS
Status: COMPLETED | OUTPATIENT
Start: 2023-01-10 | End: 2023-01-10

## 2023-01-10 RX ORDER — ONDANSETRON 2 MG/ML
4 INJECTION INTRAMUSCULAR; INTRAVENOUS EVERY 30 MIN PRN
Status: DISCONTINUED | OUTPATIENT
Start: 2023-01-10 | End: 2023-01-10 | Stop reason: HOSPADM

## 2023-01-10 RX ORDER — OXYCODONE HYDROCHLORIDE 5 MG/1
5 TABLET ORAL
Status: DISCONTINUED | OUTPATIENT
Start: 2023-01-10 | End: 2023-01-11 | Stop reason: HOSPADM

## 2023-01-10 RX ORDER — LIDOCAINE 40 MG/G
CREAM TOPICAL
Status: DISCONTINUED | OUTPATIENT
Start: 2023-01-10 | End: 2023-01-11 | Stop reason: HOSPADM

## 2023-01-10 RX ORDER — PROCHLORPERAZINE MALEATE 5 MG
10 TABLET ORAL EVERY 6 HOURS PRN
Status: DISCONTINUED | OUTPATIENT
Start: 2023-01-10 | End: 2023-01-11 | Stop reason: HOSPADM

## 2023-01-10 RX ORDER — SODIUM CHLORIDE, SODIUM LACTATE, POTASSIUM CHLORIDE, CALCIUM CHLORIDE 600; 310; 30; 20 MG/100ML; MG/100ML; MG/100ML; MG/100ML
INJECTION, SOLUTION INTRAVENOUS CONTINUOUS
Status: DISCONTINUED | OUTPATIENT
Start: 2023-01-10 | End: 2023-01-10 | Stop reason: HOSPADM

## 2023-01-10 RX ORDER — HYDRALAZINE HYDROCHLORIDE 20 MG/ML
10 INJECTION INTRAMUSCULAR; INTRAVENOUS EVERY 6 HOURS PRN
Status: DISCONTINUED | OUTPATIENT
Start: 2023-01-10 | End: 2023-01-11 | Stop reason: HOSPADM

## 2023-01-10 RX ORDER — SODIUM CHLORIDE, SODIUM LACTATE, POTASSIUM CHLORIDE, CALCIUM CHLORIDE 600; 310; 30; 20 MG/100ML; MG/100ML; MG/100ML; MG/100ML
INJECTION, SOLUTION INTRAVENOUS CONTINUOUS PRN
Status: DISCONTINUED | OUTPATIENT
Start: 2023-01-10 | End: 2023-01-10

## 2023-01-10 RX ORDER — FENTANYL CITRATE 50 UG/ML
50 INJECTION, SOLUTION INTRAMUSCULAR; INTRAVENOUS EVERY 5 MIN PRN
Status: DISCONTINUED | OUTPATIENT
Start: 2023-01-10 | End: 2023-01-10 | Stop reason: HOSPADM

## 2023-01-10 RX ORDER — BUPIVACAINE HYDROCHLORIDE 2.5 MG/ML
INJECTION, SOLUTION EPIDURAL; INFILTRATION; INTRACAUDAL PRN
Status: DISCONTINUED | OUTPATIENT
Start: 2023-01-10 | End: 2023-01-10 | Stop reason: HOSPADM

## 2023-01-10 RX ORDER — ACETAMINOPHEN 325 MG/1
650 TABLET ORAL EVERY 4 HOURS PRN
Status: DISCONTINUED | OUTPATIENT
Start: 2023-01-10 | End: 2023-01-11 | Stop reason: HOSPADM

## 2023-01-10 RX ORDER — FENTANYL CITRATE 50 UG/ML
25 INJECTION, SOLUTION INTRAMUSCULAR; INTRAVENOUS EVERY 5 MIN PRN
Status: DISCONTINUED | OUTPATIENT
Start: 2023-01-10 | End: 2023-01-10 | Stop reason: HOSPADM

## 2023-01-10 RX ORDER — ENOXAPARIN SODIUM 100 MG/ML
40 INJECTION SUBCUTANEOUS
Status: COMPLETED | OUTPATIENT
Start: 2023-01-10 | End: 2023-01-10

## 2023-01-10 RX ORDER — AMOXICILLIN 250 MG
2 CAPSULE ORAL 2 TIMES DAILY
Status: DISCONTINUED | OUTPATIENT
Start: 2023-01-10 | End: 2023-01-11 | Stop reason: HOSPADM

## 2023-01-10 RX ORDER — EPHEDRINE SULFATE 50 MG/ML
INJECTION, SOLUTION INTRAMUSCULAR; INTRAVENOUS; SUBCUTANEOUS PRN
Status: DISCONTINUED | OUTPATIENT
Start: 2023-01-10 | End: 2023-01-10

## 2023-01-10 RX ORDER — FENTANYL CITRATE 50 UG/ML
INJECTION, SOLUTION INTRAMUSCULAR; INTRAVENOUS PRN
Status: DISCONTINUED | OUTPATIENT
Start: 2023-01-10 | End: 2023-01-10

## 2023-01-10 RX ORDER — HYDROMORPHONE HCL IN WATER/PF 6 MG/30 ML
0.2 PATIENT CONTROLLED ANALGESIA SYRINGE INTRAVENOUS EVERY 5 MIN PRN
Status: DISCONTINUED | OUTPATIENT
Start: 2023-01-10 | End: 2023-01-10 | Stop reason: HOSPADM

## 2023-01-10 RX ADMIN — KETOROLAC TROMETHAMINE 30 MG: 30 INJECTION, SOLUTION INTRAMUSCULAR at 10:19

## 2023-01-10 RX ADMIN — FAMOTIDINE 20 MG: 10 INJECTION, SOLUTION INTRAVENOUS at 08:00

## 2023-01-10 RX ADMIN — FENTANYL CITRATE 25 MCG: 50 INJECTION, SOLUTION INTRAMUSCULAR; INTRAVENOUS at 12:01

## 2023-01-10 RX ADMIN — HYDROMORPHONE HYDROCHLORIDE 0.2 MG: 0.2 INJECTION, SOLUTION INTRAMUSCULAR; INTRAVENOUS; SUBCUTANEOUS at 13:03

## 2023-01-10 RX ADMIN — GLYCOPYRROLATE 0.2 MG: 0.2 INJECTION, SOLUTION INTRAMUSCULAR; INTRAVENOUS at 08:51

## 2023-01-10 RX ADMIN — SODIUM CHLORIDE, POTASSIUM CHLORIDE, SODIUM LACTATE AND CALCIUM CHLORIDE: 600; 310; 30; 20 INJECTION, SOLUTION INTRAVENOUS at 09:23

## 2023-01-10 RX ADMIN — SODIUM CHLORIDE, POTASSIUM CHLORIDE, SODIUM LACTATE AND CALCIUM CHLORIDE: 600; 310; 30; 20 INJECTION, SOLUTION INTRAVENOUS at 08:00

## 2023-01-10 RX ADMIN — ACETAMINOPHEN 975 MG: 325 TABLET, FILM COATED ORAL at 06:59

## 2023-01-10 RX ADMIN — HYDROMORPHONE HYDROCHLORIDE 0.5 MG: 1 INJECTION, SOLUTION INTRAMUSCULAR; INTRAVENOUS; SUBCUTANEOUS at 10:24

## 2023-01-10 RX ADMIN — FENTANYL CITRATE 100 MCG: 50 INJECTION, SOLUTION INTRAMUSCULAR; INTRAVENOUS at 08:16

## 2023-01-10 RX ADMIN — Medication 20 MG: at 08:48

## 2023-01-10 RX ADMIN — SCOPALAMINE 1 PATCH: 1 PATCH, EXTENDED RELEASE TRANSDERMAL at 14:36

## 2023-01-10 RX ADMIN — MIDAZOLAM 2 MG: 1 INJECTION INTRAMUSCULAR; INTRAVENOUS at 08:00

## 2023-01-10 RX ADMIN — FENTANYL CITRATE 50 MCG: 50 INJECTION, SOLUTION INTRAMUSCULAR; INTRAVENOUS at 10:05

## 2023-01-10 RX ADMIN — Medication 3 G: at 08:25

## 2023-01-10 RX ADMIN — KETOROLAC TROMETHAMINE 30 MG: 30 INJECTION, SOLUTION INTRAMUSCULAR; INTRAVENOUS at 16:15

## 2023-01-10 RX ADMIN — LABETALOL HYDROCHLORIDE 10 MG: 5 INJECTION, SOLUTION INTRAVENOUS at 11:35

## 2023-01-10 RX ADMIN — ONDANSETRON 4 MG: 2 INJECTION INTRAMUSCULAR; INTRAVENOUS at 16:27

## 2023-01-10 RX ADMIN — SODIUM CHLORIDE, POTASSIUM CHLORIDE, SODIUM LACTATE AND CALCIUM CHLORIDE: 600; 310; 30; 20 INJECTION, SOLUTION INTRAVENOUS at 14:10

## 2023-01-10 RX ADMIN — OXYCODONE HYDROCHLORIDE 5 MG: 5 TABLET ORAL at 14:21

## 2023-01-10 RX ADMIN — PHENYLEPHRINE HYDROCHLORIDE 100 MCG: 10 INJECTION INTRAVENOUS at 08:51

## 2023-01-10 RX ADMIN — SUGAMMADEX 400 MG: 100 INJECTION, SOLUTION INTRAVENOUS at 10:24

## 2023-01-10 RX ADMIN — FENTANYL CITRATE 50 MCG: 50 INJECTION, SOLUTION INTRAMUSCULAR; INTRAVENOUS at 08:41

## 2023-01-10 RX ADMIN — OXYCODONE HYDROCHLORIDE 5 MG: 5 TABLET ORAL at 20:16

## 2023-01-10 RX ADMIN — ONDANSETRON 4 MG: 2 INJECTION INTRAMUSCULAR; INTRAVENOUS at 08:00

## 2023-01-10 RX ADMIN — FENTANYL CITRATE 50 MCG: 50 INJECTION, SOLUTION INTRAMUSCULAR; INTRAVENOUS at 09:51

## 2023-01-10 RX ADMIN — SODIUM CHLORIDE, POTASSIUM CHLORIDE, SODIUM LACTATE AND CALCIUM CHLORIDE: 600; 310; 30; 20 INJECTION, SOLUTION INTRAVENOUS at 12:24

## 2023-01-10 RX ADMIN — DEXAMETHASONE SODIUM PHOSPHATE 6 MG: 4 INJECTION, SOLUTION INTRA-ARTICULAR; INTRALESIONAL; INTRAMUSCULAR; INTRAVENOUS; SOFT TISSUE at 08:30

## 2023-01-10 RX ADMIN — PROPOFOL 200 MG: 10 INJECTION, EMULSION INTRAVENOUS at 08:16

## 2023-01-10 RX ADMIN — Medication 50 MG: at 08:16

## 2023-01-10 RX ADMIN — LIDOCAINE HYDROCHLORIDE 100 MG: 20 INJECTION, SOLUTION INFILTRATION; PERINEURAL at 08:16

## 2023-01-10 RX ADMIN — Medication 5 MG: at 09:15

## 2023-01-10 RX ADMIN — Medication 10 MG: at 09:36

## 2023-01-10 RX ADMIN — ENOXAPARIN SODIUM 40 MG: 40 INJECTION SUBCUTANEOUS at 07:00

## 2023-01-10 RX ADMIN — FENTANYL CITRATE 25 MCG: 50 INJECTION, SOLUTION INTRAMUSCULAR; INTRAVENOUS at 11:15

## 2023-01-10 RX ADMIN — FENTANYL CITRATE 25 MCG: 50 INJECTION, SOLUTION INTRAMUSCULAR; INTRAVENOUS at 11:05

## 2023-01-10 RX ADMIN — FENTANYL CITRATE 25 MCG: 50 INJECTION, SOLUTION INTRAMUSCULAR; INTRAVENOUS at 11:40

## 2023-01-10 ASSESSMENT — ACTIVITIES OF DAILY LIVING (ADL)
ADLS_ACUITY_SCORE: 18

## 2023-01-10 NOTE — ANESTHESIA POSTPROCEDURE EVALUATION
Patient: Raffy Castelan    Procedure: Procedure(s):  REMOVAL, GASTRIC BAND, ADJUSTABLE, and components,  LAPAROSCOPIC  GASTRECTOMY, SLEEVE, LAPAROSCOPIC       Anesthesia Type:  General    Note:  Disposition: Admission   Postop Pain Control: Uneventful            Sign Out: Well controlled pain   PONV: No   Neuro/Psych: Uneventful            Sign Out: Acceptable/Baseline neuro status   Airway/Respiratory: Uneventful            Sign Out: Acceptable/Baseline resp. status   CV/Hemodynamics: Uneventful            Sign Out: Acceptable CV status; No obvious hypovolemia; No obvious fluid overload   Other NRE: NONE   DID A NON-ROUTINE EVENT OCCUR? No           Last vitals:  Vitals Value Taken Time   /99 01/10/23 1115   Temp 37.1  C (98.7  F) 01/10/23 1037   Pulse 104 01/10/23 1128   Resp 14 01/10/23 1128   SpO2 95 % 01/10/23 1128   Vitals shown include unvalidated device data.    Electronically Signed By: Axel Frias MD  January 10, 2023  11:29 AM

## 2023-01-10 NOTE — ANESTHESIA CARE TRANSFER NOTE
Patient: Raffy Castelan    Procedure: Procedure(s):  REMOVAL, GASTRIC BAND, ADJUSTABLE, and components,  LAPAROSCOPIC  GASTRECTOMY, SLEEVE, LAPAROSCOPIC       Diagnosis: Morbid obesity (H) [E66.01]  Diagnosis Additional Information: No value filed.    Anesthesia Type:   General     Note:    Oropharynx: oropharynx clear of all foreign objects and spontaneously breathing  Level of Consciousness: drowsy  Oxygen Supplementation: face mask  Level of Supplemental Oxygen (L/min / FiO2): 6  Independent Airway: airway patency satisfactory and stable  Dentition: dentition unchanged  Vital Signs Stable: post-procedure vital signs reviewed and stable  Report to RN Given: handoff report given  Patient transferred to: PACU    Handoff Report: Identifed the Patient, Identified the Reponsible Provider, Reviewed the pertinent medical history, Discussed the surgical course, Reviewed Intra-OP anesthesia mangement and issues during anesthesia, Set expectations for post-procedure period and Allowed opportunity for questions and acknowledgement of understanding      Vitals:  Vitals Value Taken Time   /92 01/10/23 1040   Temp     Pulse 101 01/10/23 1042   Resp 17 01/10/23 1042   SpO2 97 % 01/10/23 1042   Vitals shown include unvalidated device data.    Electronically Signed By: ZIA Hussein CRNA  January 10, 2023  10:43 AM

## 2023-01-10 NOTE — PLAN OF CARE
"Goal Outcome Evaluation:      Plan of Care Reviewed With: patient    BP (!) 163/89 (BP Location: Right arm)   Pulse 94   Temp 96.9  F (36.1  C) (Oral)   Resp 15   Ht 1.651 m (5' 5\")   Wt 131.8 kg (290 lb 9.1 oz)   LMP 12/12/2022 (Approximate)   SpO2 94%   BMI 48.35 kg/m       Patient arrived to  around 1330 this shift.  Alert and oriented x 4, a little sleepy but easy to arouse.  Abdomen soft with hypoactive bowel sounds.  Abdominal incisions x 5 with primapore, dried drainage noted on 2/5.  Patient reports pain is tolerable with Dilaudid and Levsin. Tolerating clears.  Waiting for first void. Scopolomine patch behind right ear.  Continue with POC.                  "

## 2023-01-10 NOTE — ANESTHESIA PROCEDURE NOTES
Airway       Patient location during procedure: OR       Procedure Start/Stop Times: 1/10/2023 8:19 AM  Staff -        Anesthesiologist:  Behrens, Christopher J, MD       CRNA: Diane Escoto APRN CRNA       Performed By: CRNA  Consent for Airway        Urgency: elective  Indications and Patient Condition       Indications for airway management: sol-procedural       Induction type:intravenous       Mask difficulty assessment: 1 - vent by mask    Final Airway Details       Final airway type: endotracheal airway       Successful airway: ETT - single  Endotracheal Airway Details        ETT size (mm): 7.0       Cuffed: yes       Successful intubation technique: video laryngoscopy       VL Blade Size: MAC 4       Grade View of Cords: 1       Adjucts: stylet       Position: Right       Measured from: lips       Secured at (cm): 22       Bite block used: None    Post intubation assessment        Placement verified by: capnometry, equal breath sounds and chest rise        Number of attempts at approach: 1       Number of other approaches attempted: 0       Secured with: pink tape       Ease of procedure: easy       Dentition: Intact and Unchanged    Medication(s) Administered   Medication Administration Time: 1/10/2023 8:19 AM

## 2023-01-10 NOTE — BRIEF OP NOTE
[facility]    Brief Operative Note    Pre-operative diagnosis: Morbid obesity (H) [E66.01]  Post-operative diagnosis Same as pre-operative diagnosis    Procedure: Procedure(s):  REMOVAL, GASTRIC BAND, ADJUSTABLE, and components,  LAPAROSCOPIC  GASTRECTOMY, SLEEVE, LAPAROSCOPIC  Surgeon: Surgeon(s) and Role:     * Gurinder Ritter MD - Primary  Anesthesia: General   Estimated Blood Loss: 50    Drains: None  Specimens:   ID Type Source Tests Collected by Time Destination   1 : partial gastrectomy Tissue Other SURGICAL PATHOLOGY EXAM Gurinder Ritter MD 1/10/2023 10:00 AM    2 : Gastric band, port ,tubing, 2cm strain relief tube, Implant Other SURGICAL PATHOLOGY EXAM Gurinder Ritter MD 1/10/2023 10:02 AM      Findings:   None.  Complications: None.  Implants: * No implants in log *

## 2023-01-11 VITALS
HEART RATE: 88 BPM | OXYGEN SATURATION: 95 % | DIASTOLIC BLOOD PRESSURE: 82 MMHG | WEIGHT: 290.57 LBS | SYSTOLIC BLOOD PRESSURE: 146 MMHG | HEIGHT: 65 IN | RESPIRATION RATE: 18 BRPM | TEMPERATURE: 96.2 F | BODY MASS INDEX: 48.41 KG/M2

## 2023-01-11 LAB — GLUCOSE BLDC GLUCOMTR-MCNC: 107 MG/DL (ref 70–99)

## 2023-01-11 PROCEDURE — 250N000013 HC RX MED GY IP 250 OP 250 PS 637

## 2023-01-11 PROCEDURE — 250N000011 HC RX IP 250 OP 636: Performed by: NURSE PRACTITIONER

## 2023-01-11 PROCEDURE — 250N000013 HC RX MED GY IP 250 OP 250 PS 637: Performed by: NURSE PRACTITIONER

## 2023-01-11 RX ORDER — CALCIUM CARBONATE 500 MG/1
500 TABLET, CHEWABLE ORAL DAILY PRN
Status: DISCONTINUED | OUTPATIENT
Start: 2023-01-11 | End: 2023-01-11 | Stop reason: HOSPADM

## 2023-01-11 RX ORDER — LOSARTAN POTASSIUM 50 MG/1
50 TABLET ORAL DAILY
Qty: 30 TABLET | Refills: 0 | Status: SHIPPED | OUTPATIENT
Start: 2023-01-11 | End: 2023-04-07

## 2023-01-11 RX ORDER — OXYCODONE HYDROCHLORIDE 5 MG/1
5 TABLET ORAL
Qty: 12 TABLET | Refills: 0 | Status: SHIPPED | OUTPATIENT
Start: 2023-01-11 | End: 2023-01-19

## 2023-01-11 RX ADMIN — OMEPRAZOLE 20 MG: 20 CAPSULE, DELAYED RELEASE ORAL at 10:08

## 2023-01-11 RX ADMIN — OXYCODONE HYDROCHLORIDE 5 MG: 5 TABLET ORAL at 11:57

## 2023-01-11 RX ADMIN — SENNOSIDES AND DOCUSATE SODIUM 2 TABLET: 50; 8.6 TABLET ORAL at 08:55

## 2023-01-11 RX ADMIN — KETOROLAC TROMETHAMINE 30 MG: 30 INJECTION, SOLUTION INTRAMUSCULAR; INTRAVENOUS at 01:15

## 2023-01-11 RX ADMIN — OXYCODONE HYDROCHLORIDE 5 MG: 5 TABLET ORAL at 09:03

## 2023-01-11 RX ADMIN — LOSARTAN POTASSIUM 50 MG: 50 TABLET ORAL at 08:54

## 2023-01-11 RX ADMIN — ENOXAPARIN SODIUM 40 MG: 40 INJECTION SUBCUTANEOUS at 13:59

## 2023-01-11 RX ADMIN — CALCIUM CARBONATE (ANTACID) CHEW TAB 500 MG 500 MG: 500 CHEW TAB at 01:24

## 2023-01-11 ASSESSMENT — ACTIVITIES OF DAILY LIVING (ADL)
ADLS_ACUITY_SCORE: 18

## 2023-01-11 NOTE — DISCHARGE INSTRUCTIONS
After Bariatric Surgery Discharge Instructions  [facility]   Blood pressure medication changed to Losartan - instead of lostartan/ hydrochlorothiazide to avoid dehydration. Monitor blood pressure at home. Continue to monitor blood pressure at home.   Note: Ask your nurse to order your medications from the pharmacy. Be sure you have your medications with you when you leave.  Diet on discharge: bariatric full liquids.    How much fluid should I drink?  Strive for 48-64 ounces daily.  Carry a water bottle with you without a straw or sports top. Drink from it often.  Keep track of how much fluid you drink in a day.  Remember:  -Do not use straws, chew gum or suck on hard candies. They may cause painful gas.    -Sip, don't slurp when you drink.    -Practice small sips using a medicine cup for the first week postop.   -No ice or cold drinks. This could cause gas or spasms.   -No coffee, soda pop or drinks with caffeine. These may cause stomach pain.   -No alcohol. It is bad for your liver and will cause stomach pain.    How often should I do my deep breathing and coughing?  Use your incentive spirometer (small plastic breathing device) every hour while awake after you get home. Using the incentive spirometer helps you deep breath. Continuing to cough and deep breath will help prevent fevers and pneumonia.   If you do not have a fever after one week, you can stop using the incentive spirometer and discard it.     You can continue to take deep breaths without the incentive spirometer every one to two hours while awake for the month after surgery.    What kinds of activity can I do?  Get plenty of rest the first few days after surgery and try to balance rest and activity. You will need some time to recover - you may be more tired than you realize at first. You'll feel better and heal faster if you take good care of yourself.  For 4 weeks after surgery (Please review restrictions at your  one week visit, they could change based on how well you are doing):  -Don't lift more than 20 pounds.   -Take 4-5 short walks every day.  -Don't jog, run, or do belly exercises.  Don't swim, bathe or use a hot tub until your cuts are healed (scabs are gone).  You may shower  Don't plan to fly or take a road trip within the first 1 to 3 months after surgery.  You could get a blood clot in your legs. If you must travel, get up and move around every hour for at least 5 minutes before continuing on your journey.  Your care team can help you decide when it's safe for you to travel.     What can I do for pain control?  You had major belly surgery that involves all layers of your belly muscles. Pain is expected, even for some as far out as 6-8 weeks after surgery. Moving, sneezing, coughing, and breathing will cause discomfort because these activities use your belly muscles.   Please see your after visit summary medication review for what pain medication will be continued, discontinued and newly started for you.    You can take opioid pain medicine if prescribed and if needed. Try to wean off from it as soon as you feel comfortable.   Do not drive while you are taking opioid pain medicine. This is dangerous.  You can take acetaminophen (Tylenol) between your prescribed pain medicine on a scheduled basis OR take it scheduled every 6 hours (check with your care team for specifics).  -Acetaminophen formulation options:    -Liquid   -Caplet (Cut caplet in half before taking)   -Do not take more than 3000 mg Tylenol in a 24 hour period.  -You may also take Tylenol for pain in place of the opioid pain medicine (check with your care team for specifics).   You can apply ice or heat to the affected area(s). Just remember to wrap the ice in something and limit icing sessions to 20 minutes. Excessive icing can irritate the skin or cause skin damage.  You can apply heat with a hot, wet towel or heating pad. Just like cold therapy,  limit heat application to 20 minutes. Never sleep with a heating pad on. It could cause severe burns to your skin.  Wear your binder to support your belly muscles if you have one.  Take this off a little more each day and try to be off completely by 2 weeks after surgery. If you don't need your binder for comfort or support, you don't need to wear it.   You may not be able to sleep in a comfortable position for a few weeks after surgery. This is normal. You may be more comfortable sleeping in a recliner or propped up with 3 pillows for the first couple of weeks after surgery.  Do not take NSAID's (Non-Steroidal Anti-Inflammatory Drugs) (examples: ibuprofen, Motrin, Advil, Aleve, and Naproxen), aspirin, or use pain patches with NSAID's. They will increase your risk of bleeding or getting an ulcer.    Please call the clinic for any of the following pain concerns, we would like to talk to you:  -pain that does not improve with rest  -pain that gets worse and worse  -pain that is not controlled by your pain medicine  -a sudden severe increase in pain    What medications will I need to take after surgery?  You may be discharged with the following types of medications: omeprazole 20 mg once daily for heartburn symptom prevention, zofran as needed for nausea and a medication called hyoscyamine (levsin) as needed for cramping.Please see your after visit summary medication review for what will be continued, discontinued and newly started.  It is important to reduce the amount of acid in your new stomach for a couple of months after surgery while it is still healing. We will prescribe an acid reducer or antacid. Take it as directed. This will help prevent ulcers, heartburn and acid reflux.  If you took an acid reducer before you had surgery, your care team will let you know what acid reducer you will take after surgery.   It is okay to swallow any medications smaller than   inch in size.   -If it is larger than   inch, it may  need to be cut, crushed, or in a liquid form. Check with your care team about which way is most appropriate for you and your medications.    What should I know about my incisions (cuts)?  Your incisions are covered with white steri strips or butterfly tape and have band aids or gauze over the top. If you have gauze or band aids, they can come off in the hospital.  Leave your steri strips on until they fall off on their own. If the steri strips don't fall off after 1 to 2 weeks, you can take them off. If they fall off earlier, replace them with clean band aids trying to avoid touching the incision itself.   If you have gauze covered by a clear dressing, remove 2-3 days after surgery or as directed by your care team.  You may shower in the hospital after surgery and can get your incision coverings wet.  Do not submerge in water (e.g. No baths, swimming pools, hot tubs) until your care team tells you it is okay and your incisions are completely healed.    Call the clinic if you have any of these signs or symptoms of infection:  -Redness around the site.  -Drainage that smells bad.  -Drainage that is thick yellow or green.  -An increase in pain around the incision site  -An increase in swelling around the incision site  -Heat or warmth around incision site   -Fever of 101.5  F (38.3  C) or higher when taken under the tongue.    -Chills    Will my urine or bowel movements change?   Your first bowel movements (stools) will likely be liquid. You may also notice old blood or a darker color (black or maroon color) in your bowel movements.  This is not unusual and usually goes away after the first week, if not sooner. You may not have a bowel movement for a week.   If you have not had a bowel movement for at least three days after your surgery date and are passing gas, you can use over the counter stool softeners.  Please stop taking the stool softeners and laxatives if your stools are loose.  Increasing fluids and activity  "as well as getting off narcotics will help prevent constipation.    Call the clinic if:   -You have stomach pain.   -You continue to have constipation.  -You have excessive bloating after walking and passing gas.    How can I prevent dehydration if I feel nauseated (sick to my stomach) and vomit (throw up)?   Vomiting is not normal after surgery. If you continue to have nausea and vomiting, call the clinic.   Nausea can be a sign of dehydration. That is why it is very important to track your fluids.  Do not nap more than one hour during the day. Set a timer to wake yourself up, if needed. Too much sleep will keep you from drinking enough fluid during the day and lead to dehydration.  No outside activity in hot, humid weather until you can drink 48 to 64 ounces of fluid in 24 hours. If you sweat a lot, your body may lose too much water.  Try to take a 1 ounce sip of water (one medicine cup) every 15 minutes.  Set a timer to remind yourself.    Call the clinic if you have any of these signs or symptoms of dehydration:  -Dark colored urine  -Urinating (pass water) less than 2-3 times per day  -Lack of energy  -Nausea  -Dizziness  -Headache    Call the clinic ANY TIME at [phone] if:  -Your pain medicine is not working.  -You have a fever ? 101.5 F.  -You have belly or left shoulder pain that gets worse and worse.  -You have a swollen leg with redness, warmth, or pain behind the knee or calf.  -You have chest pain   -You feel very short of breath.  -You have a sudden severe increase in heart rate.  -You have vomiting that gets worse and worse.  -You have constant nausea (feeling sick to your stomach) that does not go away with medication.  -You have trouble swallowing.  -You have an increasing feeling that \"something is not right\".  -You have hiccups that do not stop.  -You have any questions or concerns.    AFTER HOURS QUESTIONS OR CONCERNS: Call [phone] and ask to speak with surgery resident if you are having " troubles in the evenings, at night, or on weekends. Please call if you experience increasing abdominal pain, nausea, vomiting, increasing drainage from your wounds, chills, or fever >101.5    If you have to go to the Emergency Room, we prefer you go to the hospital that did your surgery. Please let them know that you had bariatric surgery and to notify your surgeon.    When should I go back to the clinic?  Follow up with your care team in 1-2 weeks.   If this appointment was not already made, please call: [phone]    Appointments located at [facility] clinic:  [facility]    [address]  [place] [address]

## 2023-01-11 NOTE — PLAN OF CARE
"BP (!) 165/92 (BP Location: Right arm)   Pulse 87   Temp 96.9  F (36.1  C) (Oral)   Resp 13   Ht 1.651 m (5' 5\")   Wt 131.8 kg (290 lb 9.1 oz)   LMP 12/12/2022 (Approximate)   SpO2 95%   BMI 48.35 kg/m      Respiratory: O2 >95% on 3L. Pt dyspneic upon exertion.   Cardiac: Hypertensive. Pt has HTN and stated that she didn't take her Losartan this morning. Pt denies cardiac chest pain.   Neuro: A&OX4. Pt able to make needs known.   GI/: Adequate urine output. Pt has +BS, but no flatus or BM. Pt reported nausea, IV Zofran given X1. Nausea subsided after Zofran admin.   Diet: Bariatric liquid diet, appetite fair.   Skin: Skin intact except for 5 lap sites, bruise on Left buttocks and scabs on upper back from acne scars. Mepelix in place near the coccyx.   Lines/drains: L PIV infusing LR @75mL/hr.   Pain: Pt reported pain from her lip sites. Scheduled Toradol given.   Labs: Reviewed.   Activity: Independent.   Plan: Continue to monitor and follow POC.     Admitted/transferred from:   2 RN full   skin assessment completed by Tal PETERS RN and Tg JULES  Skin assessment finding: issues found Skin intact except for 5 lap sites, bruise on Left buttocks and scabs on upper back from acne scars.    Interventions/actions: skin interventions Mepelix in place near the coccyx.       Bedside Emergency Equipment Present:  Suction Regulator: Yes  Suction Canister: Yes  Tubing between Regulator and Canister: Yes  O2 Regulator with Tree: Yes  Ambu Bag: Yes    " "Chief Complaint   Patient presents with     Port Draw     labs drawn from port by rn.  vs taken     Port accessed with 20 gauge 3/4\" gripper needle and labs drawn by rn.  Port flushed with NS and heparin.  Pt tolerated well.  VS taken.  Pt checked in for next appt.  Janki Elias RN      "

## 2023-01-11 NOTE — PHARMACY-ADMISSION MEDICATION HISTORY
Admission Medication History Completed by Pharmacy    See [facility] Admission Navigator for allergy information, preferred outpatient pharmacy, prior to admission medications and immunization status.     Medication History Sources:     Patient    Fill history    Changes made to PTA medication list (reason):    Added: None    Deleted: None    Changed: None    Additional Information:    Patient was on losartan-hydrochlorothiazide prior to admission but has been since changed to losartan for impending discharge.    Prior to Admission medications    Medication Sig Last Dose Taking? Auth Provider Long Term End Date   albuterol (PROAIR HFA/PROVENTIL HFA/VENTOLIN HFA) 108 (90 Base) MCG/ACT inhaler Inhale 2 puffs into the lungs every 6 hours as needed for shortness of breath / dyspnea or wheezing More than a month Yes Majo Tavares MD Yes    clobetasol propionate (OLUX) 0.05 % external foam APPLY SPARINGLY TO AFFECTED AREA TWICE DAILY AS NEEDED DO NOT APPLY TO FACE Past Week Yes Majo Tavares MD     losartan (COZAAR) 50 MG tablet Take 1 tablet (50 mg) by mouth daily  Yes Phoebe Ryan NP Yes    oxyCODONE (ROXICODONE) 5 MG tablet Take 1 tablet (5 mg) by mouth every 3 hours as needed for moderate to severe pain  Yes Phoebe Ryan NP     senna (SENOKOT) 8.6 MG tablet Take 2 tablets by mouth At Bedtime 1/9/2023 at 2000 Yes Reported, Patient     senna-docusate (SENOKOT-S/PERICOLACE) 8.6-50 MG tablet Take 2 tablets by mouth daily as needed for constipation (While taking narcotic pain medications.  Stop taking if having loose stools.) More than a month Yes Gurinder Ritter MD     hyoscyamine (LEVSIN) 0.125 MG tablet Take 1 tablet (125 mcg) by mouth every 4 hours as needed for cramping   Gurinder Ritter MD     omeprazole (PRILOSEC) 20 MG DR capsule Take 1 capsule (20 mg) by mouth daily   Gurinder Ritter MD         Date completed: 01/11/23    Medication history completed by: Justin Torres [facility]

## 2023-01-11 NOTE — PROGRESS NOTES
"Doing well overall.    BP (!) 146/82 (BP Location: Left arm, Patient Position: Sitting, Cuff Size: Adult Large)   Pulse 88   Temp (!) 96.2  F (35.7  C) (Oral)   Resp 18   Ht 1.651 m (5' 5\")   Wt 131.8 kg (290 lb 9.1 oz)   LMP 12/12/2022 (Approximate)   SpO2 95%   BMI 48.35 kg/m    NAD  Breathing unlabored.    Should be able to discharge this afternoon.    Gurinder Ritter MD  Surgery  [phone] (hospital )  [phone] (clinic nurses)                "

## 2023-01-11 NOTE — PHARMACY-CONSULT NOTE
Bariatric Consult    Medications evaluated as requested per Bariatric Consult. Medications available as liquid formulations have been dispensed when possible.    No medication changes were needed based on the Bariatric Medication Management Policy.    The pharmacist will continue to follow as new medications are ordered.    Joshua Omalley PharmD, BCPS

## 2023-01-11 NOTE — PROGRESS NOTES
"SPIRITUAL HEALTH SERVICES  SPIRITUAL ASSESSMENT Progress Note  [facility] ([facility]) 7B       REFERRAL SOURCE: Self initiated  visit, Hinduism specific.     DEMOGRAPHIC: Pt Raffy Castelan identifies as Hinduism and is of [place] descent.        ILLNESS CIRCUMSTANCE: I introduced myself to Abchristelle as the Lead Hinduism  and oriented her to [facility].  Assessed emotional/spiritual needs and resources while offering reflective conversation, which integrated elements of illness and family narratives.     Raffy stated that she underwent bariatric surgery, \"I had a gastric band placed\" and stated that this was very painful.     SPIRITUAL INTERVENTIONS: Raffy welcomed Islamic incantations/prayer at bedside. We prayed together at her request. I also provided her with an Islamic prayer booklet for Hinduism patients.    PLAN: I will follow up with Raffy for the duration of her stay. [facility] is available to Raffy per request.     *This visit was conducted in the Polish language.     Nick Mcdaniel  Lead Hinduism   Pager [phone]    [facility] remains available 24/7 for emergent requests/referrals, either by having the switchboard page the on-call  or by entering an ASAP/STAT consult in Epic (this will also page the on-call ).    "

## 2023-01-11 NOTE — PLAN OF CARE
"Goal Outcome Evaluation:      Plan of Care Reviewed With: patient    BP (!) 146/82 (BP Location: Left arm, Patient Position: Sitting, Cuff Size: Adult Large)   Pulse 88   Temp (!) 96.2  F (35.7  C) (Oral)   Resp 18   Ht 1.651 m (5' 5\")   Wt 131.8 kg (290 lb 9.1 oz)   LMP 12/12/2022 (Approximate)   SpO2 95%   BMI 48.35 kg/m       Patient reports pain is tolerable with Oxycodone x 2. Abdomen soft with hypoactive bowel sounds.  Abdominal incisions with steri strips intact. Reviewed discharge instructions and medications with patient. PIV removed before discharge.                  "

## 2023-01-11 NOTE — DISCHARGE SUMMARY
"[facility]   MIS Discharge Summary    Date of Admission: 1/10/2023  Date of Discharge: 1/11/2023    Admission Diagnosis:  1. Morbid Obesity    Discharge Diagnosis:  1. Same as above  2. S/p sleeve gastrectomy    Consultations:  IP pharmacy    Procedures:  1. Removal of Gastric band and components and laparoscopic sleeve gastrectomy by Dr Riya Linda HPI:  Long history of obesity. Lap band placed in 2014 with weight loss from 270 to 240 but regain started quickly after band placement. Ongoing medical weight management since that time but no durable weight loss or weight maintenance. All fluid removed from band 8/2022 due to side effect. Has decided to pursue lap band removal and sleeve gastrectomy to achieve more durable weight loss.     Hospital Course:  The patient was admitted and underwent the above procedure. The patient tolerated the procedure well. There were no complications. The patient's diet was slowly advanced as bowel function returned. Pain was controlled with oral pain medication and the patient was able to ambulate and void without difficulty. The patient received appropriate education post operatively. On POD #1 the patient was discharged to home.    Discharge Physical Exam:  BP (!) 146/82 (BP Location: Left arm, Patient Position: Sitting, Cuff Size: Adult Large)   Pulse 88   Temp (!) 96.2  F (35.7  C) (Oral)   Resp 18   Ht 1.651 m (5' 5\")   Wt 131.8 kg (290 lb 9.1 oz)   LMP 12/12/2022 (Approximate)   SpO2 95%   BMI 48.35 kg/m      Gen: NAD  Lungs: non-labored breathing  CV:  normal rate   Abd: obese, soft, nondistended, appropriately tender, incisions are c/d/i  Ext: no peripheral edema  Neuro: AOx3    Meds:       Review of your medicines        START taking        Dose / Directions   losartan 50 MG tablet  Commonly known as: COZAAR  Used for: S/P laparoscopic sleeve gastrectomy      Dose: 50 mg  Take 1 tablet (50 mg) by mouth daily  Quantity: 30 " tablet  Refills: 0     oxyCODONE 5 MG tablet  Commonly known as: ROXICODONE  Used for: S/P laparoscopic sleeve gastrectomy      Dose: 5 mg  Take 1 tablet (5 mg) by mouth every 3 hours as needed for moderate to severe pain  Quantity: 12 tablet  Refills: 0            CONTINUE these medicines which have NOT CHANGED        Dose / Directions   albuterol 108 (90 Base) MCG/ACT inhaler  Commonly known as: PROAIR HFA/PROVENTIL HFA/VENTOLIN HFA  Used for: Clinical diagnosis of COVID-19      Dose: 2 puff  Inhale 2 puffs into the lungs every 6 hours as needed for shortness of breath / dyspnea or wheezing  Quantity: 8.5 g  Refills: 3     clobetasol propionate 0.05 % external foam  Commonly known as: OLUX  Used for: Psoriasis      APPLY SPARINGLY TO AFFECTED AREA TWICE DAILY AS NEEDED DO NOT APPLY TO FACE  Quantity: 600 g  Refills: 3     hyoscyamine 0.125 MG tablet  Commonly known as: LEVSIN  Used for: At high risk for postoperative complications      Dose: 125 mcg  Take 1 tablet (125 mcg) by mouth every 4 hours as needed for cramping  Quantity: 30 tablet  Refills: 1     omeprazole 20 MG DR capsule  Commonly known as: priLOSEC  Used for: At high risk for postoperative complications      Dose: 20 mg  Take 1 capsule (20 mg) by mouth daily  Quantity: 30 capsule  Refills: 3     senna 8.6 MG tablet  Commonly known as: SENOKOT      Dose: 2 tablet  Take 2 tablets by mouth At Bedtime  Refills: 0     senna-docusate 8.6-50 MG tablet  Commonly known as: SENOKOT-S/PERICOLACE  Used for: At high risk for postoperative complications      Dose: 2 tablet  Take 2 tablets by mouth daily as needed for constipation (While taking narcotic pain medications.  Stop taking if having loose stools.)  Quantity: 30 tablet  Refills: 1            STOP taking      HAIR SKIN & NAILS ADVANCED PO        losartan-hydrochlorothiazide 50-12.5 MG tablet  Commonly known as: HYZAAR        PRENATAL VITAMINS PO                  Where to get your medicines        These  medications were sent to [facility] - [place], [place] - [address]  [address], [facility] [address]      Phone: [phone]   losartan 50 MG tablet  oxyCODONE 5 MG tablet         Additional instructions:  After Care       Future Labs/Procedures    Activity     Comments:    Your activity upon discharge: activity as tolerated and no lifting of more than 20lb for 4 weeks    Contact Surgeon     Comments:    Contact your Surgeon IF:  ~  Your pain is not controlled by pain medication or pain that suddenly increases;  ~  You develop a fever greater than 101 and/or chills 24 hours after surgery;  ~  You notice redness or bad smelling drainage at the surgery site;  ~  You notice a large amount of bleeding from the surgery site that does not stop when moderate pressure is applied;  ~  You are unable to pass urine within 8-10 hours after surgery;  ~  You have an upset stomach or vomiting lasting more than a day;  ~  You have a skin reaction to tape or dressing such as redness, itching or rash at the surgery site.    Diet     Comments:    Follow this diet upon discharge: Bariatric full liquids    Discharge diet     Comments:    Phase II: Full liquid diet, room temperature with one protein shake per day. Goal water intake: 40oz per day by post-operative day #4. Meet with Bariatric Dietitian in 1 week to discuss dietary changes.    Do not drive     Comments:    Do NOT drive until after you have been completely off narcotics for a minimum of 24 hours.    Follow-up Care - Dietician     Comments:    Follow-up with your bariatric dietitian in 1 week.    Return to Work     Comments:    May return to work in 2 weeks if cleared by Bariatric surgeon.    Shower/Bathing     Comments:    Okay to shower. Do NOT soak in tub for 2-4 weeks.    Surgical Site Care     Comments:    If you have skin tapes on your surgical site(s), leave them on the surgical site(s) until they fall off on their own or 1 week  after surgery date. May remove Band-Aid one day after surgery.    Wash your hands     Comments:    Wash your hands with soap and warm water before you touch the site of surgery.    Weight Restrictions     Comments:    Do not lift over 20 pounds for 2 weeks.                Follow Up:  -Follow up with Melinda Marshall PA-C  in clinic 1/19/2023      BARIATRIC PATIENTS:  Call [phone] to schedule or to reach your care team    Phoebe Ryan CNP  [facility] [place]

## 2023-01-11 NOTE — PLAN OF CARE
Time: 1900-0730  Temp: (!) 96.7  F (35.9  C) Temp src: Oral BP: (!) 145/86 Pulse: 82   Resp: 14 SpO2: 95 % O2 Device: Nasal cannula Oxygen Delivery: 2 LPM     Activity: SBA/independent.  Diet: Bariatric clears.   Pain: Given oxycodone once at 2020 for moderate pain. Scheduled Toradol .   Neuro: Alert, oriented x4. CMS intact.   Cardiac: Hypertensive.  Respiratory: Lung sounds clear except LLL diminished. C/o difficulty breathing, a feeling of pressure when taking a breath. Sats mid 90's on 2-3 LPM nasal cannula.   Skin: 5 abdominal lap sites, No change with drainage since dressings marked.    GI/: Voiding spontaneously. BS hypo, not passing gas.   Lines/inf: PIV infusing LR at 75.  New Labs: Post op .

## 2023-01-12 LAB
PATH REPORT.COMMENTS IMP SPEC: NORMAL
PATH REPORT.COMMENTS IMP SPEC: NORMAL
PATH REPORT.FINAL DX SPEC: NORMAL
PATH REPORT.GROSS SPEC: NORMAL
PATH REPORT.MICROSCOPIC SPEC OTHER STN: NORMAL
PATH REPORT.RELEVANT HX SPEC: NORMAL
PHOTO IMAGE: NORMAL

## 2023-01-13 ENCOUNTER — PATIENT OUTREACH (OUTPATIENT)
Dept: CARE COORDINATION | Facility: CLINIC | Age: 48
End: 2023-01-13

## 2023-01-13 ENCOUNTER — PATIENT OUTREACH (OUTPATIENT)
Dept: ENDOCRINOLOGY | Facility: CLINIC | Age: 48
End: 2023-01-13

## 2023-01-13 NOTE — PROGRESS NOTES
Clinic Care Coordination Contact  [facility]: Post-Discharge Note  SITUATION                                                      Admission:    Admission Date: 01/10/23   Reason for Admission: Admission Diagnosis:  1. Morbid Obesity     Discharge Diagnosis:  1. Same as above  2. S/p sleeve gastrectomy  Discharge:   Discharge Date: 01/11/23  Discharge Diagnosis: Admission Diagnosis:  1. Morbid Obesity     Discharge Diagnosis:  1. Same as above  2. S/p sleeve gastrectomy    BACKGROUND                                                      Per hospital discharge summary and inpatient provider notes:    Brief HPI:  Long history of obesity. Lap band placed in 2014 with weight loss from 270 to 240 but regain started quickly after band placement. Ongoing medical weight management since that time but no durable weight loss or weight maintenance. All fluid removed from band 8/2022 due to side effect. Has decided to pursue lap band removal and sleeve gastrectomy to achieve more durable weight loss.     ASSESSMENT           Discharge Assessment  How are you doing now that you are home?: doing well  How are your symptoms? (Red Flag symptoms escalate to triage hotline per guidelines): Improved  Do you feel your condition is stable enough to be safe at home until your provider visit?: Yes  Does the patient have their discharge instructions? : Yes  Does the patient have questions regarding their discharge instructions? : No  Were you started on any new medications or were there changes to any of your previous medications? : Yes  Does the patient have all of their medications?: Yes  Do you have questions regarding any of your medications? : No  Do you have all of your needed medical supplies or equipment (DME)?  (i.e. oxygen tank, CPAP, cane, etc.): Yes  Discharge follow-up appointment scheduled within 14 calendar days? : Yes  Discharge Follow Up Appointment Date: 01/19/23  Discharge Follow Up Appointment Scheduled with?:  Primary Care Provider    Post-op (W CTA Only)  If the patient had a surgery or procedure, do they have any questions for a nurse?: No         PLAN                                                      Outpatient Plan:     Follow Up and recommended labs and tests  [facility]C [facility]20[facility] to evaluate after surgery. No follow up  labs or test are needed.  Future Appointments   Date Time Provider Department Center   1/19/2023 11:00 AM Melinda Marshall PA-C [facility]   1/19/2023  4:30 PM Cintia Galvez RD [facility]       For any urgent concerns, please contact our 24 hour nurse triage line: [phone] ([phone])       LEN Rivera  [phone]  [facility]

## 2023-01-13 NOTE — PROGRESS NOTES
RN Post-Op/Post-Discharge Care Coordination Note    Ms. Raffy Castelan is a 47 year old female who underwent laparoscopic gastric sleeve on 1/10/23 with  Dr. Tereso Ritter.  Spoke with Patient.    Support  Patient able to care for self independently     Health Status  Fevers/chills: Patient denies any fever or chills.    Nausea/Vomiting: Patient denies nausea/vomiting.    Eating/drinking: Tolerating full liquid diet, Tolerating clear liquid diet, Discussed importance of adequate hydration (48-64 ounces or greater of water) and protein intake.  Recommended keeping a food/fluid diary. and Discussed drinking slowly with small sips. Reminded to drink small sips slowly.  Encouraged room temperature/warm fluids.  Patient states she had a few bites of mashed potatoes.  Informed patient that mashed potatoes are not on the approved liquids list. Encouraged patient to refer back to the Stage 1 and 2 diet handouts for approved foods.    Fluid Ounces per day: 46 oz - encouraged patient to measure fluids.    Bowel/Bladder habits: Patient reports no bowel movement since surgery. and is passing gas. Last bowel movement 1/10/23.  Urine clear yellow - also has her period.    Activity: standing, sitting, walking and ambulating stairs    Breathing: Reminded patient to use incentive spirometer- 5 to 10 deep breaths each hour while awake.    Other symptoms: Tachycardia: no, Dizziness/lightheadedness: no, Shortness of breath, dyspnea with exertion, leg pain/swelling: no.  Using a massager on legs also.    Drains (SUDHAKAR): N/A    Incisions: Patient denies any signs and symptoms of infection..  Wound closure:  Steri-strips.    Pain: Rates pain a 1 on a 10 Scale.  None.  Encouraged non-medication management modalities: Heating pad, with barrier, to abdomen, Frequent position changes, Walking and Abdominal Binder    New Medications:  Omeprazole (opening capsules), Levsin, Zofran, Oxycodone, Tylenol, Senna, home medications and patient was reminded  not to take NSAIDS    Activity/Restrictions  No lifting in excess of 20 pounds for 4 weeks    Pathology reviewed with patient:  Yes   Final Diagnosis   A.  STOMACH, PARTIAL GASTRECTOMY:  -Gastric mucosa with no significant histologic abnormality  -Negative for intestinal metaplasia and dysplasia  -No H. pylori-like organisms identified on routine stain    B. GASTRIC BAND, PORT ,TUBING, 2CM STRAIN RELIEF TUBE, REMOVAL:  -Medical device identified grossly, see below description         Forms/Letters  No. All forms should be faxed to [phone].  Return to Work Date: 1/23/23    Postop Appointment Reminder  January 19 at 11 AM confirmed with the patient:  Yes.    Additional Questions: All of her questions were answered including reviewing diet, restrictions, and wound care.  She will call this office if she has any further questions and/or concerns.        Whom and When to Call  Nurses: [phone]  Fax: [phone]     Patient advised if any concerns outside of clinic hours, to call hospital at [phone] and ask to speak to on-call bariatric resident. They should present to ED at [facility] to be assessed if urgency arises.      Risk for:  urinary tract infection, pneumonia, leg clots (deep vein thrombosis), lung clots (pulmonary emboli), injury to the bowels or other organs, bowel obstruction, hernia, and gastrointestinal bleeding.      Weight loss surgery side effects:  abdominal pain, cramping, bloating, difficulty swallowing, constipation, nausea, vomiting, diarrhea, frequent loose stools, dehydration, hair loss, excess skin, protein, iron and vitamin deficiencies, malnutrition, fatigue, and heartburn.    Bariatric surgery risks may include:  an internal leak at the staple line, narrowing of the esophagus, stomach or intestines (strictures), gallstones, bowel obstruction, inflammation of the esophagus or stomach, ulcers with or without bleeding, injuries to other organs, hernias, and iron  deficiency.    Sleeve gastrectomy side effects:  leaks are more common after this procedure.  Risks include:  internal leak at the vertical sleeve staple line; nausea, vomiting, and dehydration for several months; bowel obstruction; gallstones during the first 6 months related to rapid weight loss; decreased absorption of vitamins and protein because of the reduced stomach size; weight regain if you increase food intake; narrowing of stomach, esophagus or intestines (stricture); injury to other organs; hernia; and ulcers.

## 2023-01-18 NOTE — PATIENT INSTRUCTIONS
Goals:    1. When feeling stressed, try drinking Cory instead of getting more food.  Remind yourself that while sugar feels good immediately, you feel more drained later.     2. When traveling, try to look up nutrition information before you eat out and/or try sharing a meal and/or ordering appetizer    3. Pre-portion the snacks.  Take what need/ 1 serving and put everything away.      4. Try to limit calories to 1900 calories a day (average)    5. Re-increase the gym to 3 days a week    FOLLOW UP: Friday, April 27th at 12pm (noon) at [facility]    Lupe Clement RD, MAICOL, CDE   [phone]   Take all of the antibiotics as directed  Take ibuprofen for pain as needed  Follow up with dental at the number provided

## 2023-01-19 ENCOUNTER — VIRTUAL VISIT (OUTPATIENT)
Dept: ENDOCRINOLOGY | Facility: CLINIC | Age: 48
End: 2023-01-19
Payer: COMMERCIAL

## 2023-01-19 ENCOUNTER — OFFICE VISIT (OUTPATIENT)
Dept: ENDOCRINOLOGY | Facility: CLINIC | Age: 48
End: 2023-01-19
Payer: COMMERCIAL

## 2023-01-19 VITALS
TEMPERATURE: 98.3 F | BODY MASS INDEX: 46.62 KG/M2 | RESPIRATION RATE: 18 BRPM | HEART RATE: 87 BPM | HEIGHT: 65 IN | SYSTOLIC BLOOD PRESSURE: 132 MMHG | OXYGEN SATURATION: 97 % | DIASTOLIC BLOOD PRESSURE: 78 MMHG | WEIGHT: 279.8 LBS

## 2023-01-19 DIAGNOSIS — Z98.84 S/P LAPAROSCOPIC SLEEVE GASTRECTOMY: ICD-10-CM

## 2023-01-19 DIAGNOSIS — Z71.3 NUTRITIONAL COUNSELING: Primary | ICD-10-CM

## 2023-01-19 DIAGNOSIS — E66.813 CLASS 3 SEVERE OBESITY DUE TO EXCESS CALORIES WITH SERIOUS COMORBIDITY AND BODY MASS INDEX (BMI) OF 45.0 TO 49.9 IN ADULT (H): Primary | ICD-10-CM

## 2023-01-19 DIAGNOSIS — E66.01 CLASS 3 SEVERE OBESITY DUE TO EXCESS CALORIES WITH SERIOUS COMORBIDITY AND BODY MASS INDEX (BMI) OF 45.0 TO 49.9 IN ADULT (H): Primary | ICD-10-CM

## 2023-01-19 DIAGNOSIS — E66.9 OBESITY: ICD-10-CM

## 2023-01-19 PROCEDURE — 97803 MED NUTRITION INDIV SUBSEQ: CPT | Mod: 95 | Performed by: DIETITIAN, REGISTERED

## 2023-01-19 PROCEDURE — 99024 POSTOP FOLLOW-UP VISIT: CPT

## 2023-01-19 RX ORDER — LOSARTAN POTASSIUM AND HYDROCHLOROTHIAZIDE 12.5; 5 MG/1; MG/1
1 TABLET ORAL DAILY
Qty: 30 TABLET | Refills: 3 | Status: CANCELLED | OUTPATIENT
Start: 2023-01-19

## 2023-01-19 RX ORDER — URSODIOL 300 MG/1
300 CAPSULE ORAL 2 TIMES DAILY
Qty: 60 CAPSULE | Refills: 5 | Status: SHIPPED | OUTPATIENT
Start: 2023-01-19 | End: 2023-02-14

## 2023-01-19 ASSESSMENT — PAIN SCALES - GENERAL: PAINLEVEL: NO PAIN (0)

## 2023-01-19 NOTE — LETTER
"1/19/2023       RE: Raff[facility] Castelan  [address] [facility] [address]     Dear Colleague,    Thank you for referring your patient, Raffy Castelan, to the [facility] [place] at . Please see a copy of my visit note below.    Raffy Castelan is a 47 year old female who is being evaluated via a billable video visit.      The patient has been notified of following:     \"This video visit will be conducted via a call between you and your physician/provider. We have found that certain health care needs can be provided without the need for an in-person physical exam.  This service lets us provide the care you need with a video conversation.  If a prescription is necessary we can send it directly to your pharmacy.  If lab work is needed we can place an order for that and you can then stop by our lab to have the test done at a later time.    Video visits are billed at different rates depending on your insurance coverage.  Please reach out to your insurance provider with any questions.    If during the course of the call the physician/provider feels a video visit is not appropriate, you will not be charged for this service.\"    Patient has given verbal consent for Video visit? Yes  How would you like to obtain your AVS? MyChart  If you are dropped from the video visit, the video invite should be resent to: Text to cell phone: [phone]  Will anyone else be joining your video visit? No  {If patient encounters technical issues they should call [phone]      Video-Visit Details    Type of service:  Video Visit    Video Start Time: 4:30 PM  Video End Time: 4:45 PM    Originating Location (pt. Location): Home    Distant Location (provider location):  Offsite (providers home)     Platform used for Video Visit: Mieple    During this virtual visit the patient is located in [place], patient verifies this as the location during the " entirety of this visit.       Nutrition Assessment  Reason For Visit:  Raffy Castelan is a 47 year old female presenting today for nutrition follow-up, 1 week s/p 1/10/23 with Dr Ritter.  Patient referred by NOEMI Adams on January 19, 2023.    Anthropometrics  Initial Consult Weight: 290 lbs  Day of Surgery Weight (1/10/23): 290 lbs  Current Weight: 279 lbs  Weight loss: 11 lbs from initial consult; 11 lbs from day of surgery    Current Vitamins/Minerals: MVI     Nutrition History  Pt reports consuming and tolerating bariatric clear and low-fat full liquid diets. Fluid intake appears adequate, consuming 48-64 oz/day.    Pt doing well- no issues with pain. Struggling mentally with liquid diet but excited to have lost weight.    Nutrition Prescription:  Grams Protein: 60 (minimum)  Amount of Fluid: 48-64 oz      Nutrition Diagnosis  Food and nutrition-related knowledge deficit r/t lack of prior exposure to diet advancements beyond bariatric low-fat full liquid diet aeb recent bariatric surgery and pt interest in diet education/review    Intervention  Intervention At Appointment:  Materials/education provided on bariatric pureed and soft diets, protein intake, fluid intake, eating pace, portion control, avoiding excess sugar and fat, recommended vitamin/mineral supplements. Patient demonstrates understanding.     Expected Engagement: met, continues    Goals:  1) Follow bariatric low-fat full liquid diet through day 13 post-op, then to progress to pureed diet x 2 weeks (1/24/23).  If tolerating, may advance on day 29 post-op to bariatric soft diet (2/8/23).   2) Work towards 60 gm protein/day.  3) Consume 48-64+ oz fluids daily- between meals only once on puree diet  4) Eat slowly (>20 min/meal), chewing well to smooth consistency once on the bariatric soft diet.  5) Limit portions to ~1/4 to 1/2 cup/meal.  6) Start chewable/liquid multivitamin/minerals daily.    Recipe ideas:  [email or web address]    Post-op Diet Advancement Schedule:  Clear Liquid Diet (stage 1): 1/09 - 1/10  *No RED/PURPLE, PROTEIN, or PULP day before surgery  Low-Fat Full Liquid Diet (stage 2): 1/11 - 1/23  Pureed Diet (stage 3): 1/24 - 2/7  Soft Diet (stage 4): 2/8-3/8  Regular Diet (stage 5): 3/9    Post-op Diet Handouts:  Diet Guidelines after Weight-loss Surgery  [email or web address]     Your Stage 1 Diet: Clear Liquids  [email or web address]     Your Stage 2 Diet: Low-fat Full Liquids  [email or web address]     Your Stage 3 Diet: Pureed Foods  [email or web address]     Pureed Recipes  [email or web address]    Your Stage 4 Diet: Soft Foods  [email or web address]    Your Stage 5 Diet: Regular Foods  [email or web address]    Supplements after Sleeve Gastrectomy, Gastric Bypass or Single Anastomosis Duodenal Switch  [email or web address]    Keeping Track of Fluids  [email or web address]    Exercise Guidelines after Weight Loss Surgery (1st 4-6 weeks)  [email or web address]      Follow-Up: 3 weeks or prn    Time spent with patient: 15 minutes.            Again, thank you for allowing me to participate in the care of your patient.      Sincerely,    MARCO A NESS RD

## 2023-01-19 NOTE — PROGRESS NOTES
"Raffy Castelan is a 47 year old female who is being evaluated via a billable video visit.      The patient has been notified of following:     \"This video visit will be conducted via a call between you and your physician/provider. We have found that certain health care needs can be provided without the need for an in-person physical exam.  This service lets us provide the care you need with a video conversation.  If a prescription is necessary we can send it directly to your pharmacy.  If lab work is needed we can place an order for that and you can then stop by our lab to have the test done at a later time.    Video visits are billed at different rates depending on your insurance coverage.  Please reach out to your insurance provider with any questions.    If during the course of the call the physician/provider feels a video visit is not appropriate, you will not be charged for this service.\"    Patient has given verbal consent for Video visit? Yes  How would you like to obtain your AVS? MyChart  If you are dropped from the video visit, the video invite should be resent to: Text to cell phone: [phone]  Will anyone else be joining your video visit? No  {If patient encounters technical issues they should call [phone]      Video-Visit Details    Type of service:  Video Visit    Video Start Time: 4:30 PM  Video End Time: 4:45 PM    Originating Location (pt. Location): Home    Distant Location (provider location):  Offsite (providers home)     Platform used for Video Visit: Runteq    During this virtual visit the patient is located in [place], patient verifies this as the location during the entirety of this visit.       Nutrition Assessment  Reason For Visit:  Raffy Castelan is a 47 year old female presenting today for nutrition follow-up, 1 week s/p 1/10/23 with Dr Ritter.  Patient referred by NOEMI Adams on January 19, 2023.    Anthropometrics  Initial Consult Weight: 290 lbs  Day of Surgery Weight " (1/10/23): 290 lbs  Current Weight: 279 lbs  Weight loss: 11 lbs from initial consult; 11 lbs from day of surgery    Current Vitamins/Minerals: MVI     Nutrition History  Pt reports consuming and tolerating bariatric clear and low-fat full liquid diets. Fluid intake appears adequate, consuming 48-64 oz/day.    Pt doing well- no issues with pain. Struggling mentally with liquid diet but excited to have lost weight.    Nutrition Prescription:  Grams Protein: 60 (minimum)  Amount of Fluid: 48-64 oz      Nutrition Diagnosis  Food and nutrition-related knowledge deficit r/t lack of prior exposure to diet advancements beyond bariatric low-fat full liquid diet aeb recent bariatric surgery and pt interest in diet education/review    Intervention  Intervention At Appointment:  Materials/education provided on bariatric pureed and soft diets, protein intake, fluid intake, eating pace, portion control, avoiding excess sugar and fat, recommended vitamin/mineral supplements. Patient demonstrates understanding.     Expected Engagement: met, continues    Goals:  1) Follow bariatric low-fat full liquid diet through day 13 post-op, then to progress to pureed diet x 2 weeks (1/24/23).  If tolerating, may advance on day 29 post-op to bariatric soft diet (2/8/23).   2) Work towards 60 gm protein/day.  3) Consume 48-64+ oz fluids daily- between meals only once on puree diet  4) Eat slowly (>20 min/meal), chewing well to smooth consistency once on the bariatric soft diet.  5) Limit portions to ~1/4 to 1/2 cup/meal.  6) Start chewable/liquid multivitamin/minerals daily.    Recipe ideas: [email or web address]    Post-op Diet Advancement Schedule:  Clear Liquid Diet (stage 1): 1/09 - 1/10  *No RED/PURPLE, PROTEIN, or PULP day before surgery  Low-Fat Full Liquid Diet (stage 2): 1/11 - 1/23  Pureed Diet (stage 3): 1/24 - 2/7  Soft Diet (stage 4): 2/8-3/8  Regular Diet (stage 5): 3/9    Post-op Diet Handouts:  Diet  Guidelines after Weight-loss Surgery  [email or web address]     Your Stage 1 Diet: Clear Liquids  [email or web address]     Your Stage 2 Diet: Low-fat Full Liquids  [email or web address]     Your Stage 3 Diet: Pureed Foods  [email or web address]     Pureed Recipes  [email or web address]    Your Stage 4 Diet: Soft Foods  [email or web address]    Your Stage 5 Diet: Regular Foods  [email or web address]    Supplements after Sleeve Gastrectomy, Gastric Bypass or Single Anastomosis Duodenal Switch  [email or web address]    Keeping Track of Fluids  [email or web address]    Exercise Guidelines after Weight Loss Surgery (1st 4-6 weeks)  [email or web address]      Follow-Up: 3 weeks or prn    Time spent with patient: 15 minutes.  MARCO A NESS RD, LD

## 2023-01-19 NOTE — PROGRESS NOTES
"Postoperative bariatric surgery visit.    Patient underwent sleeve gastrectomy  weeks ago, on 1/10/2023 with Dr. Ritter     Tolerating liquids: 55-60oz of fluids a day. 40g of protein, but not fully counting. Drinking water, soup, protein shakes, yogurt. Denies dysphagia, odynophagia, nausea, or vomiting. Has not needed zofran  Lightheadedness: No  Abdominal pain: No. Has not needed levsin or tylenol.    Bowel movements: Last BM was this morning. At baseline struggles with constipation, had BM every day. Currently stools are harder. Is take Senna, but has taken less Senna then at baseline.   Fevers/shakes/chills: No  GERD: No Taking omeprazole daily  Leg/calf pain: No  Chest pain/SOB: No  Walking: Active around the house. Around 7000 steps   Work: Going back to work next week. Works from home. No note needed.      Blood Pressure - increase right leg swelling. Is checking BP at home - is around 140/80. This is higher then usual for her. Usually well controlled with the Losartan/hydrochlorothiazide. hydrochlorothiazide stopped after discharge.     Coughing - Waking up at night coughing, but has been using spirometer and this has been helping. History of asthma, has not needed inhalter.     How many opioid pain medications used after surgery? 3 What did you do with extra pills? home  Were any opioid pain medication refills provided after surgery? No  Were any opioid pain medications needed after 30 days postop? No    /78 (BP Location: Right arm, Patient Position: Sitting, Cuff Size: Adult Large)   Pulse 87   Temp 98.3  F (36.8  C) (Oral)   Resp 18   Ht 1.651 m (5' 5\")   Wt 126.9 kg (279 lb 12.8 oz)   LMP 12/12/2022 (Approximate)   SpO2 97%   BMI 46.56 kg/m       Wt Readings from Last 5 Encounters:   01/19/23 126.9 kg (279 lb 12.8 oz)   01/10/23 131.8 kg (290 lb 9.1 oz)   12/22/22 131.1 kg (289 lb)   12/01/22 129.7 kg (286 lb)   10/31/22 129.8 kg (286 lb 3.2 oz)       NAD  Overall looks well.   Incisions " c/d/i; Abdomen not tender to palpation    Pathology   Final Diagnosis   A.  STOMACH, PARTIAL GASTRECTOMY:  -Gastric mucosa with no significant histologic abnormality  -Negative for intestinal metaplasia and dysplasia  -No H. pylori-like organisms identified on routine stain    B. GASTRIC BAND, PORT ,TUBING, 2CM STRAIN RELIEF TUBE, REMOVAL:  -Medical device identified grossly, see below description         Plan:  1. RD visit today.       - Start vitamin supplements per RD directions.      - Advance diet per RD directions.  2. Follow-up: 3 weeks   3. Actigall prescription to be started at 1 month   4. B12 SL or injection - not needed taking bariatric MVI  5. Pathology reviewed   6. Weight loss medications - N/A  7. Need to restart statin - N/A  8. Discussed restrictions of no lifting, pushing pulling >20lbs till 4weeks post op.  9. Discussed no water submersion till incisions are completely healed.  10. Restart Losartan-hydrochlorothiazide 50mg-12.5mg. Continue to monitor Blood pressure at home.   11. Coughing - monitor symptoms. Reach out if worsening or new on sent fever.     ROSS GONZALES PA-C

## 2023-01-19 NOTE — NURSING NOTE
"(   Chief Complaint   Patient presents with     Post-op Visit    )    ( Weight: 279 lb 12.8 oz )  ( Height: 5' 5\" )  ( BMI (Calculated): 46.56 )  (   )  (   )  (   )  (   )  (   )  (   )    ( BP: (!) 156/84 )  (   )  ( Temp: 98.3  F (36.8  C) )  ( Temp src: Oral )  ( Pulse: 87 )  (   )  ( SpO2: 97 % )    (   Patient Active Problem List   Diagnosis     Chronic constipation     CARDIOVASCULAR SCREENING; LDL GOAL LESS THAN 160     Family history of breast cancer     GERD (gastroesophageal reflux disease)     Class 3 severe obesity due to excess calories with serious comorbidity and body mass index (BMI) of 45.0 to 49.9 in adult (H)     Vitamin D deficiency     Psoriasis     Bariatric surgery status     H/O laparoscopic adjustable gastric banding     Diaphragmatic hernia     Hypertension goal BP (blood pressure) < 140/90     Anxiety     Impingement syndrome, shoulder, left     Spontaneous vaginal delivery     Advanced maternal age during pregnancy     37+ weeks gestation completed     Bunion, left     Tailor's bunion of both feet     Morbid obesity (H)    )  (   Current Outpatient Medications   Medication Sig Dispense Refill     albuterol (PROAIR HFA/PROVENTIL HFA/VENTOLIN HFA) 108 (90 Base) MCG/ACT inhaler Inhale 2 puffs into the lungs every 6 hours as needed for shortness of breath / dyspnea or wheezing 8.5 g 3     clobetasol propionate (OLUX) 0.05 % external foam APPLY SPARINGLY TO AFFECTED AREA TWICE DAILY AS NEEDED DO NOT APPLY TO FACE 600 g 3     losartan (COZAAR) 50 MG tablet Take 1 tablet (50 mg) by mouth daily 30 tablet 0     omeprazole (PRILOSEC) 20 MG DR capsule Take 1 capsule (20 mg) by mouth daily 30 capsule 3     senna (SENOKOT) 8.6 MG tablet Take 2 tablets by mouth At Bedtime       senna-docusate (SENOKOT-S/PERICOLACE) 8.6-50 MG tablet Take 2 tablets by mouth daily as needed for constipation (While taking narcotic pain medications.  Stop taking if having loose stools.) 30 tablet 1    )  ( Diabetes Eval:   "  )    ( Pain Eval:  No Pain (0) )    ( Wound Eval:       )    (   History   Smoking Status     Never   Smokeless Tobacco     Never    )    ( Signed By:  Sung Martinez, EMT; January 19, 2023; 10:58 AM )

## 2023-01-19 NOTE — PATIENT INSTRUCTIONS
"Thank you for allowing us the privilege of caring for you. We hope we provided you with the excellent service you deserve.   Please let us know if there is anything else we can do for you so that we can be sure you are completely satisfied with your care experience.    To ensure the quality of our services you may be receiving a patient satisfaction survey from an independent patient satisfaction monitoring company.    The greatest compliment you can give is a \"Likely to Recommend\"    Your visit was with ROSS GONZALES PA-C today.    Instructions per today's visit:     Dayron Castelan, it was great to visit with you today.  Here is a review of our visit.  If our clinic scheduler is not able to reach you please call [phone] to schedule your next appointments.    Plan:  1. RD visit today.       - Start vitamin supplements per RD directions.      - Advance diet per RD directions.  2. Follow-up: 3 weeks   3. Actigall prescription to be started at 1 month   4. B12 SL or injection - not needed taking bariatric MVI  5. Pathology reviewed   6. Weight loss medications - N/A  7. Need to restart statin - N/A  8. Discussed restrictions of no lifting, pushing pulling >20lbs till 4weeks post op.  9. Discussed no water submersion till incisions are completely healed.  10. Restart Losartan-hydrochlorothiazide 50mg-12.5mg. Continue to monitor blood pressure at home.   11. Coughing - monitor symptoms. Reach out if worsening or new on sent fever.       Information about Video Visits with [facility]: video visit information  _________________________________________________________________________________________________________________________________________________________  If you are asked by your clinic team to have your blood pressure checked:  [facility] do offer several locations for blood pressure checks. Please follow the below link to schedule an appointment. Scheduling an appointment at the pharmacy for a " blood pressure check is now preferred.    Appointment Plus ([email or web address])  _________________________________________________________________________________________________________________________________________________________  Important contact and scheduling information:  Please call our contact center at [phone] to schedule your next appointments.  To find a lab location near you, please call [phone].  For any nursing questions or concerns call Abiola Damian LPN at [phone] or Johanne Aguilar RN at [phone]  Please call during clinic hours Monday through Friday 8:00a - 4:00p if you have questions or you can contact us via Zero9hart at anytime and we will reply during clinic hours.    Lab results will be communicated through My Chart or letter (if My Chart not used). Please call the clinic if you have not received communication after 1 week or if you have any questions.?  Clinic Fax: [phone]    _________________________________________________________________________________________________________________________________________________________  Meal Replacement Products:    Here is the link to our new e-store where you can purchase our meal replacement products    [facility] E-Store  [email or web address]    The one week starter kit is a great way to sample a variety of products and see what works for you.    If you want more information about the product go to: [employer]    If you are an employee or [facility] or [facility] please contact your care team for a 10% estore discount    Free Shipping for orders over $75     Benefits of meal replacements products:    Portion and calorie control  Improved nutrition  Structured eating  Simplified food choices  Avoid contact with trigger  foods  _________________________________________________________________________________________________________________________________________________________  Interested in working with a health ?  Health coaches work with you to improve your overall health and wellbeing.  They look at the whole person, and may involve discussion of different areas of life, including, but not limited to the four pillars of health (sleep, exercise, nutrition, and stress management). Discuss with your care team if you would like to start working a health .  Health Coaching-3 Pack: Schedule by calling [phone]    $99 for three health coaching visits    Visits may be done in person or via phone    Coaching is a partnership between the  and the client; Coaches do not prescribe or diagnose    Coaching helps inspire the client to reach his/her personal goals   _________________________________________________________________________________________________________________________________________________________  24 Week Healthy Lifestyle Plan:    Our mission in the 24-week Healthy Lifestyle Plan is to provide you with individualized care by giving you the tools, education and support you need to lose weight and maintain a healthy lifestyle. In your 24-week journey, you ll be supported by a dedicated weight loss team that includes registered dietitians, medical weight management providers, health coaches, and nurses -- all with special expertise in weight loss -- to help you every step of the way.     Monthly meetings with your registered dietician or medical weight management provider help to review your progress, update your care plan, and make any adjustments needed to ensure success. Between these visits, weekly and bi-weekly health  visits will help you focus on the four pillars of weight loss -- stress, sleep, nutrition, and exercise -- and how you can best adapt each to achieve sustainable weight loss  results.    In addition, you will be given exclusive access to online wellbeing classes through [employer].  Your initial visit will be with a medical weight management provider who will help to understand your weight loss goals and ensure this program is the right fit for you. Please let our team know if you are interested in the 24 week plan by sending a message to your care team or calling [phone] to schedule.  _________________________________________________________________________________________________________________________________________________________    COMPREHENSIVE WEIGHT MANAGEMENT PROGRAM  VIRTUAL SUPPORT GROUPS    For Support Group Information:      We offer support groups for patients who are working on weight loss and considering, preparing for or have had weight loss surgery.   There is no cost for this opportunity.  You are invited to attend the?Virtual Support Groups?provided by any of the following locations:    [facility] via [employer] Teams with Marcie Johnson RN  2.   [place] via [employer] Teams with Tono Myrick, PhD, LP  3.   [facility] via [employer] Teams with Anh Jeong RN  4.   [facility] via [employer]a Teams with Anh Black [facility]    The following Support Group information can also be found on our website:  [email or web address]    [facility] Weight Loss Surgery Support Group    [facility] Weight Loss Surgery Support Group  The support group is a patient-lead forum that meets monthly to share experiences, encouragement and education. It is open to those who have had weight loss surgery, are scheduled for surgery, and those who are considering surgery.   WHEN: This group meets on the 3rd Wednesday of each month from 5:00PM - 6:00PM virtually using Microsoft Teams.   FACILITATOR: Led by Marcie Johnson RD, LD, RN, the program's Clinical Coordinator.   TO REGISTER: Please contact the  "clinic via MideoMe or call the nurse line directly at [phone] to inform our staff that you would like an invite sent to you and to let us know the email you would like the invite sent to. Prior to the meeting, a link with directions on how to join the meeting will be sent to you.    2022 Meetings  Lynn 15: \"Let's Talk\" a time for the group to share.  July 20: \"Let's Talk\" a time for the group to share.  August 17: \"Let's Talk\" a time for the group to share.  September 21: \"Let's Talk\" a time for the group to share.  October 19: Guest Speaker: Dr Juan Murillo MD Pulmonologist and Sleep Medicine Physician, \"Getting a Good Night's Sleep\".  November 16: \"Let's Talk\" a time for the group to share.  December 21: \"Let's Talk\" a time for the group to share.    [facility] Support Groups    Connections: Bariatric Care Support Group?  This is open to all [facility] (and those external to this program) pre- and post- operative bariatric surgery patients as well as their support system.   WHEN: This group meets the 2nd Tuesday of each month from 6:30 PM - 8:00 PM virtually using Microsoft Teams.   FACILITATOR: Led by Tono Myrick, Ph.D who is a Licensed Psychologist with the [facility] Comprehensive Weight Management Program.   TO REGISTER: Please send an email to Tono Myrick, Ph.D., LP at?[email or web address]?if you would like an invitation to the group and to learn about using Microsoft Teams.    2022 Meetings  June 14: Queenie Cullen, TAMI, LD at [facility], \"Nutritional Labeling\"  July 12 August 2 (Please Note Date Change)  September 13 October 11 November 8 December 13    Connections: Post-Operative Bariatric Surgery Support Group  This is a support group for [facility] bariatric patients (and those external to [facility]) who have had bariatric surgery and are at least 3 months post-surgery.  WHEN: This support group meets the " "4th Wednesday of the month from 11:00 AM - 12:00 PM virtually using Microsoft Teams.   FACILITATOR: Led by Certified Bariatric Nurse, Anh Jeong RN.   TO REGISTER: Please send an email to Anh at [email or web address] if you would like an invitation to the group and to learn about using Microsoft Teams.    2022 Meetings June 22 July 27 August 24 September 28 October 26 November 23 December 28      [facility] Healthy Lifestyle Virtual Support Group    Healthy Lifestyle Virtual Support Group?  This is 60 minutes of small group guided discussion, support and resources. All are welcome who want a healthy lifestyle.  WHEN: This group meets monthly on a Friday from 12:30 PM - 1:30 PM virtually using Microsoft Teams.   FACILITATOR: Led by National Board Certified Health and , Anh Black [facility].   TO REGISTER: Please send an email to Anh at?[email or web address] to receive monthly invites to the group or if you have any questions about having a health .  Prior to the meeting, a link with directions on how to join the meeting will be sent to you.    2022 Meetings  June 24: Anh Black [facility], \"Setting Limits and Boundaries\".  Jul 29: Open Forum  August 26: Guest Speaker: Cintia Galvez Registered Dietitian  September 30: Open Forum  October 28th: Guest Speaker: Charlotte Mancini [facility], Health , \"Gratitude Practices\".  November 18: Guest Speaker: Ivelisse Singh RD Registered Dietitian, \"Navigating How to Eat around the Holidays\".  December 16: Guest Speaker: Paradise Fuller [facility], \"Changing Your Relationship with Movement\".    ____________________________________________________________________________________________________________________________________________________________________________  [facility] Get Moving Program  Our team of physical therapists is trained to help you understand and take control of your " condition. They will perform a thorough evaluation to determine your ability for activity and develop a customized plan to fit your goals and physical ability.  Scheduling: Unsure if the Get Moving program is right for you? Discuss the program with your medical provider or diabetes educator. You can also call us at [phone] to ask questions or schedule an appointment.   PARAM Get Moving Program  ____________________________________________________________________________________________________________________________________________________________________________  [facility] Diabetes Prevention Program (DPP)  If you have prediabetes and Medicare please contact us via One [employer] Networkshart to learn more about the Diabetes Prevention Program (DPP)  Program Details:  [facility] offers the year-long Diabetes Prevention Program (DPP). The program helps you to make lifestyle changes that prevent or delay type 2 diabetes by supporting healthy eating, increased physical activity, stress reduction and use of coping skills.   On average, previous [facility] DPP cohorts have lost and maintained at least 5% of their starting weight throughout the program and averaged more than 150 minutes of physical activity per week.  Participants meet weekly for one-hour group sessions over sixteen weeks, every other week for the next 8 weeks, and monthly for the last six months.   A year-long maintenance program is also available for participants who complete the first year.   Location & Cost:   During the COVID-19 Public Health Emergency, the program is offered virtually. When in-person classes can resume, they will be held at [facility].  For people with Medicare, the program is covered in full. A self-pay option will also be available for those with non-Medicare insurance plans.    _________________________________________________________________________________________________________________________________________________________  Bluetooth Scale:    We hope to provide you with high quality virtual healthcare visits while social distancing for COVID-19 is necessary, as well as in the future when virtual visits may be more convenient for you.     Our technology team made it possible for Bluetooth scales to send weight measurements to our electronic medical record. This allows weights from you weighing at home to securely flow into the medical record, which will improve telephone and virtual visits.   Additionally, studies have shown that adults actually lose more weight when their weights are automatically sent to someone else, and also that this process is not stressful for those adults.    Below is a link for purchasing the scale, with a discount code for our patients. You may call your insurance company to see if they will reimburse you for the cost of the scale, as a piece of durable medical equipment. The scales only go up to a weight of 400 pounds. This is an issue and we are working with the developer on increasing this. We found no scales that go over 400lb that have blue-tooth for connecting to [employer].    Scale to purchase: the [employer]  Body  Scale: https://[email or web address]/en/body/shop?gclid=[email or web address]&gclsrc=aw.ds    Discount Code: We have a discount code for our patients to bring the cost down to $50, Discount code is: [place]_Scale_20%off  _______________________________________________________________________________________________________________________________________________________________________________    To work with a Behavioral Health Psychologist:    Call to schedule:    Alex Mora - [phone]  James Alcantar - [phone]  Majo Patricio - [phone]  Argenis Cuenca - [phone]   Chio Courtney PhD  (cannot accept Medicare) [phone]        Thank you,   [facility] Comprehensive Weight Management Team

## 2023-01-19 NOTE — LETTER
"1/19/2023       RE: Raffy Castelan  [address]  [place] [address]     Dear Colleague,    Thank you for referring your patient, Raffy Castelan, to the [facility] WEIGHT MANAGEMENT CL[facility]IC [place] [facility]. Please see a copy of my visit note below.    Postoperative bariatric surgery visit.    Patient underwent sleeve gastrectomy  weeks ago, on 1/10/2023 with Dr. Ritter     Tolerating liquids: 55-60oz of fluids a day. 40g of protein, but not fully counting. Drinking water, soup, protein shakes, yogurt. Denies dysphagia, odynophagia, nausea, or vomiting. Has not needed zofran  Lightheadedness: No  Abdominal pain: No. Has not needed levsin or tylenol.    Bowel movements: Last BM was this morning. At baseline struggles with constipation, had BM every day. Currently stools are harder. Is take Senna, but has taken less Senna then at baseline.   Fevers/shakes/chills: No  GERD: No Taking omeprazole daily  Leg/calf pain: No  Chest pain/SOB: No  Walking: Active around the house. Around 7000 steps   Work: Going back to work next week. Works from home. No note needed.      Blood Pressure - increase right leg swelling. Is checking BP at home - is around 140/80. This is higher then usual for her. Usually well controlled with the Losartan/hydrochlorothiazide. hydrochlorothiazide stopped after discharge.     Coughing - Waking up at night coughing, but has been using spirometer and this has been helping. History of asthma, has not needed inhalter.     How many opioid pain medications used after surgery? 3 What did you do with extra pills? home  Were any opioid pain medication refills provided after surgery? No  Were any opioid pain medications needed after 30 days postop? No    /78 (BP Location: Right arm, Patient Position: Sitting, Cuff Size: Adult Large)   Pulse 87   Temp 98.3  F (36.8  C) (Oral)   Resp 18   Ht 1.651 m (5' 5\")   Wt 126.9 kg (279 lb " 12.8 oz)   LMP 12/12/2022 (Approximate)   SpO2 97%   BMI 46.56 kg/m       Wt Readings from Last 5 Encounters:   01/19/23 126.9 kg (279 lb 12.8 oz)   01/10/23 131.8 kg (290 lb 9.1 oz)   12/22/22 131.1 kg (289 lb)   12/01/22 129.7 kg (286 lb)   10/31/22 129.8 kg (286 lb 3.2 oz)       NAD  Overall looks well.   Incisions c/d/i; Abdomen not tender to palpation    Pathology   Final Diagnosis   A.  STOMACH, PARTIAL GASTRECTOMY:  -Gastric mucosa with no significant histologic abnormality  -Negative for intestinal metaplasia and dysplasia  -No H. pylori-like organisms identified on routine stain    B. GASTRIC BAND, PORT ,TUBING, 2CM STRAIN RELIEF TUBE, REMOVAL:  -Medical device identified grossly, see below description       Plan:  1. RD visit today.       - Start vitamin supplements per RD directions.      - Advance diet per RD directions.  2. Follow-up: 3 weeks   3. Actigall prescription to be started at 1 month   4. B12 SL or injection - not needed taking bariatric MVI  5. Pathology reviewed   6. Weight loss medications - N/A  7. Need to restart statin - N/A  8. Discussed restrictions of no lifting, pushing pulling >20lbs till 4weeks post op.  9. Discussed no water submersion till incisions are completely healed.  10. Restart Losartan-hydrochlorothiazide 50mg-12.5mg. Continue to monitor Blood pressure at home.   11. Coughing - monitor symptoms. Reach out if worsening or new on sent fever.       Again, thank you for allowing me to participate in the care of your patient.      Sincerely,    ROSS GONZALES PA-C

## 2023-01-20 NOTE — PATIENT INSTRUCTIONS
Hi Abeer!    Follow-up with RD 1 month post op    Thank you,    Cintia Galvez, RD, LD  If you would like to schedule or reschedule an appointment with the RD, please call [phone]    Nutrition Goals  1) Follow bariatric low-fat full liquid diet through day 13 post-op, then to progress to pureed diet x 2 weeks (1/24/23).  If tolerating, may advance on day 29 post-op to bariatric soft diet (2/8/23).   2) Work towards 60 gm protein/day.  3) Consume 48-64+ oz fluids daily- between meals only once on puree diet  4) Eat slowly (>20 min/meal), chewing well to smooth consistency once on the bariatric soft diet.  5) Limit portions to ~1/4 to 1/2 cup/meal.  6) Start chewable/liquid multivitamin/minerals daily.    Recipe ideas: [email or web address]    Post-op Diet Advancement Schedule:  Clear Liquid Diet (stage 1): 1/09 - 1/10  *No RED/PURPLE, PROTEIN, or PULP day before surgery  Low-Fat Full Liquid Diet (stage 2): 1/11 - 1/23  Pureed Diet (stage 3): 1/24 - 2/7  Soft Diet (stage 4): 2/8-3/8  Regular Diet (stage 5): 3/9    Post-op Diet Handouts:  Diet Guidelines after Weight-loss Surgery  [email or web address]     Your Stage 1 Diet: Clear Liquids  [email or web address]     Your Stage 2 Diet: Low-fat Full Liquids  [email or web address]     Your Stage 3 Diet: Pureed Foods  [email or web address]     Pureed Recipes  [email or web address]    Your Stage 4 Diet: Soft Foods  [email or web address]    Your Stage 5 Diet: Regular Foods  [email or web address]    Supplements after Sleeve Gastrectomy, Gastric Bypass or Single Anastomosis Duodenal Switch  [email or web address]    Keeping Track of Fluids  [email or web address]    Exercise Guidelines after Weight Loss Surgery (1st 4-6 weeks)  [email or web address]      Interested in working with a health ? Health coaches work with you to improve your overall health and  wellbeing. They look at the whole person, and may involve discussion of different areas of life, including, but not limited to the four pillars of health (sleep, exercise, nutrition, and stress management). Discuss with your care team if you would like to start working a health .    Health Coaching-3 Pack:    $99 for three health coaching visits    Visits may be done in person or via phone    Coaching is a partnership between the  and the client; Coaches do not prescribe or diagnose    Coaching helps inspire the client to reach his/her personal goals    COMPREHENSIVE WEIGHT MANAGEMENT PROGRAM  VIRTUAL SUPPORT GROUPS    For Support Group Information:      We offer support groups for patients who are working on weight loss and considering, preparing for or have had weight loss surgery.   There is no cost for this opportunity.  You are invited to attend the?Virtual Support Groups?provided by any of the following locations:    [facility] via Microsoft Teams with Marcie Johnson RN  2.   [place] via [employer] with Tono Myrikc, PhD, LP  3.   [place] via [employer] with Anh Jeong RN  4.   [facility] via Microsoft Teams with Anh Black [facility]    The following Support Group information can also be found on our website:  [email or web address]      [facility] Weight Loss Surgery Support Group    [facility] Weight Loss Surgery Support Group  The support group is a patient-lead forum that meets monthly to share experiences, encouragement and education. It is open to those who have had weight loss surgery, are scheduled for surgery, and those who are considering surgery.   WHEN: This group meets on the 3rd Wednesday of each month from 5:00PM - 6:00PM virtually using Microsoft Teams.   FACILITATOR: Led by Marcie Johnson, RD, LD, RN, the program's Clinical Coordinator.   TO REGISTER: Please contact the clinic via  "Juan or call the nurse line directly at [phone] to inform our staff that you would like an invite sent to you and to let us know the email you would like the invite sent to. Prior to the meeting, a link with directions on how to join the meeting will be sent to you.    2022 Meetings January 19: \"Let's Talk\" a time for the group to share.  February 16: \"Let's Talk\" a time for the group to share.  March 16: Guest Speakers: Psychologists, Gely Weiss, PhD,LP and Gala Frey, PsyD,  April 20: Guest Speaker: Health , Paradise Fuller, [facility],CHES, CPT  May 18: Guest Speaker: Dietitian, Delfino Hughes, RD, LP  Lynn 15: \"Let's Talk\" a time for the group to share.  July 20: \"Let's Talk\" a time for the group to share.  August 17: TBA  September 21: TBA  October 19: Guest Speaker: Dr Juan Murillo MD Pulmonologist and Sleep Medicine Physician, \"Getting a Good Night's Sleep\".  November 16: TBA  December 21: TBA    [facility] Support Groups    Connections: Bariatric Care Support Group?  This is open to all [facility] (and those external to this program) pre- and post- operative bariatric surgery patients as well as their support system.   WHEN: This group meets the 2nd Tuesday of each month from 6:30 PM - 8:00 PM virtually using Microsoft Teams.   FACILITATOR: Led by Tono Myrick, Ph.D who is a Licensed Psychologist with the [facility] Comprehensive Weight Management Program.   TO REGISTER: Please send an email to Tono Myrick, Ph.D., LP at?[email or web address]?if you would like an invitation to the group and to learn about using Microsoft Teams.    2022 Meetings January 11: Nohemi Moy, PharmD, Pharmacy Resident at [facility], \"Medications and Bariatric Surgery\".  February 8: Open Forum  March 8  April 12  May 10  Lynn 14    Connections: Post-Operative Bariatric Surgery Support Group  This is a support group for [facility] bariatric " "patients (and those external to [facility]) who have had bariatric surgery and are at least 3 months post-surgery.  WHEN: This support group meets the 4th Wednesday of the month from 11:00 AM - 12:00 PM virtually using Microsoft Teams.   FACILITATOR: Led by Certified Bariatric Nurse, Anh Jeong RN.   TO REGISTER: Please send an email to Anh at [email or web address] if you would like an invitation to the group and to learn about using Microsoft Teams.    2022 Meetings  January 26  February 23  March 23  April 27  May 25  Lynn 22    [facility] Healthy Lifestyle Virtual Support Group    Healthy Lifestyle Virtual Support Group?  This is 60 minutes of small group guided discussion, support and resources. All are welcome who want a healthy lifestyle.  WHEN: This group meets monthly on a Friday from 12:30 PM - 1:30 PM virtually using Microsoft Teams.   FACILITATOR: Led by National Board Certified Health and , Anh Black [facility].   TO REGISTER: Please send an email to Anh at?[email or web address] to receive monthly invites to the group or if you have any questions about having a health .  Prior to the meeting, a link with directions on how to join the meeting will be sent to you.    2022 Meetings  January 21: Shelly Vieira MS, RN, CIC, CBN, \"Healthy Habits\"  February 25: Open Forum  March 18: \"Setting Limits and Boundaries\"  April 29: Ivelisse Singh RD, \"Meal Planning Made Easy\"  May 20: Open Forum  June: To be determined                "

## 2023-01-24 NOTE — OP NOTE
[facility]    Operative Note    Pre-operative diagnosis: Morbid obesity (H) [E66.01]   Post-operative diagnosis * No post-op diagnosis entered *   Procedure: Procedure(s):  REMOVAL, GASTRIC BAND, ADJUSTABLE, and components,  LAPAROSCOPIC  GASTRECTOMY, SLEEVE, LAPAROSCOPIC   Surgeon: Surgeon(s) and Role:     * Gurinder Ritter MD - Primary   Assistant Surgeon                            Anesthesia: The assistance of Phoebe Ryan NP was required in this case due to advanced laparoscopy technique; she assisted by performing port assistance and providing exposure to patient bedside while I was dissecting.    I attest that no qualified resident or fellow was available to assist for this surgery due adequate training and skills to assist with this technically challenging case and instrumentation; as a consequence, I attest that Phoebe performed these needed skills.        General    Estimated blood loss: Less than 50 ml   Drains: None   Specimens: ID Type Source Tests Collected by Time Destination   1 : partial gastrectomy Tissue Other SURGICAL PATHOLOGY EXAM Gurinder Ritter MD 1/10/2023 10:00 AM    2 : Gastric band, port ,tubing, 2cm strain relief tube, Implant Other SURGICAL PATHOLOGY EXAM Gurinder Ritter MD 1/10/2023 10:02 AM       Findings: None.   Complications: None.   Implants: None.         BOUGIE SIZE: 40 FR  DISTANCE FROM PYLORUS: 10 CM  STAPLE LINE REINFORCEMENT: NO  STAPLE LINE OVERSEW: NO  COMORBIDITIES:   Past Medical History:   Diagnosis Date     Asthma      Constipation      GERD (gastroesophageal reflux disease)      Hypertension      Morbid obesity (H)      Psoriasis        INDICATIONS FOR PROCEDURE  Raffy Castelan is a 47 year old female who is morbidly obese.  Numerous weight loss attempts without surgery have been without success.     After understanding the risks and benefits of proceeding with a laparoscopic vertical sleeve gastrectomy,  "she agreed to an operation as outlined by me.    I reviewed the risks of surgery with Raffy Castelan.    These include, but are not limited to, death, myocardial infarction, pneumonia, urinary tract infection, deep venous thrombosis with or without pulmonary embolus, abdominal infection from bowel injury or abscess, bowel obstruction, wound infection, and bleeding.    More specific risks related to vertical sleeve gastrectomy were detailed at the bariatric informational seminar and include the followin.) leak at the vertical sleeve staple line, 2.) stricture in the sleeve, 3.) nausea, vomiting, and dehydration for several months, 4.) adhesions causing bowel obstruction, 5.) rapid weight loss causing a higher rate of gallstone formation during the first 6 months after surgery, 6.) decreased absorption of vitamins because of the reduced stomach size, 7.) weight regain if inappropriate food intake occurs.    Heightened risk of sleeve leak due to prior lapband also reviewed in some detail.    The BMI that we are treating this patient for was measured at the initial consultation visit in our bariatric program and it was: 47.7 kg/m2 (as calculated just below).      The initial consult height, weight, and BMI are as follows:    Height: 5' 5\"  BMI 47.7 kg/m2    Our weight loss surgery program requires weight loss prior to bariatric surgery and currently the height, weight, and BMI are as follows:    Height: 165.1 cm (5' 5\"), Weight: 131.8 kg (290 lb 9.1 oz), and currently the Body mass index is 48.35 kg/m .    Due to the patient's comorbidity conditions of gerd, hypertension, and asthma, in association with elevated body mass index, bariatric surgery has been recommended and is being performed today.    Moreover, as the surgeon performing this procedure, I certify that the following are true in regards to this patient at or prior to the day of surgery:    1. The patient's body mass index (BMI) is or has been greater " than or equal to 35 kg/m2.  2. The patient has at least one co-morbidity related to obesity (as outlined above).  3. The patient has been previously unsuccessful with medical treatment for obesity.  Please note that some of this information has been documented as part of a comprehensive pre-bariatric surgery process in the outpatient clinic and is NOT immediately available in the inpatient encounter for this bariatric operation.      OPERATIVE PROCEDURE:     Raffy Castelan was brought to the operating room and prepared in routine fashion. Under the benefits of general anesthesia, a left upper quadrant Veress needle was inserted and pneumoperitoneum was established using carbon dioxide gas to a maximum pressure of 15 mmHg. A total of five ports were placed into the abdomen.     A liver retractor was placed through the rightmost port and this provided a view of the upper stomach. The operation was started by dividing the short gastric vessels off the greater curvature of the stomach. This dissection was carried up to the angle of His, and ligasure dissector was used for hemostasis.     Next, the lapband was dissected free of adjacent scar tissue; it was divided with scissors and removed from circumferential pathway.    A bougie (size noted above) was passed into the stomach and I used 5 blue loads of the [employer] [place] flex linear cutter stapler device to create a vertical sleeve gastrectomy with the bougie as a template. The bougie was removed.    The lapband port was also removed via extended incision.  All components of lapband identified and removed completely.    A transabdominal properitoneal anesthetic block was performed using 30 ml 0.5% bupivicaine diluted with 90 ml saline (120 mL total); this was injected using 22gauge spinal needle into the properitoneal planes around the ports and laterally along the anterior axillary line under direct optical guidance. Some of the mixture was used to inject local  anesthetic at the skin of each incision as well.    The sleeve gastrectomy specimen (partial gastrectomy) was now removed from the abdomen through the 15 mm port.    Hemostasis was secured, and the liver retractor and all ports were removed from the abdomen under direct visualization.     All needle and sponge counts were correct x2 at the end of the operation, and I was present for all critical components of the procedure.     Skin incisions were closed using skin staples, and sterile dressings were placed.     Gurinder Ritter MD  Surgery  [phone] (hospital )  [phone] (clinic nurses)

## 2023-02-06 ENCOUNTER — VIRTUAL VISIT (OUTPATIENT)
Dept: ENDOCRINOLOGY | Facility: CLINIC | Age: 48
End: 2023-02-06
Payer: COMMERCIAL

## 2023-02-06 DIAGNOSIS — Z71.3 NUTRITIONAL COUNSELING: Primary | ICD-10-CM

## 2023-02-06 DIAGNOSIS — Z98.84 S/P LAPAROSCOPIC SLEEVE GASTRECTOMY: ICD-10-CM

## 2023-02-06 DIAGNOSIS — E66.9 OBESITY: ICD-10-CM

## 2023-02-06 PROCEDURE — 97803 MED NUTRITION INDIV SUBSEQ: CPT | Mod: 95 | Performed by: DIETITIAN, REGISTERED

## 2023-02-06 NOTE — LETTER
"2/6/2023       RE: Raff[facility] Castelan  [address] [facility] [address]     Dear Colleague,    Thank you for referring your patient, Raffy Castelan, to the [facility]NAGEMENT [facility] [place] [facility]. Please see a copy of my visit note below.    Raffy Castelan is a 47 year old female who is being evaluated via a billable video visit.      The patient has been notified of following:     \"This video visit will be conducted via a call between you and your physician/provider. We have found that certain health care needs can be provided without the need for an in-person physical exam.  This service lets us provide the care you need with a video conversation.  If a prescription is necessary we can send it directly to your pharmacy.  If lab work is needed we can place an order for that and you can then stop by our lab to have the test done at a later time.    Video visits are billed at different rates depending on your insurance coverage.  Please reach out to your insurance provider with any questions.    If during the course of the call the physician/provider feels a video visit is not appropriate, you will not be charged for this service.\"    Patient has given verbal consent for Video visit? Yes  How would you like to obtain your AVS? MyChart  If you are dropped from the video visit, the video invite should be resent to: Text to cell phone: [phone]  Will anyone else be joining your video visit? No  {If patient encounters technical issues they should call [phone]      Video-Visit Details    Type of service:  Video Visit    Video Start Time: 4:30 PM  Video End Time: 4:45 PM    Originating Location (pt. Location): Home    Distant Location (provider location):  Offsite (providers home)     Platform used for Video Visit: Lumiy    During this virtual visit the patient is located in [place], patient verifies this as the location during the " entirety of this visit.       Nutrition Reassessment  Reason For Visit:  Raffy Castelan is a 47 year old female presenting today for nutrition follow-up, 1 month s/p 1/10/23.    Anthropometrics:  Initial Consult Weight: 290 lbs  Day of Surgery Weight (1/10/23): 290 lbs  Current Weight: 269 lbs  Weight loss: -21 lbs from initial consult; -21 lbs from day of surgery    Current Vitamins/Minerals: MVI/minerals BID - Celebrate 45 with extra Iron     Nutrition History:  Pt has been tolerating soft foods well- has not noticed any foods that cause issues. Had a few days where she was very busy and struggled to eat slowly and noticed some stomach cramps. Traveling next week for work- concerned about foods that will be available.     Progress with Previous Goals:  1) Follow bariatric low-fat full liquid diet through day 13 post-op, then to progress to pureed diet x 2 weeks.  If tolerating, may advance on day 29 post-op to bariatric soft diet. Met, continues   2) Work towards 60 gm protein/day. Met, continues   3) Consume 48-64+ oz fluids daily- between meals. Met, continues   4) Eat slowly (>20 min/meal), chewing well to smooth consistency once on the bariatric soft diet. Met, continues   5) Limit portions to ~1/2 cup/meal. Met, continues   6) Start chewable/liquid multivitamin/minerals daily met, continues     Nutrition Prescription:  Grams Protein: 60 (minimum)   Amount of Fluid: 48-64 oz    Nutrition Diagnosis  Previous: Food and nutrition-related knowledge deficit r/t lack of prior exposure to diet advancements beyond bariatric low-fat full liquid diet aeb recent bariatric surgery and pt interest in diet education/review     Current: Food and nutrition-related knowledge deficit r/t lack of prior exposure to diet advancements beyond bariatric pureed diet aeb recent bariatric surgery and pt interest in diet education/review     Intervention  Materials/Education provided on bariatric soft and regular consistency diets,  protein intake, fluid intake, eating pace, chewing foods well, portion control, sugar/fat intake, recommended vitamin/mineral supplements. Patient demonstrates understanding.     Expected Engagement: good     Goals:  1) Follow soft diet for 4 weeks, then to progress to bariatric regular diet.   2) Consume 60+ grams of protein/day.  3) Sip on 48-64+ oz of fluids/day- between meals only.  4) Eat slowly (>20 min/meal), chewing foods well (to applesauce-like consistency).  5) Limit portions to ~1/2 cup/meal until 3 months post op  6) Schedule 3 month provider/RD appointments  7) Get 3 month labs done before next appointments   8) Take vitamins/minerals as recommended    Post-op Diet Handouts:  Diet Guidelines after Weight-loss Surgery  [email or web address]     Your Stage 4 Diet: Soft Foods  [email or web address]    Your Stage 5 Diet: Regular Foods  [email or web address]    Supplements after Sleeve Gastrectomy, Gastric Bypass or Single Anastomosis Duodenal Switch  [email or web address]    Keeping Track of Fluids  [email or web address]    Exercises after Weight Loss Surgery (strengthening, when no weight lifting restrictions)  [email or web address]         Follow-Up: 3 months post op/prn    Time spent with patient: 15 minutes.  TAMI SMITH

## 2023-02-06 NOTE — PROGRESS NOTES
"Raffy Castelan is a 47 year old female who is being evaluated via a billable video visit.      The patient has been notified of following:     \"This video visit will be conducted via a call between you and your physician/provider. We have found that certain health care needs can be provided without the need for an in-person physical exam.  This service lets us provide the care you need with a video conversation.  If a prescription is necessary we can send it directly to your pharmacy.  If lab work is needed we can place an order for that and you can then stop by our lab to have the test done at a later time.    Video visits are billed at different rates depending on your insurance coverage.  Please reach out to your insurance provider with any questions.    If during the course of the call the physician/provider feels a video visit is not appropriate, you will not be charged for this service.\"    Patient has given verbal consent for Video visit? Yes  How would you like to obtain your AVS? MyChart  If you are dropped from the video visit, the video invite should be resent to: Text to cell phone: [phone]  Will anyone else be joining your video visit? No  {If patient encounters technical issues they should call [phone]      Video-Visit Details    Type of service:  Video Visit    Video Start Time: 4:30 PM  Video End Time: 4:45 PM    Originating Location (pt. Location): Home    Distant Location (provider location):  Offsite (providers home)     Platform used for Video Visit: [employer]    During this virtual visit the patient is located in [place], patient verifies this as the location during the entirety of this visit.       Nutrition Reassessment  Reason For Visit:  Raffy Castelan is a 47 year old female presenting today for nutrition follow-up, 1 month s/p 1/10/23.    Anthropometrics:  Initial Consult Weight: 290 lbs  Day of Surgery Weight (1/10/23): 290 lbs  Current Weight: 269 lbs  Weight loss: -21 lbs from initial " consult; -21 lbs from day of surgery    Current Vitamins/Minerals: MVI/minerals BID - Celebrate 45 with extra Iron     Nutrition History:  Pt has been tolerating soft foods well- has not noticed any foods that cause issues. Had a few days where she was very busy and struggled to eat slowly and noticed some stomach cramps. Traveling next week for work- concerned about foods that will be available.     Progress with Previous Goals:  1) Follow bariatric low-fat full liquid diet through day 13 post-op, then to progress to pureed diet x 2 weeks.  If tolerating, may advance on day 29 post-op to bariatric soft diet. Met, continues   2) Work towards 60 gm protein/day. Met, continues   3) Consume 48-64+ oz fluids daily- between meals. Met, continues   4) Eat slowly (>20 min/meal), chewing well to smooth consistency once on the bariatric soft diet. Met, continues   5) Limit portions to ~1/2 cup/meal. Met, continues   6) Start chewable/liquid multivitamin/minerals daily met, continues     Nutrition Prescription:  Grams Protein: 60 (minimum)   Amount of Fluid: 48-64 oz    Nutrition Diagnosis  Previous: Food and nutrition-related knowledge deficit r/t lack of prior exposure to diet advancements beyond bariatric low-fat full liquid diet aeb recent bariatric surgery and pt interest in diet education/review     Current: Food and nutrition-related knowledge deficit r/t lack of prior exposure to diet advancements beyond bariatric pureed diet aeb recent bariatric surgery and pt interest in diet education/review     Intervention  Materials/Education provided on bariatric soft and regular consistency diets, protein intake, fluid intake, eating pace, chewing foods well, portion control, sugar/fat intake, recommended vitamin/mineral supplements. Patient demonstrates understanding.     Expected Engagement: good     Goals:  1) Follow soft diet for 4 weeks, then to progress to bariatric regular diet.   2) Consume 60+ grams of  protein/day.  3) Sip on 48-64+ oz of fluids/day- between meals only.  4) Eat slowly (>20 min/meal), chewing foods well (to applesauce-like consistency).  5) Limit portions to ~1/2 cup/meal until 3 months post op  6) Schedule 3 month provider/RD appointments  7) Get 3 month labs done before next appointments   8) Take vitamins/minerals as recommended    Post-op Diet Handouts:  Diet Guidelines after Weight-loss Surgery  [email or web address]     Your Stage 4 Diet: Soft Foods  [email or web address]    Your Stage 5 Diet: Regular Foods  [email or web address]    Supplements after Sleeve Gastrectomy, Gastric Bypass or Single Anastomosis Duodenal Switch  [email or web address]    Keeping Track of Fluids  [email or web address]    Exercises after Weight Loss Surgery (strengthening, when no weight lifting restrictions)  [email or web address]         Follow-Up: 3 months post op/prn    Time spent with patient: 15 minutes.  MARCO A NESS RD LD

## 2023-02-07 NOTE — PATIENT INSTRUCTIONS
Hi Abeer!    Follow-up with RD in 2 months    Thank you,    Cintia Galvez, RD, LD  If you would like to schedule or reschedule an appointment with the RD, please call [phone]    Nutrition Goals  1) Follow soft diet for 4 weeks, then to progress to bariatric regular diet.   2) Consume 60+ grams of protein/day.  3) Sip on 48-64+ oz of fluids/day- between meals only.  4) Eat slowly (>20 min/meal), chewing foods well (to applesauce-like consistency).  5) Limit portions to ~1/2 cup/meal until 3 months post op  6) Schedule 3 month provider/RD appointments  7) Get 3 month labs done before next appointments   8) Take vitamins/minerals as recommended    Post-op Diet Handouts:  Diet Guidelines after Weight-loss Surgery  [email or web address]     Your Stage 4 Diet: Soft Foods  [email or web address]    Your Stage 5 Diet: Regular Foods  [email or web address]    Supplements after Sleeve Gastrectomy, Gastric Bypass or Single Anastomosis Duodenal Switch  [email or web address]    Keeping Track of Fluids  [email or web address]    Exercises after Weight Loss Surgery (strengthening, when no weight lifting restrictions)  [email or web address]      Interested in working with a health ? Health coaches work with you to improve your overall health and wellbeing. They look at the whole person, and may involve discussion of different areas of life, including, but not limited to the four pillars of health (sleep, exercise, nutrition, and stress management). Discuss with your care team if you would like to start working a health .    Health Coaching-3 Pack:    $99 for three health coaching visits    Visits may be done in person or via phone    Coaching is a partnership between the  and the client; Coaches do not prescribe or diagnose    Coaching helps inspire the client to reach his/her personal goals    COMPREHENSIVE WEIGHT MANAGEMENT PROGRAM  VIRTUAL SUPPORT  "GROUPS    For Support Group Information:      We offer support groups for patients who are working on weight loss and considering, preparing for or have had weight loss surgery.   There is no cost for this opportunity.  You are invited to attend the?Virtual Support Groups?provided by any of the following locations:    [facility] via Microsoft Teams with Marcie Johnson RN  2.   [place] via [employer] with Tono Myrick, PhD, LP  3.   [place] via [employer] with Anh Jeong RN  4.   [facility] via [employer] Teams with Anh Black [facility]    The following Support Group information can also be found on our website:  [email or web address]      [facility] Weight Loss Surgery Support Group    [facility] Weight Loss Surgery Support Group  The support group is a patient-lead forum that meets monthly to share experiences, encouragement and education. It is open to those who have had weight loss surgery, are scheduled for surgery, and those who are considering surgery.   WHEN: This group meets on the 3rd Wednesday of each month from 5:00PM - 6:00PM virtually using Microsoft Teams.   FACILITATOR: Led by Marcie Johnson RD, MAICOL, RN, the program's Clinical Coordinator.   TO REGISTER: Please contact the clinic via [employer] or call the nurse line directly at [phone] to inform our staff that you would like an invite sent to you and to let us know the email you would like the invite sent to. Prior to the meeting, a link with directions on how to join the meeting will be sent to you.    2022 Meetings  January 19: \"Let's Talk\" a time for the group to share.  February 16: \"Let's Talk\" a time for the group to share.  March 16: Guest Speakers: Psychologists, Gely Weiss, PhD,LP and Gala Frey PsyD,LP  April 20: Guest Speaker: Health , Paradise Fuller, CH,CHES, CPT  May 18: Guest Speaker: Dietitian, Delfino Hughes, TAMI, LP  June " "15: \"Let's Talk\" a time for the group to share.  July 20: \"Let's Talk\" a time for the group to share.  August 17: TBA  September 21: TBA  October 19: Guest Speaker: Dr Juan Murillo MD Pulmonologist and Sleep Medicine Physician, \"Getting a Good Night's Sleep\".  November 16: TBA  December 21: TBA    [facility] Support Groups    Connections: Bariatric Care Support Group?  This is open to all [facility] (and those external to this program) pre- and post- operative bariatric surgery patients as well as their support system.   WHEN: This group meets the 2nd Tuesday of each month from 6:30 PM - 8:00 PM virtually using Microsoft Teams.   FACILITATOR: Led by Tono Myrick, Ph.D who is a Licensed Psychologist with the [facility] Comprehensive Weight Management Program.   TO REGISTER: Please send an email to Tono Myrick, Ph.D., LP at?[email or web address]?if you would like an invitation to the group and to learn about using Microsoft Teams.    2022 Meetings  January 11: Nohemi Moy, PharmD, Pharmacy Resident at [facility], \"Medications and Bariatric Surgery\".  February 8: Open Forum  March 8  April 12  May 10  Lynn 14    Connections: Post-Operative Bariatric Surgery Support Group  This is a support group for [facility] bariatric patients (and those external to [facility]) who have had bariatric surgery and are at least 3 months post-surgery.  WHEN: This support group meets the 4th Wednesday of the month from 11:00 AM - 12:00 PM virtually using Microsoft Teams.   FACILITATOR: Led by Certified Bariatric Nurse, Anh Jeong RN.   TO REGISTER: Please send an email to Anh at [email or web address] if you would like an invitation to the group and to learn about using Microsoft Teams.    2022 Meetings  January 26  February 23  March 23  April 27  May 25  Lynn 22    [facility] Healthy Lifestyle Virtual " "Support Group    Healthy Lifestyle Virtual Support Group?  This is 60 minutes of small group guided discussion, support and resources. All are welcome who want a healthy lifestyle.  WHEN: This group meets monthly on a Friday from 12:30 PM - 1:30 PM virtually using Microsoft Teams.   FACILITATOR: Led by National Board Certified Health and , Anh Black [facility].   TO REGISTER: Please send an email to Anh at?[email or web address] to receive monthly invites to the group or if you have any questions about having a health .  Prior to the meeting, a link with directions on how to join the meeting will be sent to you.    2022 Meetings  January 21: Shelly Vieira MS, RN, CIC, CBN, \"Healthy Habits\"  February 25: Open Forum  March 18: \"Setting Limits and Boundaries\"  April 29: Ivelisse Singh RD, \"Meal Planning Made Easy\"  May 20: Open Forum  June: To be determined                "

## 2023-02-10 ENCOUNTER — VIRTUAL VISIT (OUTPATIENT)
Dept: ENDOCRINOLOGY | Facility: CLINIC | Age: 48
End: 2023-02-10
Payer: COMMERCIAL

## 2023-02-10 VITALS — WEIGHT: 268 LBS | HEIGHT: 65 IN | BODY MASS INDEX: 44.65 KG/M2

## 2023-02-10 DIAGNOSIS — Z98.84 S/P LAPAROSCOPIC SLEEVE GASTRECTOMY: Primary | ICD-10-CM

## 2023-02-10 DIAGNOSIS — Z91.89 AT HIGH RISK FOR POSTOPERATIVE COMPLICATIONS: ICD-10-CM

## 2023-02-10 DIAGNOSIS — E66.01 MORBID OBESITY (H): ICD-10-CM

## 2023-02-10 PROCEDURE — 99024 POSTOP FOLLOW-UP VISIT: CPT | Mod: VID

## 2023-02-10 ASSESSMENT — PAIN SCALES - GENERAL: PAINLEVEL: NO PAIN (0)

## 2023-02-10 NOTE — PROGRESS NOTES
"Raffy is a 47 year old who is being evaluated via a billable video visit.      How would you like to obtain your AVS? MyChart  If the video visit is dropped, the invitation should be resent by: Text to cell phone: [phone]  Will anyone else be joining your video visit? No        During this virtual visit the patient is located in [place], patient verifies this as the location during the entirety of this visit.     Video-Visit Details    Type of service:  Video Visit     Originating Location (pt. Location): Home    Distant Location (provider location):  Off-site  Platform used for Video Visit: [facility]      Postoperative bariatric surgery visit.    Patient underwent sleeve gastrectomy 5 weeks ago, on 1/10/2023 with Dr. Ritter.      Tolerating liquids: 48-64oz fluids daily. 60-100g of protein daily. Tracking calories in general. Tolerating transition to soft foods without concerns. Has been working on listening to hunger signals. Denies dysphagia, nausea, or vomiting.   Lightheadedness: No  Abdominal pain: Will have abdominal pain after over eating.   Bowel movements: Regular  Fevers/shakes/chills: No  GERD: symptoms of acid reflux. Worse at night Taking omeprazole 20mg twice a day. Has to sleep at a slight incline to help.   Leg/calf pain: No  Chest pain/SOB: No  Walking: Active around the house.   Work: working full time without concerns. Is traveling next week for a work conference.     Fatigue - still getting tired after prolonged standing.     BP - swelling have improved. /73. Still taking normal BP medications     Cough - gone     How many opioid pain medications used after surgery? 3 What did you do with extra pills? Home  Were any opioid pain medication refills provided after surgery? No  Were any opioid pain medications needed after 30 days postop? No    Ht 1.651 m (5' 5\")   Wt 121.6 kg (268 lb)   LMP 12/12/2022 (Approximate)   BMI 44.60 kg/m     Wt Readings from Last 5 Encounters:   02/10/23 121.6 kg " (268 lb)   01/19/23 126.9 kg (279 lb 12.8 oz)   01/10/23 131.8 kg (290 lb 9.1 oz)   12/22/22 131.1 kg (289 lb)   12/01/22 129.7 kg (286 lb)         GENERAL: Healthy, alert and no distress  EYES: Eyes grossly normal to inspection.  No discharge or erythema, or obvious scleral/conjunctival abnormalities.  RESP: No audible wheeze, cough, or visible cyanosis.  No visible retractions or increased work of breathing.    SKIN: Visible skin clear. No significant rash, abnormal pigmentation or lesions.  NEURO: Cranial nerves grossly intact.  Mentation and speech appropriate for age.  PSYCH: Mentation appears normal, affect normal/bright, judgement and insight intact, normal speech and appearance well-groomed.      Plan:  1. RD visit two days ago.       - Start vitamin supplements per RD directions.      - Advance diet per RD directions.  2. Follow-up: 2 months  3. Actigall prescription - not covered by insurance, not started   4. B12 SL or injection - not needed, taking bariatric MVI  6. Weight loss medications - N/A  7. Need to restart statin - N/A  8. Take Omeprazole 40mg at night for more GERD relief at night. Refill sent   9. Fatigue - discussed improvement with time. Increase fluids and protein. Will continue to monitor   10. Travel - discussed continue to follow diet guidelines when traveling, and getting up and walking through airplane to continue to prevent DVTs.   11. Labs ordered for 3 month post op visit. To be collected prior.     ROSS GONZALES PA-C

## 2023-02-10 NOTE — PROGRESS NOTES
"Raffy is a 47 year old who is being evaluated via a billable video visit.      How would you like to obtain your AVS? MyChart  If the video visit is dropped, the invitation should be resent by: Text to cell phone: [phone]  Will anyone else be joining your video visit? No  {If patient encounters technical issues they should call [phone] :[id number]}      During this virtual visit the patient is located in [place], patient verifies this as the location during the entirety of this visit.     Video-Visit Details    Type of service:  Video Visit     Originating Location (pt. Location): {video visit patient location:196171::\"Home\"}  {PROVIDER LOCATION On-site should be selected for visits conducted from your clinic location or adjoining [facility] hospital, academic office, or other nearby [facility] building. Off-site should be selected for all other provider locations, including home:[id number]}  Distant Location (provider location):  {virtual location provider:782312}  Platform used for Video Visit: {Virtual Visit Platforms:320844::\"[employer]\"}    "

## 2023-02-10 NOTE — LETTER
2/10/2023       RE: Raffy Castelan  [address]  [place] [address]     Dear Colleague,    Thank you for referring your patient, Raffy Castelan, to the [facility] [place] [facility]. Please see a copy of my visit note below.    Raffy is a 47 year old who is being evaluated via a billable video visit.      How would you like to obtain your AVS? MyChart  If the video visit is dropped, the invitation should be resent by: Text to cell phone: [phone]  Will anyone else be joining your video visit? No        During this virtual visit the patient is located in [place], patient verifies this as the location during the entirety of this visit.     Video-Visit Details    Type of service:  Video Visit     Originating Location (pt. Location): Home    Distant Location (provider location):  Off-site  Platform used for Video Visit: [facility]      Postoperative bariatric surgery visit.    Patient underwent sleeve gastrectomy 5 weeks ago, on 1/10/2023 with Dr. Ritter.      Tolerating liquids: 48-64oz fluids daily. 60-100g of protein daily. Tracking calories in general. Tolerating transition to soft foods without concerns. Has been working on listening to hunger signals. Denies dysphagia, nausea, or vomiting.   Lightheadedness: No  Abdominal pain: Will have abdominal pain after over eating.   Bowel movements: Regular  Fevers/shakes/chills: No  GERD: symptoms of acid reflux. Worse at night Taking omeprazole 20mg twice a day. Has to sleep at a slight incline to help.   Leg/calf pain: No  Chest pain/SOB: No  Walking: Active around the house.   Work: working full time without concerns. Is traveling next week for a work conference.     Fatigue - still getting tired after prolonged standing.     BP - swelling have improved. /73. Still taking normal BP medications     Cough - gone     How many opioid pain medications used after surgery? 3 What  "did you do with extra pills? Home  Were any opioid pain medication refills provided after surgery? No  Were any opioid pain medications needed after 30 days postop? No    Ht 1.651 m (5' 5\")   Wt 121.6 kg (268 lb)   LMP 12/12/2022 (Approximate)   BMI 44.60 kg/m     Wt Readings from Last 5 Encounters:   02/10/23 121.6 kg (268 lb)   01/19/23 126.9 kg (279 lb 12.8 oz)   01/10/23 131.8 kg (290 lb 9.1 oz)   12/22/22 131.1 kg (289 lb)   12/01/22 129.7 kg (286 lb)         GENERAL: Healthy, alert and no distress  EYES: Eyes grossly normal to inspection.  No discharge or erythema, or obvious scleral/conjunctival abnormalities.  RESP: No audible wheeze, cough, or visible cyanosis.  No visible retractions or increased work of breathing.    SKIN: Visible skin clear. No significant rash, abnormal pigmentation or lesions.  NEURO: Cranial nerves grossly intact.  Mentation and speech appropriate for age.  PSYCH: Mentation appears normal, affect normal/bright, judgement and insight intact, normal speech and appearance well-groomed.      Plan:  1. RD visit two days ago.       - Start vitamin supplements per RD directions.      - Advance diet per RD directions.  2. Follow-up: 2 months  3. Actigall prescription - not covered by insurance, not started   4. B12 SL or injection - not needed, taking bariatric MVI  6. Weight loss medications - N/A  7. Need to restart statin - N/A  8. Take Omeprazole 40mg at night for more GERD relief at night. Refill sent   9. Fatigue - discussed improvement with time. Increase fluids and protein. Will continue to monitor   10. Travel - discussed continue to follow diet guidelines when traveling, and getting up and walking through airplane to continue to prevent DVTs.   11. Labs ordered for 3 month post op visit. To be collected prior.     ROSS GONZALES PA-C       "

## 2023-02-10 NOTE — NURSING NOTE
"Chief Complaint   Patient presents with     Follow Up     1 month post-op       Vitals:    02/10/23 1018   Weight: 121.6 kg (268 lb)   Height: 1.651 m (5' 5\")       Body mass index is 44.6 kg/m .                              "

## 2023-02-13 ENCOUNTER — MYC MEDICAL ADVICE (OUTPATIENT)
Dept: ENDOCRINOLOGY | Facility: CLINIC | Age: 48
End: 2023-02-13
Payer: COMMERCIAL

## 2023-02-13 DIAGNOSIS — Z91.89 AT HIGH RISK FOR POSTOPERATIVE COMPLICATIONS: ICD-10-CM

## 2023-02-13 DIAGNOSIS — E66.01 MORBID OBESITY (H): Primary | ICD-10-CM

## 2023-02-13 DIAGNOSIS — K21.9 GERD (GASTROESOPHAGEAL REFLUX DISEASE): ICD-10-CM

## 2023-02-14 NOTE — PATIENT INSTRUCTIONS
"Thank you for allowing us the privilege of caring for you. We hope we provided you with the excellent service you deserve.   Please let us know if there is anything else we can do for you so that we can be sure you are completely satisfied with your care experience.    To ensure the quality of our services you may be receiving a patient satisfaction survey from an independent patient satisfaction monitoring company.    The greatest compliment you can give is a \"Likely to Recommend\"    Your visit was with ROSS GONZALES PA-C today.    Instructions per today's visit:     Dayron Castelan, it was great to visit with you today.  Here is a review of our visit.  If our clinic scheduler is not able to reach you please call [phone] to schedule your next appointments.    Plan:  1. RD visit two days ago.       - Start vitamin supplements per RD directions.      - Advance diet per RD directions.  2. Follow-up: 2 months  3. Actigall prescription - not covered by insurance, not started   4. B12 SL or injection - not needed, taking bariatric MVI  6. Weight loss medications - N/A  7. Need to restart statin - N/A  8. Take Omeprazole 40mg at night for more GERD relief at night   9. Fatigue - discussed improvement with time. Increase fluids and protein. Will continue to monitor   10. Travel - discussed continue to follow diet guidelines when traveling, and getting up and walking through airplane to continue to prevent DVTs.   11. Labs ordered for 3 month post op visit. To be collected prior.       Information about Video Visits with [facility]: video visit information  _________________________________________________________________________________________________________________________________________________________  If you are asked by your clinic team to have your blood pressure checked:  [facility] do offer several locations for blood pressure checks. Please follow the below link to schedule an appointment. " Scheduling an appointment at the pharmacy for a blood pressure check is now preferred.    Appointment Plus ([email or web address])  _________________________________________________________________________________________________________________________________________________________  Important contact and scheduling information:  Please call our contact center at [phone] to schedule your next appointments.  To find a lab location near you, please call [phone].  For any nursing questions or concerns call bAiola Damian LPN at [phone] or Johanne Aguilar RN at [phone]  Please call during clinic hours Monday through Friday 8:00a - 4:00p if you have questions or you can contact us via Tellagencehart at anytime and we will reply during clinic hours.    Lab results will be communicated through My Chart or letter (if My Chart not used). Please call the clinic if you have not received communication after 1 week or if you have any questions.?  Clinic Fax: [phone]    _________________________________________________________________________________________________________________________________________________________  Meal Replacement Products:    Here is the link to our new e-store where you can purchase our meal replacement products    [facility] E-Store  [email or web address].Cook [email or web address] Eat/store    The one week starter kit is a great way to sample a variety of products and see what works for you.    If you want more information about the product go to: Fresh Job App Plus    If you are an employee or [facility] or [facility] please contact your care team for a 10% estore discount    Free Shipping for orders over $75     Benefits of meal replacements products:    Portion and calorie control  Improved nutrition  Structured eating  Simplified food choices  Avoid contact with trigger  foods  _________________________________________________________________________________________________________________________________________________________  Interested in working with a health ?  Health coaches work with you to improve your overall health and wellbeing.  They look at the whole person, and may involve discussion of different areas of life, including, but not limited to the four pillars of health (sleep, exercise, nutrition, and stress management). Discuss with your care team if you would like to start working a health .  Health Coaching-3 Pack: Schedule by calling [phone]    $99 for three health coaching visits    Visits may be done in person or via phone    Coaching is a partnership between the  and the client; Coaches do not prescribe or diagnose    Coaching helps inspire the client to reach his/her personal goals   _________________________________________________________________________________________________________________________________________________________  24 Week Healthy Lifestyle Plan:    Our mission in the 24-week Healthy Lifestyle Plan is to provide you with individualized care by giving you the tools, education and support you need to lose weight and maintain a healthy lifestyle. In your 24-week journey, you ll be supported by a dedicated weight loss team that includes registered dietitians, medical weight management providers, health coaches, and nurses -- all with special expertise in weight loss -- to help you every step of the way.     Monthly meetings with your registered dietician or medical weight management provider help to review your progress, update your care plan, and make any adjustments needed to ensure success. Between these visits, weekly and bi-weekly health  visits will help you focus on the four pillars of weight loss -- stress, sleep, nutrition, and exercise -- and how you can best adapt each to achieve sustainable weight loss  results.    In addition, you will be given exclusive access to online wellbeing classes through [email or web address].  Your initial visit will be with a medical weight management provider who will help to understand your weight loss goals and ensure this program is the right fit for you. Please let our team know if you are interested in the 24 week plan by sending a message to your care team or calling [phone] to schedule.  _________________________________________________________________________________________________________________________________________________________    COMPREHENSIVE WEIGHT MANAGEMENT PROGRAM  VIRTUAL SUPPORT GROUPS    For Support Group Information:      We offer support groups for patients who are working on weight loss and considering, preparing for or have had weight loss surgery.   There is no cost for this opportunity.  You are invited to attend the?Virtual Support Groups?provided by any of the following locations:    [facility] via FSI Teams with Marcie Johnson RN  2.   [place] via FSI Teams with Tono Myrick, PhD, LP  3.   [place] via FSI Teams with Anh Jeong RN  4.   [facility] via FSI Teams with Anh Black [facility]    The following Support Group information can also be found on our website:  [email or web address]    [facility] Weight Loss Surgery Support Group    [facility] Weight Loss Surgery Support Group  The support group is a patient-lead forum that meets monthly to share experiences, encouragement and education. It is open to those who have had weight loss surgery, are scheduled for surgery, and those who are considering surgery.   WHEN: This group meets on the 3rd Wednesday of each month from 5:00PM - 6:00PM virtually using Microsoft Teams.   FACILITATOR: Led by Marcie Johnson RD, LD, RN, the program's Clinical Coordinator.   TO REGISTER: Please contact the  "clinic via [email or web address] or call the nurse line directly at [phone] to inform our staff that you would like an invite sent to you and to let us know the email you would like the invite sent to. Prior to the meeting, a link with directions on how to join the meeting will be sent to you.    2022 Meetings  Lynn 15: \"Let's Talk\" a time for the group to share.  July 20: \"Let's Talk\" a time for the group to share.  August 17: \"Let's Talk\" a time for the group to share.  September 21: \"Let's Talk\" a time for the group to share.  October 19: Guest Speaker: Dr Juan Murillo MD Pulmonologist and Sleep Medicine Physician, \"Getting a Good Night's Sleep\".  November 16: \"Let's Talk\" a time for the group to share.  December 21: \"Let's Talk\" a time for the group to share.    [facility] Support Groups    Connections: Bariatric Care Support Group?  This is open to all [facility] (and those external to this program) pre- and post- operative bariatric surgery patients as well as their support system.   WHEN: This group meets the 2nd Tuesday of each month from 6:30 PM - 8:00 PM virtually using Microsoft Teams.   FACILITATOR: Led by Tono Myrick, Ph.D who is a Licensed Psychologist with the [facility] Comprehensive Weight Management Program.   TO REGISTER: Please send an email to Tono Myrick, Ph.D., LP at?[email or web address]?if you would like an invitation to the group and to learn about using Microsoft Teams.    2022 Meetings  June 14: Queenie Cullen, TAMI, LD at [facility], \"Nutritional Labeling\"  July 12 August 2 (Please Note Date Change)  September 13 October 11 November 8 December 13    Connections: Post-Operative Bariatric Surgery Support Group  This is a support group for [facility] bariatric patients (and those external to [facility]) who have had bariatric surgery and are at least 3 months post-surgery.  WHEN: This support group meets the " "4th Wednesday of the month from 11:00 AM - 12:00 PM virtually using Microsoft Teams.   FACILITATOR: Led by Certified Bariatric Nurse, Anh Jeong RN.   TO REGISTER: Please send an email to Anh at [email or web address] if you would like an invitation to the group and to learn about using Microsoft Teams.    2022 Meetings June 22 July 27 August 24 September 28 October 26 November 23 December 28      [facility] Healthy Lifestyle Virtual Support Group    Healthy Lifestyle Virtual Support Group?  This is 60 minutes of small group guided discussion, support and resources. All are welcome who want a healthy lifestyle.  WHEN: This group meets monthly on a Friday from 12:30 PM - 1:30 PM virtually using Microsoft Teams.   FACILITATOR: Led by National Board Certified Health and , Anh Black [facility].   TO REGISTER: Please send an email to Anh at?[email or web address] to receive monthly invites to the group or if you have any questions about having a health .  Prior to the meeting, a link with directions on how to join the meeting will be sent to you.    2022 Meetings  June 24: Anh Black [facility], \"Setting Limits and Boundaries\".  Jul 29: Open Forum  August 26: Guest Speaker: Cintia Galvez Registered Dietitian  September 30: Open Forum  October 28th: Guest Speaker: Charlotte Mancini [facility], Health , \"Gratitude Practices\".  November 18: Guest Speaker: Ivelisse Signh RD Registered Dietitian, \"Navigating How to Eat around the Holidays\".  December 16: Guest Speaker: Paradise Fuller [facility], \"Changing Your Relationship with Movement\".    ____________________________________________________________________________________________________________________________________________________________________________  [facility] Get Moving Program  Our team of physical therapists is trained to help you understand and take control of your " condition. They will perform a thorough evaluation to determine your ability for activity and develop a customized plan to fit your goals and physical ability.  Scheduling: Unsure if the Get Moving program is right for you? Discuss the program with your medical provider or diabetes educator. You can also call us at [phone] to ask questions or schedule an appointment.   PARAM Get Moving Program  ____________________________________________________________________________________________________________________________________________________________________________  [facility] Diabetes Prevention Program (DPP)  If you have prediabetes and Medicare please contact us via Whatâ€™s More Alive Than Youhart to learn more about the Diabetes Prevention Program (DPP)  Program Details:  [facility] offers the year-long Diabetes Prevention Program (DPP). The program helps you to make lifestyle changes that prevent or delay type 2 diabetes by supporting healthy eating, increased physical activity, stress reduction and use of coping skills.   On average, previous [facility] DPP cohorts have lost and maintained at least 5% of their starting weight throughout the program and averaged more than 150 minutes of physical activity per week.  Participants meet weekly for one-hour group sessions over sixteen weeks, every other week for the next 8 weeks, and monthly for the last six months.   A year-long maintenance program is also available for participants who complete the first year.   Location & Cost:   During the COVID-19 Public Health Emergency, the program is offered virtually. When in-person classes can resume, they will be held at [facility].  For people with Medicare, the program is covered in full. A self-pay option will also be available for those with non-Medicare insurance plans.    _________________________________________________________________________________________________________________________________________________________  Bluetooth Scale:    We hope to provide you with high quality virtual healthcare visits while social distancing for COVID-19 is necessary, as well as in the future when virtual visits may be more convenient for you.     Our technology team made it possible for Bluetooth scales to send weight measurements to our electronic medical record. This allows weights from you weighing at home to securely flow into the medical record, which will improve telephone and virtual visits.   Additionally, studies have shown that adults actually lose more weight when their weights are automatically sent to someone else, and also that this process is not stressful for those adults.    Below is a link for purchasing the scale, with a discount code for our patients. You may call your insurance company to see if they will reimburse you for the cost of the scale, as a piece of durable medical equipment. The scales only go up to a weight of 400 pounds. This is an issue and we are working with the developer on increasing this. We found no scales that go over 400lb that have blue-tooth for connecting to [employer].    Scale to purchase: the [employer]  Body  Scale: https://www.[email or web address]/us/en/body/shop?gclid=[email or web address]=aw.ds    Discount Code: We have a discount code for our patients to bring the cost down to $50, Discount code is: [place]_Scale_20%off  _______________________________________________________________________________________________________________________________________________________________________________    To work with a Behavioral Health Psychologist:    Call to schedule:    Alex Mora - [phone]  James Alcantar - [phone]  Majo Patricio - [phone]  Argenis Cuenca - [phone]   Chio Courtney PhD  (cannot accept Medicare) [phone]        Thank you,   [facility] Comprehensive Weight Management Team

## 2023-02-21 LAB — NONINV COLON CA DNA+OCC BLD SCRN STL QL: NEGATIVE

## 2023-03-28 ENCOUNTER — LAB (OUTPATIENT)
Dept: LAB | Facility: CLINIC | Age: 48
End: 2023-03-28
Payer: COMMERCIAL

## 2023-03-28 DIAGNOSIS — Z98.84 S/P LAPAROSCOPIC SLEEVE GASTRECTOMY: ICD-10-CM

## 2023-03-28 LAB
ALBUMIN SERPL BCG-MCNC: 4.1 G/DL (ref 3.5–5.2)
ALP SERPL-CCNC: 49 U/L (ref 35–104)
ALT SERPL W P-5'-P-CCNC: 13 U/L (ref 10–35)
ANION GAP SERPL CALCULATED.3IONS-SCNC: 13 MMOL/L (ref 7–15)
AST SERPL W P-5'-P-CCNC: 25 U/L (ref 10–35)
BILIRUB SERPL-MCNC: 0.3 MG/DL
BUN SERPL-MCNC: 12.9 MG/DL (ref 6–20)
CALCIUM SERPL-MCNC: 9.7 MG/DL (ref 8.6–10)
CHLORIDE SERPL-SCNC: 98 MMOL/L (ref 98–107)
CREAT SERPL-MCNC: 0.79 MG/DL (ref 0.51–0.95)
DEPRECATED HCO3 PLAS-SCNC: 26 MMOL/L (ref 22–29)
ERYTHROCYTE [DISTWIDTH] IN BLOOD BY AUTOMATED COUNT: 15.6 % (ref 10–15)
FERRITIN SERPL-MCNC: 27 NG/ML (ref 6–175)
GFR SERPL CREATININE-BSD FRML MDRD: >90 ML/MIN/1.73M2
GLUCOSE SERPL-MCNC: 84 MG/DL (ref 70–99)
HBA1C MFR BLD: 6.3 % (ref 0–5.6)
HCT VFR BLD AUTO: 32.9 % (ref 35–47)
HGB BLD-MCNC: 10.5 G/DL (ref 11.7–15.7)
MCH RBC QN AUTO: 24.4 PG (ref 26.5–33)
MCHC RBC AUTO-ENTMCNC: 31.9 G/DL (ref 31.5–36.5)
MCV RBC AUTO: 77 FL (ref 78–100)
PLATELET # BLD AUTO: 438 10E3/UL (ref 150–450)
POTASSIUM SERPL-SCNC: 3.9 MMOL/L (ref 3.4–5.3)
PROT SERPL-MCNC: 8.1 G/DL (ref 6.4–8.3)
PTH-INTACT SERPL-MCNC: 21 PG/ML (ref 15–65)
RBC # BLD AUTO: 4.3 10E6/UL (ref 3.8–5.2)
SODIUM SERPL-SCNC: 137 MMOL/L (ref 136–145)
WBC # BLD AUTO: 5.5 10E3/UL (ref 4–11)

## 2023-03-28 PROCEDURE — 99000 SPECIMEN HANDLING OFFICE-LAB: CPT

## 2023-03-28 PROCEDURE — 83970 ASSAY OF PARATHORMONE: CPT

## 2023-03-28 PROCEDURE — 83036 HEMOGLOBIN GLYCOSYLATED A1C: CPT

## 2023-03-28 PROCEDURE — 36415 COLL VENOUS BLD VENIPUNCTURE: CPT

## 2023-03-28 PROCEDURE — 82306 VITAMIN D 25 HYDROXY: CPT

## 2023-03-28 PROCEDURE — 80053 COMPREHEN METABOLIC PANEL: CPT

## 2023-03-28 PROCEDURE — 82728 ASSAY OF FERRITIN: CPT

## 2023-03-28 PROCEDURE — 84590 ASSAY OF VITAMIN A: CPT | Mod: 90

## 2023-03-28 PROCEDURE — 85027 COMPLETE CBC AUTOMATED: CPT

## 2023-03-29 LAB — DEPRECATED CALCIDIOL+CALCIFEROL SERPL-MC: 45 UG/L (ref 20–75)

## 2023-03-30 ENCOUNTER — LAB (OUTPATIENT)
Dept: LAB | Facility: CLINIC | Age: 48
End: 2023-03-30
Payer: COMMERCIAL

## 2023-03-30 DIAGNOSIS — Z98.84 S/P LAPAROSCOPIC SLEEVE GASTRECTOMY: ICD-10-CM

## 2023-03-30 LAB
CHOLEST SERPL-MCNC: 214 MG/DL
HDLC SERPL-MCNC: 91 MG/DL
LDLC SERPL CALC-MCNC: 104 MG/DL
NONHDLC SERPL-MCNC: 123 MG/DL
TRIGL SERPL-MCNC: 95 MG/DL
VIT B12 SERPL-MCNC: 1442 PG/ML (ref 232–1245)

## 2023-03-30 PROCEDURE — 80061 LIPID PANEL: CPT

## 2023-03-30 PROCEDURE — 36415 COLL VENOUS BLD VENIPUNCTURE: CPT

## 2023-03-30 PROCEDURE — 82607 VITAMIN B-12: CPT

## 2023-03-31 LAB
ANNOTATION COMMENT IMP: NORMAL
RETINYL PALMITATE SERPL-MCNC: 0.04 MG/L
VIT A SERPL-MCNC: 0.46 MG/L

## 2023-04-03 ENCOUNTER — VIRTUAL VISIT (OUTPATIENT)
Dept: ENDOCRINOLOGY | Facility: CLINIC | Age: 48
End: 2023-04-03

## 2023-04-03 DIAGNOSIS — Z98.84 S/P LAPAROSCOPIC SLEEVE GASTRECTOMY: ICD-10-CM

## 2023-04-03 DIAGNOSIS — E66.9 OBESITY: ICD-10-CM

## 2023-04-03 DIAGNOSIS — Z71.3 NUTRITIONAL COUNSELING: Primary | ICD-10-CM

## 2023-04-03 PROCEDURE — 97803 MED NUTRITION INDIV SUBSEQ: CPT | Mod: VID | Performed by: DIETITIAN, REGISTERED

## 2023-04-03 PROCEDURE — 99207 PR NO CHARGE LOS: CPT | Mod: VID | Performed by: DIETITIAN, REGISTERED

## 2023-04-03 NOTE — LETTER
"4/3/2023       RE: Raffy Castelan  [address] [facility] [place] [place] [address]     Dear Colleague,    Thank you for referring your patient, Raffy Castelan, to the [facility] [place] [facility]. Please see a copy of my visit note below.    Raffy Castelan is a 47 year old female who is being evaluated via a billable video visit.      The patient has been notified of following:     \"This video visit will be conducted via a call between you and your physician/provider. We have found that certain health care needs can be provided without the need for an in-person physical exam.  This service lets us provide the care you need with a video conversation.  If a prescription is necessary we can send it directly to your pharmacy.  If lab work is needed we can place an order for that and you can then stop by our lab to have the test done at a later time.    Video visits are billed at different rates depending on your insurance coverage.  Please reach out to your insurance provider with any questions.    If during the course of the call the physician/provider feels a video visit is not appropriate, you will not be charged for this service.\"    Patient has given verbal consent for Video visit? Yes  How would you like to obtain your AVS? MyChart  If you are dropped from the video visit, the video invite should be resent to: Text to cell phone: [phone]  Will anyone else be joining your video visit? No  {If patient encounters technical issues they should call [phone]      Video-Visit Details    Type of service:  Video Visit    Video Start Time: 4:30 PM  Video End Time: 5:02 PM    Originating Location (pt. Location): Home    Distant Location (provider location):  Offsite (providers home)     Platform used for Video Visit: MarkMonitor    During this virtual visit the patient is located in [place], patient verifies this as the location during the " entirety of this visit.       Nutrition Reassessment  Reason For Visit:  Raffy Castelan is a 47 year old female presenting today for nutrition follow-up, 3 month s/p 1/10/23.    Anthropometrics:  Initial Consult Weight: 290 lbs  Day of Surgery Weight (1/10/23): 290 lbs  Current Weight: 257 lbs  Weight loss: -33 lbs from initial consult; -33 lbs from day of surgery    Current Vitamins/Minerals: MVI/minerals BID - Bariatric Fusion with Iron    Nutrition History:  Pt doing well. Labs completed (see below)   Tolerating foods well. Sometimes feels fatigued.   Still really struggling with heartburn.     Recent Abnormal Lab values:   [id number] pg/mL  Hemoglobin -10.5 g/dL    Progress with Previous Goals:  1) Continue bariatric regular diet, as tolerated met, continues   2) Consume 60+ grams of protein/day. Met, continues   3) Sip on 48-64+ oz of fluids/day- between meals only.  Met, continues   4) Eat slowly (>20 min/meal), chewing foods well (to applesauce-like consistency). 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     - Must be a chewable calcium citrate until post-op 3 months     - Options for calcium citrate: Brandon calcium citrate chewable, bariatric advantage calcium citrate chewable, Celebrate vitamins calcium citrate chewable, Bariatric Fusion calcium citrate chewable  - Vitamin D - at least 3,000 international units/day between all  supplements  - Vitamin B12: sublingual form of at least 500 mcg daily or injection of 1000 mcg monthly     Nutrition Prescription:  Grams Protein: 60 (minimum)   Amount of Fluid: 48-64+ oz    Nutrition Diagnosis  Previous: Food and nutrition-related knowledge deficit r/t lack of prior exposure to diet advancements beyond bariatric pureed diet aeb recent bariatric surgery and pt interest in diet education/review    Current: Food and nutrition-related knowledge deficit r/t lack of prior exposure to diet instruction beyond 3 months s/p SG as evidenced by recent bariatric surgery and pt interest in diet education/review      Intervention  Materials/Education provided, reviewed bariatric regular consistency diet, protein intake, fluid intake, eating pace, chewing foods well, portion control, sugar/fat intake, recommended vitamin/mineral supplements. Patient demonstrates understanding.     Expected Engagement: good     Goals:  1) Follow bariatric regular diet.   2) Consume 60+ grams of protein/day.  3) Sip on 48-64+ oz of fluids/day- between meals only.  4) Eat slowly (>20 min/meal), chewing foods well (to applesauce-like consistency).  5) Limit portions to ~1/2 - 3/4 cup/meal until 6 months post op.  6) Supplements: continue taking your multivitamin regularly as directed. I am going to check with Melinda Sanford about your hemoglobin and B12 levels and come up with a game plan. I will let you know as soon as I hear from her. Thank you!      Your Stage 5 Diet: Regular Foods  [email or web address]     Keeping Up Your Diet after Weight Loss Surgery  [email or web address]    Exercises after Weight Loss Surgery (strengthening, when no weight lifting restrictions)  [email or web address]    Why Take Supplements for Life after Weight Loss Surgery  [email or web address]      Preventing Low Blood Sugar after Weight Loss Surgery  [email or web address]      Preventing Dumping Syndrome after  Weight Loss Surgery  [email or web address]    Supplements after Sleeve Gastrectomy, Gastric Bypass or Single Anastomosis Duodenal Switch  [email or web address]       Follow-Up: 6 months post op/prn    Time spent with patient: 32 minutes.  MARCO A NESS RD LD

## 2023-04-03 NOTE — PATIENT INSTRUCTIONS
Hi Abeer!    Follow-up with RD in 3 months    Thank you,    Cintia Galvez, TAMI, LD  If you would like to schedule or reschedule an appointment with the RD, please call [phone]    Nutrition Goals  1) Follow bariatric regular diet.   2) Consume 60+ grams of protein/day.  3) Sip on 48-64+ oz of fluids/day- between meals only.  4) Eat slowly (>20 min/meal), chewing foods well (to applesauce-like consistency).  5) Limit portions to ~1/2 - 3/4 cup/meal until 6 months post op.  6) Supplements: continue taking your multivitamin regularly as directed. I am going to check with Melinda Sanford about your hemoglobin and B12 levels and come up with a game plan. I will let you know as soon as I hear from her. Thank you!      Your Stage 5 Diet: Regular Foods  [email or web address]     Keeping Up Your Diet after Weight Loss Surgery  [email or web address]    Exercises after Weight Loss Surgery (strengthening, when no weight lifting restrictions)  [email or web address]    Why Take Supplements for Life after Weight Loss Surgery  [email or web address]      Preventing Low Blood Sugar after Weight Loss Surgery  [email or web address]      Preventing Dumping Syndrome after Weight Loss Surgery  [email or web address]    Supplements after Sleeve Gastrectomy, Gastric Bypass or Single Anastomosis Duodenal Switch  [email or web address]    Interested in working with a health ? Health coaches work with you to improve your overall health and wellbeing. They look at the whole person, and may involve discussion of different areas of life, including, but not limited to the four pillars of health (sleep, exercise, nutrition, and stress management). Discuss with your care team if you would like to start working a health .    Health Coaching-3 Pack:    $99 for three health coaching visits    Visits may be done in person or via phone    Coaching is a partnership between the  and  "the client; Coaches do not prescribe or diagnose    Coaching helps inspire the client to reach his/her personal goals    COMPREHENSIVE WEIGHT MANAGEMENT PROGRAM  VIRTUAL SUPPORT GROUPS    For Support Group Information:      We offer support groups for patients who are working on weight loss and considering, preparing for or have had weight loss surgery.   There is no cost for this opportunity.  You are invited to attend the?Virtual Support Groups?provided by any of the following locations:    [facility] via Microsoft Teams with Marcie Ramirez RN  2.   [facility] via [employer] with Tono Myrick, PhD, LP  3.   [facility] via [employer] with Anh Jeong RN  4.   [facility] via Microsoft Teams with Anh Black [facility]    The following Support Group information can also be found on our website:  [email or web address]    [email or web address]    [facility] Weight Loss Surgery Support Group    [facility] Weight Loss Surgery Support Group  The support group is a patient-lead forum that meets monthly to share experiences, encouragement and education. It is open to those who have had weight loss surgery, are scheduled for surgery, or are considering surgery.   WHEN: This group meets on the 3rd Wednesday of each month from 5:00PM - 6:00PM virtually using Microsoft Teams.   FACILITATOR: Led by Marcie Johnson, TAMI, LD, RN, the program's Clinical Coordinator.   TO REGISTER: Please contact the clinic via [employer] or call the nurse line directly at [phone] to inform our staff that you would like an invite sent to you and to let us know the email you would like the invite sent to. Prior to the meeting, a link with directions on how to join the meeting will be sent to you.    2023 Meetings (speakers to be determined)  January 18: \"Let's Talk\" a time for " "the group to share.  February 15: \"Let's Talk\" a time for the group to share.  March 15: \"Let's Talk\" a time for the group to share.  April 19: \"Let's Talk\" a time for the group to share.  May 17: \"Let's Talk\" a time for the group to share.  June 21: \"Let's Talk\" a time for the group to share.    [facility] and [facility] Support Groups    Connections Bariatric Care Support Group?  This is open to all pre- and post- operative bariatric surgery patients as well as their support system.   WHEN: This group meets the 2nd Tuesday of each month from 6:30 PM - 8:00 PM virtually using Microsoft Teams.   FACILITATOR: Led by Tono Myrick, Ph.D who is a Licensed Psychologist with the [facility] Comprehensive Weight Management Program.   TO REGISTER: Please send an email to Tono Myrick, Ph.D., LP at?[email or web address]?if you would like an invitation to the group and to learn about using Microsoft Teams.    2023 Meetings  January 10: Ac Funes, PharmD, Pharmacy Resident, \"Medications and Bariatric Surgery\"  February 14:  March 14:  April 11:  May 9:  June 11:    Connections Post-Operative Bariatric Surgery Support Group  This is a support group for [facility] bariatric patients (and those external to [facility]) who have had bariatric surgery and are at least 3 months post-surgery.  WHEN: This support group meets the 4th Wednesday of the month from 11:00 AM - 12:00 PM virtually using Microsoft Teams.   FACILITATOR: Led by Certified Bariatric Nurse, Anh Jeong RN.   TO REGISTER: Please send an email to Anh at [email or web address] if you would like an invitation to the group and to learn about using Microsoft Teams.    2023 Meetings  January 25  February 22  March 22  April 26  May 24  Lynn 28      [facility] Healthy Lifestyle Virtual Support Group    Healthy Lifestyle Virtual Support Group?  This is 60 minutes " "of small group guided discussion, support and resources. All are welcome who want a healthy lifestyle.  WHEN: This group meets monthly on a Friday from 12:30 PM - 1:30 PM virtually using Microsoft Teams.   FACILITATOR: Led by National Board Certified Health and , Anh Black [facility].   TO REGISTER: Please send an email to Anh at?[email or web address] to receive monthly invites to the group or if you have any questions about having a health .  Prior to the meeting, a link with directions on how to join the meeting will be sent to you.    2023 Meetings  January 20: \"Let's Talk\" a time for the group to share  February 17: Guest Speaker, Ivelisse Singh RD, Registered Dietician, \"Tips to Maximize Your Metabolism\"  March 17: Let's Talk\" a time for the group to share  April 14: Guest Speaker, Cintia Galvez RD, Registered Dietician, \"Heart Health\"  May 19: \"Let's Talk\" a time for the group to share  June: To be announced.      "

## 2023-04-03 NOTE — PROGRESS NOTES
"Raffy Castelan is a 47 year old female who is being evaluated via a billable video visit.      The patient has been notified of following:     \"This video visit will be conducted via a call between you and your physician/provider. We have found that certain health care needs can be provided without the need for an in-person physical exam.  This service lets us provide the care you need with a video conversation.  If a prescription is necessary we can send it directly to your pharmacy.  If lab work is needed we can place an order for that and you can then stop by our lab to have the test done at a later time.    Video visits are billed at different rates depending on your insurance coverage.  Please reach out to your insurance provider with any questions.    If during the course of the call the physician/provider feels a video visit is not appropriate, you will not be charged for this service.\"    Patient has given verbal consent for Video visit? Yes  How would you like to obtain your AVS? MyChart  If you are dropped from the video visit, the video invite should be resent to: Text to cell phone: [phone]  Will anyone else be joining your video visit? No  {If patient encounters technical issues they should call [phone]      Video-Visit Details    Type of service:  Video Visit    Video Start Time: 4:30 PM  Video End Time: 5:02 PM    Originating Location (pt. Location): Home    Distant Location (provider location):  Offsite (providers home)     Platform used for Video Visit: [employer]    During this virtual visit the patient is located in [place], patient verifies this as the location during the entirety of this visit.       Nutrition Reassessment  Reason For Visit:  Raffy Castelan is a 47 year old female presenting today for nutrition follow-up, 3 month s/p 1/10/23.    Anthropometrics:  Initial Consult Weight: 290 lbs  Day of Surgery Weight (1/10/23): 290 lbs  Current Weight: 257 lbs  Weight loss: -33 lbs from initial " consult; -33 lbs from day of surgery    Current Vitamins/Minerals: MVI/minerals BID - Bariatric Fusion with Iron    Nutrition History:  Pt doing well. Labs completed (see below)   Tolerating foods well. Sometimes feels fatigued.   Still really struggling with heartburn.     Recent Abnormal Lab values:   [id number] pg/mL  Hemoglobin -10.5 g/dL    Progress with Previous Goals:  1) Continue bariatric regular diet, as tolerated met, continues   2) Consume 60+ grams of protein/day. Met, continues   3) Sip on 48-64+ oz of fluids/day- between meals only.  Met, continues   4) Eat slowly (>20 min/meal), chewing foods well (to applesauce-like consistency). 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     - Must be a chewable calcium citrate until post-op 3 months     - Options for calcium citrate: Brandon calcium citrate chewable, bariatric advantage calcium citrate chewable, Celebrate vitamins calcium citrate chewable, Bariatric Fusion calcium citrate chewable  - Vitamin D - at least 3,000 international units/day between all supplements  - Vitamin B12: sublingual form of at least 500 mcg daily or injection of 1000 mcg monthly     Nutrition Prescription:  Grams Protein: 60 (minimum)   Amount of Fluid: 48-64+ oz    Nutrition Diagnosis  Previous: Food and nutrition-related knowledge deficit r/t lack of prior exposure to diet advancements beyond  bariatric pureed diet aeb recent bariatric surgery and pt interest in diet education/review    Current: Food and nutrition-related knowledge deficit r/t lack of prior exposure to diet instruction beyond 3 months s/p SG as evidenced by recent bariatric surgery and pt interest in diet education/review      Intervention  Materials/Education provided, reviewed bariatric regular consistency diet, protein intake, fluid intake, eating pace, chewing foods well, portion control, sugar/fat intake, recommended vitamin/mineral supplements. Patient demonstrates understanding.     Expected Engagement: good     Goals:  1) Follow bariatric regular diet.   2) Consume 60+ grams of protein/day.  3) Sip on 48-64+ oz of fluids/day- between meals only.  4) Eat slowly (>20 min/meal), chewing foods well (to applesauce-like consistency).  5) Limit portions to ~1/2 - 3/4 cup/meal until 6 months post op.  6) Supplements: continue taking your multivitamin regularly as directed. I am going to check with Melinda Safnord about your hemoglobin and B12 levels and come up with a game plan. I will let you know as soon as I hear from her. Thank you!      Your Stage 5 Diet: Regular Foods  [email or web address]     Keeping Up Your Diet after Weight Loss Surgery  [email or web address]    Exercises after Weight Loss Surgery (strengthening, when no weight lifting restrictions)  [email or web address]    Why Take Supplements for Life after Weight Loss Surgery  [email or web address]      Preventing Low Blood Sugar after Weight Loss Surgery  [email or web address]      Preventing Dumping Syndrome after Weight Loss Surgery  [email or web address]    Supplements after Sleeve Gastrectomy, Gastric Bypass or Single Anastomosis Duodenal Switch  [email or web address]       Follow-Up: 6 months post op/prn    Time spent with patient: 32 minutes.  TAMI SMITH

## 2023-04-06 ENCOUNTER — TELEPHONE (OUTPATIENT)
Dept: FAMILY MEDICINE | Facility: CLINIC | Age: 48
End: 2023-04-06
Payer: COMMERCIAL

## 2023-04-06 ENCOUNTER — MYC MEDICAL ADVICE (OUTPATIENT)
Dept: ENDOCRINOLOGY | Facility: CLINIC | Age: 48
End: 2023-04-06
Payer: COMMERCIAL

## 2023-04-06 DIAGNOSIS — Z98.84 S/P LAPAROSCOPIC SLEEVE GASTRECTOMY: ICD-10-CM

## 2023-04-06 NOTE — TELEPHONE ENCOUNTER
Called patient but could not leave a voicemail message because mailbox was full.    KAZ Olivas  [facility]

## 2023-04-06 NOTE — TELEPHONE ENCOUNTER
Routing to team     Please assist patient with scheduling establish care visit.        Leela Lanza RN

## 2023-04-07 ENCOUNTER — TELEPHONE (OUTPATIENT)
Dept: FAMILY MEDICINE | Facility: CLINIC | Age: 48
End: 2023-04-07
Payer: COMMERCIAL

## 2023-04-07 RX ORDER — LOSARTAN POTASSIUM 50 MG/1
50 TABLET ORAL DAILY
Qty: 30 TABLET | Refills: 0 | Status: SHIPPED | OUTPATIENT
Start: 2023-04-07 | End: 2023-04-13 | Stop reason: ALTCHOICE

## 2023-04-07 NOTE — TELEPHONE ENCOUNTER
Routing to endo team     Pt calling PCP to restart losartan hydrochlorothiazide.     Pt is establishing care 4/13 with PCP but needing refill before hand.     1/19/23 after surgery note from endo:  Blood Pressure - increase right leg swelling. Is checking BP at home - is around 140/80. This is higher then usual for her. Usually well controlled with the Losartan/hydrochlorothiazide. hydrochlorothiazide stopped after discharge.    plan: 10. Restart Losartan-hydrochlorothiazide 50mg-12.5mg. Continue to monitor Blood pressure at home.     Because pt had medication at home refill was not sent in at this time.    Please send refill - pt will be out by 4/10    Susie Marino RN

## 2023-04-07 NOTE — TELEPHONE ENCOUNTER
Rn reached out to pt.     Endo note from 01/19 states to restart medication requesting    RN sent endo team note under mychart 04/06/23    Susie Marino RN

## 2023-04-07 NOTE — TELEPHONE ENCOUNTER
Called patient and scheduled an appointment to establish care with Dr Hillman.    KAZ Olivas  [facility]

## 2023-04-07 NOTE — TELEPHONE ENCOUNTER
Patient is wanting to get a refill of losartan/hydrochlorothiazide,.   Pharmacy:  [facility].  Patient is scheduled with Dr Hillman to establish care on 04/13/23 but is needing a refill of this medication before then.  Patient was in the hospital and they discontinued this medication but patient said she is needing to be on this medication for her hypertension.    Terra Bolivar [facility]

## 2023-04-11 ENCOUNTER — VIRTUAL VISIT (OUTPATIENT)
Dept: ENDOCRINOLOGY | Facility: CLINIC | Age: 48
End: 2023-04-11
Payer: COMMERCIAL

## 2023-04-11 VITALS — WEIGHT: 257 LBS | HEIGHT: 65 IN | BODY MASS INDEX: 42.82 KG/M2

## 2023-04-11 DIAGNOSIS — I10 HYPERTENSION GOAL BP (BLOOD PRESSURE) < 140/90: ICD-10-CM

## 2023-04-11 DIAGNOSIS — E66.01 CLASS 3 SEVERE OBESITY DUE TO EXCESS CALORIES WITH SERIOUS COMORBIDITY AND BODY MASS INDEX (BMI) OF 45.0 TO 49.9 IN ADULT (H): Primary | ICD-10-CM

## 2023-04-11 DIAGNOSIS — Z98.84 S/P LAPAROSCOPIC SLEEVE GASTRECTOMY: ICD-10-CM

## 2023-04-11 DIAGNOSIS — E66.813 CLASS 3 SEVERE OBESITY DUE TO EXCESS CALORIES WITH SERIOUS COMORBIDITY AND BODY MASS INDEX (BMI) OF 45.0 TO 49.9 IN ADULT (H): Primary | ICD-10-CM

## 2023-04-11 PROCEDURE — 99214 OFFICE O/P EST MOD 30 MIN: CPT | Mod: VID

## 2023-04-11 RX ORDER — LOSARTAN POTASSIUM AND HYDROCHLOROTHIAZIDE 12.5; 5 MG/1; MG/1
1 TABLET ORAL DAILY
Qty: 30 TABLET | Refills: 0 | Status: SHIPPED | OUTPATIENT
Start: 2023-04-11 | End: 2023-04-13

## 2023-04-11 NOTE — PATIENT INSTRUCTIONS
"Thank you for allowing us the privilege of caring for you. We hope we provided you with the excellent service you deserve.   Please let us know if there is anything else we can do for you so that we can be sure you are completely satisfied with your care experience.    To ensure the quality of our services you may be receiving a patient satisfaction survey from an independent patient satisfaction monitoring company.    The greatest compliment you can give is a \"Likely to Recommend\"    Your visit was with ROSS GONZALES PA-C today.    Instructions per today's visit:   1. Anemia - discussed with PCP for greater workup and possible referral to hematology   2. Repeat A1C, CBC, and Ferritin in 3 months ordered   3. Continue Losartan/HCTZ - 1 refill sent until established with PCP   4. Trial Famotidine 40mg at night. Stop omeprazole 20mg. If no control, transition back to omeprazole at night .  5. Follow up with TAMI Joya in 3 months   6. Follow up with Ross Sanford in 3 months    _________________________________________________________________________________________________________________________________________________________  Important contact and scheduling information:  Please call our contact center at [phone] to schedule your next appointments.  To find a lab location near you, please call [phone].  For any nursing questions or concerns call Abiola Damian LPN at [phone] or Johanne Aguilar RN at [phone]  Please call during clinic hours Monday through Friday 8:00a - 4:00p if you have questions or you can contact us via [employer]vill[employer] at anytime and we will reply during clinic hours.    Lab results will be communicated through My Chart or letter (if My Chart not used). Please call the clinic if you have not received communication after 1 week or if you have any questions.?  Clinic Fax: " [phone]  _________________________________________________________________________________________________________________________________________________________  If you are asked by your clinic team to have your blood pressure checked:  [facility] do offer several locations for blood pressure checks. Please follow the below link to schedule an appointment. Scheduling an appointment at the pharmacy for a blood pressure check is now preferred.    Appointment Plus (appointment-plusRed Swoosh)  _________________________________________________________________________________________________________________________________________________________  Meal Replacement Products:    Here is the link to our new e-store where you can purchase our meal replacement products    [facility] E-Store  [email or web address]    The one week starter kit is a great way to sample a variety of products and see what works for you.    If you want more information about the product go to: Fresh Steps Meals  [email or web address]    If you are an employee or [facility] or [facility] please contact your care team for a 10% estore discount    Free Shipping for orders over $75     Benefits of meal replacements products:    Portion and calorie control  Improved nutrition  Structured eating  Simplified food choices  Avoid contact with trigger foods  _________________________________________________________________________________________________________________________________________________________  Interested in working with a health ?  Health coaches work with you to improve your overall health and wellbeing.  They look at the whole person, and may involve discussion of different areas of life, including, but not limited to the four pillars of health (sleep, exercise, nutrition, and stress management). Discuss with your care team if you would like to start working a health .  Health Coaching-3  Pack: Schedule by calling [phone]    $99 for three health coaching visits    Visits may be done in person or via phone    Coaching is a partnership between the  and the client; Coaches do not prescribe or diagnose    Coaching helps inspire the client to reach his/her personal goals   _________________________________________________________________________________________________________________________________________________________  24 Week Healthy Lifestyle Plan:    Our mission in the 24-week Healthy Lifestyle Plan is to provide you with individualized care by giving you the tools, education and support you need to lose weight and maintain a healthy lifestyle. In your 24-week journey, you ll be supported by a dedicated weight loss team that includes registered dietitians, medical weight management providers, health coaches, and nurses -- all with special expertise in weight loss -- to help you every step of the way.     Monthly meetings with your registered dietician or medical weight management provider help to review your progress, update your care plan, and make any adjustments needed to ensure success. Between these visits, weekly and bi-weekly health  visits will help you focus on the four pillars of weight loss -- stress, sleep, nutrition, and exercise -- and how you can best adapt each to achieve sustainable weight loss results.    In addition, you will be given exclusive access to online wellbeing classes through [employer].  Your initial visit will be with a medical weight management provider who will help to understand your weight loss goals and ensure this program is the right fit for you. Please let our team know if you are interested in the 24 week plan by sending a message to your care team or calling [phone] to  "schedule.  _________________________________________________________________________________________________________________________________________________________    COMPREHENSIVE WEIGHT MANAGEMENT PROGRAM  VIRTUAL SUPPORT GROUPS    For Support Group Information:      We offer support groups for patients who are working on weight loss and considering, preparing for or have had weight loss surgery.   There is no cost for this opportunity.  You are invited to attend the?Virtual Support Groups?provided by any of the following locations:    [facility] via Microsoft Teams with Marcie Johnson RN  2.   [place] via VFA with Tono Myrick, PhD, LP  3.   [place] via VFA with Anh Jeong RN  4.   [facility] via [employer]st[employer] Teams with Anh Black [facility]    The following Support Group information can also be found on our website:  [email or web address]    [facility] Weight Loss Surgery Support Group    [facility] Weight Loss Surgery Support Group  The support group is a patient-lead forum that meets monthly to share experiences, encouragement and education. It is open to those who have had weight loss surgery, are scheduled for surgery, and those who are considering surgery.   WHEN: This group meets on the 3rd Wednesday of each month from 5:00PM - 6:00PM virtually using Microsoft Teams.   FACILITATOR: Led by Marcie Johnson, TAMI, LD, RN, the program's Clinical Coordinator.   TO REGISTER: Please contact the clinic via [employer] or call the nurse line directly at [phone] to inform our staff that you would like an invite sent to you and to let us know the email you would like the invite sent to. Prior to the meeting, a link with directions on how to join the meeting will be sent to you.    2022 Meetings  Lynn 15: \"Let's Talk\" a time for the group to share.  July 20: \"Let's Talk\" a time for the group to " "share.  August 17: \"Let's Talk\" a time for the group to share.  September 21: \"Let's Talk\" a time for the group to share.  October 19: Guest Speaker: Dr Juan Murillo MD Pulmonologist and Sleep Medicine Physician, \"Getting a Good Night's Sleep\".  November 16: \"Let's Talk\" a time for the group to share.  December 21: \"Let's Talk\" a time for the group to share.    [facility] Support Groups    Connections: Bariatric Care Support Group?  This is open to all [facility] (and those external to this program) pre- and post- operative bariatric surgery patients as well as their support system.   WHEN: This group meets the 2nd Tuesday of each month from 6:30 PM - 8:00 PM virtually using Microsoft Teams.   FACILITATOR: Led by Tono Myrick, Ph.D who is a Licensed Psychologist with the [facility] Comprehensive Weight Management Program.   TO REGISTER: Please send an email to Tono Myrick, Ph.D., LP at?[email or web address]?if you would like an invitation to the group and to learn about using Microsoft Teams.    2022 Meetings  June 14: Queenie Cullen RD, LD at [facility], \"Nutritional Labeling\"  July 12 August 2 (Please Note Date Change)  September 13 October 11 November 8 December 13    Connections: Post-Operative Bariatric Surgery Support Group  This is a support group for [facility] bariatric patients (and those external to [facility]) who have had bariatric surgery and are at least 3 months post-surgery.  WHEN: This support group meets the 4th Wednesday of the month from 11:00 AM - 12:00 PM virtually using Microsoft Teams.   FACILITATOR: Led by Certified Bariatric Nurse, Anh Jeong RN.   TO REGISTER: Please send an email to Anh at [email or web address] if you would like an invitation to the group and to learn about using Microsoft Teams.    2022 Meetings June 22 July 27 August 24 September 28 October 26 November 23 December " "28      [facility] Healthy Lifestyle Virtual Support Group    Healthy Lifestyle Virtual Support Group?  This is 60 minutes of small group guided discussion, support and resources. All are welcome who want a healthy lifestyle.  WHEN: This group meets monthly on a Friday from 12:30 PM - 1:30 PM virtually using Microsoft Teams.   FACILITATOR: Led by National Board Certified Health and , Anh Black [facility].   TO REGISTER: Please send an email to Anh at?[email or web address] to receive monthly invites to the group or if you have any questions about having a health .  Prior to the meeting, a link with directions on how to join the meeting will be sent to you.    2022 Meetings  June 24: Anh Black [facility], \"Setting Limits and Boundaries\".  Jul 29: Open Forum  August 26: Guest Speaker: Cintia Galvez Registered Dietitian  September 30: Open Forum  October 28th: Guest Speaker: Charlotte Mancini [facility], Health , \"Gratitude Practices\".  November 18: Guest Speaker: Ivelisse Singh RD Registered Dietitian, \"Navigating How to Eat around the Holidays\".  December 16: Guest Speaker: Paradise Fuller [facility], \"Changing Your Relationship with Movement\".    ____________________________________________________________________________________________________________________________________________________________________________  [facility] Get Moving Program  Our team of physical therapists is trained to help you understand and take control of your condition. They will perform a thorough evaluation to determine your ability for activity and develop a customized plan to fit your goals and physical ability.  Scheduling: Unsure if the Get Moving program is right for you? Discuss the program with your medical provider or diabetes educator. You can also call us at [phone] to ask questions or schedule an appointment.   PARAM Get Moving " Program  ____________________________________________________________________________________________________________________________________________________________________________  [facility] Diabetes Prevention Program (DPP)  If you have prediabetes and Medicare please contact us via Mile[employer] to learn more about the Diabetes Prevention Program (DPP)  Program Details:  [facility] offers the year-long Diabetes Prevention Program (DPP). The program helps you to make lifestyle changes that prevent or delay type 2 diabetes by supporting healthy eating, increased physical activity, stress reduction and use of coping skills.   On average, previous [facility] DPP cohorts have lost and maintained at least 5% of their starting weight throughout the program and averaged more than 150 minutes of physical activity per week.  Participants meet weekly for one-hour group sessions over sixteen weeks, every other week for the next 8 weeks, and monthly for the last six months.   A year-long maintenance program is also available for participants who complete the first year.   Location & Cost:   During the COVID-19 Public Health Emergency, the program is offered virtually. When in-person classes can resume, they will be held at [facility].  For people with Medicare, the program is covered in full. A self-pay option will also be available for those with non-Medicare insurance plans.   _________________________________________________________________________________________________________________________________________________________  Bluetooth Scale:    We hope to provide you with high quality virtual healthcare visits while social distancing for COVID-19 is necessary, as well as in the future when virtual visits may be more convenient for you.     Our technology team made it possible for Bluetooth scales to send weight measurements to our electronic medical record. This allows  weights from you weighing at home to securely flow into the medical record, which will improve telephone and virtual visits.   Additionally, studies have shown that adults actually lose more weight when their weights are automatically sent to someone else, and also that this process is not stressful for those adults.    Below is a link for purchasing the scale, with a discount code for our patients. You may call your insurance company to see if they will reimburse you for the cost of the scale, as a piece of durable medical equipment. The scales only go up to a weight of 400 pounds. This is an issue and we are working with the developer on increasing this. We found no scales that go over 400lb that have blue-tooth for connecting to [employer].    Scale to purchase: the [employer]  Body  Scale: [email or web address]/shop?[email or web address]&gclsrc=aw.ds    Discount Code: We have a discount code for our patients to bring the cost down to $50, Discount code is: [place]_Scale_20%off  _______________________________________________________________________________________________________________________________________________________________________________    To work with a Behavioral Health Psychologist:    Call to schedule:    Alex Mora - [phone]  James Alcantar - [phone]  Majo Patricio - [phone]  Argenis Cuenca - [phone]   Chio Courtney PhD (cannot accept Medicare) [phone]        Thank you,   [facility] Comprehensive Weight Management Team

## 2023-04-11 NOTE — PROGRESS NOTES
Virtual Visit Check-In    During this virtual visit the patient is located in [place], patient verifies this as the location during the entirety of this visit.     Raffy is a 47 year old who is being evaluated via a billable video visit.      How would you like to obtain your AVS? MyChart  If the video visit is dropped, the invitation should be resent by: Text to cell phone: [phone]  Will anyone else be joining your video visit? No        Video-Visit Details    Type of service:  Video Visit     Originating Location (pt. Location): Home    Distant Location (provider location):  Off-site  Platform used for Video Visit: [facility] Bariatric Surgery Note    RE: Raffy Castelan  MR#: [id number]  : 1975  VISIT DATE: 2023      [facility]    I had the pleasure of seeing your patient, Raffy Castelan, in my post-bariatric surgery assessment clinic.    Assessment & Plan   Problem List Items Addressed This Visit        Digestive    Class 3 severe obesity due to excess calories with serious comorbidity and body mass index (BMI) of 45.0 to 49.9 in adult (H) - Primary       Circulatory    Hypertension goal BP (blood pressure) < 140/90    Relevant Medications    losartan-hydrochlorothiazide (HYZAAR) 50-12.5 MG tablet   Other Visit Diagnoses     S/P laparoscopic sleeve gastrectomy        Relevant Orders    Hemoglobin A1c    CBC with platelets    Ferritin         1. Anemia - discussed with PCP for greater workup and possible referral to hematology   2. Repeat A1C, CBC, and Ferritin in 3 months ordered   3. Continue Losartan/HCTZ - 1 refill sent until established with PCP   4. Trial Famotidine 40mg at night. Stop omeprazole 20mg. If no control, transition back to omeprazole at night .  5. Follow up with TAMI Joya in 3 months   6. Follow up with Melinda Sanford in 3 months     50 minutes spent by me on the date of the encounter doing chart review, history and exam,  documentation and further activities per the note    CHIEF COMPLAINT: Post-bariatric surgery follow-up. 3 months s/p gastric sleeve with Dr. Ritter.    HISTORY OF PRESENT ILLNESS:      4/11/2023    10:41 AM   Questions Regarding Prior Weight Loss Surgery Reviewed With Patient   I had the following weight loss procedure Sleeve Gastrectomy   What year was your surgery? 2023   How has your weight changed since your last visit? I have lost weight   Do you currently have any of the following Heartburn, acid reflux, or GERD (acid reflux disease)?    Hypertension (high blood pressure)?   Do you have any concerns today? Heartburn and reflux         Recent diet changes: Eating 3 meals a day, with 2 snacks. Portion size around 1 cup. Getting hungry every every 3 hours. Tolerating normal food without concern. Denies nausea, vomiting, diarrhea. Not fasting for Ramadan as advised.     -Protein? 60g daily  -Water? 48-64oz daily     Recent exercise/activity changes: Very active as a mom and at work. Wants to go back to the gym after Ramadan. Wants to start walking with warmer weather.     Recent stressors: Stable     Recent sleep changes: Stable     Vitamins/Labs: Reviewed, no change in B12 vitamin needed as part of bariatric MV. Taking Bariatric MV, Calcium Citrate.     BP - restarting hydrochlorothiazide. BP is averaging around 140/80.     GERD - Symptoms worse at night. As long as doesn't eat 2-3hours before bed and sleeps elevated, symptoms are better. Decreased omeprazole to 20mg at night with continued relief.     Anemia - has never been worked up for it. Struggles with fatigue and constipation. Has a possible family trait of thalassemia.     Weight History:      4/11/2023    10:41 AM   --   What is your highest lifetime weight? 290   What is your lowest weight since surgery? (In pounds) 257     Initial Weight (lbs): 290 lbs  Weight: 116.6 kg (257 lb)  Last Visits Weight: 121.6 kg (268 lb)  Cumulative weight loss (lbs):  33  Weight Loss Percentage: 11.38%        4/11/2023    10:41 AM   Questions Regarding Co-Morbidities and Health Concerns Reviewed With Patient   Pre-diabetes Stayed the same   Diabetes II Never   High Blood Pressure Stayed the same   High cholesterol Stayed the same   Heartburn/Reflux Worsened   Sleep apnea Never   PCOS Never   Back pain Stayed the same   Joint pain Stayed the same   Lower leg swelling Gone away           4/11/2023    10:41 AM   Eating Habits   How many meals do you eat per day? 3   Do you snack between meals? Yes   How much food are you eating at each meal? 1/2 cup to 1 cup   Are you able to separate your meals and liquids by at least 30 minutes? Sometimes   Are you able to avoid liquid calories? Yes           4/11/2023    10:41 AM   Exercise Questions Reviewed With Patient   How often do you exercise? Never   What keeps you from being more active? I should be more active but I just have not gotten around to it       Social History:       View : No data to display.                Medications:  Current Outpatient Medications   Medication     albuterol (PROAIR HFA/PROVENTIL HFA/VENTOLIN HFA) 108 (90 Base) MCG/ACT inhaler     Calcium Citrate-Vitamin D (CALCIUM CITRATE CHEWY BITE) 500-12.5 MG-MCG CHEW     clobetasol propionate (OLUX) 0.05 % external foam     losartan (COZAAR) 50 MG tablet     losartan-hydrochlorothiazide (HYZAAR) 50-12.5 MG tablet     omeprazole (PRILOSEC) 20 MG DR capsule     senna (SENOKOT) 8.6 MG tablet     No current facility-administered medications for this visit.          View : No data to display.                ROS:  GI:       4/11/2023    10:41 AM   --   Stress urinary incontinence No     Skin:        View : No data to display.              Psych:        View : No data to display.              Female Only:       4/11/2023    10:41 AM   BAR RBS ROS - FEMALE ONLY   Female only Excessive menstrual bleeding    Irregular menstrual cycles         Followed by another  "provider.    PHYSICAL EXAM:  Objective    Ht 1.651 m (5' 5\")   Wt 116.6 kg (257 lb)   BMI 42.77 kg/m           Vitals:  No vitals were obtained today due to virtual visit.    Physical Exam   GENERAL: Healthy, alert and no distress  EYES: Eyes grossly normal to inspection.  No discharge or erythema, or obvious scleral/conjunctival abnormalities.  RESP: No audible wheeze, cough, or visible cyanosis.  No visible retractions or increased work of breathing.    SKIN: Visible skin clear. No significant rash, abnormal pigmentation or lesions.  NEURO: Cranial nerves grossly intact.  Mentation and speech appropriate for age.  PSYCH: Mentation appears normal, affect normal/bright, judgement and insight intact, normal speech and appearance well-groomed.        Sincerely,    ROSS GONZALES PA-C    "

## 2023-04-11 NOTE — NURSING NOTE
Chief Complaint   Patient presents with     Follow Up     Return after bariatric surgery.         Vitals:    04/11/23 1028   Weight: 257 lb       Body mass index is 42.77 kg/m .      Jatinder Sorensen, EMT  Surgery Clinic

## 2023-04-11 NOTE — LETTER
2023       RE: Raffy Castelan  [address]  [facility] [address]     Dear Colleague,    Thank you for referring your patient, Raffy Castelan, to the [facility] [place] [facility]. Please see a copy of my visit note below.    Virtual Visit Check-In    During this virtual visit the patient is located in [place], patient verifies this as the location during the entirety of this visit.     Raffy is a 47 year old who is being evaluated via a billable video visit.      How would you like to obtain your AVS? MyChart  If the video visit is dropped, the invitation should be resent by: Text to cell phone: [phone]  Will anyone else be joining your video visit? No        Video-Visit Details    Type of service:  Video Visit     Originating Location (pt. Location): Home    Distant Location (provider location):  Off-site  Platform used for Video Visit: [facility] Bariatric Surgery Note    RE: Raffy Castelan  MR#: [id number]  : 1975  VISIT DATE: 2023      Dear Clinic - [facility],    I had the pleasure of seeing your patient, Raffy Castelan, in my post-bariatric surgery assessment clinic.    Assessment & Plan   Problem List Items Addressed This Visit          Digestive    Class 3 severe obesity due to excess calories with serious comorbidity and body mass index (BMI) of 45.0 to 49.9 in adult (H) - Primary       Circulatory    Hypertension goal BP (blood pressure) < 140/90    Relevant Medications    losartan-hydrochlorothiazide (HYZAAR) 50-12.5 MG tablet     Other Visit Diagnoses       S/P laparoscopic sleeve gastrectomy        Relevant Orders    Hemoglobin A1c    CBC with platelets    Ferritin           1. Anemia - discussed with PCP for greater workup and possible referral to hematology   2. Repeat A1C, CBC, and Ferritin in 3 months ordered   3. Continue Losartan/HCTZ - 1 refill  sent until established with PCP   4. Trial Famotidine 40mg at night. Stop omeprazole 20mg. If no control, transition back to omeprazole at night .  5. Follow up with TAMI Joya in 3 months   6. Follow up with Melinda Sanford in 3 months     50 minutes spent by me on the date of the encounter doing chart review, history and exam, documentation and further activities per the note    CHIEF COMPLAINT: Post-bariatric surgery follow-up. 3 months s/p gastric sleeve with Dr. Ritter.    HISTORY OF PRESENT ILLNESS:      4/11/2023    10:41 AM   Questions Regarding Prior Weight Loss Surgery Reviewed With Patient   I had the following weight loss procedure Sleeve Gastrectomy   What year was your surgery? 2023   How has your weight changed since your last visit? I have lost weight   Do you currently have any of the following Heartburn, acid reflux, or GERD (acid reflux disease)?    Hypertension (high blood pressure)?   Do you have any concerns today? Heartburn and reflux         Recent diet changes: Eating 3 meals a day, with 2 snacks. Portion size around 1 cup. Getting hungry every every 3 hours. Tolerating normal food without concern. Denies nausea, vomiting, diarrhea. Not fasting for Ramadan as advised.     -Protein? 60g daily  -Water? 48-64oz daily     Recent exercise/activity changes: Very active as a mom and at work. Wants to go back to the gym after Ramadan. Wants to start walking with warmer weather.     Recent stressors: Stable     Recent sleep changes: Stable     Vitamins/Labs: Reviewed, no change in B12 vitamin needed as part of bariatric MV. Taking Bariatric MV, Calcium Citrate.     BP - restarting hydrochlorothiazide. BP is averaging around 140/80.     GERD - Symptoms worse at night. As long as doesn't eat 2-3hours before bed and sleeps elevated, symptoms are better. Decreased omeprazole to 20mg at night with continued relief.     Anemia - has never been worked up for it. Struggles with fatigue and constipation. Has a  possible family trait of thalassemia.     Weight History:      4/11/2023    10:41 AM   --   What is your highest lifetime weight? 290   What is your lowest weight since surgery? (In pounds) 257     Initial Weight (lbs): 290 lbs  Weight: 116.6 kg (257 lb)  Last Visits Weight: 121.6 kg (268 lb)  Cumulative weight loss (lbs): 33  Weight Loss Percentage: 11.38%        4/11/2023    10:41 AM   Questions Regarding Co-Morbidities and Health Concerns Reviewed With Patient   Pre-diabetes Stayed the same   Diabetes II Never   High Blood Pressure Stayed the same   High cholesterol Stayed the same   Heartburn/Reflux Worsened   Sleep apnea Never   PCOS Never   Back pain Stayed the same   Joint pain Stayed the same   Lower leg swelling Gone away           4/11/2023    10:41 AM   Eating Habits   How many meals do you eat per day? 3   Do you snack between meals? Yes   How much food are you eating at each meal? 1/2 cup to 1 cup   Are you able to separate your meals and liquids by at least 30 minutes? Sometimes   Are you able to avoid liquid calories? Yes           4/11/2023    10:41 AM   Exercise Questions Reviewed With Patient   How often do you exercise? Never   What keeps you from being more active? I should be more active but I just have not gotten around to it       Social History:       View : No data to display.                Medications:  Current Outpatient Medications   Medication    albuterol (PROAIR HFA/PROVENTIL HFA/VENTOLIN HFA) 108 (90 Base) MCG/ACT inhaler    Calcium Citrate-Vitamin D (CALCIUM CITRATE CHEWY BITE) 500-12.5 MG-MCG CHEW    clobetasol propionate (OLUX) 0.05 % external foam    losartan (COZAAR) 50 MG tablet    losartan-hydrochlorothiazide (HYZAAR) 50-12.5 MG tablet    omeprazole (PRILOSEC) 20 MG DR capsule    senna (SENOKOT) 8.6 MG tablet     No current facility-administered medications for this visit.          View : No data to display.                ROS:  GI:       4/11/2023    10:41 AM   --   Stress  "urinary incontinence No     Skin:        View : No data to display.              Psych:        View : No data to display.              Female Only:       4/11/2023    10:41 AM   BAR RBS ROS - FEMALE ONLY   Female only Excessive menstrual bleeding    Irregular menstrual cycles         Followed by another provider.    PHYSICAL EXAM:  Objective    Ht 1.651 m (5' 5\")   Wt 116.6 kg (257 lb)   BMI 42.77 kg/m           Vitals:  No vitals were obtained today due to virtual visit.    Physical Exam   GENERAL: Healthy, alert and no distress  EYES: Eyes grossly normal to inspection.  No discharge or erythema, or obvious scleral/conjunctival abnormalities.  RESP: No audible wheeze, cough, or visible cyanosis.  No visible retractions or increased work of breathing.    SKIN: Visible skin clear. No significant rash, abnormal pigmentation or lesions.  NEURO: Cranial nerves grossly intact.  Mentation and speech appropriate for age.  PSYCH: Mentation appears normal, affect normal/bright, judgement and insight intact, normal speech and appearance well-groomed.        Sincerely,    ROSS GONZALES PA-C    "

## 2023-04-13 ENCOUNTER — OFFICE VISIT (OUTPATIENT)
Dept: FAMILY MEDICINE | Facility: CLINIC | Age: 48
End: 2023-04-13
Payer: COMMERCIAL

## 2023-04-13 VITALS
BODY MASS INDEX: 42.72 KG/M2 | DIASTOLIC BLOOD PRESSURE: 82 MMHG | HEART RATE: 86 BPM | SYSTOLIC BLOOD PRESSURE: 130 MMHG | WEIGHT: 256.4 LBS | OXYGEN SATURATION: 96 % | HEIGHT: 65 IN | RESPIRATION RATE: 21 BRPM | TEMPERATURE: 98.2 F

## 2023-04-13 DIAGNOSIS — R73.03 PREDIABETES: ICD-10-CM

## 2023-04-13 DIAGNOSIS — N64.52 NIPPLE DISCHARGE: ICD-10-CM

## 2023-04-13 DIAGNOSIS — Z83.2 FAMILY HISTORY OF ALPHA THALASSEMIA: ICD-10-CM

## 2023-04-13 DIAGNOSIS — Z11.59 NEED FOR HEPATITIS C SCREENING TEST: ICD-10-CM

## 2023-04-13 DIAGNOSIS — Z00.00 ROUTINE GENERAL MEDICAL EXAMINATION AT A HEALTH CARE FACILITY: Primary | ICD-10-CM

## 2023-04-13 DIAGNOSIS — D64.9 ANEMIA, UNSPECIFIED TYPE: ICD-10-CM

## 2023-04-13 DIAGNOSIS — K21.9 GASTROESOPHAGEAL REFLUX DISEASE WITHOUT ESOPHAGITIS: ICD-10-CM

## 2023-04-13 DIAGNOSIS — Z80.3 FAMILY HISTORY OF BREAST CANCER: ICD-10-CM

## 2023-04-13 DIAGNOSIS — L40.9 PSORIASIS: ICD-10-CM

## 2023-04-13 DIAGNOSIS — I10 HYPERTENSION GOAL BP (BLOOD PRESSURE) < 140/90: ICD-10-CM

## 2023-04-13 DIAGNOSIS — E66.01 MORBID OBESITY (H): ICD-10-CM

## 2023-04-13 PROCEDURE — 99396 PREV VISIT EST AGE 40-64: CPT | Mod: 25 | Performed by: STUDENT IN AN ORGANIZED HEALTH CARE EDUCATION/TRAINING PROGRAM

## 2023-04-13 PROCEDURE — 99214 OFFICE O/P EST MOD 30 MIN: CPT | Mod: 25 | Performed by: STUDENT IN AN ORGANIZED HEALTH CARE EDUCATION/TRAINING PROGRAM

## 2023-04-13 PROCEDURE — 90746 HEPB VACCINE 3 DOSE ADULT IM: CPT | Performed by: STUDENT IN AN ORGANIZED HEALTH CARE EDUCATION/TRAINING PROGRAM

## 2023-04-13 PROCEDURE — 90471 IMMUNIZATION ADMIN: CPT | Performed by: STUDENT IN AN ORGANIZED HEALTH CARE EDUCATION/TRAINING PROGRAM

## 2023-04-13 RX ORDER — LOSARTAN POTASSIUM AND HYDROCHLOROTHIAZIDE 12.5; 5 MG/1; MG/1
1 TABLET ORAL DAILY
Qty: 90 TABLET | Refills: 1 | Status: SHIPPED | OUTPATIENT
Start: 2023-04-13 | End: 2023-11-08

## 2023-04-13 RX ORDER — CLOBETASOL PROPIONATE 0.5 MG/G
AEROSOL, FOAM TOPICAL
Qty: 200 G | Refills: 3 | Status: SHIPPED | OUTPATIENT
Start: 2023-04-13

## 2023-04-13 ASSESSMENT — ENCOUNTER SYMPTOMS
SHORTNESS OF BREATH: 0
PALPITATIONS: 0
ABDOMINAL PAIN: 0
COUGH: 1
BREAST MASS: 0
HEMATOCHEZIA: 0
HEARTBURN: 1
DYSURIA: 0
JOINT SWELLING: 0
FREQUENCY: 0
SORE THROAT: 0
WEAKNESS: 0
EYE PAIN: 0
HEMATURIA: 0
NAUSEA: 0
MYALGIAS: 0
DIZZINESS: 0
PARESTHESIAS: 0
DIARRHEA: 0
NERVOUS/ANXIOUS: 0
CONSTIPATION: 1
CHILLS: 0
ARTHRALGIAS: 0
HEADACHES: 0
FEVER: 0

## 2023-04-13 NOTE — PROGRESS NOTES
SUBJECTIVE:   CC: Raffy is an 47 year old who presents for preventive health visit.       4/13/2023    10:24 AM   Additional Questions   Roomed by Teri See JIM Nieto   Accompanied by N/A         4/13/2023    10:24 AM   Patient Reported Additional Medications   Patient reports taking the following new medications Bariatric multivitamin     Patient has been advised of split billing requirements and indicates understanding: Yes  Healthy Habits:     Getting at least 3 servings of Calcium per day:  Yes    Bi-annual eye exam:  NO    Dental care twice a year:  Yes    Sleep apnea or symptoms of sleep apnea:  Excessive snoring    Diet:  Regular (no restrictions)    Frequency of exercise:  None    Taking medications regularly:  Yes    Barriers to taking medications:  None    Medication side effects:  None    PHQ-2 Total Score: 0    Additional concerns today:  Yes      Had bariatric surgery in January 2023. Lost 34 lb so far. Had gastric sleeve.     Feeling better overall.   Except for reflux. On omeprazole. Switching to pepcid but hasn't yet. Symptoms worse at night. Tries to eat couple hours before bed. British food, spicy, tomato makes it worse. Staying upright 30-60 minutes. Elevates head of bed. Had issues with reflux for years but much worse after surgery.    Activity good. Single mom. Not able to go to the gym yet, but is busy working/volunteering. Trying to start walking soon.     Counting calories 0112-7052 /day.     Nipple discharge R breast for 7-8 years, clear. No milk. Last lactated 13 years ago. Has breast lumps chronically; cystic breasts. mammo and US done Jan 2023.       Father - passed from CVA    Hypertension Follow-up      Do you check your blood pressure regularly outside of the clinic? Yes     Are you following a low salt diet? No    Are your blood pressures ever more than 140 on the top number (systolic) OR more   than 90 on the bottom number (diastolic), for example 140/90? No    Patient would like  anxiety removed from her problem list as she states she does not have anxiety.    Discuss excessive menstrual periods. Now every 4-5 weeks; less frequent but 2nd and 3rd days are heavy; changing tampon/pad every 2-3 hours     sennakot - 1 BM/day    Daily MV with 45mg iron     Son also has alpha thalassemia trait     Did 21 and me DNA testing showed negative BRCA       Social History     Tobacco Use     Smoking status: Never     Smokeless tobacco: Never   Vaping Use     Vaping status: Never Used   Substance Use Topics     Alcohol use: No     Drug use: No         4/1/2016    11:26 AM 12/8/2017     3:01 PM 4/25/2019     9:57 AM   JENNY-7 SCORE   Total Score 0 0 2         4/1/2016    11:26 AM 12/8/2017     3:01 PM 4/25/2019     9:57 AM   PHQ   PHQ-9 Total Score 3 2 0   Q9: Thoughts of better off dead/self-harm past 2 weeks Not at all Not at all Not at all           Today's PHQ-2 Score:       4/13/2023    10:19 AM   PHQ-2 ( 1999 [employer])   Q1: Little interest or pleasure in doing things 0   Q2: Feeling down, depressed or hopeless 0   PHQ-2 Score 0   Q1: Little interest or pleasure in doing things Not at all    Not at all   Q2: Feeling down, depressed or hopeless Not at all    Not at all   PHQ-2 Score 0    0       Have you ever done Advance Care Planning? (For example, a Health Directive, POLST, or a discussion with a medical provider or your loved ones about your wishes): Yes, advance care planning is on file.    Social History     Tobacco Use     Smoking status: Never     Smokeless tobacco: Never   Vaping Use     Vaping status: Never Used   Substance Use Topics     Alcohol use: No             4/13/2023    10:19 AM   Alcohol Use   Prescreen: >3 drinks/day or >7 drinks/week? Not Applicable     Reviewed orders with patient.  Reviewed health maintenance and updated orders accordingly - Yes  Lab work is in process  Labs reviewed in EPIC  BP Readings from Last 3 Encounters:   04/13/23 (!) 132/90   01/19/23 132/78   01/11/23 (!)  146/82    Wt Readings from Last 3 Encounters:   04/13/23 116.3 kg (256 lb 6.4 oz)   04/11/23 116.6 kg (257 lb)   02/10/23 121.6 kg (268 lb)                  Patient Active Problem List   Diagnosis     Chronic constipation     CARDIOVASCULAR SCREENING; LDL GOAL LESS THAN 160     Family history of breast cancer     GERD (gastroesophageal reflux disease)     Class 3 severe obesity due to excess calories with serious comorbidity and body mass index (BMI) of 45.0 to 49.9 in adult (H)     Vitamin D deficiency     Psoriasis     Bariatric surgery status     H/O laparoscopic adjustable gastric banding     Diaphragmatic hernia     Hypertension goal BP (blood pressure) < 140/90     Anxiety     Impingement syndrome, shoulder, left     Spontaneous vaginal delivery     Advanced maternal age during pregnancy     37+ weeks gestation completed     Bunion, left     Tailor's bunion of both feet     Morbid obesity (H)     Past Surgical History:   Procedure Laterality Date     BIOPSY       BUNIONECTOMY LAPIDUS WITH TARSAL METATARSAL (TMT) FUSION Left 12/15/2020    Procedure: Left first tarsometatarsal joint fusion Left Jacob bunionectomy Left fifth metatarsal Efraín bunionectomy;  Surgeon: Markus Hart DPM;  Location: [facility]     COLONOSCOPY       ESOPHAGOSCOPY, GASTROSCOPY, DUODENOSCOPY (EGD), COMBINED  07/30/2013    Procedure: COMBINED ESOPHAGOSCOPY, GASTROSCOPY, DUODENOSCOPY (EGD), BIOPSY SINGLE OR MULTIPLE;;  Surgeon: Wilson Fournier MD;  Location: [facility]     ESOPHAGOSCOPY, GASTROSCOPY, DUODENOSCOPY (EGD), COMBINED  03/24/2014    Procedure: COMBINED ESOPHAGOSCOPY, GASTROSCOPY, DUODENOSCOPY (EGD);  Surgeon: Wilson Fournier MD;  Location: [facility]     EXCISE LESION FOOT Left 12/15/2020    Procedure: Left foot mass removal;  Surgeon: Markus Hart DPM;  Location: [facility]     LAPAROSCOPIC GASTRIC ADJUSTABLE BANDING  05/27/2014    Procedure: LAPAROSCOPIC GASTRIC ADJUSTABLE BANDING;  Surgeon: Wilson Fournier MD;  Location:  [facility]     LAPAROSCOPIC GASTRIC SLEEVE N/A 1/10/2023    Procedure: GASTRECTOMY, SLEEVE, LAPAROSCOPIC;  Surgeon: Gurinder Ritter MD;  [facility]: [facility]     LAPAROSCOPIC REMOVAL GASTRIC ADJUSTABLE BAND N/A 1/10/2023    Procedure: REMOVAL, GASTRIC BAND, ADJUSTABLE, and components,  LAPAROSCOPIC;  Surgeon: Gurinder Ritter MD;  Location: [facility]       Social History     Tobacco Use     Smoking status: Never     Smokeless tobacco: Never   Vaping Use     Vaping status: Never Used   Substance Use Topics     Alcohol use: No     Family History   Problem Relation Age of Onset     Blood Disease Mother         anemia     Heart Disease Mother      Hypertension Mother      Hyperlipidemia Mother      Anxiety Disorder Mother      Osteoporosis Mother      Genetic Disorder Mother         Alpha Thalassemia trait     Cerebrovascular Disease Father      Hypertension Father      Diabetes Father         type  2     Deep Vein Thrombosis (DVT) Father      Breast Cancer Maternal Aunt      Breast Cancer Paternal Aunt      Cancer Paternal Aunt         stomach     Breast Cancer Cousin      Breast Cancer Other      Blood Disease Other         son with alpha thalessemia/mother alpha thalasemia trait     Breast Cancer Other      Breast Cancer Other      Anesthesia Reaction No family hx of          Current Outpatient Medications   Medication Sig Dispense Refill     albuterol (PROAIR HFA/PROVENTIL HFA/VENTOLIN HFA) 108 (90 Base) MCG/ACT inhaler Inhale 2 puffs into the lungs every 6 hours as needed for shortness of breath / dyspnea or wheezing 8.5 g 3     Calcium Citrate-Vitamin D (CALCIUM CITRATE CHEWY BITE) 500-12.5 MG-MCG CHEW Take 1 chew tab by mouth 3 times daily 90 tablet 3     clobetasol propionate (OLUX) 0.05 % external foam APPLY SPARINGLY TO AFFECTED AREA TWICE DAILY AS NEEDED DO NOT APPLY TO FACE 600 g 3     losartan-hydrochlorothiazide (HYZAAR) 50-12.5 MG tablet Take 1 tablet by mouth daily 30 tablet 0     omeprazole (PRILOSEC) 20  MG DR capsule Take 1 capsule (20 mg) by mouth 2 times daily (Patient taking differently: Take 20 mg by mouth daily) 60 capsule 3     senna (SENOKOT) 8.6 MG tablet Take 2 tablets by mouth At Bedtime       losartan (COZAAR) 50 MG tablet Take 1 tablet (50 mg) by mouth daily (Patient not taking: Reported on 4/13/2023) 30 tablet 0     Allergies   Allergen Reactions     Lisinopril Cough       Breast Cancer Screening:    FHS-7:       1/12/2022     8:42 AM 1/19/2022     9:19 AM 1/5/2023     9:14 AM 4/13/2023    10:20 AM   Breast CA Risk Assessment (FHS-7)   Did any of your first-degree relatives have breast or ovarian cancer? No No No No   Did any of your relatives have bilateral breast cancer? Yes No Unknown Yes   Did any man in your family have breast cancer? No No No No   Did any woman in your family have breast and ovarian cancer? Yes No No Yes   Did any woman in your family have breast cancer before age 50 y? Yes No Yes Yes   Do you have 2 or more relatives with breast and/or ovarian cancer? Yes Yes Yes Yes   Do you have 2 or more relatives with breast and/or bowel cancer? Yes No Yes Yes     click delete button to remove this line now  Mammogram Screening: Recommended annual mammography  Pertinent mammograms are reviewed under the imaging tab.    History of abnormal Pap smear: NO - age 30-65 PAP every 5 years with negative HPV co-testing recommended  Last 3 Pap and HPV Results:       Latest Ref Rng & Units 1/12/2022     9:16 AM 2/2/2018    12:34 PM 2/2/2018    12:30 PM   PAP / HPV   PAP  Negative for Intraepithelial Lesion or Malignancy (NILM)       PAP (Historical)   NIL      HPV 16 DNA Negative Negative    Negative     HPV 18 DNA Negative Negative    Negative     Other HR HPV Negative Negative    Negative           Latest Ref Rng & Units 1/12/2022     9:16 AM 2/2/2018    12:34 PM 2/2/2018    12:30 PM   PAP / HPV   PAP  Negative for Intraepithelial Lesion or Malignancy (NILM)       PAP (Historical)   NIL      HPV 16  DNA Negative Negative    Negative     HPV 18 DNA Negative Negative    Negative     Other HR HPV Negative Negative    Negative       Reviewed and updated as needed this visit by clinical staff   Tobacco   Meds   Med Hx  Surg Hx  Fam Hx  Soc Hx        Reviewed and updated as needed this visit by Provider                 Past Medical History:   Diagnosis Date     Asthma      Constipation      GERD (gastroesophageal reflux disease)      Hypertension      Morbid obesity (H)      Psoriasis       Past Surgical History:   Procedure Laterality Date     BIOPSY       BUNIONECTOMY LAPIDUS WITH TARSAL METATARSAL (TMT) FUSION Left 12/15/2020    Procedure: Left first tarsometatarsal joint fusion Left Jacob bunionectomy Left fifth metatarsal Efraín bunionectomy;  Surgeon: Markus Hart DPM;  Location: [facility]     COLONOSCOPY       ESOPHAGOSCOPY, GASTROSCOPY, DUODENOSCOPY (EGD), COMBINED  2013    Procedure: COMBINED ESOPHAGOSCOPY, GASTROSCOPY, DUODENOSCOPY (EGD), BIOPSY SINGLE OR MULTIPLE;;  Surgeon: Wilson Fournier MD;  [facility]     ESOPHAGOSCOPY, GASTROSCOPY, DUODENOSCOPY (EGD), COMBINED  2014    Procedure: COMBINED ESOPHAGOSCOPY, GASTROSCOPY, DUODENOSCOPY (EGD);  Surgeon: Wilson Fournier MD;  [facility]     EXCISE LESION FOOT Left 12/15/2020    Procedure: Left foot mass removal;  Surgeon: Markus Hart DPM;  [facility]     LAPAROSCOPIC GASTRIC ADJUSTABLE BANDING  2014    Procedure: LAPAROSCOPIC GASTRIC ADJUSTABLE BANDING;  Surgeon: Wilson Fournier MD;  [facility]     LAPAROSCOPIC GASTRIC SLEEVE N/A 1/10/2023    Procedure: GASTRECTOMY, SLEEVE, LAPAROSCOPIC;  Surgeon: Gurinder Ritter MD;  [facility]     LAPAROSCOPIC REMOVAL GASTRIC ADJUSTABLE BAND N/A 1/10/2023    Procedure: REMOVAL, GASTRIC BAND, ADJUSTABLE, and components,  LAPAROSCOPIC;  Surgeon: Gurinder Ritter MD;  [facility]     OB History    Para Term  AB Living   2 0 0 0 0 0  "  SAB IAB Ectopic Multiple Live Births   0 0 0 0 0      # Outcome Date GA Lbr Yoel/2nd Weight Sex Delivery Anes PTL Lv   2  09           1  06               Review of Systems   Constitutional: Negative for chills and fever.   HENT: Negative for congestion, ear pain, hearing loss and sore throat.    Eyes: Negative for pain and visual disturbance.   Respiratory: Positive for cough. Negative for shortness of breath.    Cardiovascular: Negative for chest pain, palpitations and peripheral edema.   Gastrointestinal: Positive for constipation and heartburn. Negative for abdominal pain, diarrhea, hematochezia and nausea.   Breasts:  Positive for discharge. Negative for tenderness and breast mass.   Genitourinary: Positive for vaginal discharge. Negative for dysuria, frequency, genital sores, hematuria, pelvic pain, urgency and vaginal bleeding.   Musculoskeletal: Negative for arthralgias, joint swelling and myalgias.   Skin: Negative for rash.   Neurological: Negative for dizziness, weakness, headaches and paresthesias.   Psychiatric/Behavioral: Negative for mood changes. The patient is not nervous/anxious.           OBJECTIVE:   BP (!) 132/90 (BP Location: Right arm, Patient Position: Chair, Cuff Size: Adult Large)   Pulse 86   Temp 98.2  F (36.8  C) (Tympanic)   Resp 21   Ht 1.655 m (5' 5.16\")   Wt 116.3 kg (256 lb 6.4 oz)   LMP 2023 (Approximate)   SpO2 96%   BMI 42.46 kg/m    Physical Exam  GENERAL: healthy, alert and no distress  EYES: Eyes grossly normal to inspection, PERRL and conjunctivae and sclerae normal  HENT: ear canals and TM's normal, nose and mouth without ulcers or lesions  NECK: no adenopathy, no asymmetry, masses, or scars and thyroid normal to palpation  RESP: lungs clear to auscultation - no rales, rhonchi or wheezes  BREAST: normal without masses, tenderness or nipple discharge and no palpable axillary masses or adenopathy  CV: regular rate and rhythm, " normal S1 S2, no S3 or S4, no murmur, click or rub, no peripheral edema and peripheral pulses strong  ABDOMEN: soft, nontender, no hepatosplenomegaly, no masses and bowel sounds normal  MS: no gross musculoskeletal defects noted, no edema  SKIN: no suspicious lesions or rashes  NEURO: Normal strength and tone, mentation intact and speech normal  PSYCH: mentation appears normal, affect normal/bright    Diagnostic Test Results:  Labs reviewed in Epic    ASSESSMENT/PLAN:   (Z00.00) Routine general medical examination at a health care facility  (primary encounter diagnosis)      (Z11.59) Need for hepatitis C screening test  Plan: Hepatitis C antibody           (L40.9) Psoriasis  Comment: Primarily on the base of her scalp, adequately controlled with as needed clobetasol, on average uses a couple times a month.  Plan: clobetasol propionate (OLUX) 0.05 % external         foam           (I10) Hypertension goal BP (blood pressure) < 140/90  Comment: Blood pressure today adequately controlled on repeat check.  BMP is up-to-date  Plan: losartan-hydrochlorothiazide (HYZAAR) 50-12.5         MG tablet            (E66.01) Morbid obesity (H)  Comment: Status post gastric sleeve January 2023.  In the past she had also had gastric banding.  Weight is about 35 pounds from surgery.  Follows with the weight management clinic.    (K21.9) Gastroesophageal reflux disease without esophagitis  Comment: Since gastric sleeve surgery she has been having worsening GERD.  She had this issue before surgery as well.  She has been doing lifestyle modifications to help improve her symptoms.  Currently on omeprazole was weaned from 40 mg down to 20 mg with adequate control of her symptoms.  At her visit with weight management clinic recently they recommended stopping omeprazole and starting Pepcid, she has not done this yet but this would be a good switch.  Hopefully as she continues to heal from her surgery.  This will improve.      (Z80.3) Family  "history of breast cancer  Comment: She has done some home DNA testing before that stated she was negative for BRCA gene.  She has not met with a  before and declines referral today.  She does have a significant family history of breast cancer.  Mammogram is up-to-date    (R73.03) Prediabetes  Comment: Repeat A1c recently was 6.3%.  This is actually increased since surgery.  I would anticipate this to improve as she looks some weight loss and portion control.    (D64.9) Anemia, unspecified type  Comment: This has been chronic with hemoglobin between 10.5 - 11 on average.  She has a son with alpha thalassemia trait and other family members with this as well.  She has never been formally tested but does likely have this as well, we will have it drawn with her next lab work.  She also was noted to be mildly iron deficient, ferritin 20 on average.  She has been taking daily iron supplements, we will refer for iron infusion.  Plan: Hemoglobinopathy/Thalassemia [place]            (Z83.2) Family history of alpha thalassemia  Plan: Hemoglobinopathy/Thalassemia [place]           (N64.52) Nipple discharge  Comment: Right nipple with clear discharge intermittently for 7 to 8 years.  Recent mammogram and ultrasound showed cystic changes in the breast.  Since this is chronic we will not repeat imaging.  Recommend checking prolactin.  Plan: Prolactin              Patient has been advised of split billing requirements and indicates understanding: Yes      COUNSELING:  Reviewed preventive health counseling, as reflected in patient instructions       Regular exercise       Healthy diet/nutrition       Colorectal Cancer Screening       ([place])menopause management      BMI:   Estimated body mass index is 42.46 kg/m  as calculated from the following:    Height as of this encounter: 1.655 m (5' 5.16\").    Weight as of this encounter: 116.3 kg (256 lb 6.4 oz).   Weight management plan: Patient referred to endocrine and/or weight " management specialty Discussed healthy diet and exercise guidelines      She reports that she has never smoked. She has never used smokeless tobacco.      Ree Hillman DO  [facility]

## 2023-04-24 ENCOUNTER — TELEPHONE (OUTPATIENT)
Dept: ENDOCRINOLOGY | Facility: CLINIC | Age: 48
End: 2023-04-24
Payer: COMMERCIAL

## 2023-04-24 PROBLEM — D50.9 IRON DEFICIENCY ANEMIA: Status: ACTIVE | Noted: 2023-04-24

## 2023-05-08 ENCOUNTER — INFUSION THERAPY VISIT (OUTPATIENT)
Dept: INFUSION THERAPY | Facility: CLINIC | Age: 48
End: 2023-05-08
Payer: COMMERCIAL

## 2023-05-08 VITALS
WEIGHT: 261.5 LBS | HEART RATE: 79 BPM | BODY MASS INDEX: 43.31 KG/M2 | TEMPERATURE: 97.8 F | DIASTOLIC BLOOD PRESSURE: 77 MMHG | RESPIRATION RATE: 16 BRPM | SYSTOLIC BLOOD PRESSURE: 117 MMHG | OXYGEN SATURATION: 97 %

## 2023-05-08 DIAGNOSIS — D50.8 OTHER IRON DEFICIENCY ANEMIA: Primary | ICD-10-CM

## 2023-05-08 PROCEDURE — 96366 THER/PROPH/DIAG IV INF ADDON: CPT | Performed by: INTERNAL MEDICINE

## 2023-05-08 PROCEDURE — 99207 PR NO CHARGE LOS: CPT

## 2023-05-08 PROCEDURE — 96365 THER/PROPH/DIAG IV INF INIT: CPT | Performed by: INTERNAL MEDICINE

## 2023-05-08 RX ORDER — EPINEPHRINE 1 MG/ML
0.3 INJECTION, SOLUTION INTRAMUSCULAR; SUBCUTANEOUS EVERY 5 MIN PRN
Status: CANCELLED | OUTPATIENT
Start: 2023-05-10

## 2023-05-08 RX ORDER — HEPARIN SODIUM,PORCINE 10 UNIT/ML
5 VIAL (ML) INTRAVENOUS
Status: CANCELLED | OUTPATIENT
Start: 2023-05-10

## 2023-05-08 RX ORDER — ALBUTEROL SULFATE 0.83 MG/ML
2.5 SOLUTION RESPIRATORY (INHALATION)
Status: CANCELLED | OUTPATIENT
Start: 2023-05-10

## 2023-05-08 RX ORDER — DIPHENHYDRAMINE HYDROCHLORIDE 50 MG/ML
50 INJECTION INTRAMUSCULAR; INTRAVENOUS
Status: CANCELLED
Start: 2023-05-10

## 2023-05-08 RX ORDER — MEPERIDINE HYDROCHLORIDE 25 MG/ML
25 INJECTION INTRAMUSCULAR; INTRAVENOUS; SUBCUTANEOUS EVERY 30 MIN PRN
Status: CANCELLED | OUTPATIENT
Start: 2023-05-10

## 2023-05-08 RX ORDER — METHYLPREDNISOLONE SODIUM SUCCINATE 125 MG/2ML
125 INJECTION, POWDER, LYOPHILIZED, FOR SOLUTION INTRAMUSCULAR; INTRAVENOUS
Status: CANCELLED
Start: 2023-05-10

## 2023-05-08 RX ORDER — HEPARIN SODIUM (PORCINE) LOCK FLUSH IV SOLN 100 UNIT/ML 100 UNIT/ML
5 SOLUTION INTRAVENOUS
Status: CANCELLED | OUTPATIENT
Start: 2023-05-10

## 2023-05-08 RX ORDER — ALBUTEROL SULFATE 90 UG/1
1-2 AEROSOL, METERED RESPIRATORY (INHALATION)
Status: CANCELLED
Start: 2023-05-10

## 2023-05-08 RX ADMIN — Medication 250 ML: at 08:47

## 2023-05-08 ASSESSMENT — PAIN SCALES - GENERAL: PAINLEVEL: NO PAIN (0)

## 2023-05-08 NOTE — PROGRESS NOTES
Infusion Nursing Note:  Raffy Castelan presents today for Venofer 1/3.    Patient seen by provider today: No   present during visit today: Not Applicable.    Note: This is the pts first time to [facility]. Orientation provided to infusion center, pt also instructed on how and when to use her call light. Handout on Venofer printed out, reviewed with, and given to the patient. Questions answered to pt satisfaction.    The patient reports having bariatric surgery in January and is now anemic. She denies any medical concerns at this time.     Intravenous Access:  Peripheral IV placed.    Treatment Conditions:  Not Applicable.    Post Infusion Assessment:  Patient tolerated infusion without incident.  Site patent and intact, free from redness, edema or discomfort.  No evidence of extravasations.  Access discontinued per protocol.       Discharge Plan:   AVS to patient via [employer]et[employer]ART.  Patient will return 5/11/23 for next appointment. Future appts have been reviewed and crosschecked with appt note and plan.   Patient discharged in stable condition accompanied by: self.  Departure Mode: Ambulatory.      Romana Sarah RN

## 2023-05-11 ENCOUNTER — INFUSION THERAPY VISIT (OUTPATIENT)
Dept: INFUSION THERAPY | Facility: CLINIC | Age: 48
End: 2023-05-11
Payer: COMMERCIAL

## 2023-05-11 VITALS
DIASTOLIC BLOOD PRESSURE: 79 MMHG | RESPIRATION RATE: 16 BRPM | OXYGEN SATURATION: 98 % | BODY MASS INDEX: 43.26 KG/M2 | TEMPERATURE: 98.1 F | HEART RATE: 78 BPM | SYSTOLIC BLOOD PRESSURE: 115 MMHG | WEIGHT: 261.2 LBS

## 2023-05-11 DIAGNOSIS — D50.8 OTHER IRON DEFICIENCY ANEMIA: Primary | ICD-10-CM

## 2023-05-11 PROCEDURE — 96365 THER/PROPH/DIAG IV INF INIT: CPT | Performed by: NURSE PRACTITIONER

## 2023-05-11 PROCEDURE — 99207 PR NO CHARGE LOS: CPT

## 2023-05-11 PROCEDURE — 96366 THER/PROPH/DIAG IV INF ADDON: CPT | Performed by: NURSE PRACTITIONER

## 2023-05-11 RX ORDER — ALBUTEROL SULFATE 0.83 MG/ML
2.5 SOLUTION RESPIRATORY (INHALATION)
Status: CANCELLED | OUTPATIENT
Start: 2023-05-12

## 2023-05-11 RX ORDER — EPINEPHRINE 1 MG/ML
0.3 INJECTION, SOLUTION INTRAMUSCULAR; SUBCUTANEOUS EVERY 5 MIN PRN
Status: CANCELLED | OUTPATIENT
Start: 2023-05-12

## 2023-05-11 RX ORDER — MEPERIDINE HYDROCHLORIDE 25 MG/ML
25 INJECTION INTRAMUSCULAR; INTRAVENOUS; SUBCUTANEOUS EVERY 30 MIN PRN
Status: CANCELLED | OUTPATIENT
Start: 2023-05-12

## 2023-05-11 RX ORDER — DIPHENHYDRAMINE HYDROCHLORIDE 50 MG/ML
50 INJECTION INTRAMUSCULAR; INTRAVENOUS
Status: CANCELLED
Start: 2023-05-12

## 2023-05-11 RX ORDER — METHYLPREDNISOLONE SODIUM SUCCINATE 125 MG/2ML
125 INJECTION, POWDER, LYOPHILIZED, FOR SOLUTION INTRAMUSCULAR; INTRAVENOUS
Status: CANCELLED
Start: 2023-05-12

## 2023-05-11 RX ORDER — HEPARIN SODIUM (PORCINE) LOCK FLUSH IV SOLN 100 UNIT/ML 100 UNIT/ML
5 SOLUTION INTRAVENOUS
Status: CANCELLED | OUTPATIENT
Start: 2023-05-12

## 2023-05-11 RX ORDER — HEPARIN SODIUM,PORCINE 10 UNIT/ML
5 VIAL (ML) INTRAVENOUS
Status: CANCELLED | OUTPATIENT
Start: 2023-05-12

## 2023-05-11 RX ORDER — ALBUTEROL SULFATE 90 UG/1
1-2 AEROSOL, METERED RESPIRATORY (INHALATION)
Status: CANCELLED
Start: 2023-05-12

## 2023-05-11 RX ADMIN — Medication 250 ML: at 08:45

## 2023-05-11 ASSESSMENT — PAIN SCALES - GENERAL: PAINLEVEL: NO PAIN (0)

## 2023-05-11 NOTE — PROGRESS NOTES
Infusion Nursing Note:  Raffy Castelan presents today for Venofer 2/3.    Patient seen by provider today: No   present during visit today: Not Applicable.    Note: The patient reports feeling well today and denies any concerns at this time. She reports tolerating her first Venofer infusion without difficulty.    Intravenous Access:  Peripheral IV placed.    Treatment Conditions:  Not Applicable.    Post Infusion Assessment:  Patient tolerated infusion without incident.  Blood return noted pre and post infusion.  Site patent and intact, free from redness, edema or discomfort.  No evidence of extravasations.  Access discontinued per protocol.     Discharge Plan:   AVS to patient via MYCHART.  Patient will return 5/19/23 for next appointment. Future appts have been reviewed and crosschecked with appt note and plan.   Patient discharged in stable condition accompanied by: self.  Departure Mode: Ambulatory.      Romana Sarah RN

## 2023-05-15 ENCOUNTER — ALLIED HEALTH/NURSE VISIT (OUTPATIENT)
Dept: FAMILY MEDICINE | Facility: CLINIC | Age: 48
End: 2023-05-15
Payer: COMMERCIAL

## 2023-05-15 DIAGNOSIS — Z23 ENCOUNTER FOR IMMUNIZATION: Primary | ICD-10-CM

## 2023-05-15 PROCEDURE — 90471 IMMUNIZATION ADMIN: CPT

## 2023-05-15 PROCEDURE — 99207 PR NO CHARGE NURSE ONLY: CPT

## 2023-05-15 PROCEDURE — 90746 HEPB VACCINE 3 DOSE ADULT IM: CPT

## 2023-05-19 ENCOUNTER — INFUSION THERAPY VISIT (OUTPATIENT)
Dept: INFUSION THERAPY | Facility: CLINIC | Age: 48
End: 2023-05-19
Payer: COMMERCIAL

## 2023-05-19 VITALS
RESPIRATION RATE: 18 BRPM | DIASTOLIC BLOOD PRESSURE: 88 MMHG | SYSTOLIC BLOOD PRESSURE: 144 MMHG | OXYGEN SATURATION: 100 % | HEART RATE: 69 BPM | WEIGHT: 256.9 LBS | BODY MASS INDEX: 42.54 KG/M2 | TEMPERATURE: 97.5 F

## 2023-05-19 DIAGNOSIS — D50.8 OTHER IRON DEFICIENCY ANEMIA: Primary | ICD-10-CM

## 2023-05-19 PROCEDURE — 96365 THER/PROPH/DIAG IV INF INIT: CPT | Performed by: INTERNAL MEDICINE

## 2023-05-19 PROCEDURE — 99207 PR NO CHARGE LOS: CPT

## 2023-05-19 PROCEDURE — 96366 THER/PROPH/DIAG IV INF ADDON: CPT | Performed by: INTERNAL MEDICINE

## 2023-05-19 RX ORDER — ALBUTEROL SULFATE 0.83 MG/ML
2.5 SOLUTION RESPIRATORY (INHALATION)
OUTPATIENT
Start: 2023-05-19

## 2023-05-19 RX ORDER — HEPARIN SODIUM,PORCINE 10 UNIT/ML
5 VIAL (ML) INTRAVENOUS
OUTPATIENT
Start: 2023-05-19

## 2023-05-19 RX ORDER — ALBUTEROL SULFATE 90 UG/1
1-2 AEROSOL, METERED RESPIRATORY (INHALATION)
Start: 2023-05-19

## 2023-05-19 RX ORDER — MEPERIDINE HYDROCHLORIDE 25 MG/ML
25 INJECTION INTRAMUSCULAR; INTRAVENOUS; SUBCUTANEOUS EVERY 30 MIN PRN
OUTPATIENT
Start: 2023-05-19

## 2023-05-19 RX ORDER — METHYLPREDNISOLONE SODIUM SUCCINATE 125 MG/2ML
125 INJECTION, POWDER, LYOPHILIZED, FOR SOLUTION INTRAMUSCULAR; INTRAVENOUS
Start: 2023-05-19

## 2023-05-19 RX ORDER — HEPARIN SODIUM (PORCINE) LOCK FLUSH IV SOLN 100 UNIT/ML 100 UNIT/ML
5 SOLUTION INTRAVENOUS
OUTPATIENT
Start: 2023-05-19

## 2023-05-19 RX ORDER — DIPHENHYDRAMINE HYDROCHLORIDE 50 MG/ML
50 INJECTION INTRAMUSCULAR; INTRAVENOUS
Start: 2023-05-19

## 2023-05-19 RX ORDER — EPINEPHRINE 1 MG/ML
0.3 INJECTION, SOLUTION INTRAMUSCULAR; SUBCUTANEOUS EVERY 5 MIN PRN
OUTPATIENT
Start: 2023-05-19

## 2023-05-19 RX ADMIN — Medication 250 ML: at 09:07

## 2023-05-19 ASSESSMENT — PAIN SCALES - GENERAL: PAINLEVEL: NO PAIN (0)

## 2023-05-19 NOTE — PROGRESS NOTES
Infusion Nursing Note:  Raffy Castelan presents today for Venofer 3/3.    Patient seen by provider today: No   present during visit today: Not Applicable.    Note: Patient reports she is feeling more energized today, otherwise no new medical updates. Reports tolerating her treatments well.      Intravenous Access:  Peripheral IV placed.    Treatment Conditions:  Not Applicable.      Post Infusion Assessment:  Patient tolerated infusion without incident.  Blood return noted pre and post infusion.  Site patent and intact, free from redness, edema or discomfort.  No evidence of extravasations.  Access discontinued per protocol.       Discharge Plan:   Patient and/or family verbalized understanding of discharge instructions and all questions answered.  AVS to patient via [employer]ART.    Patient completed 3/3 Venofer infusions, return as directed by provider.  Patient discharged in stable condition accompanied by: self.  Departure Mode: Ambulatory.      Chio Zayas RN

## 2023-05-21 DIAGNOSIS — Z91.89 AT HIGH RISK FOR POSTOPERATIVE COMPLICATIONS: ICD-10-CM

## 2023-05-21 DIAGNOSIS — E66.01 MORBID OBESITY (H): ICD-10-CM

## 2023-05-21 DIAGNOSIS — K21.9 GERD (GASTROESOPHAGEAL REFLUX DISEASE): ICD-10-CM

## 2023-05-24 NOTE — TELEPHONE ENCOUNTER
Patient responded to Index message stating that is is taking Omeprazole 20mg once daily at bedtime. Routed to RN for sign off.

## 2023-05-24 NOTE — TELEPHONE ENCOUNTER
OMEPRAZOLE 20MG CPDR    Virtual Visit    4/11/2023  [facility] [place]   Melinda Marshall PA-C  Surgery     Next appt: 7/12/23      Routing to provider for review because:    Dose clarification needed    Current dose 20mg bid  Requested dose 20mg every day

## 2023-05-24 NOTE — TELEPHONE ENCOUNTER
Zapier message sent to patient to clarify her current dose. Note from April 2023 from visit with Melinda Marshall PA-C states: trial Famotidine 40mg at night. Stop omeprazole 20mg. If no control, transition back to omeprazole at night .

## 2023-06-14 ENCOUNTER — VIRTUAL VISIT (OUTPATIENT)
Dept: FAMILY MEDICINE | Facility: CLINIC | Age: 48
End: 2023-06-14
Payer: COMMERCIAL

## 2023-06-14 DIAGNOSIS — F43.22 ADJUSTMENT DISORDER WITH ANXIOUS MOOD: Primary | ICD-10-CM

## 2023-06-14 PROCEDURE — 99213 OFFICE O/P EST LOW 20 MIN: CPT | Mod: VID | Performed by: STUDENT IN AN ORGANIZED HEALTH CARE EDUCATION/TRAINING PROGRAM

## 2023-06-14 NOTE — PROGRESS NOTES
Raffy is a 47 year old who is being evaluated via a billable video visit.      How would you like to obtain your AVS? MyChart  If the video visit is dropped, the invitation should be resent by: Text to cell phone: [phone]  Will anyone else be joining your video visit? No        Assessment & Plan     Adjustment disorder with anxious mood  Interested in starting therapy to help with chronic relationship issues with her mother, she is hoping to learn some coping techniques to help. No medication is needed at this time.   - Adult Mental Health  Referral; Future    Ree Hillman DO  [facility]    Subjective   Raffy is a 47 year old, presenting for the following health issues:  MOOD CHANGES        6/14/2023     4:17 PM   Additional Questions   Accompanied by na         6/14/2023     4:17 PM   Patient Reported Additional Medications   Patient reports taking the following new medications none     HPI     Abnormal Mood Symptoms  Onset/Duration:   Description: stress   Depression (if yes, do PHQ-9): No  Anxiety (if yes, do JENNY-7): No  Accompanying Signs & Symptoms:  Still participating in activities that you used to enjoy: YES  Fatigue: No  Irritability: YES  Difficulty concentrating: No  Changes in appetite: No  Problems with sleep: No  Heart racing/beating fast: No  Abnormally elevated, expansive, or irritable mood: YES occ  Persistently increased activity or energy: No  Thoughts of hurting yourself or others: No  History:  Recent stress or major life event: YES- both   Major surgery (bariatric)  and toxic people in her life (mother)   Prior depression or anxiety: None  Family history of depression or anxiety: mother with severe anxiety refuses to take medication.   Alcohol/drug use: No  Difficulty sleeping: No  Therapies tried and outcome: mediation       4/1/2016    11:26 AM 12/8/2017     3:01 PM 4/25/2019     9:57 AM   PHQ   PHQ-9 Total Score 3 2 0   Q9: Thoughts of better off  dead/self-harm past 2 weeks Not at all Not at all Not at all         4/1/2016    11:26 AM 12/8/2017     3:01 PM 4/25/2019     9:57 AM   JENNY-7 SCORE   Total Score 0 0 2         Has lost 42 lbs since her surgery.   Iron infusions have helped a lot   Hair loss is not as bad now. Was told will happen for 6 months after surgery       Her mom has been a source of stress for years. She used to live with her because she sponsored her green card when she moved from [place]. When she lives with her she is very dependent on her and is  A toxic relationship. This causes distress even when on the phone.     She is interested in therapy. She did therapy after her divorce 13 years ago, went to therapy in 2015 and found this to be very helpful for processing. Raises her 2 teenage boys.      Review of Systems   Constitutional, HEENT, cardiovascular, pulmonary, gi and gu systems are negative, except as otherwise noted.      Objective           Vitals:  No vitals were obtained today due to virtual visit.    Physical Exam   GENERAL: Healthy, alert and no distress  EYES: Eyes grossly normal to inspection.  No discharge or erythema, or obvious scleral/conjunctival abnormalities.  RESP: No audible wheeze, cough, or visible cyanosis.  No visible retractions or increased work of breathing.    SKIN: Visible skin clear. No significant rash, abnormal pigmentation or lesions.  NEURO: Cranial nerves grossly intact.  Mentation and speech appropriate for age.  PSYCH: Mentation appears normal, affect normal/bright, judgement and insight intact, normal speech and appearance well-groomed.            Video-Visit Details    Type of service:  Video Visit     Originating Location (pt. Location): Home  Distant Location (provider location):  On-site  Platform used for Video Visit: Jess

## 2023-07-05 ENCOUNTER — LAB (OUTPATIENT)
Dept: LAB | Facility: CLINIC | Age: 48
End: 2023-07-05
Payer: COMMERCIAL

## 2023-07-05 DIAGNOSIS — N64.52 NIPPLE DISCHARGE: ICD-10-CM

## 2023-07-05 DIAGNOSIS — Z11.59 NEED FOR HEPATITIS C SCREENING TEST: ICD-10-CM

## 2023-07-05 DIAGNOSIS — D64.9 ANEMIA, UNSPECIFIED TYPE: ICD-10-CM

## 2023-07-05 DIAGNOSIS — Z98.84 S/P LAPAROSCOPIC SLEEVE GASTRECTOMY: ICD-10-CM

## 2023-07-05 DIAGNOSIS — Z83.2 FAMILY HISTORY OF ALPHA THALASSEMIA: ICD-10-CM

## 2023-07-05 LAB
ERYTHROCYTE [DISTWIDTH] IN BLOOD BY AUTOMATED COUNT: 15.2 % (ref 10–15)
FERRITIN SERPL-MCNC: 139 NG/ML (ref 6–175)
HBA1C MFR BLD: 6.1 % (ref 0–5.6)
HCT VFR BLD AUTO: 33.5 % (ref 35–47)
HCV AB SERPL QL IA: NONREACTIVE
HGB BLD-MCNC: 11 G/DL (ref 11.7–15.7)
MCH RBC QN AUTO: 25 PG (ref 26.5–33)
MCHC RBC AUTO-ENTMCNC: 32.8 G/DL (ref 31.5–36.5)
MCV RBC AUTO: 76 FL (ref 78–100)
PLATELET # BLD AUTO: 365 10E3/UL (ref 150–450)
PROLACTIN SERPL 3RD IS-MCNC: 31 NG/ML (ref 5–23)
RBC # BLD AUTO: 4.4 10E6/UL (ref 3.8–5.2)
WBC # BLD AUTO: 6.3 10E3/UL (ref 4–11)

## 2023-07-05 PROCEDURE — 85027 COMPLETE CBC AUTOMATED: CPT

## 2023-07-05 PROCEDURE — 36415 COLL VENOUS BLD VENIPUNCTURE: CPT

## 2023-07-05 PROCEDURE — 83036 HEMOGLOBIN GLYCOSYLATED A1C: CPT

## 2023-07-05 PROCEDURE — 83021 HEMOGLOBIN CHROMOTOGRAPHY: CPT

## 2023-07-05 PROCEDURE — 83020 HEMOGLOBIN ELECTROPHORESIS: CPT

## 2023-07-05 PROCEDURE — 84146 ASSAY OF PROLACTIN: CPT

## 2023-07-05 PROCEDURE — 86803 HEPATITIS C AB TEST: CPT

## 2023-07-05 PROCEDURE — 82728 ASSAY OF FERRITIN: CPT

## 2023-07-07 DIAGNOSIS — N64.52 NIPPLE DISCHARGE: Primary | ICD-10-CM

## 2023-07-11 LAB
HEMOGLOBIN A2 QUANTITATION: 2.5 % (ref 2.2–3.5)
HEMOGLOBIN A: 97.5 % (ref 94.5–97.8)
HEMOGLOBIN ELECTROPHRESIS: NORMAL
HEMOGLOBIN F QUANTITATION: <0.5 % (ref 0–2)

## 2023-07-12 ENCOUNTER — VIRTUAL VISIT (OUTPATIENT)
Dept: ENDOCRINOLOGY | Facility: CLINIC | Age: 48
End: 2023-07-12
Payer: COMMERCIAL

## 2023-07-12 VITALS — HEIGHT: 65 IN | BODY MASS INDEX: 40.65 KG/M2 | WEIGHT: 244 LBS

## 2023-07-12 DIAGNOSIS — Z98.84 S/P LAPAROSCOPIC SLEEVE GASTRECTOMY: ICD-10-CM

## 2023-07-12 DIAGNOSIS — E66.813 CLASS 3 SEVERE OBESITY DUE TO EXCESS CALORIES WITH SERIOUS COMORBIDITY AND BODY MASS INDEX (BMI) OF 45.0 TO 49.9 IN ADULT (H): Primary | ICD-10-CM

## 2023-07-12 DIAGNOSIS — E66.01 CLASS 3 SEVERE OBESITY DUE TO EXCESS CALORIES WITH SERIOUS COMORBIDITY AND BODY MASS INDEX (BMI) OF 45.0 TO 49.9 IN ADULT (H): Primary | ICD-10-CM

## 2023-07-12 PROCEDURE — 99214 OFFICE O/P EST MOD 30 MIN: CPT | Mod: 95

## 2023-07-12 ASSESSMENT — PAIN SCALES - GENERAL: PAINLEVEL: NO PAIN (0)

## 2023-07-12 NOTE — NURSING NOTE
Is the patient currently in the state of [place]? YES    Visit mode:VIDEO    If the visit is dropped, the patient can be reconnected by: VIDEO VISIT: Text to cell phone: [phone]    Will anyone else be joining the visit? NO      How would you like to obtain your AVS? MyChart    Are changes needed to the allergy or medication list? NO    Reason for visit: RECHECK (Santi)    Pt has vomited 2- 3 times since surgery. Struggles swallowing water    Pt just started Pepcid to try to replace omeprazole, but it did not work as well.    Pt stated has elevated prolactin, would like to discuss if it is related to omeprazole.    Pt unsure if she still has hypertension, readings have been good, but she is still taking medication.      Trish Mayo, VF

## 2023-07-12 NOTE — PROGRESS NOTES
Virtual Visit Details    Type of service:  Video Visit     Originating Location (pt. Location): Home    Distant Location (provider location):  Off-site  Platform used for Video Visit: [facility] Bariatric Surgery Note    RE: Raffy Castelan  MR#: [id number]  : 1975  VISIT DATE: 2023      Dear Clinic - [facility],    I had the pleasure of seeing your patient, Raffy Castelan, in my post-bariatric surgery assessment clinic.    Assessment & Plan   Problem List Items Addressed This Visit        Digestive    Class 3 severe obesity due to excess calories with serious comorbidity and body mass index (BMI) of 45.0 to 49.9 in adult (H) - Primary       Other    S/P laparoscopic sleeve gastrectomy      1. Stop Omeprazole - take 1 tablet every other day for 2 weeks, if no break through symptoms decrease to 1 tablet every 3 days for 2 weeks, if no break through symptoms then stop   2. Restart tracking calories  3. Protein goal 60g daily   4. Follow up with Melinda Sanford in 3 months   5. Dietitian next available     37 minutes spent by me on the date of the encounter doing chart review, history and exam, documentation and further activities per the note    CHIEF COMPLAINT: Post-bariatric surgery follow-up. 6 months s/p gastric sleeve with Dr. Ritter.    HISTORY OF PRESENT ILLNESS:      2023     1:52 PM   Questions Regarding Prior Weight Loss Surgery Reviewed With Patient   I had the following weight loss procedure Sleeve Gastrectomy   What year was your surgery?    How has your weight changed since your last visit? I have lost weight   Do you currently have any of the following Heartburn, acid reflux, or GERD (acid reflux disease)?    Hypertension (high blood pressure)?   Do you have any concerns today? Elevated prolactin, if its related to omeprazole       Recent diet changes:  Eating 3 meals a day, with maybe 1 snack. Portion sizes around 1 cup. No concerns with hunger. Calories  around 9794-0025 daily. Feels like her food choices have not been as good recently    -Protein? Drinking protein shakes daily. Did not hae protein shake   -Water? 6 cups of water daily     Recent exercise/activity changes: Has been very active around the house. Is able to walk in heels without any pain.     Recent stressors: Overall doing well. Starting to see therapist     Recent sleep changes: No concerns     Vitamins/Labs: Reviewed labs - ferritin, A1C, and CBC have improved.     Denies nausea, vomiting, diarrhea, or abdominal pain   Denies dumping syndrome or hypoglycemia     Constipation - stable, no changes since surgery     HTN - well controlled on medications. Lisinopril/HCTZ. Not checking BP at home.     GERD - stopped prilosec to see if was influencing prolactin levels. Transition to pepcid with moderate relief. Needed tums more when on Pepcid. Restarted Prilosec.     Anemia - Got iron infusions. Feeling less fatigued.     Hair loss has improved.     No ETOH, nicotine, or NSAIDs  Caffeine - 1/2 cup of coffee daily     Weight History:      7/12/2023     1:52 PM   --   What is your highest lifetime weight? 298   What is your lowest weight since surgery? (In pounds) 244     Initial Weight (lbs): 290 lbs  Weight: 110.7 kg (244 lb)  Last Visits Weight: 116.6 kg (257 lb)  Cumulative weight loss (lbs): 46  Weight Loss Percentage: 15.86%     Wt Readings from Last 5 Encounters:   07/12/23 110.7 kg (244 lb)   05/19/23 116.5 kg (256 lb 14.4 oz)   05/11/23 118.5 kg (261 lb 3.2 oz)   05/08/23 118.6 kg (261 lb 8 oz)   04/13/23 116.3 kg (256 lb 6.4 oz)             7/12/2023     1:52 PM   Questions Regarding Co-Morbidities and Health Concerns Reviewed With Patient   Pre-diabetes Stayed the same   Diabetes II Never   High Blood Pressure Stayed the same   High cholesterol Stayed the same   Heartburn/Reflux Worsened   Sleep apnea Never   PCOS Never   Back pain Improved   Joint pain Improved   Lower leg swelling Improved  "          7/12/2023     1:52 PM   Eating Habits   How many meals do you eat per day? 3   Do you snack between meals? Sometimes   How much food are you eating at each meal? 1/2 cup to 1 cup   Are you able to separate your meals and liquids by at least 30 minutes? Yes   Are you able to avoid liquid calories? Yes           7/12/2023     1:52 PM   Exercise Questions Reviewed With Patient   How often do you exercise? 3 to 4 times per week   What is the duration of your exercise (in minutes)? 30 Minutes   What types of exercise do you do? walking   What keeps you from being more active? I am as active as I can possbily be       Social History:      7/12/2023     1:52 PM   --   Are you smoking? No   Are you drinking alcohol? No       Medications:  Current Outpatient Medications   Medication     albuterol (PROAIR HFA/PROVENTIL HFA/VENTOLIN HFA) 108 (90 Base) MCG/ACT inhaler     Calcium Citrate-Vitamin D (CALCIUM CITRATE CHEWY BITE) 500-12.5 MG-MCG CHEW     clobetasol propionate (OLUX) 0.05 % external foam     losartan-hydrochlorothiazide (HYZAAR) 50-12.5 MG tablet     omeprazole (PRILOSEC) 20 MG DR capsule     senna (SENOKOT) 8.6 MG tablet     No current facility-administered medications for this visit.         7/12/2023     1:52 PM   --   Do you avoid NSAIDs such as (Ibuprofen, Aleve, Naproxen, Advil)? Yes       ROS:  GI:       7/12/2023     1:52 PM   --   Vomiting Yes   Diarrhea No   Constipation Yes   Swallowing trouble No   Abdominal pain No   Heartburn Yes     Skin:       7/12/2023     1:52 PM   BAR RBS ROS - SKIN   Rash in skin folds No     Psych:       7/12/2023     1:52 PM   --   Depression No   Anxiety No     Female Only:       7/12/2023     1:52 PM   BAR RBS ROS -    Female only Regular menstrual cycles   Stress urinary incontinence No       Objective    Ht 1.651 m (5' 5\")   Wt 110.7 kg (244 lb)   BMI 40.60 kg/m    Vitals - Patient Reported  Pain Score: No Pain (0)      Vitals:  No vitals were obtained today " due to virtual visit.    Physical Exam   GENERAL: Healthy, alert and no distress  EYES: Eyes grossly normal to inspection.  No discharge or erythema, or obvious scleral/conjunctival abnormalities.  RESP: No audible wheeze, cough, or visible cyanosis.  No visible retractions or increased work of breathing.    SKIN: Visible skin clear. No significant rash, abnormal pigmentation or lesions.  NEURO: Cranial nerves grossly intact.  Mentation and speech appropriate for age.  PSYCH: Mentation appears normal, affect normal/bright, judgement and insight intact, normal speech and appearance well-groomed.        Sincerely,    ROSS GONZALES PA-C

## 2023-07-12 NOTE — LETTER
2023       RE: Raffy Castelan  [address]  [place] [address]     Dear Colleague,    Thank you for referring your patient, Raffy Castelan, to the [facility] [place] at [facility]. Please see a copy of my visit note below.    Virtual Visit Details    Type of service:  Video Visit     Originating Location (pt. Location): Home    Distant Location (provider location):  Off-site  Platform used for Video Visit: [facility] Bariatric Surgery Note    RE: Raffy Castelan  MR#: [id number]  : 1975  VISIT DATE: 2023      Dear Clinic - [facility],    I had the pleasure of seeing your patient, Raffy Castelan, in my post-bariatric surgery assessment clinic.    Assessment & Plan   Problem List Items Addressed This Visit          Digestive    Class 3 severe obesity due to excess calories with serious comorbidity and body mass index (BMI) of 45.0 to 49.9 in adult (H) - Primary       Other    S/P laparoscopic sleeve gastrectomy      Stop Omeprazole - take 1 tablet every other day for 2 weeks, if no break through symptoms decrease to 1 tablet every 3 days for 2 weeks, if no break through symptoms then stop   Restart tracking calories  Protein goal 60g daily   Follow up with Melinda Sanford in 3 months   Dietitian next available     37 minutes spent by me on the date of the encounter doing chart review, history and exam, documentation and further activities per the note    CHIEF COMPLAINT: Post-bariatric surgery follow-up. 6 months s/p gastric sleeve with Dr. Ritter.    HISTORY OF PRESENT ILLNESS:      2023     1:52 PM   Questions Regarding Prior Weight Loss Surgery Reviewed With Patient   I had the following weight loss procedure Sleeve Gastrectomy   What year was your surgery?    How has your weight changed since your last visit? I have lost weight   Do you currently have any of the following  Heartburn, acid reflux, or GERD (acid reflux disease)?    Hypertension (high blood pressure)?   Do you have any concerns today? Elevated prolactin, if its related to omeprazole       Recent diet changes:  Eating 3 meals a day, with maybe 1 snack. Portion sizes around 1 cup. No concerns with hunger. Calories around 7777-8725 daily. Feels like her food choices have not been as good recently    -Protein? Drinking protein shakes daily. Did not hae protein shake   -Water? 6 cups of water daily     Recent exercise/activity changes: Has been very active around the house. Is able to walk in heels without any pain.     Recent stressors: Overall doing well. Starting to see therapist     Recent sleep changes: No concerns     Vitamins/Labs: Reviewed labs - ferritin, A1C, and CBC have improved.     Denies nausea, vomiting, diarrhea, or abdominal pain   Denies dumping syndrome or hypoglycemia     Constipation - stable, no changes since surgery     HTN - well controlled on medications. Lisinopril/HCTZ. Not checking BP at home.     GERD - stopped prilosec to see if was influencing prolactin levels. Transition to pepcid with moderate relief. Needed tums more when on Pepcid. Restarted Prilosec.     Anemia - Got iron infusions. Feeling less fatigued.     Hair loss has improved.     No ETOH, nicotine, or NSAIDs  Caffeine - 1/2 cup of coffee daily     Weight History:      7/12/2023     1:52 PM   --   What is your highest lifetime weight? 298   What is your lowest weight since surgery? (In pounds) 244     Initial Weight (lbs): 290 lbs  Weight: 110.7 kg (244 lb)  Last Visits Weight: 116.6 kg (257 lb)  Cumulative weight loss (lbs): 46  Weight Loss Percentage: 15.86%     Wt Readings from Last 5 Encounters:   07/12/23 110.7 kg (244 lb)   05/19/23 116.5 kg (256 lb 14.4 oz)   05/11/23 118.5 kg (261 lb 3.2 oz)   05/08/23 118.6 kg (261 lb 8 oz)   04/13/23 116.3 kg (256 lb 6.4 oz)             7/12/2023     1:52 PM   Questions Regarding  Co-Morbidities and Health Concerns Reviewed With Patient   Pre-diabetes Stayed the same   Diabetes II Never   High Blood Pressure Stayed the same   High cholesterol Stayed the same   Heartburn/Reflux Worsened   Sleep apnea Never   PCOS Never   Back pain Improved   Joint pain Improved   Lower leg swelling Improved           7/12/2023     1:52 PM   Eating Habits   How many meals do you eat per day? 3   Do you snack between meals? Sometimes   How much food are you eating at each meal? 1/2 cup to 1 cup   Are you able to separate your meals and liquids by at least 30 minutes? Yes   Are you able to avoid liquid calories? Yes           7/12/2023     1:52 PM   Exercise Questions Reviewed With Patient   How often do you exercise? 3 to 4 times per week   What is the duration of your exercise (in minutes)? 30 Minutes   What types of exercise do you do? walking   What keeps you from being more active? I am as active as I can possbily be       Social History:      7/12/2023     1:52 PM   --   Are you smoking? No   Are you drinking alcohol? No       Medications:  Current Outpatient Medications   Medication    albuterol (PROAIR HFA/PROVENTIL HFA/VENTOLIN HFA) 108 (90 Base) MCG/ACT inhaler    Calcium Citrate-Vitamin D (CALCIUM CITRATE CHEWY BITE) 500-12.5 MG-MCG CHEW    clobetasol propionate (OLUX) 0.05 % external foam    losartan-hydrochlorothiazide (HYZAAR) 50-12.5 MG tablet    omeprazole (PRILOSEC) 20 MG DR capsule    senna (SENOKOT) 8.6 MG tablet     No current facility-administered medications for this visit.         7/12/2023     1:52 PM   --   Do you avoid NSAIDs such as (Ibuprofen, Aleve, Naproxen, Advil)? Yes       ROS:  GI:       7/12/2023     1:52 PM   --   Vomiting Yes   Diarrhea No   Constipation Yes   Swallowing trouble No   Abdominal pain No   Heartburn Yes     Skin:       7/12/2023     1:52 PM   BAR RBS ROS - SKIN   Rash in skin folds No     Psych:       7/12/2023     1:52 PM   --   Depression No   Anxiety No  "    Female Only:       7/12/2023     1:52 PM   BAR RBS ROS -    Female only Regular menstrual cycles   Stress urinary incontinence No       Objective    Ht 1.651 m (5' 5\")   Wt 110.7 kg (244 lb)   BMI 40.60 kg/m    Vitals - Patient Reported  Pain Score: No Pain (0)      Vitals:  No vitals were obtained today due to virtual visit.    Physical Exam   GENERAL: Healthy, alert and no distress  EYES: Eyes grossly normal to inspection.  No discharge or erythema, or obvious scleral/conjunctival abnormalities.  RESP: No audible wheeze, cough, or visible cyanosis.  No visible retractions or increased work of breathing.    SKIN: Visible skin clear. No significant rash, abnormal pigmentation or lesions.  NEURO: Cranial nerves grossly intact.  Mentation and speech appropriate for age.  PSYCH: Mentation appears normal, affect normal/bright, judgement and insight intact, normal speech and appearance well-groomed.        Sincerely,    ROSS GONZALES PA-C    "

## 2023-07-13 PROBLEM — Z98.84 S/P LAPAROSCOPIC SLEEVE GASTRECTOMY: Status: ACTIVE | Noted: 2023-07-13

## 2023-07-13 NOTE — PATIENT INSTRUCTIONS
"Dayron Akbar,   Thank you for allowing us the privilege of caring for you. We hope we provided you with the excellent service you deserve.   Please let us know if there is anything else we can do for you so that we can be sure you are completely satisfied with your care experience.    To ensure the quality of our services you may be receiving a patient satisfaction survey from an independent patient satisfaction monitoring company.    The greatest compliment you can give is a \"Likely to Recommend\"    Your visit was with ROSS GONZALES PA-C today.    Instructions per today's visit:     Stop Omeprazole - take 1 tablet every other day for 2 weeks, if no break through symptoms decrease to 1 tablet every 3 days for 2 weeks, if no break through symptoms then stop   Restart tracking calories  Protein goal 60g daily   Follow up with Ross Sanford in 3 months   Dietitian next available    ___________________________________________________________________________  Important contact and scheduling information:  Please call our contact center at [phone] to schedule your next appointments.  For any nursing questions or concerns call Abiola Damian LPN at [phone] or Johanne Aguilar RN at [phone]  Please call during clinic hours Monday through Friday 8:00a - 4:00p if you have questions or you can contact us via [employer] at anytime and we will reply during clinic hours.    Lab results will be communicated through My Chart or letter (if My Chart not used). Please call the clinic if you have not received communication after 1 week or if you have any questions.?  Clinic Fax: [phone]  __________________________________________________________________________    If labs were ordered today:    Please make an appointment to have them drawn at your convenience.     To schedule the Lab Appointment using [employer]:  Select \"Schedule an Appointment\"  Select \"Lab Only\"  For \"A couple of questions\", select \"Other\"  For \"Which locations " work for you?, select the location and set up the appointment    To schedule by phone call [phone] to schedule a lab only appointment at any [facility] lab.  ___________________________________________________________________________  Work with A Health !  Virtual Sessions are Available through [facility] Clinics    To learn more, call to schedule a free, Health  Q&A appointment: [phone]     What is Health Coaching?  Do you know what you are supposed to do, but you just aren't doing it?  Then, HEALTH COACHING may help you!   Get unstuck and move forward with the support of a professionally trained [employer]HWC (National Board-Certified Health and ) who uses evidence-based approaches to help you move forward with healthy lifestyle changes in the areas of weight loss, stress management and overall well-being.    Health Coaches help you identify goals that will work best for you. Health Coaches provide support and encouragement with overcoming barriers and help you to find inspiration and motivation to lead a healthy lifestyle.    Option one:  Health Coaching 3-Pack; Three, 30-minute Health Coaching Visits, for $99  Visits are done virtually (phone or video)  This is a self pay service; we do not accept insurance for toby coaching.    Option two:   The 24 week Plan; 11 Health Coaching Visits, and a 7 months subscription to [employer]-- on-demand fitness, nutrition and mindfulness classes, for $499 (employee discounts may be available). Participants will also meet regularly with a weight management Medical Provider and a Registered/Licensed Dietician.  This is a self-pay service; we do not accept insurance for health coaching.    To Schedule a free Health  Q&A appointment to learn more,  call [phone].  ____________________________________________________________________    [facility]   Healthy Lifestyle  Virtual Support Group    Healthy Lifestyle Virtual Support Group?  This is 60 minutes of small group guided discussion, support and resources. All are welcome who want a healthy lifestyle.  WHEN: Starting in July 2023, this group meets the 1st Friday of the month from 12:30 PM - 1:30 PM virtually using Microsoft Teams.    FACILITATOR: Led by National Board Certified Health and , Anh [facility].   TO REGISTER: Please send an email to Anh [email or web address] to receive monthly invites to the group or if you have any questions about having a health .  Prior to the meeting, a link with directions on how to join the meeting will be sent to you.    2023 Meetings  May 19: Let's Talk  June 9: Create Your Coaching Toolkit: Learn How to  Yourself  July 7: Let's Talk  August 4: Benefits of Fiber with TAMI Antonio  September 1: Show and Tell (share your aps, podcasts, recipes, hacks, books)  October 6 :Let's Talk  November 3: Introduction to Mindfulness   December 1: Let's Talk    If you would like bariatric surgery specific support group info please let your care team know.         Thank you,   [facility] Comprehensive Weight Management Team

## 2023-07-19 ENCOUNTER — TELEPHONE (OUTPATIENT)
Dept: ENDOCRINOLOGY | Facility: CLINIC | Age: 48
End: 2023-07-19
Payer: COMMERCIAL

## 2023-07-20 ENCOUNTER — VIRTUAL VISIT (OUTPATIENT)
Dept: ENDOCRINOLOGY | Facility: CLINIC | Age: 48
End: 2023-07-20
Payer: COMMERCIAL

## 2023-07-20 ENCOUNTER — VIRTUAL VISIT (OUTPATIENT)
Dept: PSYCHOLOGY | Facility: CLINIC | Age: 48
End: 2023-07-20
Attending: STUDENT IN AN ORGANIZED HEALTH CARE EDUCATION/TRAINING PROGRAM
Payer: COMMERCIAL

## 2023-07-20 VITALS — BODY MASS INDEX: 40.32 KG/M2 | WEIGHT: 242 LBS | HEIGHT: 65 IN

## 2023-07-20 DIAGNOSIS — E66.01 CLASS 3 SEVERE OBESITY DUE TO EXCESS CALORIES WITH SERIOUS COMORBIDITY AND BODY MASS INDEX (BMI) OF 45.0 TO 49.9 IN ADULT (H): ICD-10-CM

## 2023-07-20 DIAGNOSIS — Z71.3 NUTRITIONAL COUNSELING: Primary | ICD-10-CM

## 2023-07-20 DIAGNOSIS — F43.22 ADJUSTMENT DISORDER WITH ANXIOUS MOOD: ICD-10-CM

## 2023-07-20 DIAGNOSIS — E66.813 CLASS 3 SEVERE OBESITY DUE TO EXCESS CALORIES WITH SERIOUS COMORBIDITY AND BODY MASS INDEX (BMI) OF 45.0 TO 49.9 IN ADULT (H): ICD-10-CM

## 2023-07-20 DIAGNOSIS — Z98.84 S/P LAPAROSCOPIC SLEEVE GASTRECTOMY: ICD-10-CM

## 2023-07-20 PROCEDURE — 99207 PR NO CHARGE LOS: CPT | Mod: 95 | Performed by: DIETITIAN, REGISTERED

## 2023-07-20 PROCEDURE — 97803 MED NUTRITION INDIV SUBSEQ: CPT | Mod: 95 | Performed by: DIETITIAN, REGISTERED

## 2023-07-20 PROCEDURE — 90791 PSYCH DIAGNOSTIC EVALUATION: CPT | Mod: 95

## 2023-07-20 ASSESSMENT — PAIN SCALES - GENERAL: PAINLEVEL: NO PAIN (0)

## 2023-07-20 ASSESSMENT — PATIENT HEALTH QUESTIONNAIRE - PHQ9
10. IF YOU CHECKED OFF ANY PROBLEMS, HOW DIFFICULT HAVE THESE PROBLEMS MADE IT FOR YOU TO DO YOUR WORK, TAKE CARE OF THINGS AT HOME, OR GET ALONG WITH OTHER PEOPLE: NOT DIFFICULT AT ALL
SUM OF ALL RESPONSES TO PHQ QUESTIONS 1-9: 2
SUM OF ALL RESPONSES TO PHQ QUESTIONS 1-9: 2

## 2023-07-20 NOTE — PROGRESS NOTES
"Video-Visit Details    Type of service:  Video Visit    Video Start Time: 10:30 AM   Video End Time: 10:48 AM    Originating Location (pt. Location): Home    Distant Location (provider location):  Offsite (providers home)     Platform used for Video Visit: [facility]      Nutrition Reassessment  Reason For Visit:  Raffy Castelan is a 47 year old female presenting today for nutrition follow-up, 6 month s/p sleeve gastrectomy with Dr. Ritter on 1/10/23.    Anthropometrics:  Initial Consult Weight: 290 lbs  Day of Surgery Weight (1/10/23): 290 lbs  Current Weight:   Estimated body mass index is 40.6 kg/m  as calculated from the following:    Height as of 7/12/23: 1.651 m (5' 5\").    Weight as of 7/12/23: 110.7 kg (244 lb).  Weight loss: -46 lbs from initial consult; -46 lbs from day of surgery    Pt goal to lose 40 more lbs.     Current Vitamins/Minerals: MVI/minerals BID (Bariatric Fusion with Iron 45 mg), hair/skin/nails supplement, and calcium citrate 500 mg BID.     Nutrition Related Labs:  Hgb (10.5->11) improving and ferritin WNL  B12 1442 (H) on 3/30/23    Nutrition History:  Tolerate bariatric regular diet. C/o heartburn.   Working on further weight loss.   Has identified needing to eat more protein, and less carb  Meals are 1 cup in volume.  Pt tracking intake, consuming 9149-3716 hazel day. Since seeing PA-C has been working towards keeping to 1200 hazel/day.      Diet Recall:  B: half CeloNova Core Power Elite; avocado toast with egg  L: 1/2 c rice, 1/2 c protein and veg  D: 1/2 c rice  Snack: cookie, nuts   Beverages: water (48-64 oz/day), 1/2 c coffee and flavored creamer, 2 cup tea with tbsp sugar    Exercise: Busy lifestyle, but not making time for exercise     Progress with Previous Goals:  1) Follow bariatric regular diet. - Met, continues  2) Consume 60+ grams of protein/day.- Met at times, not consistently  3) Sip on 48-64+ oz of fluids/day- between meals only. - Met, continues  4) Eat slowly (>20 min/meal), " chewing foods well (to applesauce-like consistency). - Met, continues  5) Limit portions to ~1/2 - 3/4 cup/meal until 6 months post op.- Met, continues  6) Supplements: continue taking your multivitamin regularly as directed. I am going to check with Melinda Sanford about your hemoglobin and B12 levels and come up with a game plan. I will let you know as soon as I hear from her. Thank you!- Met, continues      Nutrition Prescription:  Grams Protein: 60 (minimum)   Amount of Fluid: 48-64+ oz    Nutrition Diagnosis  Previous: Food and nutrition-related knowledge deficit r/t lack of prior exposure to diet advancements beyond bariatric pureed diet aeb recent bariatric surgery and pt interest in diet education/review    Current: Food and nutrition-related knowledge deficit r/t lack of prior exposure to diet instruction beyond 3 months s/p SG as evidenced by recent bariatric surgery and pt interest in diet education/review      Intervention  Materials/Education provided, reviewed bariatric regular consistency diet, protein intake, fluid intake, eating pace, chewing foods well, portion control, sugar/fat intake, recommended vitamin/mineral supplements. Encouraged increased exercise.  Patient demonstrates understanding.     Expected Engagement: good     Goals:  1) Continue to track intake, keep to 1200 hazel/day  2) Consume 60+ grams of protein/day.   - Meals should be 50% protein and 50% fruit/vegetable/wholegrain  3) Sip on 64+ oz of fluids/day- between meals only.  4) Eat slowly (>20 min/meal), chewing foods well (to applesauce-like consistency).  5) Limit portions to 1 cup/meal   6) Increase exercise as able.     Keeping Up Your Diet after Weight Loss Surgery  [email or web address]    Exercises after Weight Loss Surgery (strengthening, when no weight lifting restrictions)  [email or web address]      Tools for after surgery:  [employer] Dish Sets: bariatric meal size bowls and plates with built in measurement  "designs on the dishes    Bariatric Pal: https://store.bariatricpal.com/collections/bariatric-dinnerware    Books:  \"Fresh Start Bariatric Cookbook\" by Teodora Martinez, MS, RDN, CD - Excellent cookbook with post-op recipes and many other resources to check out for ongoing support after surgery    \"Eating Well After Weight Loss Surgery\" by Kasia Frederick and Deric Luna - Great cookbook with recipes for each diet stage after surgery and recommended portion sizes. Slightly more intensive cooking recipes.    \"Bariatric Mindset Success\" by Rachelle Ervin - book that helps with prevention of weight regain after surgery. Helps train your brain for long term success with weight loss after surgery.    Apps:  [employer] rg -  fluid and nutritional tracking. Also has bariatric recipes.     Post-Bariatric Surgery Online Recipe Resources:  Online:    Simpler Recipes: https://www.Trainfox/bariatric-surgery/recipes    https://www.Jingit/bariatric-recipes/    https://Zulama/bariatric-recipes/     Some recipes on this site have larger than 1 cup portion size pictures: http://www.[facility].org/weight-loss-surgery/nutrition/recipe-corner.html     https://www.Group Therapy Records.me/25-bariatric-friendly-weeknight-meals/    Crockpot recipes: https://www.Group Therapy Records.me/25-bariatric-friendly-crockpot-recipes/    https://ViVex Biomedical.3C Plus/recipes/     https://www.eNeura Therapeutics.3C Plus/mbd-recipes    The World According to Eggface Blog: https://theworldaccordingtoeggface.FreeBorders.com/      Other Healthy Eating Websites:    https://www.International Liars Poker Association.3C Plus/    www.Portafare.org    https://www.diabetesfoodhub.org/all-recipes.html    Various Regions of African Cuisine: https://www.happin!.3C Plus/recipes/68326/cuisines-regions//    Chinese Cuisine: https://www.madewithlau.com/      At Home Exercise Resources    Dance Workouts: The Haley Montalvo   https://www.Rue La La.com/user/Donaldo METZ Workouts: " PopSuOopsLab Fitness  https://www.beSUCCESS.Animal Kingdom/user/popsugartvfit    Pilates: Blogilates  https://www.beSUCCESS.Animal Kingdom/user/blogilates    Yoga: Yoga with Deysi   https://www.beSUCCESS.Animal Kingdom/user/yogawithadriene/featured    Strength Training: HASfit  https://www.beSUCCESS.com/channel/KMQXA7-BMWZz09RV-q9ABnlJ      Exercises you can do anytime/anywhere: https://www.ADR Sales & Concepts.MiFi/resources/everyone/blog/6593/top-25-at-home-exercises/#:~:text=Top%2025%20At-Home%20Exercises.%201%201.%20Supermans.%20Who,Knee%20Push-up.%205%205.%20Downward-facing%20Dog.%20More%20items    Plus Size Beginner Workout - Standing (low impact)  https://www.PhoneGuardube.com/watch?v=7RrQPDw0NFE      Plus Size Full Body (created by a woman who struggled to find videos that were inclusive to larger bodies so she decided to make her own!)   https://www.PhoneGuardube.com/watch?v=4NoIW0d-mv8     Standing Core Workout for Beginners   https://www.PhoneGuardube.com/watch?v=A47d8bzez99     Seated Exercises for Arms and Legs (can be done before or after surgery)  http://www.[email or web address].com/165317.pdf    Exercise Guidelines after Weight Loss Surgery (1st 4 weeks)  http://www.Ratio/289146.pdf    Exercises after Weight Loss Surgery (strengthening, when no weight lifting restrictions)  Http://www.[email or web address].com/865823.pdf           Follow-Up: 2 months, prn    Time spent with patient: 18 minutes.  Mahnaz Skaggs RD LD

## 2023-07-20 NOTE — NURSING NOTE
Is the patient currently in the state of [place]? YES    Visit mode:VIDEO    If the visit is dropped, the patient can be reconnected by: VIDEO VISIT: Text to cell phone: [phone]    Will anyone else be joining the visit? NO      How would you like to obtain your AVS? MyChart    Are changes needed to the allergy or medication list? NO    Reason for visit: RECHECK ([facility])        Trish Mayo, VF

## 2023-07-20 NOTE — PATIENT INSTRUCTIONS
"Dayron Akbar,    Follow-up with RD on 9/19    Thank you,    Mahnaz Skaggs, RD, LD  If you would like to schedule or reschedule an appointment with the RD, please call [phone]    Nutrition Goals  1) Continue to track intake, keep to 1200 hazel/day  2) Consume 60+ grams of protein/day.   - Meals should be 50% protein and 50% fruit/vegetable/wholegrain  3) Sip on 64+ oz of fluids/day- between meals only.  4) Eat slowly (>20 min/meal), chewing foods well (to applesauce-like consistency).  5) Limit portions to 1 cup/meal   6) Increase exercise as able.     Keeping Up Your Diet after Weight Loss Surgery  [email or web address]    Exercises after Weight Loss Surgery (strengthening, when no weight lifting restrictions)  Http://www.[email or web address].com/[email or web address]      Tools for after surgery:  Mis Descuentos Dish Sets: bariatric meal size bowls and plates with built in measurement designs on the dishes    Bariatric Pal: https://store.Cryoport/collections/bariatric-dinnerware    Books:  \"Fresh Start Bariatric Cookbook\" by Teodora Martinez, MS, RDN, CD - Excellent cookbook with post-op recipes and many other resources to check out for ongoing support after surgery    \"Eating Well After Weight Loss Surgery\" by Kasia Frederick and Deric Luna - Great cookbook with recipes for each diet stage after surgery and recommended portion sizes. Slightly more intensive cooking recipes.    \"Bariatric Mindset Success\" by Rachelle Ervin - book that helps with prevention of weight regain after surgery. Helps train your brain for long term success with weight loss after surgery.    Apps:  AA Party rg -  fluid and nutritional tracking. Also has bariatric recipes.     Post-Bariatric Surgery Online Recipe Resources:  Online:  Simpler Recipes: https://www.Syscor/bariatric-surgery/recipes  https://www.for; to (do).Readyforce/bariatric-recipes/  https://Peach & Lily.Readyforce/bariatric-recipes/   Some recipes on this site have larger than 1 cup portion " size pictures: http://www.[facility].org/weight-loss-surgery/nutrition/recipe-corner.html   https://www.Tranzeo Wireless Technologies.me/25-bariatric-friendly-weeknight-meals/  Crockpot recipes: https://www.Tranzeo Wireless Technologies.me/25-bariatric-friendly-crockpot-recipes/  https://Cheggin/recipes/   https://www.GenieTown/mbd-recipes  The World According to Eggface Blog: https://theUS HealthVestorldFORMTEKrdingCorduroace.Clear-Data Analytics.Zentrick/      Other Healthy Eating Websites:  https://www.Design2Launch/  www.Starbucks  https://www.diabetesfoodhub.org/all-recipes.html  Various Regions of African Cuisine: https://www.Rated People/recipes/94165/cuisines-regions//  Chinese Cuisine: https://www.Synlogic/      At Home Exercise Resources    Dance Workouts: The Fitness Selvin   https://www.LaunchLab.Zentrick/user/[facility]    HIIT Workouts: PopSugar Fitness  https://www.LaunchLab.Zentrick/user/popsugartvfit    Pilates: Blogilates  https://www.LaunchLab.Zentrick/user/blogilates    Yoga: Yoga with Deysi   https://www.University of Wollongongube.Zentrick/user/yogawithadriene/featured    Strength Training: HASfit  https://www.LaunchLab.Zentrick/channel/EPKWR1-XYNKz78BK-h1FKseH      Exercises you can do anytime/anywhere: https://www.Cube CleanTech.org/resources/everyone/blog/6593/top-25-at-home-exercises/#:~:text=Top%2025%20At-Home%20Exercises.%201%201.%20Supermans.%20Who,Knee%20Push-up.%205%205.%20Downward-facing%20Dog.%20More%20items    Plus Size Beginner Workout - Standing (low impact)  https://www.University of Wollongongube.com/watch?v=2IrWBDp3ALB      Plus Size Full Body (created by a woman who struggled to find videos that were inclusive to larger bodies so she decided to make her own!)   https://www.University of Wollongongube.com/watch?v=8UtWA7h-es1     Standing Core Workout for Beginners   https://www.University of Wollongongube.com/watch?v=K48r9byar15     Seated Exercises for Arms and Legs (can be done before or after surgery)  http://www.fvfiles.com/070791.pdf    Exercise Guidelines after Weight Loss Surgery (1st 4  weeks)  http://www.ThinAir Wireless/152350.pdf    Exercises after Weight Loss Surgery (strengthening, when no weight lifting restrictions)  Http://www.fvfiles.com/211380.pdf              COMPREHENSIVE WEIGHT MANAGEMENT PROGRAM  VIRTUAL SUPPORT GROUPS    For Support Group Information:      We offer support groups for patients who are working on weight loss and considering, preparing for or have had weight loss surgery.   There is no cost for this opportunity.  You are invited to attend the?Virtual Support Groups?provided by any of the following locations:    [facility] via Microsoft Teams with Marcie Ramirez RN  2.   [place] via Turf Geography Club with Tono Myrick, PhD, LP  3.   [place] via Turf Geography Club with Anh Jeong RN  4.   [facility] via phorus Teams with Anh Black [facility]    The following Support Group information can also be found on our website:  https://www.[facility].org/treatments/weight-loss-surgery-support-groups    https://www.[facility].org/treatments/weight-loss-and-weight-loss-surgery-support-groups    [facility] Weight Loss Surgery Support Group    [facility] Weight Loss Surgery Support Group  The support group is a patient-lead forum that meets monthly to share experiences, encouragement and education. It is open to those who have had weight loss surgery, are scheduled for surgery, or are considering surgery.   WHEN: This group meets on the 3rd Wednesday of each month from 5:00PM - 6:00PM virtually using Microsoft Teams.   FACILITATOR: Led by Marcie Johnson RD, LD, RN, the program's Clinical Coordinator.   TO REGISTER: Please contact the clinic via YouLike or call the nurse line directly at [phone] to inform our staff that you would like an invite sent to you and to let us know the email you would like the invite sent to. Prior to the meeting, a link with directions on how to join the meeting will be sent to you.    2023 Meetings  "  April 19: Guest Speaker, Delfino Hughes, TAMI, LD, \"Maintaining Weight Loss after Bariatric Surgery\".  May 17: \"Let's Talk\" a time for the group to share.  June 21: \"Let's Talk\" a time for the group to share.  July 19  August 16  September 20  October 18  November 15  December 20    [facility] Support Groups    Connections Bariatric Care Support Group?  This is open to all pre- and post- operative bariatric surgery patients as well as their support system.   WHEN: This group meets the 2nd Tuesday of each month from 6:30 PM - 8:00 PM virtually using Microsoft Teams.   FACILITATOR: Led by Tono Myrick, Ph.D who is a Licensed Psychologist with the [facility].   TO REGISTER: Please send an email to Tono Myrick, Ph.D.,  at?[email or web address]?if you would like an invitation to the group and to learn about using Microsoft Teams.    2023 Meetings  April 11: Guest speaker, Tatyana Hayden MD, Bariatrician, [facility], \"Injectable Weight Loss Medications\".  May 9  Lynn 13  July 11  August 8  September 12  October 10  November 14  December 12    Connections Post-Operative Bariatric Surgery Support Group  This is a support group for [facility] bariatric patients (and those external to [facility]) who have had bariatric surgery and are at least 3 months post-surgery.  WHEN: This support group meets the 4th Wednesday of the month from 11:00 AM - 12:00 PM virtually using Microsoft Teams.   FACILITATOR: Led by Certified Bariatric Nurse, Anh Jeong RN.   TO REGISTER: Please send an email to Anh at [email or web address] if you would like an invitation to the group and to learn about using Microsoft Teams.    2023 Meetings  April 26  May 24  Lynn 28  July 26  August 23  September 27  October 25  November 22  December 27    [facility]   Healthy Lifestyle Virtual Support Group    Healthy Lifestyle Virtual Support Group?  This is 60 minutes of small group guided discussion, support and resources. All are welcome who want a healthy lifestyle.  WHEN: This group meets monthly on a Friday from 12:30 PM - 1:30 PM virtually using Microsoft Teams.  This group will meet the first Friday of the month beginning In July  FACILITATOR: Led by National Board Certified Health and , Anh Black [facility].   TO REGISTER: Please send an email to Anh at?[email or web address] to receive monthly invites to the group or if you have any questions about having a health .  Prior to the meeting, a link with directions on how to join the meeting will be sent to you.    2023 Meetings  May 19: Let's Talk  June 9: Create Your Coaching Toolkit: Learn How to  Yourself  July 7: Let's Talk  August 4: Benefits of Fiber with TAMI Antonio  September 1: Show and Tell (share your aps, podcasts, recipes, hacks, books)  October 6 :Let's Talk  November 3: Introduction to Mindfulness   December 1: Let's Talk

## 2023-07-20 NOTE — LETTER
"7/20/2023       RE: Raffy Castelan  [address] [id number]     Dear Colleague,    Thank you for referring your patient, Raffy Castelan, to the [facility] [place] [facility]. Please see a copy of my visit note below.    Video-Visit Details    Type of service:  Video Visit    Video Start Time: 10:30 AM   Video End Time: 10:48 AM    Originating Location (pt. Location): Home    Distant Location (provider location):  Offsite (providers home)     Platform used for Video Visit: Well      Nutrition Reassessment  Reason For Visit:  Raffy Castelan is a 47 year old female presenting today for nutrition follow-up, 6 month s/p sleeve gastrectomy with Dr. Ritter on 1/10/23.    Anthropometrics:  Initial Consult Weight: 290 lbs  Day of Surgery Weight (1/10/23): 290 lbs  Current Weight:   Estimated body mass index is 40.6 kg/m  as calculated from the following:    Height as of 7/12/23: 1.651 m (5' 5\").    Weight as of 7/12/23: 110.7 kg (244 lb).  Weight loss: -46 lbs from initial consult; -46 lbs from day of surgery    Pt goal to lose 40 more lbs.     Current Vitamins/Minerals: MVI/minerals BID (Bariatric Fusion with Iron 45 mg), hair/skin/nails supplement, and calcium citrate 500 mg BID.     Nutrition Related Labs:  Hgb (10.5->11) improving and ferritin WNL  B12 1442 (H) on 3/30/23    Nutrition History:  Tolerate bariatric regular diet. C/o heartburn.   Working on further weight loss.   Has identified needing to eat more protein, and less carb  Meals are 1 cup in volume.  Pt tracking intake, consuming 3396-8531 hazel day. Since seeing PA-C has been working towards keeping to 1200 hazel/day.      Diet Recall:  B: half Fairlife Core Power Elite; avocado toast with egg  L: 1/2 c rice, 1/2 c protein and veg  D: 1/2 c rice  Snack: cookie, nuts   Beverages: water (48-64 oz/day), 1/2 c coffee and flavored creamer, 2 cup tea with tbsp " sugar    Exercise: Busy lifestyle, but not making time for exercise     Progress with Previous Goals:  1) Follow bariatric regular diet. - Met, continues  2) Consume 60+ grams of protein/day.- Met at times, not consistently  3) Sip on 48-64+ oz of fluids/day- between meals only. - Met, continues  4) Eat slowly (>20 min/meal), chewing foods well (to applesauce-like consistency). - Met, continues  5) Limit portions to ~1/2 - 3/4 cup/meal until 6 months post op.- Met, continues  6) Supplements: continue taking your multivitamin regularly as directed. I am going to check with Melinda Sanford about your hemoglobin and B12 levels and come up with a game plan. I will let you know as soon as I hear from her. Thank you!- Met, continues      Nutrition Prescription:  Grams Protein: 60 (minimum)   Amount of Fluid: 48-64+ oz    Nutrition Diagnosis  Previous: Food and nutrition-related knowledge deficit r/t lack of prior exposure to diet advancements beyond bariatric pureed diet aeb recent bariatric surgery and pt interest in diet education/review    Current: Food and nutrition-related knowledge deficit r/t lack of prior exposure to diet instruction beyond 3 months s/p SG as evidenced by recent bariatric surgery and pt interest in diet education/review      Intervention  Materials/Education provided, reviewed bariatric regular consistency diet, protein intake, fluid intake, eating pace, chewing foods well, portion control, sugar/fat intake, recommended vitamin/mineral supplements. Encouraged increased exercise.  Patient demonstrates understanding.     Expected Engagement: good     Goals:  1) Continue to track intake, keep to 1200 hazel/day  2) Consume 60+ grams of protein/day.   - Meals should be 50% protein and 50% fruit/vegetable/wholegrain  3) Sip on 64+ oz of fluids/day- between meals only.  4) Eat slowly (>20 min/meal), chewing foods well (to applesauce-like consistency).  5) Limit portions to 1 cup/meal   6) Increase exercise as  "able.     Keeping Up Your Diet after Weight Loss Surgery  [email or web address]    Exercises after Weight Loss Surgery (strengthening, when no weight lifting restrictions)  [email or web address]      Tools for after surgery:  [employer] Dish Sets: bariatric meal size bowls and plates with built in measurement designs on the dishes    Bariatric Pal: https://store.bariatricpal.com/collections/bariatric-dinnerware    Books:  \"Fresh Start Bariatric Cookbook\" by Teodora Martinez, MS, RDN, CD - Excellent cookbook with post-op recipes and many other resources to check out for ongoing support after surgery    \"Eating Well After Weight Loss Surgery\" by Kasia Frederick and Deric Villarreal-Teresita - Great cookbook with recipes for each diet stage after surgery and recommended portion sizes. Slightly more intensive cooking recipes.    \"Bariatric Mindset Success\" by Rachelle Ervin - book that helps with prevention of weight regain after surgery. Helps train your brain for long term success with weight loss after surgery.    Apps:  JobHive rg -  fluid and nutritional tracking. Also has bariatric recipes.     Post-Bariatric Surgery Online Recipe Resources:  Online:  Simpler Recipes: https://www.RetailTower/bariatric-surgery/recipes  https://www.meXBT / [employer]/bariatric-recipes/  https://Infinite Executive Car Service.Resort Gems/bariatric-recipes/   Some recipes on this site have larger than 1 cup portion size pictures: http://www.[facility].org/weight-loss-surgery/nutrition/recipe-corner.html   https://www.Newport Media.me/25-bariatric-friendly-weeknight-meals/  Crockpot recipes: https://www.foodcoBasisnote AG.me/25-bariatric-friendly-crockpot-recipes/  https://PowerMag.Resort Gems/recipes/   https://www.Voices.Resort Gems/mbd-recipes  The World According to pickrset Blog: https://theworldGatherrdGuÃ­a Localtoeggface.Highwinds.com/      Other Healthy Eating " Websites:  https://www.Fishidy/  www.Kofax.org  https://www.diabetesfoodhub.org/all-recipes.html  Various Regions of African Cuisine: https://www.picoChip/recipes/69182/cuisines-regions//  Chinese Cuisine: https://www.ShangPin.Boost Media/      At Home Exercise Resources    Dance Workouts: The Fitness Selvin   https://www.MilkyWay.Boost Media/user/TheFitnessMarward    HIIT Workouts: PopSugar Fitness  https://www.MilkyWay.Boost Media/user/popsugartvfit    Pilates: Blogilates  https://www.MilkyWay.Boost Media/user/blogilates    Yoga: Yoga with Deysi   https://www.MilkyWay.Boost Media/user/yogawithadriene/featured    Strength Training: HASfit  https://www.MilkyWay.Boost Media/channel/KCLNR7-KXIYs10JK-w4VUtnX      Exercises you can do anytime/anywhere: https://www.Humanco.org/resources/everyone/blog/6593/top-25-at-home-exercises/#:~:text=Top%2025%20At-Home%20Exercises.%201%201.%20Supermans.%20Who,Knee%20Push-up.%205%205.%20Downward-facing%20Dog.%20More%20items    Plus Size Beginner Workout - Standing (low impact)  https://www.Yastube.com/watch?v=7MzHKUu0OTE      Plus Size Full Body (created by a woman who struggled to find videos that were inclusive to larger bodies so she decided to make her own!)   https://www.Yastube.com/watch?v=5JgSN5w-bz2     Standing Core Workout for Beginners   https://www.Yastube.com/watch?v=D69c0ownm34     Seated Exercises for Arms and Legs (can be done before or after surgery)  http://www.[email or web address]    Exercise Guidelines after Weight Loss Surgery (1st 4 weeks)  http://www.Publimind/769055.pdf    Exercises after Weight Loss Surgery (strengthening, when no weight lifting restrictions)  Http://www.[email or web address]           Follow-Up: 2 months, prn    Time spent with patient: 18 minutes.  Mahnaz Skaggs RD LD
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
## 2023-07-27 ENCOUNTER — VIRTUAL VISIT (OUTPATIENT)
Dept: PSYCHOLOGY | Facility: CLINIC | Age: 48
End: 2023-07-27
Payer: COMMERCIAL

## 2023-07-27 DIAGNOSIS — F43.22 ADJUSTMENT DISORDER WITH ANXIOUS MOOD: Primary | ICD-10-CM

## 2023-07-27 PROCEDURE — 90837 PSYTX W PT 60 MINUTES: CPT | Mod: VID

## 2023-07-27 NOTE — PROGRESS NOTES
[facility]                                     Progress Note    Patient Name: Raffy Castelan  Date: 7/27/23         Service Type: Individual      Session Start Time: 9:00a  Session End Time: 9:55a     Session Length: 55 minutes    Session #: 2    Attendees: Client attended alone    Service Modality:  Video Visit:      Provider verified identity through the following two step process.  Patient provided:  Patient was verified at admission/transfer    Telemedicine Visit: The patient's condition can be safely assessed and treated via synchronous audio and visual telemedicine encounter.      Reason for Telemedicine Visit: Patient has requested telehealth visit    Originating Site (Patient Location): Patient's home    Distant Site (Provider Location): Provider Remote Setting- Home Office    Consent:  The patient/guardian has verbally consented to: the potential risks and benefits of telemedicine (video visit) versus in person care; bill my insurance or make self-payment for services provided; and responsibility for payment of non-covered services.     Patient would like the video invitation sent by:  Send to e-mail at: [email or web address]    Mode of Communication:  [facility]    Distant Location (Provider):  Off-site    As the provider I attest to compliance with applicable laws and regulations related to telemedicine.    DATA  Interactive Complexity: No  Crisis: No        Progress Since Last Session (Related to Symptoms / Goals / Homework):   Symptoms: No change Pt reported she continues to experience anxiety and depressive symptoms associated with her relationships, life changes, and self esteem.     Homework:  NA no homework as last session was intake      Episode of Care Goals: Minimal progress - PREPARATION (Decided to change - considering how); Intervened by negotiating a change plan and determining options / strategies for behavior change, identifying triggers, exploring  social supports, and working towards setting a date to begin behavior change     Current / Ongoing Stressors and Concerns:   Today, pt reported      Treatment Objective(s) Addressed in This Session:   identify the stressors which contribute to feelings of depression  identify several positives concerning self-esteem each session of therapy     Intervention:  Identifying Cognitive Distortions   Cognitive restructuring   Exploration of Coping Patterns   Exploration of Emotions     Assessments completed prior to visit:  The following assessments were completed by patient for this visit:  None - assessments were completed at intake on 7/19       ASSESSMENT: Current Emotional / Mental Status (status of significant symptoms):   Risk status (Self / Other harm or suicidal ideation)   Patient denies current fears or concerns for personal safety.   Patient denies current or recent suicidal ideation or behaviors.   Patient denies current or recent homicidal ideation or behaviors.   Patient denies current or recent self injurious behavior or ideation.   Patient denies other safety concerns.   Patient reports there has been no change in risk factors since their last session.     Patient reports there has been no change in protective factors since their last session.     Recommended that patient call 911 or go to the local ED should there be a change in any of these risk factors.     Appearance:   Appropriate    Eye Contact:   Good    Psychomotor Behavior: Normal    Attitude:   Cooperative  Friendly Pleasant   Orientation:   All   Speech    Rate / Production: Normal     Volume:  Normal    Mood:    Sad  Euthymic   Affect:    Appropriate    Thought Content:  Clear    Thought Form:  Coherent  Logical    Insight:    Good      Medication Review:   No changes to current psychiatric medication(s)     Medication Compliance:   Yes     Changes in Health Issues:   None reported     Chemical Use Review:   Substance Use: Chemical use  "reviewed, no active concerns identified      Tobacco Use: No current tobacco use.      Diagnosis:  Adjustment Disorders  309.28 (F43.23) With mixed anxiety and depressed mood.      Collateral Reports Completed:   Not Applicable    PLAN: (Patient Tasks / Therapist Tasks / Other)  Pt would track, over the next week, how many times she \"should's\" on herself  Pt will make a list of things she likes about her body that have nothing to do with how it looks and bring to next 1:1  Pt will watch the assigned videos on body image and bring to next 1:1      Padmini Eaton [facility]                                       ______________________________________________________________________    Individual Treatment Plan    Patient's Name: Raffy Castelan  YOB: 1975    Date of Creation: 7/27/23  Date Treatment Plan Last Reviewed/Revised: 7/27/23    DSM5 Diagnoses: Adjustment Disorders  309.28 (F43.23) With mixed anxiety and depressed mood.  Psychosocial / Contextual Factors:  Discord with family, hx of trauma, stage of live changes.  PROMIS (reviewed every 90 days):     Referral / Collaboration:  Referral to another professional/service is not indicated at this time..    Anticipated number of session for this episode of care: 9-12 sessions  Anticipation frequency of session: Weekly  Anticipated Duration of each session: 38-52 minutes  Treatment plan will be reviewed in 90 days or when goals have been changed.       MeasurableTreatment Goal(s) related to diagnosis / functional impairment(s)  Goal-Depression: Client will decrease depressed mood    I will know I've met my goal when I am less depressed.      Objective #A (Client Action)    Client will use identified behavioral and cognitive skills to challenge negative self talk 90% of the time.  Status: New - Date: 7/27/2023    Intervention(s)  Therapist will provide psychoeducation, behavioral activation, and cognitive restructuring.      Objective #B  Client will complete at " least 10 minutes of ACE activities (e.g.Achievement, Closeness, and Enjoyment) or other Behavioral Activation goals per day.    Status: New - Date: 7/27/2023    Intervention(s)  Therapist will provide psychoeducation, behavioral activation, and cognitive restructuring.      Objective #C  Client will exercise 30 minutes 36 times in the next 12 weeks.  Status: New - Date: 7/27/2023    Intervention(s)  Therapist will provide psychoeducation, behavioral activation, and cognitive restructuring.           Patient has reviewed and agreed to the above plan.      Padmini Etaon, [facility]  July 27, 2023

## 2023-08-01 DIAGNOSIS — E66.01 MORBID OBESITY (H): ICD-10-CM

## 2023-08-01 DIAGNOSIS — K21.9 GASTROESOPHAGEAL REFLUX DISEASE WITHOUT ESOPHAGITIS: ICD-10-CM

## 2023-08-01 DIAGNOSIS — Z91.89 AT HIGH RISK FOR POSTOPERATIVE COMPLICATIONS: ICD-10-CM

## 2023-08-01 DIAGNOSIS — K21.9 GERD (GASTROESOPHAGEAL REFLUX DISEASE): ICD-10-CM

## 2023-08-03 ENCOUNTER — VIRTUAL VISIT (OUTPATIENT)
Dept: PSYCHOLOGY | Facility: CLINIC | Age: 48
End: 2023-08-03
Payer: COMMERCIAL

## 2023-08-03 DIAGNOSIS — F43.22 ADJUSTMENT DISORDER WITH ANXIOUS MOOD: Primary | ICD-10-CM

## 2023-08-03 PROCEDURE — 90834 PSYTX W PT 45 MINUTES: CPT | Mod: VID | Performed by: COUNSELOR

## 2023-08-04 NOTE — PROGRESS NOTES
[facility]                                     Progress Note    Patient Name: Raffy Castelan  Date: 8/3/23         Service Type: Individual      Session Start Time: 2:00p  Session End Time: 2:50p     Session Length: 50 minutes    Session #: 3    Attendees: Client attended alone    Service Modality:  Video Visit:      Provider verified identity through the following two step process.  Patient provided:  Patient was verified at admission/transfer    Telemedicine Visit: The patient's condition can be safely assessed and treated via synchronous audio and visual telemedicine encounter.      Reason for Telemedicine Visit: Patient has requested telehealth visit    Originating Site (Patient Location): Patient's home    Distant Site (Provider Location): Provider Remote Setting- Home Office    Consent:  The patient/guardian has verbally consented to: the potential risks and benefits of telemedicine (video visit) versus in person care; bill my insurance or make self-payment for services provided; and responsibility for payment of non-covered services.     Patient would like the video invitation sent by:  Send to e-mail at: [email or web address]    Mode of Communication:  Video Conference via [employer]    Distant Location (Provider):  Off-site    As the provider I attest to compliance with applicable laws and regulations related to telemedicine.    DATA  Interactive Complexity: No  Crisis: No        Progress Since Last Session (Related to Symptoms / Goals / Homework):   Symptoms: Improving Pt reported the homework on body image really helped her feel better about her body    Homework: Achieved / completed to satisfaction       Episode of Care Goals: Satisfactory progress - PREPARATION (Decided to change - considering how); Intervened by negotiating a change plan and determining options / strategies for behavior change, identifying triggers, exploring social supports, and working towards setting a date to  begin behavior change     Current / Ongoing Stressors and Concerns:   Today, pt reported she did the homework from last week and noted she cried watching the videos on body image. Reported asking her friends to help her come up with things she likes about her body (not looks but how her body has been there for her). Reported this was really helpful and shared in all her life, she has not considered her body in this way. We discussed ways in which she can continue to foster this and pt agreed to a daily gratitude exercise related to her body. Pt went on to discuss her work life and noted she has been very busy. Reported as she gets older, she demands more respect from others and noted being a POC, Zoroastrian, first generation immigrant female  means she encounters a lot of disrespect. We discussed how earlier in life, she did not defend herself but now she wants to. Pt reported she fears that she comes off aggressive when she is trying to be assertive. Writer educated pt on communication styles and we discussed techniques that she can use.      Treatment Objective(s) Addressed in This Session:   identify the stressors which contribute to feelings of depression  identify several positives concerning self-esteem each session of therapy     Intervention:  Identifying Cognitive Distortions   Cognitive restructuring   Exploration of Coping Patterns   Exploration of Emotions     Assessments completed prior to visit:  The following assessments were completed by patient for this visit:  None - assessments were completed at intake on 7/19       ASSESSMENT: Current Emotional / Mental Status (status of significant symptoms):   Risk status (Self / Other harm or suicidal ideation)   Patient denies current fears or concerns for personal safety.   Patient denies current or recent suicidal ideation or behaviors.   Patient denies current or recent homicidal ideation or behaviors.   Patient denies current or recent self injurious  behavior or ideation.   Patient denies other safety concerns.   Patient reports there has been no change in risk factors since their last session.     Patient reports there has been no change in protective factors since their last session.     Recommended that patient call 911 or go to the local ED should there be a change in any of these risk factors.     Appearance:   Appropriate    Eye Contact:   Good    Psychomotor Behavior: Normal    Attitude:   Cooperative  Friendly Pleasant   Orientation:   All   Speech    Rate / Production: Normal     Volume:  Normal    Mood:    Sad  Euthymic   Affect:    Appropriate    Thought Content:  Clear    Thought Form:  Coherent  Logical    Insight:    Good      Medication Review:   No changes to current psychiatric medication(s)     Medication Compliance:   Yes     Changes in Health Issues:   None reported     Chemical Use Review:   Substance Use: Chemical use reviewed, no active concerns identified      Tobacco Use: No current tobacco use.      Diagnosis:  Adjustment Disorders  309.28 (F43.23) With mixed anxiety and depressed mood.      Collateral Reports Completed:   Not Applicable    PLAN: (Patient Tasks / Therapist Tasks / Other)  Pt will complete a daily gratitude journal related to her body  Pt will consider listening to pod cast Talk slow, Think Smart  Pt will use communication tools discussed next time she feels disrespected at work.       Padmini Eaton, [facility]                                       ______________________________________________________________________    Individual Treatment Plan    Patient's Name: Raffy Castelan  YOB: 1975    Date of Creation: 7/27/23  Date Treatment Plan Last Reviewed/Revised: 7/27/23    DSM5 Diagnoses: Adjustment Disorders  309.28 (F43.23) With mixed anxiety and depressed mood.  Psychosocial / Contextual Factors:  Discord with family, hx of trauma, stage of live changes.  PROMIS (reviewed every 90 days):     Referral /  Collaboration:  Referral to another professional/service is not indicated at this time..    Anticipated number of session for this episode of care: 9-12 sessions  Anticipation frequency of session: Weekly  Anticipated Duration of each session: 38-52 minutes  Treatment plan will be reviewed in 90 days or when goals have been changed.       MeasurableTreatment Goal(s) related to diagnosis / functional impairment(s)  Goal-Depression: Client will decrease depressed mood    I will know I've met my goal when I am less depressed.      Objective #A (Client Action)    Client will use identified behavioral and cognitive skills to challenge negative self talk 90% of the time.  Status: New - Date: 7/27/2023    Intervention(s)  Therapist will provide psychoeducation, behavioral activation, and cognitive restructuring.      Objective #B  Client will complete at least 10 minutes of ACE activities (e.g.Achievement, Closeness, and Enjoyment) or other Behavioral Activation goals per day.    Status: New - Date: 7/27/2023    Intervention(s)  Therapist will provide psychoeducation, behavioral activation, and cognitive restructuring.      Objective #C  Client will exercise 30 minutes 36 times in the next 12 weeks.  Status: New - Date: 7/27/2023    Intervention(s)  Therapist will provide psychoeducation, behavioral activation, and cognitive restructuring.           Patient has reviewed and agreed to the above plan.      JUDD Mayers  August 3, 2023

## 2023-08-10 ENCOUNTER — VIRTUAL VISIT (OUTPATIENT)
Dept: PSYCHOLOGY | Facility: CLINIC | Age: 48
End: 2023-08-10
Payer: COMMERCIAL

## 2023-08-10 DIAGNOSIS — F43.22 ADJUSTMENT DISORDER WITH ANXIOUS MOOD: Primary | ICD-10-CM

## 2023-08-10 PROCEDURE — 90834 PSYTX W PT 45 MINUTES: CPT | Mod: VID | Performed by: COUNSELOR

## 2023-08-10 NOTE — PROGRESS NOTES
[facility]                                     Progress Note    Patient Name: Raffy Castelan  Date: 8/10/23         Service Type: Individual      Session Start Time: 9:00a  Session End Time: 9:50a     Session Length: 50 minutes    Session #: 4    Attendees: Client attended alone    Service Modality:  Video Visit:      Provider verified identity through the following two step process.  Patient provided:  Patient was verified at admission/transfer    Telemedicine Visit: The patient's condition can be safely assessed and treated via synchronous audio and visual telemedicine encounter.      Reason for Telemedicine Visit: Patient has requested telehealth visit    Originating Site (Patient Location): Patient's home    Distant Site (Provider Location): Provider Remote Setting- Home Office    Consent:  The patient/guardian has verbally consented to: the potential risks and benefits of telemedicine (video visit) versus in person care; bill my insurance or make self-payment for services provided; and responsibility for payment of non-covered services.     Patient would like the video invitation sent by:  Send to e-mail at: [email or web address]    Mode of Communication:  Video Conference [facility]    Distant Location (Provider):  Off-site    As the provider I attest to compliance with applicable laws and regulations related to telemedicine.    DATA  Interactive Complexity: No  Crisis: No        Progress Since Last Session (Related to Symptoms / Goals / Homework):   Symptoms: Improving Pt reported  I am actually starting to like myself better. Reported she is no longer avoiding mirrors.       Homework: Achieved / completed to satisfaction       Episode of Care Goals: Satisfactory progress - PREPARATION (Decided to change - considering how); Intervened by negotiating a change plan and determining options / strategies for behavior change, identifying triggers, exploring social supports, and working towards  "setting a date to begin behavior change     Current / Ongoing Stressors and Concerns:  Today, we discussed how pt has created an environment for her boys that she did not have as a child. We discussed the negative impact her childhood has had on her self esteem and self actualization.  Pt noted the \"should\" in her head is her mom's voice and noted \"when I talk, my mom comes out.\" We discussed the \"shoulds\" of her childhood including: Shouldn t have a loud voice; Shouldn t gain weight; Shouldn t lie; Shouldn t cheat; Shouldn t cross legs in front of elderly; Should get  before 30. Pt reported she was taught to always do for others. Pt reported that caretaking makes her resentful. We discussed how her moms messaging of \"you owe me because I did __ for you\" has created a core belief for her that if she does for others, they are supposed to owe her and often, others do not return the favor. We discussed caretaking vs caregiving. Pt reported that she is scared to date again because she is afraid she will   lose herself and become resentful. We discussed the concept of  me first, then you.      Pt set a goal for the future: Wants to be able to ask for help without being afraid people will be upset with her      Treatment Objective(s) Addressed in This Session:   identify the stressors which contribute to feelings of depression  identify several positives concerning self-esteem each session of therapy     Intervention:  Skills training  Explore skills useful to client in current situation  Skills include assertiveness, communication, conflict management, problem-solving, relaxation, etc.     Cognitive-behavioral Therapy  Discuss common cognitive distortions, identified them in patient's life  Explore ways to challenge, replace, and act against these cognitions     Psychodynamic psychotherapy  Discuss patient's emotional dynamics and issues and how they impact behaviors  Explore patient's history of relationships and " how they impact present behaviors  Explore how to work with and make changes in these schemas and patterns       Assessments completed prior to visit:  The following assessments were completed by patient for this visit:  None - assessments were completed at intake on 7/19       ASSESSMENT: Current Emotional / Mental Status (status of significant symptoms):   Risk status (Self / Other harm or suicidal ideation)   Patient denies current fears or concerns for personal safety.   Patient denies current or recent suicidal ideation or behaviors.   Patient denies current or recent homicidal ideation or behaviors.   Patient denies current or recent self injurious behavior or ideation.   Patient denies other safety concerns.   Patient reports there has been no change in risk factors since their last session.     Patient reports there has been no change in protective factors since their last session.     Recommended that patient call 911 or go to the local ED should there be a change in any of these risk factors.     Appearance:   Appropriate    Eye Contact:   Good    Psychomotor Behavior: Normal    Attitude:   Cooperative  Friendly Pleasant   Orientation:   All   Speech    Rate / Production: Normal     Volume:  Normal    Mood:    Sad  Euthymic   Affect:    Appropriate    Thought Content:  Clear    Thought Form:  Coherent  Logical    Insight:    Good      Medication Review:   No changes to current psychiatric medication(s)     Medication Compliance:   Yes     Changes in Health Issues:   None reported     Chemical Use Review:   Substance Use: Chemical use reviewed, no active concerns identified      Tobacco Use: No current tobacco use.      Diagnosis:  Adjustment Disorders  309.28 (F43.23) With mixed anxiety and depressed mood.      Collateral Reports Completed:   Not Applicable    PLAN: (Patient Tasks / Therapist Tasks / Other)  Pt will continue her daily gratitude journal related to her body  Pt will make a list regarding  "what drains, charges, and nutrally impacts her \"battery\"  Pt will watch the Claudio Clement video on \"percentages\" in relationships      Padmini Eaton, [facility]                                       ______________________________________________________________________    Individual Treatment Plan    Patient's Name: Raffy Castelan  YOB: 1975    Date of Creation: 7/27/23  Date Treatment Plan Last Reviewed/Revised: 7/27/23    DSM5 Diagnoses: Adjustment Disorders  309.28 (F43.23) With mixed anxiety and depressed mood.  Psychosocial / Contextual Factors:  Discord with family, hx of trauma, stage of live changes.  PROMIS (reviewed every 90 days):     Referral / Collaboration:  Referral to another professional/service is not indicated at this time..    Anticipated number of session for this episode of care: 9-12 sessions  Anticipation frequency of session: Weekly  Anticipated Duration of each session: 38-52 minutes  Treatment plan will be reviewed in 90 days or when goals have been changed.       MeasurableTreatment Goal(s) related to diagnosis / functional impairment(s)  Goal-Depression: Client will decrease depressed mood    I will know I've met my goal when I am less depressed.      Objective #A (Client Action)    Client will use identified behavioral and cognitive skills to challenge negative self talk 90% of the time.  Status: New - Date: 7/27/2023    Intervention(s)  Therapist will provide psychoeducation, behavioral activation, and cognitive restructuring.      Objective #B  Client will complete at least 10 minutes of ACE activities (e.g.Achievement, Closeness, and Enjoyment) or other Behavioral Activation goals per day.    Status: New - Date: 7/27/2023    Intervention(s)  Therapist will provide psychoeducation, behavioral activation, and cognitive restructuring.      Objective #C  Client will exercise 30 minutes 36 times in the next 12 weeks.  Status: New - Date: 7/27/2023    Intervention(s)  Therapist will " provide psychoeducation, behavioral activation, and cognitive restructuring.           Patient has reviewed and agreed to the above plan.      Padmini Eaton, [facility]  August 3, 2023

## 2023-08-24 ENCOUNTER — VIRTUAL VISIT (OUTPATIENT)
Dept: PSYCHOLOGY | Facility: CLINIC | Age: 48
End: 2023-08-24
Payer: COMMERCIAL

## 2023-08-24 DIAGNOSIS — F43.22 ADJUSTMENT DISORDER WITH ANXIOUS MOOD: Primary | ICD-10-CM

## 2023-08-24 PROCEDURE — 90834 PSYTX W PT 45 MINUTES: CPT | Mod: VID | Performed by: COUNSELOR

## 2023-08-25 NOTE — PROGRESS NOTES
[facility]                                     Progress Note    Patient Name: Raffy Castelan  Date: 8/23/23         Service Type: Individual      Session Start Time: 9:00a  Session End Time: 9:50a     Session Length: 50 minutes    Session #: 5    Attendees: Client attended alone    Service Modality:  Video Visit:      Provider verified identity through the following two step process.  Patient provided:  Patient was verified at admission/transfer    Telemedicine Visit: The patient's condition can be safely assessed and treated via synchronous audio and visual telemedicine encounter.      Reason for Telemedicine Visit: Patient has requested telehealth visit    Originating Site (Patient Location): Patient's home    Distant Site (Provider Location): Provider Remote Setting- Home Office    Consent:  The patient/guardian has verbally consented to: the potential risks and benefits of telemedicine (video visit) versus in person care; bill my insurance or make self-payment for services provided; and responsibility for payment of non-covered services.     Patient would like the video invitation sent by:  Send to e-mail at: [email or web address]    Mode of Communication:  Video Conference via [employer]well    Distant Location (Provider):  Off-site    As the provider I attest to compliance with applicable laws and regulations related to telemedicine.    DATA  Interactive Complexity: No  Crisis: No        Progress Since Last Session (Related to Symptoms / Goals / Homework):   Symptoms: No change Pt reported the last two weeks have been stressful due to the boys resuming school.     Homework: Achieved / completed to satisfaction       Episode of Care Goals: Satisfactory progress - PREPARATION (Decided to change - considering how); Intervened by negotiating a change plan and determining options / strategies for behavior change, identifying triggers, exploring social supports, and working towards setting a date to  begin behavior change     Current / Ongoing Stressors and Concerns:  Today, pt reported school started this week and things have been a bit busier. She reported her older son is PSEO at a local college but doesn t have his drivers license so she has been driving him around and this is cumbersome. Reported he also has been dating a girl and this feels scary for her. Pt reported her younger son has been acting out of character lately. Reported he is struggling with body image and change. Reported she has been short with both of her sons. We discussed how pt reported her mother  comes out of my mouth  and ways in which she can parent in a loving way. Writer educated pt on assertiveness, healthy boundaries, vulnerability, modeling emotional expression, and empathy.  Writer reported she completed her assignment regarding what  charges her battery.  She reported coming up with things that change and drain her mental battery were easier than her physical battery. We walked through the lists together and discussed the overlap between mental and physical health. Writer encouraged pt to continue to add to these lists in real time, as she gets to know herself better.      Treatment Objective(s) Addressed in This Session:   identify the stressors which contribute to feelings of depression  identify several positives concerning self-esteem each session of therapy     Intervention:  Skills training  Explore skills useful to client in current situation  Skills include assertiveness, communication, conflict management, problem-solving, relaxation, etc.     Cognitive-behavioral Therapy  Discuss common cognitive distortions, identified them in patient's life  Explore ways to challenge, replace, and act against these cognitions     Psychodynamic psychotherapy  Discuss patient's emotional dynamics and issues and how they impact behaviors  Explore patient's history of relationships and how they impact present behaviors  Explore how to  work with and make changes in these schemas and patterns       Assessments completed prior to visit:  The following assessments were completed by patient for this visit:  None - assessments were completed at intake on 7/19       ASSESSMENT: Current Emotional / Mental Status (status of significant symptoms):   Risk status (Self / Other harm or suicidal ideation)   Patient denies current fears or concerns for personal safety.   Patient denies current or recent suicidal ideation or behaviors.   Patient denies current or recent homicidal ideation or behaviors.   Patient denies current or recent self injurious behavior or ideation.   Patient denies other safety concerns.   Patient reports there has been no change in risk factors since their last session.     Patient reports there has been no change in protective factors since their last session.     Recommended that patient call 911 or go to the local ED should there be a change in any of these risk factors.     Appearance:   Appropriate    Eye Contact:   Good    Psychomotor Behavior: Normal    Attitude:   Cooperative  Friendly Pleasant   Orientation:   All   Speech    Rate / Production: Normal     Volume:  Normal    Mood:    Sad  Euthymic   Affect:    Appropriate    Thought Content:  Clear    Thought Form:  Coherent  Logical    Insight:    Good      Medication Review:   No changes to current psychiatric medication(s)     Medication Compliance:   Yes     Changes in Health Issues:   None reported     Chemical Use Review:   Substance Use: Chemical use reviewed, no active concerns identified      Tobacco Use: No current tobacco use.      Diagnosis:  Adjustment Disorders  309.28 (F43.23) With mixed anxiety and depressed mood.      Collateral Reports Completed:   Not Applicable    PLAN: (Patient Tasks / Therapist Tasks / Other)  Pt will continue her daily gratitude journal related to her body  Model emotional expression with your sons   Continue to add to your Battery Lists      Padmini Eaton, [facility]                                       ______________________________________________________________________    Individual Treatment Plan    Patient's Name: Raffy Castelan  YOB: 1975    Date of Creation: 7/27/23  Date Treatment Plan Last Reviewed/Revised: 7/27/23    DSM5 Diagnoses: Adjustment Disorders  309.28 (F43.23) With mixed anxiety and depressed mood.  Psychosocial / Contextual Factors:  Discord with family, hx of trauma, stage of live changes.  PROMIS (reviewed every 90 days):     Referral / Collaboration:  Referral to another professional/service is not indicated at this time..    Anticipated number of session for this episode of care: 9-12 sessions  Anticipation frequency of session: Weekly  Anticipated Duration of each session: 38-52 minutes  Treatment plan will be reviewed in 90 days or when goals have been changed.       MeasurableTreatment Goal(s) related to diagnosis / functional impairment(s)  Goal-Depression: Client will decrease depressed mood    I will know I've met my goal when I am less depressed.      Objective #A (Client Action)    Client will use identified behavioral and cognitive skills to challenge negative self talk 90% of the time.  Status: New - Date: 7/27/2023    Intervention(s)  Therapist will provide psychoeducation, behavioral activation, and cognitive restructuring.      Objective #B  Client will complete at least 10 minutes of ACE activities (e.g.Achievement, Closeness, and Enjoyment) or other Behavioral Activation goals per day.    Status: New - Date: 7/27/2023    Intervention(s)  Therapist will provide psychoeducation, behavioral activation, and cognitive restructuring.      Objective #C  Client will exercise 30 minutes 36 times in the next 12 weeks.  Status: New - Date: 7/27/2023    Intervention(s)  Therapist will provide psychoeducation, behavioral activation, and cognitive restructuring.           Patient has reviewed and agreed to the above  plan.      Padmini Eaton, [facility]  August 3, 2023

## 2023-09-07 ENCOUNTER — MYC MEDICAL ADVICE (OUTPATIENT)
Dept: FAMILY MEDICINE | Facility: CLINIC | Age: 48
End: 2023-09-07
Payer: COMMERCIAL

## 2023-09-07 ENCOUNTER — VIRTUAL VISIT (OUTPATIENT)
Dept: PSYCHOLOGY | Facility: CLINIC | Age: 48
End: 2023-09-07
Payer: COMMERCIAL

## 2023-09-07 DIAGNOSIS — F43.22 ADJUSTMENT DISORDER WITH ANXIOUS MOOD: Primary | ICD-10-CM

## 2023-09-07 PROCEDURE — 90834 PSYTX W PT 45 MINUTES: CPT | Mod: VID | Performed by: COUNSELOR

## 2023-09-07 NOTE — PROGRESS NOTES
[facility]                                     Progress Note    Patient Name: Raffy Castelan  Date: 9/7/23         Service Type: Individual      Session Start Time: 9:00a  Session End Time: 9:50a     Session Length: 50 minutes    Session #: 6    Attendees: Client attended alone    Service Modality:  Video Visit:      Provider verified identity through the following two step process.  Patient provided:  Patient was verified at admission/transfer    Telemedicine Visit: The patient's condition can be safely assessed and treated via synchronous audio and visual telemedicine encounter.      Reason for Telemedicine Visit: Patient has requested telehealth visit    Originating Site (Patient Location): Patient's home    Distant Site (Provider Location): Provider Remote Setting- Home Office    Consent:  The patient/guardian has verbally consented to: the potential risks and benefits of telemedicine (video visit) versus in person care; bill my insurance or make self-payment for services provided; and responsibility for payment of non-covered services.     Patient would like the video invitation sent by:  Send to e-mail at: [email or web address]    Mode of Communication:  [facility]    Distant Location (Provider):  Off-site    As the provider I attest to compliance with applicable laws and regulations related to telemedicine.    DATA  Interactive Complexity: No  Crisis: No        Progress Since Last Session (Related to Symptoms / Goals / Homework):   Symptoms: Improving Pt reported increased self worth and empowerment.    Homework: Achieved / completed to satisfaction       Episode of Care Goals: Satisfactory progress - ACTION (Actively working towards change); Intervened by reinforcing change plan / affirming steps taken     Current / Ongoing Stressors and Concerns:  Today, pt reported she had to cancel last week because she was busy with preparing to travel to see her brother. Pt  reported he lives in [place], [place]. Reported this was the first time seeing them in 2 years. Reported her intent to see the family was to fix the relationship with her brother because historically, they do not have pleasant visits when their mom is around. The topic of mom came up and pt shared she had told her mom not to visit again. Pt reported it  is not my job to maintain her choices.  Writer noted this was a huge change in her thinking and pt reported feeling empowered by setting boundaries. Pt reported the trip went really well overall. Pt went on to discuss a  toxic boss  at her place of employment. We discussed ways in which she can manage up and set healthy boundaries at work.       Treatment Objective(s) Addressed in This Session:   identify the stressors which contribute to feelings of depression  identify several positives concerning self-esteem each session of therapy     Intervention:  Skills training  Explore skills useful to client in current situation  Skills include assertiveness, communication, conflict management, problem-solving, relaxation, etc.     Cognitive-behavioral Therapy  Discuss common cognitive distortions, identified them in patient's life  Explore ways to challenge, replace, and act against these cognitions     Psychodynamic psychotherapy  Discuss patient's emotional dynamics and issues and how they impact behaviors  Explore patient's history of relationships and how they impact present behaviors  Explore how to work with and make changes in these schemas and patterns       Assessments completed prior to visit:  The following assessments were completed by patient for this visit:  None - assessments were completed at intake on 7/19       ASSESSMENT: Current Emotional / Mental Status (status of significant symptoms):   Risk status (Self / Other harm or suicidal ideation)   Patient denies current fears or concerns for personal safety.   Patient denies current or recent suicidal  ideation or behaviors.   Patient denies current or recent homicidal ideation or behaviors.   Patient denies current or recent self injurious behavior or ideation.   Patient denies other safety concerns.   Patient reports there has been no change in risk factors since their last session.     Patient reports there has been no change in protective factors since their last session.     Recommended that patient call 911 or go to the local ED should there be a change in any of these risk factors.     Appearance:   Appropriate    Eye Contact:   Good    Psychomotor Behavior: Normal    Attitude:   Cooperative  Friendly Pleasant   Orientation:   All   Speech    Rate / Production: Normal     Volume:  Normal    Mood:    Sad  Euthymic   Affect:    Appropriate    Thought Content:  Clear    Thought Form:  Coherent  Logical    Insight:    Good      Medication Review:   No changes to current psychiatric medication(s)     Medication Compliance:   Yes     Changes in Health Issues:   None reported     Chemical Use Review:   Substance Use: Chemical use reviewed, no active concerns identified      Tobacco Use: No current tobacco use.      Diagnosis:  Adjustment Disorders  309.28 (F43.23) With mixed anxiety and depressed mood.      Collateral Reports Completed:   Not Applicable    PLAN: (Patient Tasks / Therapist Tasks / Other)  Pt will continue her daily gratitude journal related to her body  Watch the nail in the head video   Read the book on managing up   Write a rashawn to mom     Padmini Eaton, [facility]                                       ______________________________________________________________________    Individual Treatment Plan    Patient's Name: Raffy Castelan  YOB: 1975    Date of Creation: 7/27/23  Date Treatment Plan Last Reviewed/Revised: 7/27/23    DSM5 Diagnoses: Adjustment Disorders  309.28 (F43.23) With mixed anxiety and depressed mood.  Psychosocial / Contextual Factors:  Discord with family, hx of trauma,  stage of live changes.  PROMIS (reviewed every 90 days):     Referral / Collaboration:  Referral to another professional/service is not indicated at this time..    Anticipated number of session for this episode of care: 9-12 sessions  Anticipation frequency of session: Weekly  Anticipated Duration of each session: 38-52 minutes  Treatment plan will be reviewed in 90 days or when goals have been changed.       MeasurableTreatment Goal(s) related to diagnosis / functional impairment(s)  Goal-Depression: Client will decrease depressed mood    I will know I've met my goal when I am less depressed.      Objective #A (Client Action)    Client will use identified behavioral and cognitive skills to challenge negative self talk 90% of the time.  Status: New - Date: 7/27/2023    Intervention(s)  Therapist will provide psychoeducation, behavioral activation, and cognitive restructuring.      Objective #B  Client will complete at least 10 minutes of ACE activities (e.g.Achievement, Closeness, and Enjoyment) or other Behavioral Activation goals per day.    Status: New - Date: 7/27/2023    Intervention(s)  Therapist will provide psychoeducation, behavioral activation, and cognitive restructuring.      Objective #C  Client will exercise 30 minutes 36 times in the next 12 weeks.  Status: New - Date: 7/27/2023    Intervention(s)  Therapist will provide psychoeducation, behavioral activation, and cognitive restructuring.           Patient has reviewed and agreed to the above plan.      Padmini Eaton, [facility]  August 3, 2023

## 2023-09-13 ENCOUNTER — OFFICE VISIT (OUTPATIENT)
Dept: FAMILY MEDICINE | Facility: CLINIC | Age: 48
End: 2023-09-13
Payer: COMMERCIAL

## 2023-09-13 VITALS
HEART RATE: 97 BPM | BODY MASS INDEX: 40.85 KG/M2 | HEIGHT: 65 IN | DIASTOLIC BLOOD PRESSURE: 80 MMHG | TEMPERATURE: 97.5 F | OXYGEN SATURATION: 98 % | SYSTOLIC BLOOD PRESSURE: 122 MMHG | WEIGHT: 245.2 LBS

## 2023-09-13 DIAGNOSIS — R22.31 MASS OF FINGER OF RIGHT HAND: Primary | ICD-10-CM

## 2023-09-13 PROCEDURE — 90686 IIV4 VACC NO PRSV 0.5 ML IM: CPT | Performed by: FAMILY MEDICINE

## 2023-09-13 PROCEDURE — 90471 IMMUNIZATION ADMIN: CPT | Performed by: FAMILY MEDICINE

## 2023-09-13 PROCEDURE — 99213 OFFICE O/P EST LOW 20 MIN: CPT | Mod: 25 | Performed by: FAMILY MEDICINE

## 2023-09-13 RX ORDER — MULTIPLE VITAMINS W/ MINERALS TAB 9MG-400MCG
1 TAB ORAL DAILY
COMMUNITY

## 2023-09-13 ASSESSMENT — PAIN SCALES - GENERAL: PAINLEVEL: NO PAIN (0)

## 2023-09-13 NOTE — PROGRESS NOTES
"  {PROVIDER CHARTING PREFERENCE:734911}    Josephine Akbar is a 48 year old, presenting for the following health issues:  Mass (Right ring finger)        9/13/2023    10:08 AM   Additional Questions   Roomed by Kaitlin       [unfilled]     {SUPERLIST (Optional):548261}  {additonal problems for provider to add (Optional):176723}      [unfilled]   {ROS COMP (Optional):563873}      Objective    /80   Pulse 97   Temp 97.5  F (36.4  C) (Temporal)   Ht 1.644 m (5' 4.72\")   Wt 111.2 kg (245 lb 3.2 oz)   SpO2 98%   BMI 41.15 kg/m    Body mass index is 41.15 kg/m .  [unfilled]   {Exam List (Optional):611976}    {Diagnostic Test Results (Optional):288785}    {AMBULATORY ATTESTATION (Optional):656927}              {PROVIDER CHARTING PREFERENCE:838628}    Josephine Akbar is a 48 year old, presenting for the following health issues:  Mass (Right ring finger)        9/13/2023    10:08 AM   Additional Questions   Roomed by Kaitlin       History of Present Illness       Reason for visit:  Finger bunion  Symptom onset:  More than a month  Symptoms include:  Bunion  Symptom intensity:  Mild  Symptom progression:  Staying the same  Had these symptoms before:  No  What makes it worse:  No  What makes it better:  No    She eats 2-3 servings of fruits and vegetables daily.She consumes 1 sweetened beverage(s) daily.She exercises with enough effort to increase her heart rate 9 or less minutes per day.  She exercises with enough effort to increase her heart rate 3 or less days per week.   She is taking medications regularly.       {SUPERLIST (Optional):341516}  {additonal problems for provider to add (Optional):081752}      Review of Systems   {ROS COMP (Optional):859086}      Objective    /80   Pulse 97   Temp 97.5  F (36.4  C) (Temporal)   Ht 1.644 m (5' 4.72\")   Wt 111.2 kg (245 lb 3.2 oz)   SpO2 98%   BMI 41.15 kg/m    Body mass index is 41.15 kg/m .  Physical Exam   {Exam List " (Optional):096975}    {Diagnostic Test Results (Optional):033929}    {AMBULATORY ATTESTATION (Optional):981434}

## 2023-09-13 NOTE — PROGRESS NOTES
"  Assessment & Plan     Mass of finger of right hand  Referral ortho  Pt wants this removed  - Orthopedic  Referral; Future  MD JOSE MIGUEL Aviles [facility] TABITHA Akbar is a 48 year old, presenting for the following health issues:  Mass (Right ring finger)      9/13/2023    10:08 AM   Additional Questions   Roomed by Kaitlin Greco    History of Present Illness       Reason for visit:  Finger bunion  Symptom onset:  More than a month  Symptoms include:  Bunion  Symptom intensity:  Mild  Symptom progression:  Staying the same  Had these symptoms before:  No  What makes it worse:  No  What makes it better:  No    She eats 2-3 servings of fruits and vegetables daily.She consumes 1 sweetened beverage(s) daily.She exercises with enough effort to increase her heart rate 9 or less minutes per day.  She exercises with enough effort to increase her heart rate 3 or less days per week.   She is taking medications regularly.     Has a mass on the Right ring finger that Bother her when she wear a ring  Np pain      Review of Systems   Rest of the  pertinent ROS is Negative except see above and Problem list [stable]        Objective    /80   Pulse 97   Temp 97.5  F (36.4  C) (Temporal)   Ht 1.644 m (5' 4.72\")   Wt 111.2 kg (245 lb 3.2 oz)   SpO2 98%   BMI 41.15 kg/m    Body mass index is 41.15 kg/m .  Physical Exam   GENERAL: healthy, alert and no distress  Right ring finger-palmar aspect 3 mm cystic mass   No tenderness                 " Patient called regarding scheduling a consult with Dr. Campuzano. He is agreeable and an appointment was scheduled for Monday, 8/21/23 at 0830. All questions were answered at this time.

## 2023-09-18 NOTE — PROGRESS NOTES
"Video-Visit Details    Type of service:  Video Visit    Video Start Time: 1:30 PM  Video End Time: 1:51 PM    Originating Location (pt. Location): Home    Distant Location (provider location):  Offsite (providers home)     Platform used for Video Visit: Jess      Nutrition Reassessment  Reason For Visit:  Raffy Castelan is a 48 year old female presenting today for nutrition follow-up, 9 month s/p sleeve gastrectomy with Dr. Ritter on 1/10/23.    Anthropometrics:  Initial Consult Weight: 290 lbs  Day of Surgery Weight (1/10/23): 290 lbs  Current Weight:   Estimated body mass index is 39.78 kg/m  as calculated from the following:    Height as of this encounter: 1.644 m (5' 4.72\").    Weight as of this encounter: 107.5 kg (237 lb). -7 lbs in past 2 months.   Weight loss: -53 lbs from initial consult; -53 lbs from day of surgery    Goal to get to 200 lbs    Current Vitamins/Minerals: MVI/minerals BID (Bariatric Fusion with Iron 45 mg), hair/skin/nails supplement, and calcium citrate 500 mg BID.     Nutrition Related Labs:  Hgb (10.5->11) improving and ferritin WNL  B12 1442 (H) on 3/30/23    Nutrition History:  C/o heartburn.   Has thrown up 1-2 times in last few weeks. Notes times when she ate past fullness or too quickly.   Continues tracking, aiming for 1200, but typically consuming 1500 hazel/day.    Diet Recall:  B: Half [employer] Core Power Elite; avocado toast with egg  L: chicken   D: salad  Snack: cookie; biscotti; granola bars; half pro shake  Beverages: water (48-64 oz/day), 1/2 c coffee and flavored creamer, 1 cup tea with 1-2 tsp sugar    Exercise: Increased her walking. Currently doing outside in neighborhood, thinking about joining a gym for winter months.     Progress with Previous Goals:  1) Continue to track intake, keep to 1200 hazel/day - Improving  2) Consume 60+ grams of protein/day. - Met, continues    - Meals should be 50% protein and 50% fruit/vegetable/wholegrain  3) Sip on 64+ oz of fluids/day- " between meals only. - Met, continues   4) Eat slowly (>20 min/meal), chewing foods well (to applesauce-like consistency). - Improving  5) Limit portions to 1 cup/meal - Improving  6) Increase exercise as able. - Improving      Nutrition Prescription:  Grams Protein: 60 (minimum)   Amount of Fluid: 48-64+ oz    Nutrition Diagnosis  Previous: Food and nutrition-related knowledge deficit r/t lack of prior exposure to diet instruction beyond 3 months s/p SG as evidenced by recent bariatric surgery and pt interest in diet education/review - resolved    Current: Obesity r/t long history of positive energy balance aeb BMI >30.    Intervention  Materials/Education provided, reviewed bariatric regular consistency diet, protein intake, fluid intake, eating pace, chewing foods well, portion control, sugar/fat intake, recommended vitamin/mineral supplements. Encouraged increased exercise.  Patient demonstrates understanding.     Expected Engagement: good     Goals:  1) Continue to track intake, aim for 1200 hazel/day  2) Consume 60+ grams of protein/day.  3) Limit/avoid snacking. 100 calorie option if having.   4) Eat slowly (>20 min/meal), chewing foods well (to applesauce-like consistency).  5) Portion out less at meals. Limit portions to 1 cup/meal. Wait for 10-15 mins before going back for more.  - 50% protein and 50% fruit/vegetable/wholegrain   6) Increase exercise.     Keeping Up Your Diet after Weight Loss Surgery  [email or web address]    Exercises after Weight Loss Surgery (strengthening, when no weight lifting restrictions)  [email or web address]      At Home Exercise Resources  Exercises you can do anytime/anywhere: https://www.[employer].org/resources/everyone/blog/4920/top-25-at-home-exercises/#:~:text=Top%2025%20At-Home%20Exercises.%201%201.%20[employer].%20Who,Knee%20Push-up.%205%205.%20Downward-facing%20Dog.%20More%20items    Plus Size Beginner Workout - Standing (low  impact)  https://www.Adeniosube.com/watch?v=4SrFZWx6PKO      Plus Size Full Body (created by a woman who struggled to find videos that were inclusive to larger bodies so she decided to make her own!)   https://www.Adeniosube.com/watch?v=1YzVK5u-af2     Standing Core Workout for Beginners   https://www.Adeniosube.com/watch?v=K31b4zcll10            Follow-Up: 11/2/23    Time spent with patient: 21 minutes.  Mahnaz Skaggs RD LD

## 2023-09-19 ENCOUNTER — VIRTUAL VISIT (OUTPATIENT)
Dept: ENDOCRINOLOGY | Facility: CLINIC | Age: 48
End: 2023-09-19
Payer: COMMERCIAL

## 2023-09-19 VITALS — BODY MASS INDEX: 39.49 KG/M2 | WEIGHT: 237 LBS | HEIGHT: 65 IN

## 2023-09-19 DIAGNOSIS — Z98.84 S/P LAPAROSCOPIC SLEEVE GASTRECTOMY: ICD-10-CM

## 2023-09-19 DIAGNOSIS — Z71.3 NUTRITIONAL COUNSELING: Primary | ICD-10-CM

## 2023-09-19 DIAGNOSIS — E66.01 MORBID OBESITY (H): ICD-10-CM

## 2023-09-19 PROCEDURE — 97803 MED NUTRITION INDIV SUBSEQ: CPT | Mod: 95 | Performed by: DIETITIAN, REGISTERED

## 2023-09-19 PROCEDURE — 99207 PR NO CHARGE LOS: CPT | Mod: 95 | Performed by: DIETITIAN, REGISTERED

## 2023-09-19 ASSESSMENT — PAIN SCALES - GENERAL: PAINLEVEL: NO PAIN (0)

## 2023-09-19 NOTE — LETTER
"9/19/2023       RE: Raffy Castelan  [address]  [facility] [address]     Dear Colleague,    Thank you for referring your patient, Raffy Castelan, to the [facility] WEIGHT MANAGEMENT CLINIC [place] at [facility]. Please see a copy of my visit note below.    Video-Visit Details    Type of service:  Video Visit    Video Start Time: 1:30 PM  Video End Time: 1:51 PM    Originating Location (pt. Location): Home    Distant Location (provider location):  Offsite (providers home)     Platform used for Video Visit: Well      Nutrition Reassessment  Reason For Visit:  Raffy Castelan is a 48 year old female presenting today for nutrition follow-up, 9 month s/p sleeve gastrectomy with Dr. Ritter on 1/10/23.    Anthropometrics:  Initial Consult Weight: 290 lbs  Day of Surgery Weight (1/10/23): 290 lbs  Current Weight:   Estimated body mass index is 39.78 kg/m  as calculated from the following:    Height as of this encounter: 1.644 m (5' 4.72\").    Weight as of this encounter: 107.5 kg (237 lb). -7 lbs in past 2 months.   Weight loss: -53 lbs from initial consult; -53 lbs from day of surgery    Goal to get to 200 lbs    Current Vitamins/Minerals: MVI/minerals BID (Bariatric Fusion with Iron 45 mg), hair/skin/nails supplement, and calcium citrate 500 mg BID.     Nutrition Related Labs:  Hgb (10.5->11) improving and ferritin WNL  B12 1442 (H) on 3/30/23    Nutrition History:  C/o heartburn.   Has thrown up 1-2 times in last few weeks. Notes times when she ate past fullness or too quickly.   Continues tracking, aiming for 1200, but typically consuming 1500 hazel/day.    Diet Recall:  B: Half Evocha Core Power Elite; avocado toast with egg  L: chicken   D: salad  Snack: cookie; biscotti; granola bars; half pro shake  Beverages: water (48-64 oz/day), 1/2 c coffee and flavored creamer, 1 cup tea with 1-2 tsp sugar    Exercise: Increased her walking. Currently doing " outside in neighborhood, thinking about joining a gym for winter months.     Progress with Previous Goals:  1) Continue to track intake, keep to 1200 hazel/day - Improving  2) Consume 60+ grams of protein/day. - Met, continues    - Meals should be 50% protein and 50% fruit/vegetable/wholegrain  3) Sip on 64+ oz of fluids/day- between meals only. - Met, continues   4) Eat slowly (>20 min/meal), chewing foods well (to applesauce-like consistency). - Improving  5) Limit portions to 1 cup/meal - Improving  6) Increase exercise as able. - Improving      Nutrition Prescription:  Grams Protein: 60 (minimum)   Amount of Fluid: 48-64+ oz    Nutrition Diagnosis  Previous: Food and nutrition-related knowledge deficit r/t lack of prior exposure to diet instruction beyond 3 months s/p SG as evidenced by recent bariatric surgery and pt interest in diet education/review - resolved    Current: Obesity r/t long history of positive energy balance aeb BMI >30.    Intervention  Materials/Education provided, reviewed bariatric regular consistency diet, protein intake, fluid intake, eating pace, chewing foods well, portion control, sugar/fat intake, recommended vitamin/mineral supplements. Encouraged increased exercise.  Patient demonstrates understanding.     Expected Engagement: good     Goals:  1) Continue to track intake, aim for 1200 hazel/day  2) Consume 60+ grams of protein/day.  3) Limit/avoid snacking. 100 calorie option if having.   4) Eat slowly (>20 min/meal), chewing foods well (to applesauce-like consistency).  5) Portion out less at meals. Limit portions to 1 cup/meal. Wait for 10-15 mins before going back for more.  - 50% protein and 50% fruit/vegetable/wholegrain   6) Increase exercise.     Keeping Up Your Diet after Weight Loss Surgery  [email or web address]    Exercises after Weight Loss Surgery (strengthening, when no weight lifting restrictions)  [email or web address]      At Home Exercise  Resources  Exercises you can do anytime/anywhere: https://www.Crowdx.org/resources/everyone/blog/6593/top-25-at-home-exercises/#:~:text=Top%2025%20At-Home%20Exercises.%201%201.%20[employer].%20Who,Knee%20Push-up.%205%205.%20Downward-facing%20Dog.%20More%20items    Plus Size Beginner Workout - Standing (low impact)  https://www.Dyynoube.com/watch?v=0ZnTAMo6ZJP      Plus Size Full Body (created by a woman who struggled to find videos that were inclusive to larger bodies so she decided to make her own!)   https://www.Dyynoube.com/watch?v=1KvVK1s-xz5     Standing Core Workout for Beginners   https://www.Dyynoube.com/watch?v=K79p1ihuc98            Follow-Up: 11/2/23    Time spent with patient: 21 minutes.  TAMI Soto

## 2023-09-19 NOTE — PATIENT INSTRUCTIONS
Dayron Akbar,    Follow-up with RD on 11/2    Thank you,    Mahnaz Skaggs, RD, LD  If you would like to schedule or reschedule an appointment with the RD, please call [phone]    Nutrition Goals  Continue to track intake, aim for 1200 hazel/day  2) Consume 60+ grams of protein/day.  3) Limit/avoid snacking. 100 calorie option if having.   4) Eat slowly (>20 min/meal), chewing foods well (to applesauce-like consistency).  5) Portion out less at meals. Limit portions to 1 cup/meal. Wait for 10-15 mins before going back for more.  - 50% protein and 50% fruit/vegetable/wholegrain   6) Increase exercise.     Keeping Up Your Diet after Weight Loss Surgery  [email or web address]    Exercises after Weight Loss Surgery (strengthening, when no weight lifting restrictions)  Http://[email or web address]      At Home Exercise Resources  Exercises you can do anytime/anywhere: https://www.Tradeshift.org/resources/everyone/blog/6593/top-25-at-home-exercises/#:~:text=Top%2025%20At-Home%20Exercises.%201%201.%20Supermans.%20Who,Knee%20Push-up.%205%205.%20Downward-facing%20Dog.%20More%20items    Plus Size Beginner Workout - Standing (low impact)  https://www.[employer].com/watch?v=0AcXHJg5ZRI      Plus Size Full Body (created by a woman who struggled to find videos that were inclusive to larger bodies so she decided to make her own!)   https://www.[employer].com/watch?v=9XhVE4e-vr2     Standing Core Workout for Beginners   https://www.[employer].com/watch?v=Y83j4acde55           COMPREHENSIVE WEIGHT MANAGEMENT PROGRAM  VIRTUAL SUPPORT GROUPS    For Support Group Information:      We offer support groups for patients who are working on weight loss and considering, preparing for or have had weight loss surgery.   There is no cost for this opportunity.  You are invited to attend the?Virtual Support Groups?provided by any of the following locations:    [facility] via Smile Teams with Marcie Ramirez RN  2.   [place] via  "RealScout Teams with Tono Myrick, PhD, LP  3.   [place] via RealScout Teams with Anh Jeong RN  4.   [facility] via RealScout Teams with Anh Black [facility]    The following Support Group information can also be found on our website:  https://www.[facility].org/treatments/weight-loss-surgery-support-groups    https://www.[facility].org/treatments/weight-loss-and-weight-loss-surgery-support-groups    [facility] Weight Loss Surgery Support Group    [facility] Weight Loss Surgery Support Group  The support group is a patient-lead forum that meets monthly to share experiences, encouragement and education. It is open to those who have had weight loss surgery, are scheduled for surgery, or are considering surgery.   WHEN: This group meets on the 3rd Wednesday of each month from 5:00PM - 6:00PM virtually using Microsoft Teams.   FACILITATOR: Led by Marcie Johnson RD, MAICOL, RN, the program's Clinical Coordinator.   TO REGISTER: Please contact the clinic via Gojee or call the nurse line directly at [phone] to inform our staff that you would like an invite sent to you and to let us know the email you would like the invite sent to. Prior to the meeting, a link with directions on how to join the meeting will be sent to you.    2023 Meetings   April 19: Guest Speaker, Delfino Hughes RD, LD, \"Maintaining Weight Loss after Bariatric Surgery\".  May 17: \"Let's Talk\" a time for the group to share.  June 21: \"Let's Talk\" a time for the group to share.  July 19  August 16  September 20  October 18  November 15  December 20    [facility] Support Groups    [facility] Bariatric Care Support Group?  This is open to all pre- and post- operative bariatric surgery patients as well as their support system.   WHEN: This group meets the 2nd Tuesday of each month from 6:30 PM - 8:00 PM virtually using Microsoft Teams.   FACILITATOR: Led by Tono" "Ramez, Ph.D who is a Licensed Psychologist with the [facility] Comprehensive Weight Management Program.   TO REGISTER: Please send an email to Tono Myrick, Ph.D.,  at?[email or web address]?if you would like an invitation to the group and to learn about using Microsoft Teams.    2023 Meetings  April 11: Guest speaker, Tatyana Hayden MD, Bariatrician, [facility] Comprehensive Weight Management Program, \"Injectable Weight Loss Medications\".  May 9  Lynn 13  July 11  August 8  September 12  October 10  November 14  December 12    [facility] Post-Operative Bariatric Surgery Support Group  This is a support group for [facility] bariatric patients (and those external to [facility]) who have had bariatric surgery and are at least 3 months post-surgery.  WHEN: This support group meets the 4th Wednesday of the month from 11:00 AM - 12:00 PM virtually using Microsoft Teams.   FACILITATOR: Led by Certified Bariatric Nurse, Anh Jeong RN.   TO REGISTER: Please send an email to Anh at [email or web address] if you would like an invitation to the group and to learn about using Microsoft Teams.    2023 Meetings  April 26  May 24  Lynn 28  July 26  August 23  September 27  October 25  November 22  December 27    [facility]   Healthy Lifestyle Virtual Support Group    Healthy Lifestyle Virtual Support Group?  This is 60 minutes of small group guided discussion, support and resources. All are welcome who want a healthy lifestyle.  WHEN: This group meets monthly on a Friday from 12:30 PM - 1:30 PM virtually using Microsoft Teams.  This group will meet the first Friday of the month beginning In July  FACILITATOR: Led by National Board Certified Health and , Anh Black [facility], [facility].   TO REGISTER: Please send an email to Anh [email or web address] to receive monthly invites to the group or if you have any questions about having a health " .  Prior to the meeting, a link with directions on how to join the meeting will be sent to you.    2023 Meetings  May 19: Let's Talk  June 9: Create Your Coaching Toolkit: Learn How to  Yourself  July 7: Let's Talk  August 4: Benefits of Fiber with TAMI Antonio  September 1: Show and Tell (share your aps, podcasts, recipes, hacks, books)  October 6 :Let's Talk  November 3: Introduction to Mindfulness   December 1: Let's Talk

## 2023-09-19 NOTE — NURSING NOTE
Is the patient currently in the state of [place]? YES    Visit mode:VIDEO    If the visit is dropped, the patient can be reconnected by: VIDEO VISIT: Text to cell phone:   Telephone Information:   Mobile [phone]       Will anyone else be joining the visit? NO  (If patient encounters technical issues they should call [phone])    How would you like to obtain your AVS? MyChart    Are changes needed to the allergy or medication list? Pt stated no changes to allergies and Pt stated no med changes    Reason for visit: Follow Up    Tameka MEJIA

## 2023-09-20 NOTE — PROGRESS NOTES
"SUBJECTIVE:  Raffy Castelan is a 48 year old female who is seen in consultation at the request of Dr. Gao for  right IV digit finger mass. She has had this for approximately 2-3 months.  Located volar right 4th finger  Mass growth doesn't change  Painful to grasp things     Patients past medical, surgical, social and family histories reviewed.     Patient Active Problem List   Diagnosis    Chronic constipation    CARDIOVASCULAR SCREENING; LDL GOAL LESS THAN 160    Family history of breast cancer    GERD (gastroesophageal reflux disease)    Class 3 severe obesity due to excess calories with serious comorbidity and body mass index (BMI) of 45.0 to 49.9 in adult (H)    Vitamin D deficiency    Psoriasis    Bariatric surgery status    H/O laparoscopic adjustable gastric banding    Diaphragmatic hernia    Hypertension goal BP (blood pressure) < 140/90    Anxiety    Impingement syndrome, shoulder, left    Spontaneous vaginal delivery    Advanced maternal age during pregnancy    37+ weeks gestation completed    Bunion, left    Tailor's bunion of both feet    Morbid obesity (H)    Iron deficiency anemia    S/P laparoscopic sleeve gastrectomy        REVIEW OF SYSTEMS:  CONSTITUTIONAL:NEGATIVE for fever, chills, change in weight  INTEGUMENTARY/SKIN: NEGATIVE for worrisome rashes, moles or lesions  MUSCULOSKELETAL:See HPI above  NEURO: NEGATIVE for weakness, dizziness or paresthesias      /78 (BP Location: Right arm, Patient Position: Sitting, Cuff Size: Adult Large)   Pulse 97   Ht 1.626 m (5' 4\")   Wt 111.1 kg (245 lb)   SpO2 96%   BMI 42.05 kg/m      EXAM:  GENERAL APPEARANCE: healthy, alert, and no distress   GAIT: NORMAL  SKIN: no suspicious lesions or rashes  NEURO: normal strength and tone, sentation intact, reflexes normal, DTR symmetrically normal in upper extremities, and DTR symmetrically normal in lower extremities  PSYCH:  mentation appears normal and affect " normal/bright    MUSCULOSKELETAL:  HAND/FINGERS:    Inspection:Size: 7mm, irregular  Tender: mildly  Consistency: somewhat firm  Mobility very mobile      Xray: none    ASSESSMENT:  (R22.31) Finger mass, right  (primary encounter diagnosis)  Comment: inclusion cyst vs ganglion associated with tendon sheath  Plan: I gave her the options. I don't think aspiration would be beneficial.  I suggested excision.  Local anesthesia.  The procedure was discussed in detail with the patient, including alternatives to the proposed procedure, and expected recovery.  Risks were discussed, including, but not limited to infection, neurovascular injury, postoperative pain,failure to relieve pain, cyst recurrence and anesthetic complications.       She would like this done before the end of September. If not she would be interested in an in-clinic procedure, but I did explain that the setting is not ideal for that. This would be at the end of clinic. 40  min time slot.    KAMALJIT Soto MD  Dept. Orthopedic Surgery  [facility]

## 2023-09-21 ENCOUNTER — OFFICE VISIT (OUTPATIENT)
Dept: ORTHOPEDICS | Facility: CLINIC | Age: 48
End: 2023-09-21
Payer: COMMERCIAL

## 2023-09-21 ENCOUNTER — TELEPHONE (OUTPATIENT)
Dept: ORTHOPEDICS | Facility: CLINIC | Age: 48
End: 2023-09-21

## 2023-09-21 ENCOUNTER — PREP FOR PROCEDURE (OUTPATIENT)
Dept: ORTHOPEDICS | Facility: CLINIC | Age: 48
End: 2023-09-21

## 2023-09-21 ENCOUNTER — HOSPITAL ENCOUNTER (OUTPATIENT)
Facility: CLINIC | Age: 48
End: 2023-09-21
Attending: ORTHOPAEDIC SURGERY | Admitting: ORTHOPAEDIC SURGERY
Payer: COMMERCIAL

## 2023-09-21 VITALS
SYSTOLIC BLOOD PRESSURE: 131 MMHG | HEIGHT: 64 IN | BODY MASS INDEX: 41.83 KG/M2 | DIASTOLIC BLOOD PRESSURE: 78 MMHG | OXYGEN SATURATION: 96 % | HEART RATE: 97 BPM | WEIGHT: 245 LBS

## 2023-09-21 DIAGNOSIS — R22.31 MASS OF FINGER OF RIGHT HAND: ICD-10-CM

## 2023-09-21 DIAGNOSIS — R22.31 MASS OF RIGHT FINGER: Primary | ICD-10-CM

## 2023-09-21 DIAGNOSIS — R22.31 FINGER MASS, RIGHT: Primary | ICD-10-CM

## 2023-09-21 PROCEDURE — 99243 OFF/OP CNSLTJ NEW/EST LOW 30: CPT | Performed by: ORTHOPAEDIC SURGERY

## 2023-09-21 ASSESSMENT — PAIN SCALES - GENERAL: PAINLEVEL: NO PAIN (0)

## 2023-09-21 NOTE — LETTER
"    9/21/2023         RE: Raffy Castelan  [address]  [place] [id number]        Dear Colleague,    Thank you for referring your patient, Raffy Castelan, to the [facility]. Please see a copy of my visit note below.    SUBJECTIVE:  Raffy Castelan is a 48 year old female who is seen in consultation at the request of Dr. Gao for  right IV digit finger mass. She has had this for approximately 2-3 months.  Located volar right 4th finger  Mass growth doesn't change  Painful to grasp things     Patients past medical, surgical, social and family histories reviewed.     Patient Active Problem List   Diagnosis     Chronic constipation     CARDIOVASCULAR SCREENING; LDL GOAL LESS THAN 160     Family history of breast cancer     GERD (gastroesophageal reflux disease)     Class 3 severe obesity due to excess calories with serious comorbidity and body mass index (BMI) of 45.0 to 49.9 in adult (H)     Vitamin D deficiency     Psoriasis     Bariatric surgery status     H/O laparoscopic adjustable gastric banding     Diaphragmatic hernia     Hypertension goal BP (blood pressure) < 140/90     Anxiety     Impingement syndrome, shoulder, left     Spontaneous vaginal delivery     Advanced maternal age during pregnancy     37+ weeks gestation completed     Bunion, left     Tailor's bunion of both feet     Morbid obesity (H)     Iron deficiency anemia     S/P laparoscopic sleeve gastrectomy        REVIEW OF SYSTEMS:  CONSTITUTIONAL:NEGATIVE for fever, chills, change in weight  INTEGUMENTARY/SKIN: NEGATIVE for worrisome rashes, moles or lesions  MUSCULOSKELETAL:See HPI above  NEURO: NEGATIVE for weakness, dizziness or paresthesias      /78 (BP Location: Right arm, Patient Position: Sitting, Cuff Size: Adult Large)   Pulse 97   Ht 1.626 m (5' 4\")   Wt 111.1 kg (245 lb)   SpO2 96%   BMI 42.05 kg/m      EXAM:  GENERAL APPEARANCE: healthy, alert, and no distress   GAIT: NORMAL  SKIN: no " suspicious lesions or rashes  NEURO: normal strength and tone, sentation intact, reflexes normal, DTR symmetrically normal in upper extremities, and DTR symmetrically normal in lower extremities  PSYCH:  mentation appears normal and affect normal/bright    MUSCULOSKELETAL:  HAND/FINGERS:    Inspection:Size: 7mm, irregular  Tender: mildly  Consistency: somewhat firm  Mobility very mobile      Xray: none    ASSESSMENT:  (R22.31) Finger mass, right  (primary encounter diagnosis)  Comment: inclusion cyst vs ganglion associated with tendon sheath  Plan: I gave her the options. I don't think aspiration would be beneficial.  I suggested excision.  Local anesthesia.  The procedure was discussed in detail with the patient, including alternatives to the proposed procedure, and expected recovery.  Risks were discussed, including, but not limited to infection, neurovascular injury, postoperative pain,failure to relieve pain, cyst recurrence and anesthetic complications.       She would like this done before the end of September. If not she would be interested in an in-clinic procedure, but I did explain that the setting is not ideal for that. This would be at the end of clinic. 40  min time slot.    KAMALJIT Soto MD  Dept. Orthopedic Surgery  [facility]         Again, thank you for allowing me to participate in the care of your patient.        Sincerely,        Jin Soto MD

## 2023-09-22 RX ORDER — ACETAMINOPHEN 325 MG/1
975 TABLET ORAL ONCE
Status: CANCELLED | OUTPATIENT
Start: 2023-09-27

## 2023-09-22 RX ORDER — CELECOXIB 100 MG/1
400 CAPSULE ORAL ONCE
Status: CANCELLED | OUTPATIENT
Start: 2023-09-27

## 2023-09-27 ENCOUNTER — HOSPITAL ENCOUNTER (OUTPATIENT)
Facility: CLINIC | Age: 48
Discharge: HOME | End: 2023-09-27
Attending: ORTHOPAEDIC SURGERY | Admitting: ORTHOPAEDIC SURGERY
Payer: COMMERCIAL

## 2023-09-27 VITALS
OXYGEN SATURATION: 99 % | HEART RATE: 79 BPM | RESPIRATION RATE: 16 BRPM | SYSTOLIC BLOOD PRESSURE: 130 MMHG | TEMPERATURE: 98.1 F | DIASTOLIC BLOOD PRESSURE: 88 MMHG

## 2023-09-27 DIAGNOSIS — R22.31 MASS OF RIGHT FINGER: Primary | ICD-10-CM

## 2023-09-27 PROCEDURE — 26111 EXC HAND LES SC 1.5 CM/>: CPT | Mod: F8 | Performed by: ORTHOPAEDIC SURGERY

## 2023-09-27 PROCEDURE — 250N000009 HC RX 250: Performed by: ORTHOPAEDIC SURGERY

## 2023-09-27 PROCEDURE — 88305 TISSUE EXAM BY PATHOLOGIST: CPT | Mod: TC | Performed by: ORTHOPAEDIC SURGERY

## 2023-09-27 PROCEDURE — 360N000075 HC SURGERY LEVEL 2, PER MIN: Performed by: ORTHOPAEDIC SURGERY

## 2023-09-27 PROCEDURE — 710N000012 HC RECOVERY PHASE 2, PER MINUTE: Performed by: ORTHOPAEDIC SURGERY

## 2023-09-27 PROCEDURE — 999N000141 HC STATISTIC PRE-PROCEDURE NURSING ASSESSMENT: Performed by: ORTHOPAEDIC SURGERY

## 2023-09-27 PROCEDURE — 272N000001 HC OR GENERAL SUPPLY STERILE: Performed by: ORTHOPAEDIC SURGERY

## 2023-09-27 PROCEDURE — 88305 TISSUE EXAM BY PATHOLOGIST: CPT | Mod: 26 | Performed by: PATHOLOGY

## 2023-09-27 RX ORDER — HYDROCODONE BITARTRATE AND ACETAMINOPHEN 5; 325 MG/1; MG/1
1 TABLET ORAL
Status: CANCELLED | OUTPATIENT
Start: 2023-09-27

## 2023-09-27 RX ORDER — HYDROCODONE BITARTRATE AND ACETAMINOPHEN 5; 325 MG/1; MG/1
1-2 TABLET ORAL EVERY 4 HOURS PRN
Qty: 5 TABLET | Refills: 0 | Status: SHIPPED | OUTPATIENT
Start: 2023-09-27 | End: 2023-10-27

## 2023-09-27 RX ORDER — ACETAMINOPHEN 325 MG/1
650 TABLET ORAL EVERY 4 HOURS PRN
Qty: 50 TABLET | Refills: 0 | Status: SHIPPED | OUTPATIENT
Start: 2023-09-27

## 2023-09-27 RX ORDER — ACETAMINOPHEN 325 MG/1
650 TABLET ORAL
Status: CANCELLED | OUTPATIENT
Start: 2023-09-27

## 2023-09-27 ASSESSMENT — ACTIVITIES OF DAILY LIVING (ADL): ADLS_ACUITY_SCORE: 35

## 2023-09-27 NOTE — OP NOTE
OPERATIVE NOTE:    PRE-OP DIAGNOSIS:  mass right 4th finger     POST-OP DIAGNOSIS:  vascular mass right 4th finger    PROCEDURE:  mass excision right 4th finger    SURGEON: Charles    ASSISTANT(S):  DINORA Dooley    ANESTHESIA:  Local    INDICATIONS: 48 year old female with a right 4th finger mass. She has had this for approximately 2-3 months. Mass size doesn't change. Painful to grasp things.   She failed conservative treatment.     PROCEDURE: PROCEDURE: she was taken to the operating room and placed on the operating table in the supine position.  Tourniquet was placed around the proximal forearm.  The limb was prepped and draped in usual sterile fashion.  Pause for site confirmation was performed. .     Local anesthetic injected using 1/4% Marcaine and 1% Lidocaine without Epinephrine was injected into the anticipated incision site.    A incision was made over the mass on the volar right 4th finger, measuring 2cm in length, and once through the dermal layer a spreading technique was used to reach the mass. I then dissected around the mass, which appeared to be hemorrhagic. There were a couple of vascular feeders, which appeared to be venous, which were coagulated.  The mass was excised, and did not appear cystic, but was vascular in nature.  The mass was sent to pathology.  The wound was irrigated, the tourniquet was deflated and minor bleeders were encountered and coagulated. I had a bit of trouble controlling the non-arterial bleeding, but was able to control it with the electrocautery. Wound closure was performed using interrupted 5-0 nylon sutures placed in a vertical matresss fashion.    ESTIMATED BLOOD LOSS:  5cc    Drains: none    SPECIMEN: mass sent to pathology     COMPLICATIONS:  none        KAMALJIT Soto MD  Dept. Orthopedic Surgery  [facility]

## 2023-09-28 ENCOUNTER — VIRTUAL VISIT (OUTPATIENT)
Dept: PSYCHOLOGY | Facility: CLINIC | Age: 48
End: 2023-09-28
Payer: COMMERCIAL

## 2023-09-28 DIAGNOSIS — F43.22 ADJUSTMENT DISORDER WITH ANXIOUS MOOD: Primary | ICD-10-CM

## 2023-09-28 PROCEDURE — 90837 PSYTX W PT 60 MINUTES: CPT | Mod: VID | Performed by: COUNSELOR

## 2023-10-02 NOTE — PROGRESS NOTES
Discharge Summary  Multiple Sessions    Client Name: Raffy Castelan MRN#: [id number] YOB: 1975    Discharge Date:   October 1, 2023    Service Modality: Video Visit:      Provider verified identity through the following two step process.  Patient provided:  Patient was verified at admission/transfer    Telemedicine Visit: The patient's condition can be safely assessed and treated via synchronous audio and visual telemedicine encounter.      Reason for Telemedicine Visit: Patient has requested telehealth visit    Originating Site (Patient Location): Patient's home    Distant Site (Provider Location): Provider Remote Setting- Home Office    Consent:  The patient/guardian has verbally consented to: the potential risks and benefits of telemedicine (video visit) versus in person care; bill my insurance or make self-payment for services provided; and responsibility for payment of non-covered services.     Patient would like the video invitation sent by:  Send to e-mail at: [email or web address]    Mode of Communication:  Video Conference via Amwell    Distant Location (Provider):  Off-site    As the provider I attest to compliance with applicable laws and regulations related to telemedicine.    Service Type: Individual      Session Start Time: 9:00a  Session End Time: 9:55a      Session Length: 55 min     Session #: 7     Attendees: Client attended alone      Focus of Treatment Objective(s):  Client's presenting concerns included: Anxiety - Pt reported difficulty navigating the relationship with her mother and noted this a significant source of stress. The problem(s) began in childhood. Pt reported recently she set boundaries with her mother which caused her guilt. She reported as her sons get older, she is preparing to date again and wants to find healthy relationships. Reported she also wants to avoid treating her sons in the same way her mother has treated her.   Stage of Change at time  of Discharge: ACTION (Actively working towards change)    Medication Adherence:  Yes    Chemical Use:  NA - no current or hx of SUDs concenrs    Assessment: Current Emotional / Mental Status (status of significant symptoms):    Risk status (Self / Other harm or suicidal ideation)  Client denies current fears or concerns for personal safety.  Client denies current or recent suicidal ideation or behaviors.  Client denies current or recent homicidal ideation or behaviors.  Client denies current or recent self injurious behavior or ideation.  Client denies other safety concerns.  A safety and risk management plan has not been developed at this time, however client was given the after-hours number should there be a change in any of these risk factors.    Appearance:   Appropriate   Eye Contact:   Good   Psychomotor Behavior: Normal   Attitude:   Cooperative   Orientation:   All  Speech   Rate / Production: Normal    Volume:  Normal   Mood:    Normal  Affect:    Appropriate   Thought Content:  Clear   Thought Form:  Coherent  Logical   Insight:   Good     DSM5 Diagnoses: (Sustained by DSM5 Criteria Listed Above)  Diagnoses: Adjustment Disorders  309.24 (F43.22) With anxiety  Psychosocial & Contextual Factors: Pt reported increased levels of stress associated with maladaptive coping skills related to trauma and discord with her mother. Psychosocial, Cultural and Contextual Factors: Discord with family, hx of trauma, stage of live changes.      Reason for Discharge:  Pt's insurance deductible renews Oct 1 and pt reported she did not feel like she could afford therapy with the renewal. Pt reported she intended to return to therapy with writer later in the next year but at this time, felt okay without therapy. Writer offered other options for individual therapy but pt noted feeling as though she was better able to manage her symptoms at this time. Pt appears to have made great improvements in setting healthy boundaries, her  sense of self, her self worth, and her relationships with others.       Aftercare Plan:  Client may resume counseling services at any time in the future by calling the [facility] Intake Office, [phone].      JUDD Mayers  October 1, 2023

## 2023-10-05 NOTE — PROGRESS NOTES
Raffy is here for his first postop visit, status post 9/27/23 mass excision right fourth finger digit.     Pathology reports:   Final Diagnosis   Soft tissue, right fourth finger, excision-  Hemangioma       Exam:  Wounds look good, no evidence of infection.   No significant erythema or swelling.  Neurovascular exam within normal limits.  ROM: full  Sutures were removed today.  CMS is intact.  Mild tenderness over surgical site    Assessment:     She is doing well.      Plan   Discussed the nature of hemangiomas  Discussed scar management.  Avoid grabbing with that portion of the finger for another week.    Sutures removed today.    Return to clinic: as needed     KAMALJIT Soto MD  Dept. Orthopedic Surgery  [facility]

## 2023-10-10 ENCOUNTER — OFFICE VISIT (OUTPATIENT)
Dept: ORTHOPEDICS | Facility: CLINIC | Age: 48
End: 2023-10-10
Payer: COMMERCIAL

## 2023-10-10 VITALS — SYSTOLIC BLOOD PRESSURE: 130 MMHG | HEART RATE: 83 BPM | DIASTOLIC BLOOD PRESSURE: 77 MMHG | OXYGEN SATURATION: 99 %

## 2023-10-10 DIAGNOSIS — R22.31 FINGER MASS, RIGHT: Primary | ICD-10-CM

## 2023-10-10 DIAGNOSIS — D18.09 HEMANGIOMA OF OTHER SITES: ICD-10-CM

## 2023-10-10 DIAGNOSIS — Z09 POSTOP CHECK: ICD-10-CM

## 2023-10-10 PROCEDURE — 99024 POSTOP FOLLOW-UP VISIT: CPT | Performed by: ORTHOPAEDIC SURGERY

## 2023-10-10 ASSESSMENT — PAIN SCALES - GENERAL: PAINLEVEL: MILD PAIN (3)

## 2023-10-10 NOTE — LETTER
10/10/2023         RE: Raffy Castelan  [address]  [place] [address]        Dear Colleague,    Thank you for referring your patient, Raffy Castelan, to the [facility]. Please see a copy of my visit note below.    Raffy is here for his first postop visit, status post 9/27/23 mass excision right fourth finger digit.     Pathology reports:   Final Diagnosis   Soft tissue, right fourth finger, excision-  Hemangioma       Exam:  Wounds look good, no evidence of infection.   No significant erythema or swelling.  Neurovascular exam within normal limits.  ROM: full  Sutures were removed today.  CMS is intact.  Mild tenderness over surgical site    Assessment:     She is doing well.      Plan   Discussed the nature of hemangiomas  Discussed scar management.  Avoid grabbing with that portion of the finger for another week.    Sutures removed today.    Return to clinic: as needed     KAMALJIT Soto MD  Dept. Orthopedic Surgery  [facility]       Again, thank you for allowing me to participate in the care of your patient.        Sincerely,        Jin Soto MD

## 2023-10-27 ENCOUNTER — VIRTUAL VISIT (OUTPATIENT)
Dept: ENDOCRINOLOGY | Facility: CLINIC | Age: 48
End: 2023-10-27
Payer: COMMERCIAL

## 2023-10-27 VITALS — WEIGHT: 233 LBS | HEIGHT: 65 IN | BODY MASS INDEX: 38.82 KG/M2

## 2023-10-27 DIAGNOSIS — E66.813 CLASS 3 SEVERE OBESITY DUE TO EXCESS CALORIES WITH SERIOUS COMORBIDITY AND BODY MASS INDEX (BMI) OF 45.0 TO 49.9 IN ADULT (H): Primary | ICD-10-CM

## 2023-10-27 DIAGNOSIS — Z98.84 S/P LAPAROSCOPIC SLEEVE GASTRECTOMY: ICD-10-CM

## 2023-10-27 DIAGNOSIS — K21.9 GASTROESOPHAGEAL REFLUX DISEASE WITHOUT ESOPHAGITIS: ICD-10-CM

## 2023-10-27 DIAGNOSIS — E66.01 CLASS 3 SEVERE OBESITY DUE TO EXCESS CALORIES WITH SERIOUS COMORBIDITY AND BODY MASS INDEX (BMI) OF 45.0 TO 49.9 IN ADULT (H): Primary | ICD-10-CM

## 2023-10-27 PROCEDURE — 99214 OFFICE O/P EST MOD 30 MIN: CPT | Mod: VID

## 2023-10-27 ASSESSMENT — PAIN SCALES - GENERAL: PAINLEVEL: NO PAIN (0)

## 2023-10-27 NOTE — NURSING NOTE
Is the patient currently in the state of [place]? YES    Visit mode:VIDEO    If the visit is dropped, the patient can be reconnected by: VIDEO VISIT: Text to cell phone:   Telephone Information:   Mobile [phone]       Will anyone else be joining the visit? NO  (If patient encounters technical issues they should call [phone]    How would you like to obtain your AVS? MyChart    Are changes needed to the allergy or medication list? Pt stated no changes to allergies and Pt stated no med changes    Reason for visit: Follow Up    Tameka MEJIA

## 2023-10-27 NOTE — PATIENT INSTRUCTIONS
"Dayron Koromar, it was nice to meet you today!  Thank you for allowing us the privilege of caring for you. We hope we provided you with the excellent service you deserve.   Please let us know if there is anything else we can do for you so that we can be sure you are completely satisfied with your care experience.    To ensure the quality of our services you may be receiving a patient satisfaction survey from an independent patient satisfaction monitoring company.    The greatest compliment you can give is a \"Likely to Recommend\"    Your visit was with ROSS GONZALES PA-C today.    Instructions per today's visit:     Follow up  plan:    ___________________________________________________________________________  Important contact and scheduling information:  Please call our contact center at [phone] to schedule your next appointments.  For any nursing questions or concerns call Abiola Damian LPN at [phone] or Johanne Aguilar RN at [phone]  Please call during clinic hours Monday through Friday 8:00a - 4:00p if you have questions or you can contact us via [email or web address] at anytime and we will reply during clinic hours.    Lab results will be communicated through My Chart or letter (if My Chart not used). Please call the clinic if you have not received communication after 1 week or if you have any questions.?  Clinic Fax: [phone]  __________________________________________________________________________    If labs were ordered today:    Please make an appointment to have them drawn at your convenience.     To schedule the Lab Appointment using [email or web address]:  Select \"Schedule an Appointment\"  Select \"Lab Only\"  For \"A couple of questions\", select \"Other\"  For \"Which locations work for you?, select the location and set up the appointment    To schedule by phone call [phone] to schedule a lab only appointment at [facility] " lab.  ___________________________________________________________________________  Work with A Health !  Virtual Sessions are Available through [facility]    To learn more, call to schedule a free, Health  Q&A appointment: [phone]     What is Health Coaching?  Do you know what you are supposed to do, but you just aren't doing it?  Then, HEALTH COACHING may help you!   Get unstuck and move forward with the support of a professionally trained NBC-HWC (National Board-Certified Health and ) who uses evidence-based approaches to help you move forward with healthy lifestyle changes in the areas of weight loss, stress management and overall well-being.    Health Coaches help you identify goals that will work best for you. Health Coaches provide support and encouragement with overcoming barriers and help you to find inspiration and motivation to lead a healthy lifestyle.    Option one:  Health Coaching 3-Pack; Three, 30-minute Health Coaching Visits, for $99  Visits are done virtually (phone or video)  This is a self pay service; we do not accept insurance for toby coaching.    Option two:   The 24 week Plan; 11 Health Coaching Visits, and a 7 months subscription to [employer]-- on-demand fitness, nutrition and mindfulness classes, for $499 (employee discounts may be available). Participants will also meet regularly with a weight management Medical Provider and a Registered/Licensed Dietician.  This is a self-pay service; we do not accept insurance for health coaching.    To Schedule a free Health  Q&A appointment to learn more,  call [phone].  ____________________________________________________________________  [facility]  Healthy Lifestyle Group    Healthy Lifestyle Group  This is a 60 minute virtual coaching group for those who want to lead a healthier lifestyle. Come together to set goals and overcome  "barriers in a supportive group environment. We will address the four pillars of health--nutrition, exercise, sleep and emotional well-being.  This group is highly recommended for those who are participating in the 24 week Healthy Lifestyle Plan and our Health Coaching sessions.    WHEN: This group meets the first Friday of the month, 12:30 PM - 1:30 PM online, via a zoom meeting.      FACILITATOR: Led by National Board Certified Health and , Anh Black [facility].    TO REGISTER: Please call the Call Center at [phone] to register. You will get an appointment to attend in [employer]. Fifteen minutes prior to the meeting, complete the e-check in and you will get the link to join the meeting.  There is no charge to attend this group and space is limited.      2023 and 2024 Meeting Topics and Dates:    November 3: Introduction to Mindfulness (Learn simple and effective mindfulness practices and how it can benefit you)    December 8: Let's Talk (guided discussion on our wins and challenges)    January 5: New Years Vision: Manifest your Best 2024! (Guided imagery,  journaling and discussion)    February 2: Let's Talk    March 1: 10 Percent Happier by Tereso Armendariz (Book Bites; a guided discussion on the nuggets of wisdom from favorite wellness books; no need to read the book but highly encouraged)    April 5: Let's Talk    May 3: \"Essentialism; The Disciplined Pursuit of Less by Abdirahman Mcclain (book bites discussion)    June 7: Let's Talk    July 5: NO MEETING, off for the 4th of July Holiday    August 2: The Blue Zones, Secrets for Living a Longer Life by Tereso Carrasquillo (book bites discussion)      If you would like bariatric surgery specific support group info please let your care team know.         Thank you,   [facility] Comprehensive Weight Management Team                          "

## 2023-10-27 NOTE — LETTER
10/27/2023       RE: Raffy Castelan  [address]  [place] [address]     Dear Colleague,    Thank you for referring your patient, Raffy Castelan, to the [facility] [place] [facility]. Please see a copy of my visit note below.    Virtual Visit Details    Type of service:  Video Visit     Originating Location (pt. Location): Home    Distant Location (provider location):  Off-site  Platform used for Video Visit: [facility] Bariatric Surgery Note    RE: Raffy Castelan  MR#: [id number]  : 1975  VISIT DATE: Oct 27, 2023      Dear Clinic - [facility],    I had the pleasure of seeing your patient, Raffy Castelan, in my post-bariatric surgery assessment clinic.    Assessment & Plan  Problem List Items Addressed This Visit       GERD (gastroesophageal reflux disease)    Relevant Medications    omeprazole (PRILOSEC) 20 MG DR capsule    Class 3 severe obesity due to excess calories with serious comorbidity and body mass index (BMI) of 45.0 to 49.9 in adult (H) - Primary    S/P laparoscopic sleeve gastrectomy    Relevant Orders    CBC with platelets    Comprehensive metabolic panel    Ferritin    Hemoglobin A1c    Lipid panel reflex to direct LDL Fasting    Parathyroid Hormone Intact    Vitamin A    Vitamin B12    Vitamin D Deficiency      Continue Omeprazole 20mg in the morning. Refills sent.   1 year post op labs ordered   Follow up with Melinda Sanford in 3 months     33 minutes spent by me on the date of the encounter doing chart review, history and exam, documentation and further activities per the note    CHIEF COMPLAINT: Post-bariatric surgery follow-up. 9 months s/p gastric Sleeve with Dr. Ritter.    HISTORY OF PRESENT ILLNESS:      10/27/2023    10:31 AM   Questions Regarding Prior Weight Loss Surgery Reviewed With Patient   I had the following weight loss procedure Sleeve Gastrectomy   What  year was your surgery? 2023   How has your weight changed since your last visit? I have lost weight   Do you currently have any of the following Heartburn, acid reflux, or GERD (acid reflux disease)?    Hypertension (high blood pressure)?    Hyperlipidemia (high cholesterol, triglycerides)?   Do you have any concerns today? no       Has continued to lose weight since last visit. Is very happy with her results.     Traveling has been so much easier - now able to sit in airplane without concern and can walk airports easier.     Recent diet changes: Eating 3 meals a day. When traveling is still only eating 3 meals a day. However, when at home has more snacks. Portion sizes around 1 cup. Tolerating normal diet. Is now eating protein first. Is really tired of protein shakes.     -Protein? 45-50g. Is trying to remove protein shakes from diet. Has been having protein bars, greek yogurt.   -Water? 5-6 16oz bottles, tea with sugar     Recent exercise/activity changes: Very active when traveling, starting walking 2-3 week. Limited due to work schedule at times. Has been considering signing up for community gym for the winter.     Recent stressors: Started therapy, which has been really helpful. Work continues to be stressful, but is manageable     Recent sleep changes: No concerns. Has improved with surgery.     Vitamins/Labs: Bariatric MV, Calcium citrate, hair/skin/nails.     Denies dysphagia.     Nausea, abdominal pain, and vomiting only when over eats.     GERD - taking omeprazole 20mg in the morning, with relief. Does not eat more then 2 hours. Has not been able to dose deescalate without     Denies ETOH, nicotine, or NSAIDs     Caffeiene - 1/2 cup of coffee and 2 cups of tea.     Weight History:      10/27/2023    10:31 AM   --   What is your highest lifetime weight? 300   What is your lowest weight since surgery? (In pounds) 233     Initial Weight (lbs): 290 lbs  Weight: 105.7 kg (233 lb)  Last Visits Weight: 107.5 kg  (237 lb)  Cumulative weight loss (lbs): 57  Weight Loss Percentage: 19.66%    Wt Readings from Last 5 Encounters:   10/27/23 105.7 kg (233 lb)   09/21/23 111.1 kg (245 lb)   09/19/23 107.5 kg (237 lb)   09/13/23 111.2 kg (245 lb 3.2 oz)   07/20/23 109.8 kg (242 lb)             10/27/2023    10:31 AM   Questions Regarding Co-Morbidities and Health Concerns Reviewed With Patient   Pre-diabetes Improved   Diabetes II Never   High Blood Pressure Improved   High cholesterol Improved   Heartburn/Reflux Worsened   Sleep apnea Never   PCOS Never   Back pain Improved   Joint pain Improved   Lower leg swelling Gone away           10/27/2023    10:31 AM   Eating Habits   How many meals do you eat per day? 3   Do you snack between meals? Yes   How much food are you eating at each meal? 1/2 cup to 1 cup   Are you able to separate your meals and liquids by at least 30 minutes? Sometimes   Are you able to avoid liquid calories? Yes           10/27/2023    10:31 AM   Exercise Questions Reviewed With Patient   How often do you exercise? Less than 1 time per week   What is the duration of your exercise (in minutes)? 20 Minutes   What types of exercise do you do? walking   What keeps you from being more active? I should be more active but I just have not gotten around to it       Social History:      10/27/2023    10:31 AM   --   Are you smoking? No   Are you drinking alcohol? No       Medications:  Current Outpatient Medications   Medication    acetaminophen (TYLENOL) 325 MG tablet    Calcium Citrate-Vitamin D (CALCIUM CITRATE CHEWY BITE) 500-12.5 MG-MCG CHEW    losartan-hydrochlorothiazide (HYZAAR) 50-12.5 MG tablet    omeprazole (PRILOSEC) 20 MG DR capsule    albuterol (PROAIR HFA/PROVENTIL HFA/VENTOLIN HFA) 108 (90 Base) MCG/ACT inhaler    clobetasol propionate (OLUX) 0.05 % external foam    multivitamin w/minerals (MULTI-VITAMIN) tablet    senna (SENOKOT) 8.6 MG tablet     No current facility-administered medications for this  "visit.         10/27/2023    10:31 AM   --   Do you avoid NSAIDs such as (Ibuprofen, Aleve, Naproxen, Advil)? Yes       ROS:  GI:       10/27/2023    10:31 AM   --   Vomiting Yes   Diarrhea No   Constipation Yes   Swallowing trouble No   Abdominal pain Yes   Heartburn No     Skin:       10/27/2023    10:31 AM   BAR RBS ROS - SKIN   Rash in skin folds No     Psych:       10/27/2023    10:31 AM   --   Depression No   Anxiety No     Female Only:       10/27/2023    10:31 AM   BAR RBS ROS -    Female only Excessive menstrual bleeding    Regular menstrual cycles   Stress urinary incontinence No         Objective   Ht 1.644 m (5' 4.72\")   Wt 105.7 kg (233 lb)   LMP 09/27/2023   BMI 39.10 kg/m    Vitals - Patient Reported  Pain Score: No Pain (0)        Physical Exam   GENERAL: Healthy, alert and no distress  EYES: Eyes grossly normal to inspection.  No discharge or erythema, or obvious scleral/conjunctival abnormalities.  RESP: No audible wheeze, cough, or visible cyanosis.  No visible retractions or increased work of breathing.    SKIN: Visible skin clear. No significant rash, abnormal pigmentation or lesions.  NEURO: Cranial nerves grossly intact.  Mentation and speech appropriate for age.  PSYCH: Mentation appears normal, affect normal/bright, judgement and insight intact, normal speech and appearance well-groomed.        Sincerely,    ROSS GONZALES PA-C  "

## 2023-10-27 NOTE — PROGRESS NOTES
Virtual Visit Details    Type of service:  Video Visit     Originating Location (pt. Location): Home    Distant Location (provider location):  Off-site  Platform used for Video Visit: [facility] Bariatric Surgery Note    RE: Raffy Castelan  MR#: [id number]  : 1975  VISIT DATE: Oct 27, 2023      Dear Clinic - [facility],    I had the pleasure of seeing your patient, Raffy Castelan, in my post-bariatric surgery assessment clinic.    Assessment & Plan   Problem List Items Addressed This Visit       GERD (gastroesophageal reflux disease)    Relevant Medications    omeprazole (PRILOSEC) 20 MG DR capsule    Class 3 severe obesity due to excess calories with serious comorbidity and body mass index (BMI) of 45.0 to 49.9 in adult (H) - Primary    S/P laparoscopic sleeve gastrectomy    Relevant Orders    CBC with platelets    Comprehensive metabolic panel    Ferritin    Hemoglobin A1c    Lipid panel reflex to direct LDL Fasting    Parathyroid Hormone Intact    Vitamin A    Vitamin B12    Vitamin D Deficiency      Continue Omeprazole 20mg in the morning. Refills sent.   1 year post op labs ordered   Follow up with Melinda Sanford in 3 months     33 minutes spent by me on the date of the encounter doing chart review, history and exam, documentation and further activities per the note    CHIEF COMPLAINT: Post-bariatric surgery follow-up. 9 months s/p gastric Sleeve with Dr. Ritter.    HISTORY OF PRESENT ILLNESS:      10/27/2023    10:31 AM   Questions Regarding Prior Weight Loss Surgery Reviewed With Patient   I had the following weight loss procedure Sleeve Gastrectomy   What year was your surgery?    How has your weight changed since your last visit? I have lost weight   Do you currently have any of the following Heartburn, acid reflux, or GERD (acid reflux disease)?    Hypertension (high blood pressure)?    Hyperlipidemia (high cholesterol, triglycerides)?   Do you have any concerns today?  no       Has continued to lose weight since last visit. Is very happy with her results.     Traveling has been so much easier - now able to sit in airplane without concern and can walk airports easier.     Recent diet changes: Eating 3 meals a day. When traveling is still only eating 3 meals a day. However, when at home has more snacks. Portion sizes around 1 cup. Tolerating normal diet. Is now eating protein first. Is really tired of protein shakes.     -Protein? 45-50g. Is trying to remove protein shakes from diet. Has been having protein bars, greek yogurt.   -Water? 5-6 16oz bottles, tea with sugar     Recent exercise/activity changes: Very active when traveling, starting walking 2-3 week. Limited due to work schedule at times. Has been considering signing up for community gym for the winter.     Recent stressors: Started therapy, which has been really helpful. Work continues to be stressful, but is manageable     Recent sleep changes: No concerns. Has improved with surgery.     Vitamins/Labs: Bariatric MV, Calcium citrate, hair/skin/nails.     Denies dysphagia.     Nausea, abdominal pain, and vomiting only when over eats.     GERD - taking omeprazole 20mg in the morning, with relief. Does not eat more then 2 hours. Has not been able to dose deescalate without     Denies ETOH, nicotine, or NSAIDs     Caffeiene - 1/2 cup of coffee and 2 cups of tea.     Weight History:      10/27/2023    10:31 AM   --   What is your highest lifetime weight? 300   What is your lowest weight since surgery? (In pounds) 233     Initial Weight (lbs): 290 lbs  Weight: 105.7 kg (233 lb)  Last Visits Weight: 107.5 kg (237 lb)  Cumulative weight loss (lbs): 57  Weight Loss Percentage: 19.66%    Wt Readings from Last 5 Encounters:   10/27/23 105.7 kg (233 lb)   09/21/23 111.1 kg (245 lb)   09/19/23 107.5 kg (237 lb)   09/13/23 111.2 kg (245 lb 3.2 oz)   07/20/23 109.8 kg (242 lb)             10/27/2023    10:31 AM   Questions Regarding  Co-Morbidities and Health Concerns Reviewed With Patient   Pre-diabetes Improved   Diabetes II Never   High Blood Pressure Improved   High cholesterol Improved   Heartburn/Reflux Worsened   Sleep apnea Never   PCOS Never   Back pain Improved   Joint pain Improved   Lower leg swelling Gone away           10/27/2023    10:31 AM   Eating Habits   How many meals do you eat per day? 3   Do you snack between meals? Yes   How much food are you eating at each meal? 1/2 cup to 1 cup   Are you able to separate your meals and liquids by at least 30 minutes? Sometimes   Are you able to avoid liquid calories? Yes           10/27/2023    10:31 AM   Exercise Questions Reviewed With Patient   How often do you exercise? Less than 1 time per week   What is the duration of your exercise (in minutes)? 20 Minutes   What types of exercise do you do? walking   What keeps you from being more active? I should be more active but I just have not gotten around to it       Social History:      10/27/2023    10:31 AM   --   Are you smoking? No   Are you drinking alcohol? No       Medications:  Current Outpatient Medications   Medication    acetaminophen (TYLENOL) 325 MG tablet    Calcium Citrate-Vitamin D (CALCIUM CITRATE CHEWY BITE) 500-12.5 MG-MCG CHEW    losartan-hydrochlorothiazide (HYZAAR) 50-12.5 MG tablet    omeprazole (PRILOSEC) 20 MG DR capsule    albuterol (PROAIR HFA/PROVENTIL HFA/VENTOLIN HFA) 108 (90 Base) MCG/ACT inhaler    clobetasol propionate (OLUX) 0.05 % external foam    multivitamin w/minerals (MULTI-VITAMIN) tablet    senna (SENOKOT) 8.6 MG tablet     No current facility-administered medications for this visit.         10/27/2023    10:31 AM   --   Do you avoid NSAIDs such as (Ibuprofen, Aleve, Naproxen, Advil)? Yes       ROS:  GI:       10/27/2023    10:31 AM   --   Vomiting Yes   Diarrhea No   Constipation Yes   Swallowing trouble No   Abdominal pain Yes   Heartburn No     Skin:       10/27/2023    10:31 AM   FER TSANG ROS -  "SKIN   Rash in skin folds No     Psych:       10/27/2023    10:31 AM   --   Depression No   Anxiety No     Female Only:       10/27/2023    10:31 AM   BAR RBS ROS -    Female only Excessive menstrual bleeding    Regular menstrual cycles   Stress urinary incontinence No         Objective    Ht 1.644 m (5' 4.72\")   Wt 105.7 kg (233 lb)   LMP 09/27/2023   BMI 39.10 kg/m    Vitals - Patient Reported  Pain Score: No Pain (0)        Physical Exam   GENERAL: Healthy, alert and no distress  EYES: Eyes grossly normal to inspection.  No discharge or erythema, or obvious scleral/conjunctival abnormalities.  RESP: No audible wheeze, cough, or visible cyanosis.  No visible retractions or increased work of breathing.    SKIN: Visible skin clear. No significant rash, abnormal pigmentation or lesions.  NEURO: Cranial nerves grossly intact.  Mentation and speech appropriate for age.  PSYCH: Mentation appears normal, affect normal/bright, judgement and insight intact, normal speech and appearance well-groomed.        Sincerely,    ROSS GONZALES PA-C    "

## 2023-10-31 ENCOUNTER — TELEPHONE (OUTPATIENT)
Dept: ENDOCRINOLOGY | Facility: CLINIC | Age: 48
End: 2023-10-31
Payer: COMMERCIAL

## 2023-10-31 NOTE — TELEPHONE ENCOUNTER
Scheduled patient in open spot on 1/30/23 at 10:30am with Melinda Marshall PA-C. Sent patient Haofangtong message to inform her.

## 2023-10-31 NOTE — PROGRESS NOTES
"Video-Visit Details    Type of service:  Video Visit    Video Start Time: 12:05 PM  Video End Time: 12:28 PM    Originating Location (pt. Location): Home    Distant Location (provider location):  Offsite (providers home)     Platform used for Video Visit: [facility]      Nutrition Reassessment  Reason For Visit:  Raffy Castelan is a 48 year old female presenting today for nutrition follow-up, 9 month s/p sleeve gastrectomy with Dr. Ritter on 1/10/23.    Anthropometrics:  Initial Consult Weight: 290 lbs  Day of Surgery Weight (1/10/23): 290 lbs  Current Weight:   Estimated body mass index is 38.77 kg/m  as calculated from the following:    Height as of this encounter: 1.651 m (5' 5\").    Weight as of this encounter: 105.7 kg (233 lb). -4 lbs in past month.   Weight loss: -57 lbs from initial consult; -57 lbs from day of surgery    Pt goal to get to 200 lbs    Current Vitamins/Minerals: MVI/minerals BID (Bariatric Fusion with Iron 45 mg), nutrafol (for hair), and calcium citrate 500 mg BID.     Nutrition Related Labs:  Hgb (10.5->11) improving and ferritin WNL  B12 1442 (H) on 3/30/23    Nutrition History:  Doing well with eating protein foods first at meals. When home eating q2 hours, even if not hungry. Working hard on mindfulness before eating. Today she had tea instead of snack and this worked well.  When traveling does well with only eating meals. Notes she is losing 1-2 lbs when traveling. Bringing protein bar when traveling.     Diet Recall:  B: 10:30 am Protein bar   Cup of tea instead of a snack   L: 12 chicken   D: 5-6 salad  Snack: cookie; cracker, and cookies   Beverages: water (48-64 oz/day), 1/2 c coffee and flavored creamer, 1 cup tea with 1-2 tsp sugar    Exercise: Increased her walking. Currently doing outside in neighborhood, thinking about joining a gym for winter months.     Progress with Previous Goals:  1) Continue to track intake, aim for 1200 hazel/day - Met, continues. Reduced calorie intake. Most " days is consuming 0502-1683 hazel/day.   2) Consume 60+ grams of protein/day. - Improving  3) Limit/avoid snacking. 100 calorie option if having. - Met at times  4) Eat slowly (>20 min/meal), chewing foods well (to applesauce-like consistency).  5) Portion out less at meals. Limit portions to 1 cup/meal. Wait for 10-15 mins before going back for more. - Met, continues   - 50% protein and 50% fruit/vegetable/wholegrain   6) Increase exercise. - Less walking as its getting colder. She is thinking about going to the gym.     Nutrition Prescription:  Grams Protein: 60 (minimum)   Amount of Fluid: 48-64+ oz    Nutrition Diagnosis  Previous/Current: Obesity r/t long history of positive energy balance aeb BMI >30. -improving/ongoing    Intervention  Materials/Education provided, reviewed bariatric regular consistency diet, protein intake, fluid intake, eating pace, chewing foods well, portion control, sugar/fat intake, recommended vitamin/mineral supplements. Encouraged increased exercise.  Patient demonstrates understanding.     Expected Engagement: good     Goals:  Eat 3 high-protein meals. 1 high-protein snack if going longer than 4-6 hours between meals.  Re-examine the home. Keep cookies, crackers, sweets out of the house.   Winter plan for walking - joining the gym.     Keeping Up Your Diet after Weight Loss Surgery  [email or web address]    Exercises after Weight Loss Surgery (strengthening, when no weight lifting restrictions)  [email or web address]           Follow-Up: 12/14/23    Time spent with patient: 23 minutes.  TAMI Soto

## 2023-10-31 NOTE — TELEPHONE ENCOUNTER
Pt. Was told to schedule with Melinda Marshall in latter part of January and Pt. Had to schedule for March 6th. Appointment is on wait list.

## 2023-11-02 ENCOUNTER — VIRTUAL VISIT (OUTPATIENT)
Dept: ENDOCRINOLOGY | Facility: CLINIC | Age: 48
End: 2023-11-02
Payer: COMMERCIAL

## 2023-11-02 VITALS — WEIGHT: 233 LBS | HEIGHT: 65 IN | BODY MASS INDEX: 38.82 KG/M2

## 2023-11-02 DIAGNOSIS — E66.813 CLASS 3 SEVERE OBESITY DUE TO EXCESS CALORIES WITH SERIOUS COMORBIDITY AND BODY MASS INDEX (BMI) OF 45.0 TO 49.9 IN ADULT (H): ICD-10-CM

## 2023-11-02 DIAGNOSIS — E66.01 CLASS 3 SEVERE OBESITY DUE TO EXCESS CALORIES WITH SERIOUS COMORBIDITY AND BODY MASS INDEX (BMI) OF 45.0 TO 49.9 IN ADULT (H): ICD-10-CM

## 2023-11-02 DIAGNOSIS — Z98.84 S/P LAPAROSCOPIC SLEEVE GASTRECTOMY: ICD-10-CM

## 2023-11-02 DIAGNOSIS — Z71.3 NUTRITIONAL COUNSELING: Primary | ICD-10-CM

## 2023-11-02 PROCEDURE — 97803 MED NUTRITION INDIV SUBSEQ: CPT | Mod: 95 | Performed by: DIETITIAN, REGISTERED

## 2023-11-02 PROCEDURE — 99207 PR NO CHARGE LOS: CPT | Mod: 95 | Performed by: DIETITIAN, REGISTERED

## 2023-11-02 ASSESSMENT — PAIN SCALES - GENERAL: PAINLEVEL: NO PAIN (0)

## 2023-11-02 NOTE — NURSING NOTE
Is the patient currently in the state of [place]? YES    Visit mode:VIDEO    If the visit is dropped, the patient can be reconnected by: VIDEO VISIT: Text to cell phone:   Telephone Information:   Mobile [phone]       Will anyone else be joining the visit? NO  (If patient encounters technical issues they should call [phone]    How would you like to obtain your AVS? MyChart    Are changes needed to the allergy or medication list? No, Pt stated no changes to allergies, and Pt stated no med changes    Reason for visit: LEILANI MEJIA

## 2023-11-02 NOTE — PATIENT INSTRUCTIONS
Dayron Akbar,    Follow-up with RD on 12/14    Thank you,    Mahnaz Skaggs, TAMI, LD  If you would like to schedule or reschedule an appointment with the RD, please call [phone]    Nutrition Goals  Eat 3 high-protein meals. 1 high-protein snack if going longer than 4-6 hours between meals.  Re-examine the home. Keep cookies, crackers, sweets out of the house.   Winter plan for walking - joining the gym.     Keeping Up Your Diet after Weight Loss Surgery  [email or web address]    Exercises after Weight Loss Surgery (strengthening, when no weight lifting restrictions)  [email or web address]            COMPREHENSIVE WEIGHT MANAGEMENT PROGRAM  VIRTUAL SUPPORT GROUPS    For Support Group Information:      We offer support groups for patients who are working on weight loss and considering, preparing for or have had weight loss surgery.   There is no cost for this opportunity.  You are invited to attend the?Virtual Support Groups?provided by any of the following locations:    [facility] via [employer] Teams with Marcie Ramirez RN  2.   [place] via [employer] with Tono Myrick, PhD, LP  3.   [place] via [employer] with Anh Jeong RN  4.   [facility] via Walkabout Teams with Anh Black, [facility]    The following Support Group information can also be found on our website:  [email or web address]    [email or web address]      [facility] Weight Loss Surgery Support Group    [facility] Weight Loss Surgery Support Group  The support group is a patient-lead forum that meets monthly to share experiences, encouragement and education. It is open to those who have had weight loss surgery, are scheduled for surgery, or are considering surgery.   WHEN: This group meets on the 3rd Wednesday of each month from 5:00PM - 6:00PM virtually  "using Microsoft Teams.   FACILITATOR: Led by Marcie Johnson RD, LD, RN, the program's Clinical Coordinator.   TO REGISTER: Please contact the clinic via [employer] or call the nurse line directly at [phone] to inform our staff that you would like an invite sent to you and to let us know the email you would like the invite sent to. Prior to the meeting, a link with directions on how to join the meeting will be sent to you.    2023 Meetings April 19: Guest Speaker, Delfino Hughes RD, LD, \"Maintaining Weight Loss after Bariatric Surgery\".  May 17: \"Let's Talk\" a time for the group to share.  June 21: \"Let's Talk\" a time for the group to share.  July 19  August 16  September 20  October 18  November 15  December 20    [facility] and [facility] Support Groups    Connections Bariatric Care Support Group?  This is open to all pre- and post- operative bariatric surgery patients as well as their support system.   WHEN: This group meets the 2nd Tuesday of each month from 6:30 PM - 8:00 PM virtually using Microsoft Teams.   FACILITATOR: Led by Tono Myrick, Ph.D who is a Licensed Psychologist with the [facility] Comprehensive Weight Management Program.   TO REGISTER: Please send an email to Tono Myrick, Ph.D., LP at?[email or web address]?if you would like an invitation to the group and to learn about using Microsoft Teams.    2023 Meetings April 11: Guest speaker, Tatyana Hayden MD, Bariatrician, [facility] Comprehensive Weight Management Program, \"Injectable Weight Loss Medications\".  May 9  Lynn 13  July 11  August 8  September 12  October 10  November 14  December 12    Connections Post-Operative Bariatric Surgery Support Group  This is a support group for [facility] bariatric patients (and those external to [facility]) who have had bariatric surgery and are at least 3 months post-surgery.  WHEN: This support group meets the 4th Wednesday of the month from " 11:00 AM - 12:00 PM virtually using Microsoft Teams.   FACILITATOR: Led by Certified Bariatric Nurse, Anh Jeong RN.   TO REGISTER: Please send an email to Anh at [email or web address] if you would like an invitation to the group and to learn about using Microsoft Teams.    2023 Meetings  April 26  May 24  Lynn 28  July 26  August 23  September 27  October 25  November 22  December 27      [facility]   Healthy Lifestyle Group    Healthy Lifestyle Group?  This is a 60 minute virtual coaching group for those who want to lead a healthier lifestyle. Come together to set goals and overcome barriers in a supportive group environment. We will address the four pillars of health--nutrition, exercise, sleep and emotional well-being.  This group is highly recommended for those who are participating in the 24 week Healthy Lifestyle Plan and our Health Coaching sessions.    WHEN: This group meets the first Friday of the month, 12:30 PM - 1:30 PM online, via a zoom meeting.      FACILITATOR: Led by National Board Certified Health and , Anh Black, [facility].     TO REGISTER: Please call the Call Center at [phone] to register. You will get an appointment to attend in [place]. Fifteen minutes prior to the meeting, complete the e-check in and you will get the link to join the meeting.  There is no charge to attend this group and space is limited.       2023 and 2024 Meeting Topics and Dates:  November 3: Introduction to Mindfulness (Learn simple and effective mindfulness practices and how it can benefit you)  December 8: Let's Talk (guided discussion on our wins and challenges)  January 5: New Years Vision: Manifest your Best 2024! (Guided imagery,  journaling and discussion)  February 2: Let's Talk  March 1: 10 Percent Happier by Tereso Armendariz (Book Bites; a guided discussion on the nuggets of wisdom from favorite wellness books; no need to read the book but highly  "encouraged)  April 5: Let's Talk  May 3: \"Essentialism; The Disciplined Pursuit of Less by Abdirahman Mcclain (book bites discussion)  June 7: Let's Talk  July 5: NO MEETING, off for the 4th of July Holiday  August 2: The Blue Zones, Secrets for Living a Longer Life by Tereso Carrasquillo (book bites discussion)                         "

## 2023-11-02 NOTE — LETTER
"11/2/2023       RE: Raffy Castelan  [address] [id number]     Dear Colleague,    Thank you for referring your patient, Raffy Castelan, to the [facility] [place] [facility]. Please see a copy of my visit note below.    Video-Visit Details    Type of service:  Video Visit    Video Start Time: 12:05 PM  Video End Time: 12:28 PM    Originating Location (pt. Location): Home    Distant Location (provider location):  Offsite (providers home)     Platform used for Video Visit: Well      Nutrition Reassessment  Reason For Visit:  Raffy Castelan is a 48 year old female presenting today for nutrition follow-up, 9 month s/p sleeve gastrectomy with Dr. Ritter on 1/10/23.    Anthropometrics:  Initial Consult Weight: 290 lbs  Day of Surgery Weight (1/10/23): 290 lbs  Current Weight:   Estimated body mass index is 38.77 kg/m  as calculated from the following:    Height as of this encounter: 1.651 m (5' 5\").    Weight as of this encounter: 105.7 kg (233 lb). -4 lbs in past month.   Weight loss: -57 lbs from initial consult; -57 lbs from day of surgery    Pt goal to get to 200 lbs    Current Vitamins/Minerals: MVI/minerals BID (Bariatric Fusion with Iron 45 mg), nutrafol (for hair), and calcium citrate 500 mg BID.     Nutrition Related Labs:  Hgb (10.5->11) improving and ferritin WNL  B12 1442 (H) on 3/30/23    Nutrition History:  Doing well with eating protein foods first at meals. When home eating q2 hours, even if not hungry. Working hard on mindfulness before eating. Today she had tea instead of snack and this worked well.  When traveling does well with only eating meals. Notes she is losing 1-2 lbs when traveling. Bringing protein bar when traveling.     Diet Recall:  B: 10:30 am Protein bar   Cup of tea instead of a snack   L: 12 chicken   D: 5-6 salad  Snack: cookie; cracker, and cookies   Beverages: water (48-64 " oz/day), 1/2 c coffee and flavored creamer, 1 cup tea with 1-2 tsp sugar    Exercise: Increased her walking. Currently doing outside in neighborhood, thinking about joining a gym for winter months.     Progress with Previous Goals:  1) Continue to track intake, aim for 1200 hazel/day - Met, continues. Reduced calorie intake. Most days is consuming 6933-7892 hazel/day.   2) Consume 60+ grams of protein/day. - Improving  3) Limit/avoid snacking. 100 calorie option if having. - Met at times  4) Eat slowly (>20 min/meal), chewing foods well (to applesauce-like consistency).  5) Portion out less at meals. Limit portions to 1 cup/meal. Wait for 10-15 mins before going back for more. - Met, continues   - 50% protein and 50% fruit/vegetable/wholegrain   6) Increase exercise. - Less walking as its getting colder. She is thinking about going to the gym.     Nutrition Prescription:  Grams Protein: 60 (minimum)   Amount of Fluid: 48-64+ oz    Nutrition Diagnosis  Previous/Current: Obesity r/t long history of positive energy balance aeb BMI >30. -improving/ongoing    Intervention  Materials/Education provided, reviewed bariatric regular consistency diet, protein intake, fluid intake, eating pace, chewing foods well, portion control, sugar/fat intake, recommended vitamin/mineral supplements. Encouraged increased exercise.  Patient demonstrates understanding.     Expected Engagement: good     Goals:  Eat 3 high-protein meals. 1 high-protein snack if going longer than 4-6 hours between meals.  Re-examine the home. Keep cookies, crackers, sweets out of the house.   Winter plan for walking - joining the gym.     Keeping Up Your Diet after Weight Loss Surgery  [email or web address]    Exercises after Weight Loss Surgery (strengthening, when no weight lifting restrictions)  [email or web address]           Follow-Up: 12/14/23    Time spent with patient: 23 minutes.  TAMI Soto

## 2023-11-08 ENCOUNTER — MYC REFILL (OUTPATIENT)
Dept: FAMILY MEDICINE | Facility: CLINIC | Age: 48
End: 2023-11-08
Payer: COMMERCIAL

## 2023-11-08 DIAGNOSIS — I10 HYPERTENSION GOAL BP (BLOOD PRESSURE) < 140/90: ICD-10-CM

## 2023-11-08 RX ORDER — LOSARTAN POTASSIUM AND HYDROCHLOROTHIAZIDE 12.5; 5 MG/1; MG/1
1 TABLET ORAL DAILY
Qty: 90 TABLET | Refills: 1 | OUTPATIENT
Start: 2023-11-08

## 2023-11-08 RX ORDER — LOSARTAN POTASSIUM AND HYDROCHLOROTHIAZIDE 12.5; 5 MG/1; MG/1
1 TABLET ORAL DAILY
Qty: 90 TABLET | Refills: 0 | Status: SHIPPED | OUTPATIENT
Start: 2023-11-08 | End: 2024-02-05

## 2023-11-24 ENCOUNTER — OFFICE VISIT (OUTPATIENT)
Dept: URGENT CARE | Facility: URGENT CARE | Age: 48
End: 2023-11-24
Payer: COMMERCIAL

## 2023-11-24 VITALS
HEART RATE: 91 BPM | DIASTOLIC BLOOD PRESSURE: 83 MMHG | OXYGEN SATURATION: 100 % | BODY MASS INDEX: 40.27 KG/M2 | TEMPERATURE: 98 F | RESPIRATION RATE: 16 BRPM | WEIGHT: 242 LBS | SYSTOLIC BLOOD PRESSURE: 137 MMHG

## 2023-11-24 DIAGNOSIS — L08.9 FINGER INFECTION: Primary | ICD-10-CM

## 2023-11-24 DIAGNOSIS — L03.012 PARONYCHIA OF FINGER OF LEFT HAND: ICD-10-CM

## 2023-11-24 PROCEDURE — 99214 OFFICE O/P EST MOD 30 MIN: CPT | Performed by: PHYSICIAN ASSISTANT

## 2023-11-24 ASSESSMENT — PAIN SCALES - GENERAL: PAINLEVEL: MILD PAIN (3)

## 2023-11-24 NOTE — PROGRESS NOTES
Chief Complaint   Patient presents with    Infection     Patient reports infection of left index finger beginning three days ago; patient has been using antibiotic ointment. Patient reports pain.                  ASSESSMENT:    ICD-10-CM    1. Finger infection  L08.9 amoxicillin-clavulanate (AUGMENTIN) 875-125 MG tablet      2. Paronychia of finger of left hand  L03.012               PLAN: Paronychial left pointer finger infection.  Warm soaks and Dreft for Epsom salts.  Oral Augmentin.  Keep covered.  Discussed signs and symptoms of progression to felon, if these occur go to ER.    Advised about symptoms which might herald more serious problems.            Carissa Neal PA-C      SUBJECTIVE:   Raffy Castelan is an 48 year old female who presents with with left distal pointer finger infection for the past 2 days.  No fever.  No drainage.      Allergies   Allergen Reactions    Lisinopril Cough       Past Medical History:   Diagnosis Date    Asthma     Constipation     GERD (gastroesophageal reflux disease)     Hypertension     Morbid obesity (H)     Psoriasis        acetaminophen (TYLENOL) 325 MG tablet, Take 2 tablets (650 mg) by mouth every 4 hours as needed for mild pain  albuterol (PROAIR HFA/PROVENTIL HFA/VENTOLIN HFA) 108 (90 Base) MCG/ACT inhaler, Inhale 2 puffs into the lungs every 6 hours as needed for shortness of breath / dyspnea or wheezing  Calcium Citrate-Vitamin D (CALCIUM CITRATE CHEWY BITE) 500-12.5 MG-MCG CHEW, Take 1 chew tab by mouth 3 times daily  clobetasol propionate (OLUX) 0.05 % external foam, Apply to base of scalp as needed for psoriasis  losartan-hydrochlorothiazide (HYZAAR) 50-12.5 MG tablet, TAKE 1 TABLET BY MOUTH DAILY  multivitamin w/minerals (MULTI-VITAMIN) tablet, Take 1 tablet by mouth daily  omeprazole (PRILOSEC) 20 MG DR capsule, Take 1 capsule (20 mg) by mouth daily  senna (SENOKOT) 8.6 MG tablet, Take 2 tablets by mouth At Bedtime    No current facility-administered  medications on file prior to visit.      Social History     Tobacco Use    Smoking status: Never     Passive exposure: Never    Smokeless tobacco: Never   Vaping Use    Vaping Use: Never used   Substance Use Topics    Alcohol use: No    Drug use: No       ROS:  Gen: no fevers  Musculoskel: + as above  Skin: as above    OBJECTIVE:  /83 (BP Location: Left arm, Patient Position: Sitting, Cuff Size: Adult Large)   Pulse 91   Temp 98  F (36.7  C) (Tympanic)   Resp 16   Wt 109.8 kg (242 lb)   LMP 10/14/2023 (Approximate)   SpO2 100%   BMI 40.27 kg/m     General:   awake, alert, and cooperative.  NAD.   Head: Normocephalic, atraumatic.  Eyes: Conjunctiva clear,   MS: Left pointer finger paronychial tissue red, tender, swollen.  Looks like I can see exudate underneath.  Soak hand in warm water and Hibiclens.  11 blade used to nick the area.  Only blood came back.  Band-Aid applied.   Sensation to soft touch intact.  Full range of motion of the finger at MCP, PIP and DIP joints.  Only very mild tenderness of the finger pad.   cap refill intact, radial pulse intact.  Neuro: Alert and oriented - normal speech.      Carissa Neal PA-C

## 2023-12-07 ENCOUNTER — MYC MEDICAL ADVICE (OUTPATIENT)
Dept: FAMILY MEDICINE | Facility: CLINIC | Age: 48
End: 2023-12-07
Payer: COMMERCIAL

## 2023-12-07 DIAGNOSIS — U07.1 CLINICAL DIAGNOSIS OF COVID-19: ICD-10-CM

## 2023-12-07 RX ORDER — ALBUTEROL SULFATE 90 UG/1
2 AEROSOL, METERED RESPIRATORY (INHALATION) EVERY 6 HOURS PRN
Qty: 8.5 G | Refills: 3 | Status: SHIPPED | OUTPATIENT
Start: 2023-12-07

## 2023-12-12 ENCOUNTER — ALLIED HEALTH/NURSE VISIT (OUTPATIENT)
Dept: FAMILY MEDICINE | Facility: CLINIC | Age: 48
End: 2023-12-12
Payer: COMMERCIAL

## 2023-12-12 VITALS — DIASTOLIC BLOOD PRESSURE: 89 MMHG | SYSTOLIC BLOOD PRESSURE: 129 MMHG

## 2023-12-12 DIAGNOSIS — I10 HTN (HYPERTENSION): Primary | ICD-10-CM

## 2023-12-12 PROCEDURE — 99207 PR NO CHARGE NURSE ONLY: CPT

## 2023-12-12 NOTE — PROGRESS NOTES
Raffy Castelan was evaluated at [facility] on December 12, 2023 at which time her blood pressure was:    BP Readings from Last 1 Encounters:   12/12/23 129/89     No data recorded      Reviewed lifestyle modifications for blood pressure control and reduction: including making healthy food choices, managing weight, getting regular exercise, smoking cessation, reducing alcohol consumption, monitoring blood pressure regularly.     Symptoms: None    BP Goal:< 140/90 mmHg    BP Assessment:  BP at goal    Potential Reasons for BP too high: NA - Not applicable    BP Follow-Up Plan: Recheck BP in 6 months at pharmacy    Recommendation to Provider: None    Note completed by: Micah Colon RP

## 2023-12-14 ENCOUNTER — VIRTUAL VISIT (OUTPATIENT)
Dept: ENDOCRINOLOGY | Facility: CLINIC | Age: 48
End: 2023-12-14
Payer: COMMERCIAL

## 2023-12-14 VITALS — WEIGHT: 229 LBS | HEIGHT: 65 IN | BODY MASS INDEX: 38.15 KG/M2

## 2023-12-14 DIAGNOSIS — Z98.84 S/P LAPAROSCOPIC SLEEVE GASTRECTOMY: ICD-10-CM

## 2023-12-14 DIAGNOSIS — Z71.3 NUTRITIONAL COUNSELING: Primary | ICD-10-CM

## 2023-12-14 DIAGNOSIS — E66.9 OBESITY: ICD-10-CM

## 2023-12-14 PROCEDURE — 99207 PR NO CHARGE LOS: CPT | Mod: 95 | Performed by: DIETITIAN, REGISTERED

## 2023-12-14 PROCEDURE — 97803 MED NUTRITION INDIV SUBSEQ: CPT | Mod: 95 | Performed by: DIETITIAN, REGISTERED

## 2023-12-14 ASSESSMENT — PAIN SCALES - GENERAL: PAINLEVEL: NO PAIN (0)

## 2023-12-14 NOTE — PROGRESS NOTES
"Video-Visit Details    Type of service:  Video Visit    Video Start Time: 12:34 PM  Video End Time: 12:48 PM    Originating Location (pt. Location): Home    Distant Location (provider location):  Offsite (providers home)     Platform used for Video Visit: [facility]      Nutrition Reassessment  Reason For Visit:  Raffy Castelan is a 48 year old female presenting today for nutrition follow-up, 11 month s/p sleeve gastrectomy with Dr. Ritter on 1/10/23.    Anthropometrics:  Initial Consult Weight: 290 lbs  Day of Surgery Weight (1/10/23): 290 lbs  Current Weight:   Estimated body mass index is 38.11 kg/m  as calculated from the following:    Height as of this encounter: 1.651 m (5' 5\").    Weight as of this encounter: 103.9 kg (229 lb). -4 lbs in past month.   Weight loss: -61 lbs from initial consult; -61 lbs from day of surgery    Pt goal to get to 200 lbs    Current Vitamins/Minerals: MVI/minerals BID (Bariatric Fusion with Iron 45 mg), nutrafol (for hair), and calcium citrate 500 mg BID.     Nutrition Related Labs:  Hgb (10.5->11) improving and ferritin WNL  B12 1442 (H) on 3/30/23    Nutrition History:  Doing well with eating protein foods first at meals. When home eating q2 hours, even if not hungry. Working hard on mindfulness before eating. Today she had tea instead of snack and this worked well.  When traveling does well with only eating meals. Notes she is losing 1-2 lbs when traveling. Bringing protein bar when traveling.     Today - Traveled a couple times over the past month. Loses more weight when she is traveling, less snacking. Continues to work on moderating snacking at home. Continuing to track protein intake, consuming 60 gm protein. Focused on adding in more exercise in upcoming month.     Diet Recall:  B: 10:30 am Protein bar   L: 12 chicken   D: 5-6 salad  Snack: protein bar or nuts, occ a cookie   Beverages: water (50 oz/day)    Exercise: Planning to join a gym for winter months.     Progress with " Previous Goals:  Eat 3 high-protein meals. 1 high-protein snack if going longer than 4-6 hours between meals. - Met, continues   Re-examine the home. Keep cookies, crackers, sweets out of the house. - Improving   Winter plan for walking - joining the gym. - Not met     Nutrition Prescription:  Grams Protein: 60 (minimum)   Amount of Fluid: 48-64+ oz    Nutrition Diagnosis  Previous/Current: Obesity r/t long history of positive energy balance aeb BMI >30. -improving/ongoing    Intervention  Materials/Education provided, reviewed bariatric regular consistency diet, protein intake, fluid intake, eating pace, chewing foods well, portion control, sugar/fat intake, recommended vitamin/mineral supplements. Encouraged increased exercise.  Patient demonstrates understanding.     Expected Engagement: good     Goals:  Eat 3 high-protein meals. 1 high-protein snack if going longer than 4-6 hours between meals.  Re-examine the home. Keep cookies, crackers, sweets out of sight.   Winter plan for walking - joining the gym. Walking 3 days per week.   Take a third calcium chew if not eating cheese/protein bar/shake with 200+ mg calcium.     Keeping Up Your Diet after Weight Loss Surgery  [email or web address]    Exercises after Weight Loss Surgery (strengthening, when no weight lifting restrictions)  [email or web address]           Follow-Up: 2/15/23    Time spent with patient: 14 minutes.  TAMI Soto

## 2023-12-14 NOTE — PATIENT INSTRUCTIONS
Dayron Akbar,    Follow-up with RD pn 2/15    Thank you,    Mahnaz Skaggs, RD, LD  If you would like to schedule or reschedule an appointment with the RD, please call [phone]    Nutrition Goals  Eat 3 high-protein meals. 1 high-protein snack if going longer than 4-6 hours between meals.  Re-examine the home. Keep cookies, crackers, sweets out of sight.   Winter plan for walking - joining the gym. Walking 3 days per week.   Take a third calcium chew if not eating cheese/protein bar/shake with 200+ mg calcium.     Keeping Up Your Diet after Weight Loss Surgery  [email or web address]    Exercises after Weight Loss Surgery (strengthening, when no weight lifting restrictions)  [email or web address]        COMPREHENSIVE WEIGHT MANAGEMENT PROGRAM  VIRTUAL SUPPORT GROUPS    At [facility], our Comprehensive Weight Management program offers on-line support groups for patients who are working on weight loss and considering, preparing for, or have had weight loss surgery.     There is no cost for this opportunity.  You are invited to attend the?Virtual Support Groups?provided by any of the following locations:    [facility] via [employer] Teams with Marcie Ramirez RN  2.   [place] via [employer] with Tono Myrick, PhD, LP  3.   [place] via [employer] with Anh Jeong RN  4.   [facility] via a Zoom Meeting with INEZ Dowling    The following Support Group information can also be found on our website:  [email or web address]      [facility] Weight Loss Surgery Support Group  The support group is a patient-lead forum that meets monthly to share experiences, encouragement and education. It is open to those who have had weight loss surgery, are scheduled for surgery, or are considering surgery.   WHEN: This group meets on the 3rd Wednesday of each month from 5:00PM - 6:00PM  "virtually using Microsoft Teams.   FACILITATOR: Led by Marcie Johnson, TAMI, LD, RN, the program's Clinical Coordinator.   TO REGISTER: Please contact the clinic via [employer] or call the nurse line directly at [phone] to inform our staff that you would like an invite sent to you and to let us know the email you would like the invite sent to. Prior to the meeting, a link with directions on how to join the meeting will be sent to you.    2023 and 2024 Meetings   December 20  January 17  February 21  March 20  April 17  May 15  Lynn 19      [facility] Bariatric Care Support Group?  This is open to all pre- and post- operative bariatric surgery patients as well as their support system.   WHEN: This group meets the 3rd Tuesday of each month from 6:30 PM - 8:00 PM virtually using Microsoft Teams.   FACILITATOR: Led by Tono Myrick, Ph.D who is a Licensed Psychologist with the [facility] Comprehensive Weight Management Program.   TO REGISTER: Please send an email to Tono Myrick, Ph.D., LP at?[email or web address]?if you would like an invitation to the group. Prior to the meeting, a link with directions on how to join the meeting will be sent to you.    2023 and 2024 Meetings  December 19 January 16: \"Medication Management and Bariatric Surgery\", Mariam Pizarro, PharmD, Pharmacy Resident at [facility]  February 20: \"A Bariatric Surgery Patient's Perspective\", ARMANDO Mcdaniel, [facility], Behavioral Health Clinician at [facility]  March 19  April 16  May 21  Lynn 18: \"Nutritional Labeling\", Dietitian speaker to be announced.  November 19: \"Holiday Eating\", Dietitian speaker to be announced.    [facility] Post-Operative Bariatric Surgery Support Group  This is a support group for [facility] bariatric patients (and those external to [facility] " [facility]) who have had bariatric surgery and are at least 3 months post-surgery.  WHEN: This support group meets the 4th Wednesday of the month from 11:00 AM - 12:00 PM virtually using Microsoft Teams.   FACILITATOR: Led by Certified Bariatric Nurse, Anh Jeong, RN.   TO REGISTER: Please send an email to Anh at [email or web address] if you would like an invitation to the group.  Prior to the meeting, a link with directions on how to join the meeting will be sent to you.    2023 and 2024 Meetings  December 27  January 24  February 28  March 27  April 24  May 22  Lynn 26    [facility] Healthy Lifestyle Group?  This is a 60 minute virtual coaching group for those who want to lead a healthier lifestyle. Come together to set goals and overcome barriers in a supportive group environment. We will address the four pillars of health: nutrition, exercise, sleep and emotional well-being.  This group is highly recommended for those who are participating in the 24 week Healthy Lifestyle Plan and our Health Coaching sessions.  WHEN: This group meets the 1st Friday of the month, 12:30 PM - 1:30 PM online, via a zoom meeting.    FACILITATOR: Led by National Board Certified Health and , Anh Black, [facility].   TO REGISTER: Please call the Call Center at [phone] to register.  You will get an appointment to attend in [facility]. Fifteen minutes prior to the meeting, complete the e-check in and you will get the link to join the meeting.    There is no charge to attend this group and space is limited.     2023 and 2024 Meetings  December 1: \"Let's Talk\" (guided discussion on our wins and challenges)  January 5: \"New Years Vision: Manifest your Best 2024!\" (guided imagery,  journaling and discussion)  February 2: \"Let's Talk\"  March 1: \"10 Percent Happier\" by Tereso Armendariz (Book Bites - a guided discussion on the nuggets of wisdom from favorite wellness books, no need to read " "the book but highly encouraged)  April 5: \"Let's Talk\"  May 3: \"Essentialism: The Disciplined Pursuit of Less\" by Abdirahman Mcduffie (Book Bites discussion)  June 7: \"Let's Talk\"  July 5: NO MEETING, off for the 4th of July Holiday  August 2: \"The Blue Zones, Secrets for Living a Longer Life\" by Tereso Carrasquillo (Book Bites discussion)                    "

## 2023-12-14 NOTE — LETTER
"12/14/2023       RE: Raffy Castelan  [address] [address]     Dear Colleague,    Thank you for referring your patient, Raffy Castelan, to the [facility] [place] [facility]. Please see a copy of my visit note below.    Video-Visit Details    Type of service:  Video Visit    Video Start Time: 12:34 PM  Video End Time: 12:48 PM    Originating Location (pt. Location): Home    Distant Location (provider location):  Offsite (providers home)     Platform used for Video Visit: Well      Nutrition Reassessment  Reason For Visit:  Raffy Castelan is a 48 year old female presenting today for nutrition follow-up, 11 month s/p sleeve gastrectomy with Dr. Ritter on 1/10/23.    Anthropometrics:  Initial Consult Weight: 290 lbs  Day of Surgery Weight (1/10/23): 290 lbs  Current Weight:   Estimated body mass index is 38.11 kg/m  as calculated from the following:    Height as of this encounter: 1.651 m (5' 5\").    Weight as of this encounter: 103.9 kg (229 lb). -4 lbs in past month.   Weight loss: -61 lbs from initial consult; -61 lbs from day of surgery    Pt goal to get to 200 lbs    Current Vitamins/Minerals: MVI/minerals BID (Bariatric Fusion with Iron 45 mg), nutrafol (for hair), and calcium citrate 500 mg BID.     Nutrition Related Labs:  Hgb (10.5->11) improving and ferritin WNL  B12 1442 (H) on 3/30/23    Nutrition History:  Doing well with eating protein foods first at meals. When home eating q2 hours, even if not hungry. Working hard on mindfulness before eating. Today she had tea instead of snack and this worked well.  When traveling does well with only eating meals. Notes she is losing 1-2 lbs when traveling. Bringing protein bar when traveling.     Today - Traveled a couple times over the past month. Loses more weight when she is traveling, less snacking. Continues to work on moderating snacking at home. Continuing " to track protein intake, consuming 60 gm protein. Focused on adding in more exercise in upcoming month.     Diet Recall:  B: 10:30 am Protein bar   L: 12 chicken   D: 5-6 salad  Snack: protein bar or nuts, occ a cookie   Beverages: water (50 oz/day)    Exercise: Planning to join a gym for winter months.     Progress with Previous Goals:  Eat 3 high-protein meals. 1 high-protein snack if going longer than 4-6 hours between meals. - Met, continues   Re-examine the home. Keep cookies, crackers, sweets out of the house. - Improving   Winter plan for walking - joining the gym. - Not met     Nutrition Prescription:  Grams Protein: 60 (minimum)   Amount of Fluid: 48-64+ oz    Nutrition Diagnosis  Previous/Current: Obesity r/t long history of positive energy balance aeb BMI >30. -improving/ongoing    Intervention  Materials/Education provided, reviewed bariatric regular consistency diet, protein intake, fluid intake, eating pace, chewing foods well, portion control, sugar/fat intake, recommended vitamin/mineral supplements. Encouraged increased exercise.  Patient demonstrates understanding.     Expected Engagement: good     Goals:  Eat 3 high-protein meals. 1 high-protein snack if going longer than 4-6 hours between meals.  Re-examine the home. Keep cookies, crackers, sweets out of sight.   Winter plan for walking - joining the gym. Walking 3 days per week.   Take a third calcium chew if not eating cheese/protein bar/shake with 200+ mg calcium.     Keeping Up Your Diet after Weight Loss Surgery  [email or web address]    Exercises after Weight Loss Surgery (strengthening, when no weight lifting restrictions)  [email or web address]           Follow-Up: 2/15/23    Time spent with patient: 14 minutes.  Mahnaz Skaggs RD LD

## 2023-12-14 NOTE — NURSING NOTE
Is the patient currently in the state of [place]? YES    Visit mode:VIDEO    If the visit is dropped, the patient can be reconnected by: VIDEO VISIT: Text to cell phone:   Telephone Information:   Mobile [phone]       Will anyone else be joining the visit? NO  (If patient encounters technical issues they should call [phone])    How would you like to obtain your AVS? MyChart    Are changes needed to the allergy or medication list? N/A    Reason for visit: LEILANI MEJIA

## 2023-12-22 ENCOUNTER — PATIENT OUTREACH (OUTPATIENT)
Dept: CARE COORDINATION | Facility: CLINIC | Age: 48
End: 2023-12-22
Payer: COMMERCIAL

## 2024-01-01 ENCOUNTER — NURSE TRIAGE (OUTPATIENT)
Dept: NURSING | Facility: CLINIC | Age: 49
End: 2024-01-01
Payer: COMMERCIAL

## 2024-01-01 NOTE — TELEPHONE ENCOUNTER
Nurse Triage SBAR    Is this a 2nd Level Triage? NO    Situation: Eye swelling under right eye    Background: Woke up this morning with swelling under right eye. Denies any injury or getting a chemical in it. Did have a aesthetic procedure 4 weeks ago with injections near her hairline, but doesn't think they're related.    Assessment: Mild yet noticeable swelling under right eye. Denies eye pain, itching, fever, or redness.     Protocol Recommended Disposition:   See PCP within 24 hours    Recommendation: Recommend using cold compresses today and calling clinic in the morning to make an appointment. If no appointments available can try UC. If any new or worsening symptoms, should call back. Pt verbalized understanding.    Teodora Turk, RN, BSN  [facility]   Triage Nurse Advisor     Reason for Disposition   MODERATE-SEVERE eyelid swelling on one side  (Exception: Due to a mosquito bite.)    Additional Information   Negative: Unresponsive, passed out or very weak   Negative: Difficulty breathing or wheezing   Negative: [1] Difficulty swallowing or slurred speech AND [2] sudden onset   Negative: Sounds like a life-threatening emergency to the triager   Negative: Chemical got in the eye   Negative: Recent injury to the eye   Negative: Entire face is swollen   Negative: Sacs of clear fluid (blisters) on whites of eyes (allergic cysts)   Negative: Contact with pollen, other allergic substance or eyedrops   Negative: [1] Bee sting AND [2] within last 24 hours   Negative: Insect bite suspected   Negative: Sty suspected (small, painful red lump present on lid margin   Negative: Yellow or green discharge (pus) in the eye   Negative: Redness of white area (sclera) of eye(s)   Negative: [1] SEVERE eyelid swelling (i.e., shut or almost) AND [2] fever   Negative: [1] Eyelid (outer) is very red AND [2] fever   Negative: Patient sounds very sick or weak to the triager   Negative: [1] Pregnant 20 or more weeks AND [2] sudden  "weight gain (e.g., more than 3 lbs or 1.4 kg in one week)   Negative: [1] Postpartum (from 0 to 6 weeks after delivery) AND [2] sudden weight gain (e.g., more than 3 lbs or 1.4 kg in one week)   Negative: [1] SEVERE eyelid swelling (i.e., shut or almost) AND [2] involves both eyes (Exception: Itchy eyes, which  are probably an allergic reaction.)   Negative: [1] SEVERE eyelid swelling AND [2] Taking an ACE Inhibitor medicine (e.g., benazepril / LOTENSIN, captopril / CAPOTEN, enalapril / VASOTEC, lisinopril / ZESTRIL)   Negative: [1] SEVERE eyelid swelling on one side AND [2] red and painful (or tender to touch)   Negative: [1] SEVERE eyelid swelling on one side AND [2] sinus pain or pressure   Negative: Fever   Negative: [1] Painful rash AND [2] multiple small blisters grouped together (i.e., dermatomal distribution or \"band\" or \"stripe\")   Negative: [1] SEVERE eyelid swelling (i.e., shut or almost) AND [2] involves both eyes AND [3] itchy    Protocols used: Eye - Swelling-A-AH    "

## 2024-01-02 ENCOUNTER — OFFICE VISIT (OUTPATIENT)
Dept: URGENT CARE | Facility: URGENT CARE | Age: 49
End: 2024-01-02
Payer: COMMERCIAL

## 2024-01-02 VITALS
HEART RATE: 89 BPM | SYSTOLIC BLOOD PRESSURE: 139 MMHG | OXYGEN SATURATION: 97 % | BODY MASS INDEX: 39.34 KG/M2 | DIASTOLIC BLOOD PRESSURE: 80 MMHG | RESPIRATION RATE: 16 BRPM | TEMPERATURE: 97.8 F | WEIGHT: 236.4 LBS

## 2024-01-02 DIAGNOSIS — L03.213 PRESEPTAL CELLULITIS OF RIGHT LOWER EYELID: Primary | ICD-10-CM

## 2024-01-02 PROCEDURE — 99213 OFFICE O/P EST LOW 20 MIN: CPT | Performed by: PHYSICIAN ASSISTANT

## 2024-01-02 ASSESSMENT — ENCOUNTER SYMPTOMS
FATIGUE: 0
CARDIOVASCULAR NEGATIVE: 1
CHILLS: 0
SHORTNESS OF BREATH: 0
PHOTOPHOBIA: 0
EYE DISCHARGE: 0
RESPIRATORY NEGATIVE: 1
FEVER: 0
SORE THROAT: 0
PALPITATIONS: 0
EYE REDNESS: 0
SINUS PAIN: 0
WOUND: 0
RHINORRHEA: 0
WHEEZING: 0
CONSTITUTIONAL NEGATIVE: 1
SINUS PRESSURE: 0
EYE ITCHING: 0
EYE PAIN: 0
GASTROINTESTINAL NEGATIVE: 1
COUGH: 0
COLOR CHANGE: 0

## 2024-01-02 ASSESSMENT — VISUAL ACUITY: OU: 1

## 2024-01-02 ASSESSMENT — PAIN SCALES - GENERAL: PAINLEVEL: NO PAIN (0)

## 2024-01-02 NOTE — PROGRESS NOTES
Josephine Akbar is a 48 year old, presenting for the following health issues:  Eye Swelling (Patient reports right eye swelling beginning yesterday; patient tried OTC allergy eye drops that helped temporarily helped eye )    HPI   Eyelid Problem  Onset/Duration: 2days  Description:   Location: Right lower eye lid  Pain: YES- around her eye  Redness: No  Accompanying Signs & Symptoms:  Discharge/mattering: No  Swelling: YES- around her R eye  Visual changes: No  Fever: No  Nasal Congestion: No  Bothered by bright lights: No  History:  Trauma: No  Foreign body exposure: No  Wearing contacts: No  Precipitating or alleviating factors: She was sick with URI symptoms a few weeks back.  She did have cosmetic injection a month ago over on the R side.  Therapies tried and outcome: allergy eye drops and cold compresses with minimal relief    Patient Active Problem List   Diagnosis    Chronic constipation    CARDIOVASCULAR SCREENING; LDL GOAL LESS THAN 160    Family history of breast cancer    GERD (gastroesophageal reflux disease)    Class 3 severe obesity due to excess calories with serious comorbidity and body mass index (BMI) of 45.0 to 49.9 in adult (H)    Vitamin D deficiency    Psoriasis    Bariatric surgery status    H/O laparoscopic adjustable gastric banding    Diaphragmatic hernia    Hypertension goal BP (blood pressure) < 140/90    Anxiety    Impingement syndrome, shoulder, left    Spontaneous vaginal delivery    Advanced maternal age during pregnancy    37+ weeks gestation completed    Bunion, left    Tailor's bunion of both feet    Morbid obesity (H)    Iron deficiency anemia    S/P laparoscopic sleeve gastrectomy    Mass of right finger     Current Outpatient Medications   Medication    acetaminophen (TYLENOL) 325 MG tablet    albuterol (PROAIR HFA/PROVENTIL HFA/VENTOLIN HFA) 108 (90 Base) MCG/ACT inhaler    amoxicillin-clavulanate (AUGMENTIN) 875-125 MG tablet    Calcium Citrate-Vitamin D (CALCIUM CITRATE  Patient said to disregard the message. He was able to get it figured out from his PCP. CHEWY BITE) 500-12.5 MG-MCG CHEW    clobetasol propionate (OLUX) 0.05 % external foam    losartan-hydrochlorothiazide (HYZAAR) 50-12.5 MG tablet    multivitamin w/minerals (MULTI-VITAMIN) tablet    omeprazole (PRILOSEC) 20 MG DR capsule    senna (SENOKOT) 8.6 MG tablet     No current facility-administered medications for this visit.        Allergies   Allergen Reactions    Lisinopril Cough     Review of Systems   Constitutional: Negative.  Negative for chills, fatigue and fever.   HENT:  Negative for congestion, ear discharge, ear pain, hearing loss, rhinorrhea, sinus pressure, sinus pain and sore throat.    Eyes:  Negative for photophobia, pain, discharge, redness, itching and visual disturbance.   Respiratory: Negative.  Negative for cough, shortness of breath and wheezing.    Cardiovascular: Negative.  Negative for chest pain, palpitations and peripheral edema.   Gastrointestinal: Negative.    Skin: Negative.  Negative for color change, pallor, rash and wound.   Allergic/Immunologic: Negative for environmental allergies.   All other systems reviewed and are negative.           Objective    /80 (BP Location: Left arm, Patient Position: Sitting, Cuff Size: Adult Large)   Pulse 89   Temp 97.8  F (36.6  C) (Tympanic)   Resp 16   Wt 107.2 kg (236 lb 6.4 oz)   LMP 10/14/2023 (Approximate)   SpO2 97%   BMI 39.34 kg/m    Body mass index is 39.34 kg/m .  Physical Exam  Vitals and nursing note reviewed.   Constitutional:       General: She is not in acute distress.     Appearance: Normal appearance. She is well-developed and normal weight. She is not ill-appearing.   HENT:      Head: Normocephalic and atraumatic. Right periorbital erythema (mild, lower eyelid) present. No left periorbital erythema.      Jaw: There is normal jaw occlusion.      Comments: TMs are intact without any erythema or bulging bilaterally.  Airway is patent.     Nose: Nose normal.      Mouth/Throat:      Lips: Pink.      Mouth: Mucous  membranes are moist.      Pharynx: Oropharynx is clear. Uvula midline. No pharyngeal swelling, oropharyngeal exudate or posterior oropharyngeal erythema.      Tonsils: No tonsillar exudate.   Eyes:      General: Vision grossly intact. Gaze aligned appropriately. No scleral icterus.        Right eye: No foreign body or discharge.         Left eye: No foreign body or discharge.      Extraocular Movements: Extraocular movements intact.      Conjunctiva/sclera: Conjunctivae normal.      Left eye: Left conjunctiva is not injected.      Pupils: Pupils are equal, round, and reactive to light.   Neck:      Thyroid: No thyromegaly.   Cardiovascular:      Rate and Rhythm: Normal rate and regular rhythm.      Pulses: Normal pulses.      Heart sounds: Normal heart sounds, S1 normal and S2 normal. No murmur heard.     No friction rub. No gallop.   Pulmonary:      Effort: Pulmonary effort is normal. No accessory muscle usage, respiratory distress or retractions.      Breath sounds: Normal breath sounds and air entry. No stridor. No decreased breath sounds, wheezing, rhonchi or rales.   Musculoskeletal:      Cervical back: Normal range of motion and neck supple.   Lymphadenopathy:      Cervical: No cervical adenopathy.   Skin:     General: Skin is warm and dry.      Nails: There is no clubbing.   Neurological:      Mental Status: She is alert and oriented to person, place, and time.   Psychiatric:         Mood and Affect: Mood normal.         Behavior: Behavior normal.         Thought Content: Thought content normal.         Judgment: Judgment normal.          Assessment/Plan:  Preseptal cellulitis of right lower eyelid: Will treat with [id number] and take with food/probiotics to minimize GI upset.  Recommend tylenol/ibuprofen prn pain/fever and cool compresses.   Rest, fluids, chicken soup.  Recheck in clinic if symptoms worsen or if symptoms do not improve.  To the ER if worsening redness, swelling, fevers or pain.  -      amoxicillin-clavulanate (AUGMENTIN) 875-125 MG tablet; Take 1 tablet by mouth 2 times daily for 10 days        Teri See OMAIRA Mike

## 2024-01-30 ENCOUNTER — VIRTUAL VISIT (OUTPATIENT)
Dept: ENDOCRINOLOGY | Facility: CLINIC | Age: 49
End: 2024-01-30
Payer: COMMERCIAL

## 2024-01-30 VITALS — HEIGHT: 65 IN | BODY MASS INDEX: 38.15 KG/M2 | WEIGHT: 229 LBS

## 2024-01-30 DIAGNOSIS — E66.813 CLASS 3 SEVERE OBESITY DUE TO EXCESS CALORIES WITH SERIOUS COMORBIDITY AND BODY MASS INDEX (BMI) OF 45.0 TO 49.9 IN ADULT (H): ICD-10-CM

## 2024-01-30 DIAGNOSIS — E66.01 CLASS 3 SEVERE OBESITY DUE TO EXCESS CALORIES WITH SERIOUS COMORBIDITY AND BODY MASS INDEX (BMI) OF 45.0 TO 49.9 IN ADULT (H): ICD-10-CM

## 2024-01-30 DIAGNOSIS — Z98.84 S/P LAPAROSCOPIC SLEEVE GASTRECTOMY: Primary | ICD-10-CM

## 2024-01-30 PROCEDURE — 99214 OFFICE O/P EST MOD 30 MIN: CPT | Mod: 95

## 2024-01-30 NOTE — LETTER
2024       RE: Raffy Castelan  [address]  [facility] [address]     Dear Colleague,    Thank you for referring your patient, Raffy Castelan, to the [facility] [place] at [facility]. Please see a copy of my visit note below.    Virtual Visit Details    Type of service:  Video Visit     Originating Location (pt. Location): Home    Distant Location (provider location):  Off-site  Platform used for Video Visit: [facility] Bariatric Surgery Note    RE: Raffy Castelan  MR#: [id number]  : 1975  VISIT DATE: 2024      Dear Clinic - [facility],    I had the pleasure of seeing your patient, Raffy Castelan, in my post-bariatric surgery assessment clinic.    Assessment & Plan  Problem List Items Addressed This Visit       Class 3 severe obesity due to excess calories with serious comorbidity and body mass index (BMI) of 45.0 to 49.9 in adult (H)    S/P laparoscopic sleeve gastrectomy - Primary      Stop Omeprazole - take 1 tablet every other day for 2 weeks, if no break through symptoms decrease to 1 tablet every 3 days for 2 weeks, if no break through symptoms then stop. Discussed that she may need to be on PPI long term. Can also consider transitioning to an H2 blocker in the future.   Labs ordered to be collected   Follow up with Melinda Sanford with 1 year. [facility] check in in 6 months.     30 minutes spent by me on the date of the encounter doing chart review, history and exam, documentation and further activities per the note    CHIEF COMPLAINT: Post-bariatric surgery follow-up. 1 years s/p gastric sleeve with Dr. Ritter.    HISTORY OF PRESENT ILLNESS:      10/27/2023    10:31 AM   Questions Regarding Prior Weight Loss Surgery Reviewed With Patient   I had the following weight loss procedure Sleeve Gastrectomy   What year was your surgery?    How has your weight changed since your last  "visit? I have lost weight   Do you currently have any of the following Heartburn, acid reflux, or GERD (acid reflux disease)?    Hypertension (high blood pressure)?    Hyperlipidemia (high cholesterol, triglycerides)?   Do you have any concerns today? no     Is feeling really positive with results. Has been weight stable over the past 2 months. Goal to lose another 20lbs.     Recent diet changes: Has been counting calories - feels like 1200 is not enough, will lose when at 1400. Was harder to count through the holidays. Has been refocusing on calories. Portion sizes around 1 cup. Has been really focusing on \"not eating around the sleeve\". Tolerating normal diet well. No hunger concerns when meets calorie goals. Wants to focus on fiber/vegetables more     -Protein? Has been focusing of meeting 60g daily.   -Water? 48-64oz daily. Also drinking tea.     Recent exercise/activity changes: Has started going to the gym - has been walking on the treadmill 20-30min. Going daily. Goal is to get into weight training.     Recent stressors: Has not been traveling as much with work. Work has been a little stressful, but handling well.     Recent sleep changes: has been sleeping better     Vitamins/Labs: Labs need to be collected. Started Nutrofol, and felt like it did help with stopping the hair loss. Now just taking hair/skin/nails. MV, calcium.     No nausea, dysphagia, diarrhea, dizziness, or abdominal pain.     Constipation - chronic previous to surgery     Vomiting - very rarely, when over eats.     GERD- well controlled on omeprazole 20mg. No breakthrough symptoms. Would like to try and get off of it again.     Weight History:      10/27/2023    10:31 AM   --   What is your highest lifetime weight? 300   What is your lowest weight since surgery? (In pounds) 233     Initial Weight (lbs): 290 lbs  Weight: 103.9 kg (229 lb)  Last Visits Weight: 105.7 kg (233 lb)  Cumulative weight loss (lbs): 61  Weight Loss Percentage: " 21.03%    Wt Readings from Last 5 Encounters:   01/30/24 103.9 kg (229 lb)   01/02/24 107.2 kg (236 lb 6.4 oz)   12/14/23 103.9 kg (229 lb)   11/24/23 109.8 kg (242 lb)   11/02/23 105.7 kg (233 lb)             10/27/2023    10:31 AM   Questions Regarding Co-Morbidities and Health Concerns Reviewed With Patient   Pre-diabetes Improved   Diabetes II Never   High Blood Pressure Improved   High cholesterol Improved   Heartburn/Reflux Worsened   Sleep apnea Never   PCOS Never   Back pain Improved   Joint pain Improved   Lower leg swelling Gone away           10/27/2023    10:31 AM   Eating Habits   How many meals do you eat per day? 3   Do you snack between meals? Yes   How much food are you eating at each meal? 1/2 cup to 1 cup   Are you able to separate your meals and liquids by at least 30 minutes? Sometimes   Are you able to avoid liquid calories? Yes           10/27/2023    10:31 AM   Exercise Questions Reviewed With Patient   How often do you exercise? Less than 1 time per week   What is the duration of your exercise (in minutes)? 20 Minutes   What types of exercise do you do? walking   What keeps you from being more active? I should be more active but I just have not gotten around to it       Social History:      10/27/2023    10:31 AM   --   Are you smoking? No   Are you drinking alcohol? No       Medications:  Current Outpatient Medications   Medication    albuterol (PROAIR HFA/PROVENTIL HFA/VENTOLIN HFA) 108 (90 Base) MCG/ACT inhaler    Calcium Citrate-Vitamin D (CALCIUM CITRATE CHEWY BITE) 500-12.5 MG-MCG CHEW    clobetasol propionate (OLUX) 0.05 % external foam    losartan-hydrochlorothiazide (HYZAAR) 50-12.5 MG tablet    multivitamin w/minerals (MULTI-VITAMIN) tablet    omeprazole (PRILOSEC) 20 MG DR capsule    senna (SENOKOT) 8.6 MG tablet    acetaminophen (TYLENOL) 325 MG tablet     No current facility-administered medications for this visit.         10/27/2023    10:31 AM   --   Do you avoid NSAIDs such  "as (Ibuprofen, Aleve, Naproxen, Advil)? Yes       ROS:  GI:       10/27/2023    10:31 AM   --   Vomiting Yes   Diarrhea No   Constipation Yes   Swallowing trouble No   Abdominal pain Yes   Heartburn No     Skin:       10/27/2023    10:31 AM   BAR RBS ROS - SKIN   Rash in skin folds No     Psych:       10/27/2023    10:31 AM   --   Depression No   Anxiety No     Female Only:       10/27/2023    10:31 AM   BAR RBS ROS -    Female only Excessive menstrual bleeding    Regular menstrual cycles   Stress urinary incontinence No         Objective   Ht 1.651 m (5' 5\")   Wt 103.9 kg (229 lb)   BMI 38.11 kg/m           Vitals:  No vitals were obtained today due to virtual visit.    Physical Exam   GENERAL: alert and no distress  EYES: Eyes grossly normal to inspection.  No discharge or erythema, or obvious scleral/conjunctival abnormalities.  RESP: No audible wheeze, cough, or visible cyanosis.    SKIN: Visible skin clear. No significant rash, abnormal pigmentation or lesions.  NEURO: Cranial nerves grossly intact.  Mentation and speech appropriate for age.  PSYCH: Appropriate affect, tone, and pace of words        Sincerely,    Melinda Marshall PA-C  "

## 2024-01-30 NOTE — PATIENT INSTRUCTIONS
"Dayron Akbar,   Thank you for allowing us the privilege of caring for you. We hope we provided you with the excellent service you deserve.   Please let us know if there is anything else we can do for you so that we can be sure you are completely satisfied with your care experience.    To ensure the quality of our services you may be receiving a patient satisfaction survey from an independent patient satisfaction monitoring company.    The greatest compliment you can give is a \"Likely to Recommend\"    Your visit was with Melinda Marshall PA-C today.    Instructions per today's visit:     Stop Omeprazole - take 1 tablet every other day for 2 weeks, if no break through symptoms decrease to 1 tablet every 3 days for 2 weeks, if no break through symptoms then stop   Labs ordered to be collected   Follow up with Melinda Sanford with 1 year. [employer] check in in 6 months.     ___________________________________________________________________________  Important contact and scheduling information:  Please call our contact center at [phone] to schedule your next appointments.  For any nursing questions or concerns call Abiola Damian LPN at [phone] or Johanne Aguilar RN at [phone]  Please call during clinic hours Monday through Friday 8:00a - 4:00p if you have questions or you can contact us via [employer] at anytime and we will reply during clinic hours.    Lab results will be communicated through My Chart or letter (if My Chart not used). Please call the clinic if you have not received communication after 1 week or if you have any questions.?  Clinic Fax: [phone]  __________________________________________________________________________    If labs were ordered today:    Please make an appointment to have them drawn at your convenience.     To schedule the Lab Appointment using [employer]:  Select \"Schedule an Appointment\"  Select \"Lab Only\"  For \"A couple of questions\", select \"Other\"  For \"Which locations work for you?, " select the location and set up the appointment    To schedule by phone call [phone] to schedule a lab only appointment at any [facility] lab.  ___________________________________________________________________________  Work with A Health !  Virtual Sessions are Available through [facility] Management Clinics    To learn more, call to schedule a free, Health  Q&A appointment: [phone]     What is Health Coaching?  Do you know what you are supposed to do, but you just aren't doing it?  Then, HEALTH COACHING may help you!   Get unstuck and move forward with the support of a professionally trained [employer]HWC (National Board-Certified Health and ) who uses evidence-based approaches to help you move forward with healthy lifestyle changes in the areas of weight loss, stress management and overall well-being.    Health Coaches help you identify goals that will work best for you. Health Coaches provide support and encouragement with overcoming barriers and help you to find inspiration and motivation to lead a healthy lifestyle.    Option one:  Health Coaching 3-Pack; Three, 30-minute Health Coaching Visits, for $99  Visits are done virtually (phone or video)  This is a self pay service; we do not accept insurance for toby coaching.    Option two:   The 24 week Plan; 11 Health Coaching Visits, and a 7 months subscription to [employer]-- on-demand fitness, nutrition and mindfulness classes, for $499 (employee discounts may be available). Participants will also meet regularly with a weight management Medical Provider and a Registered/Licensed Dietician.  This is a self-pay service; we do not accept insurance for health coaching.    To Schedule a free Health  Q&A appointment to learn more,  call [phone].  ____________________________________________________________________   Health [facility]  Healthy Lifestyle Group    Healthy  "Lifestyle Group  This is a 60 minute virtual coaching group for those who want to lead a healthier lifestyle. Come together to set goals and overcome barriers in a supportive group environment. We will address the four pillars of health--nutrition, exercise, sleep and emotional well-being.  This group is highly recommended for those who are participating in the 24 week Healthy Lifestyle Plan and our Health Coaching sessions.    WHEN: This group meets the first Friday of the month, 12:30 PM - 1:30 PM online, via a zoom meeting.      FACILITATOR: Led by National Board Certified Health and , Anh Black, [facility].    TO REGISTER: Please call the Call Center at [phone] to register. You will get an appointment to attend in [employer]. Fifteen minutes prior to the meeting, complete the e-check in and you will get the link to join the meeting.  There is no charge to attend this group and space is limited.      2023 and 2024 Meeting Topics and Dates:    November 3: Introduction to Mindfulness (Learn simple and effective mindfulness practices and how it can benefit you)    December 8: Let's Talk (guided discussion on our wins and challenges)    January 5: New Years Vision: Manifest your Best 2024! (Guided imagery,  journaling and discussion)    February 2: Let's Talk    March 1: 10 Percent Happier by Tereso Armendariz (Book Bites; a guided discussion on the nuggets of wisdom from favorite wellness books; no need to read the book but highly encouraged)    April 5: Let's Talk    May 3: \"Essentialism; The Disciplined Pursuit of Less by Abdirahman Mcclain (book bites discussion)    June 7: Let's Talk    July 5: NO MEETING, off for the 4th of July Holiday    August 2: The Blue Zones, Secrets for Living a Longer Life by Tereso Carrasquillo (book bites discussion)      If you would like bariatric surgery specific support group info please let your care team know.         Thank you,   [facility] " Team

## 2024-01-30 NOTE — PROGRESS NOTES
Virtual Visit Details    Type of service:  Video Visit     Originating Location (pt. Location): Home    Distant Location (provider location):  Off-site  Platform used for Video Visit: [facility] Bariatric Surgery Note    RE: Raffy Castelan  MR#: [id number]  : 1975  VISIT DATE: 2024      Dear Clinic - [facility],    I had the pleasure of seeing your patient, Raffy Castelan, in my post-bariatric surgery assessment clinic.    Assessment & Plan   Problem List Items Addressed This Visit       Class 3 severe obesity due to excess calories with serious comorbidity and body mass index (BMI) of 45.0 to 49.9 in adult (H)    S/P laparoscopic sleeve gastrectomy - Primary      Stop Omeprazole - take 1 tablet every other day for 2 weeks, if no break through symptoms decrease to 1 tablet every 3 days for 2 weeks, if no break through symptoms then stop. Discussed that she may need to be on PPI long term. Can also consider transitioning to an H2 blocker in the future.   Labs ordered to be collected   Follow up with Melinda Sanford with 1 year. Chase check in in 6 months.     30 minutes spent by me on the date of the encounter doing chart review, history and exam, documentation and further activities per the note    CHIEF COMPLAINT: Post-bariatric surgery follow-up. 1 years s/p gastric sleeve with Dr. Ritter.    HISTORY OF PRESENT ILLNESS:      10/27/2023    10:31 AM   Questions Regarding Prior Weight Loss Surgery Reviewed With Patient   I had the following weight loss procedure Sleeve Gastrectomy   What year was your surgery?    How has your weight changed since your last visit? I have lost weight   Do you currently have any of the following Heartburn, acid reflux, or GERD (acid reflux disease)?    Hypertension (high blood pressure)?    Hyperlipidemia (high cholesterol, triglycerides)?   Do you have any concerns today? no     Is feeling really positive with results. Has been weight stable  "over the past 2 months. Goal to lose another 20lbs.     Recent diet changes: Has been counting calories - feels like 1200 is not enough, will lose when at 1400. Was harder to count through the holidays. Has been refocusing on calories. Portion sizes around 1 cup. Has been really focusing on \"not eating around the sleeve\". Tolerating normal diet well. No hunger concerns when meets calorie goals. Wants to focus on fiber/vegetables more     -Protein? Has been focusing of meeting 60g daily.   -Water? 48-64oz daily. Also drinking tea.     Recent exercise/activity changes: Has started going to the gym - has been walking on the treadmill 20-30min. Going daily. Goal is to get into weight training.     Recent stressors: Has not been traveling as much with work. Work has been a little stressful, but handling well.     Recent sleep changes: has been sleeping better     Vitamins/Labs: Labs need to be collected. Started Nutrofol, and felt like it did help with stopping the hair loss. Now just taking hair/skin/nails. MV, calcium.     No nausea, dysphagia, diarrhea, dizziness, or abdominal pain.     Constipation - chronic previous to surgery     Vomiting - very rarely, when over eats.     GERD- well controlled on omeprazole 20mg. No breakthrough symptoms. Would like to try and get off of it again.     Weight History:      10/27/2023    10:31 AM   --   What is your highest lifetime weight? 300   What is your lowest weight since surgery? (In pounds) 233     Initial Weight (lbs): 290 lbs  Weight: 103.9 kg (229 lb)  Last Visits Weight: 105.7 kg (233 lb)  Cumulative weight loss (lbs): 61  Weight Loss Percentage: 21.03%    Wt Readings from Last 5 Encounters:   01/30/24 103.9 kg (229 lb)   01/02/24 107.2 kg (236 lb 6.4 oz)   12/14/23 103.9 kg (229 lb)   11/24/23 109.8 kg (242 lb)   11/02/23 105.7 kg (233 lb)             10/27/2023    10:31 AM   Questions Regarding Co-Morbidities and Health Concerns Reviewed With Patient   Pre-diabetes " Improved   Diabetes II Never   High Blood Pressure Improved   High cholesterol Improved   Heartburn/Reflux Worsened   Sleep apnea Never   PCOS Never   Back pain Improved   Joint pain Improved   Lower leg swelling Gone away           10/27/2023    10:31 AM   Eating Habits   How many meals do you eat per day? 3   Do you snack between meals? Yes   How much food are you eating at each meal? 1/2 cup to 1 cup   Are you able to separate your meals and liquids by at least 30 minutes? Sometimes   Are you able to avoid liquid calories? Yes           10/27/2023    10:31 AM   Exercise Questions Reviewed With Patient   How often do you exercise? Less than 1 time per week   What is the duration of your exercise (in minutes)? 20 Minutes   What types of exercise do you do? walking   What keeps you from being more active? I should be more active but I just have not gotten around to it       Social History:      10/27/2023    10:31 AM   --   Are you smoking? No   Are you drinking alcohol? No       Medications:  Current Outpatient Medications   Medication    albuterol (PROAIR HFA/PROVENTIL HFA/VENTOLIN HFA) 108 (90 Base) MCG/ACT inhaler    Calcium Citrate-Vitamin D (CALCIUM CITRATE CHEWY BITE) 500-12.5 MG-MCG CHEW    clobetasol propionate (OLUX) 0.05 % external foam    losartan-hydrochlorothiazide (HYZAAR) 50-12.5 MG tablet    multivitamin w/minerals (MULTI-VITAMIN) tablet    omeprazole (PRILOSEC) 20 MG DR capsule    senna (SENOKOT) 8.6 MG tablet    acetaminophen (TYLENOL) 325 MG tablet     No current facility-administered medications for this visit.         10/27/2023    10:31 AM   --   Do you avoid NSAIDs such as (Ibuprofen, Aleve, Naproxen, Advil)? Yes       ROS:  GI:       10/27/2023    10:31 AM   --   Vomiting Yes   Diarrhea No   Constipation Yes   Swallowing trouble No   Abdominal pain Yes   Heartburn No     Skin:       10/27/2023    10:31 AM   BAR RBS ROS - SKIN   Rash in skin folds No     Psych:       10/27/2023    10:31 AM  "  --   Depression No   Anxiety No     Female Only:       10/27/2023    10:31 AM   BAR RBS ROS -    Female only Excessive menstrual bleeding    Regular menstrual cycles   Stress urinary incontinence No         Objective    Ht 1.651 m (5' 5\")   Wt 103.9 kg (229 lb)   BMI 38.11 kg/m           Vitals:  No vitals were obtained today due to virtual visit.    Physical Exam   GENERAL: alert and no distress  EYES: Eyes grossly normal to inspection.  No discharge or erythema, or obvious scleral/conjunctival abnormalities.  RESP: No audible wheeze, cough, or visible cyanosis.    SKIN: Visible skin clear. No significant rash, abnormal pigmentation or lesions.  NEURO: Cranial nerves grossly intact.  Mentation and speech appropriate for age.  PSYCH: Appropriate affect, tone, and pace of words        Sincerely,    Melinda Marshall PA-C    "

## 2024-01-30 NOTE — NURSING NOTE
Is the patient currently in the state of [place]? YES    Visit mode:VIDEO    If the visit is dropped, the patient can be reconnected by: TELEPHONE VISIT: Phone number: [phone]    Will anyone else be joining the visit? NO  (If patient encounters technical issues they should call [phone]    How would you like to obtain your AVS? MyChart    Are changes needed to the allergy or medication list? No    Reason for visit: EDUECK    Chani MEJIA

## 2024-02-04 ENCOUNTER — MYC REFILL (OUTPATIENT)
Dept: FAMILY MEDICINE | Facility: CLINIC | Age: 49
End: 2024-02-04
Payer: COMMERCIAL

## 2024-02-04 DIAGNOSIS — I10 HYPERTENSION GOAL BP (BLOOD PRESSURE) < 140/90: ICD-10-CM

## 2024-02-05 RX ORDER — LOSARTAN POTASSIUM AND HYDROCHLOROTHIAZIDE 12.5; 5 MG/1; MG/1
1 TABLET ORAL DAILY
Qty: 90 TABLET | Refills: 0 | OUTPATIENT
Start: 2024-02-05

## 2024-02-05 RX ORDER — LOSARTAN POTASSIUM AND HYDROCHLOROTHIAZIDE 12.5; 5 MG/1; MG/1
1 TABLET ORAL DAILY
Qty: 90 TABLET | Refills: 0 | Status: SHIPPED | OUTPATIENT
Start: 2024-02-05 | End: 2024-04-22

## 2024-02-23 ENCOUNTER — LAB (OUTPATIENT)
Dept: LAB | Facility: CLINIC | Age: 49
End: 2024-02-23
Payer: COMMERCIAL

## 2024-02-23 DIAGNOSIS — N64.52 NIPPLE DISCHARGE: ICD-10-CM

## 2024-02-23 DIAGNOSIS — Z98.84 S/P LAPAROSCOPIC SLEEVE GASTRECTOMY: ICD-10-CM

## 2024-02-23 LAB
ALBUMIN SERPL BCG-MCNC: 4.2 G/DL (ref 3.5–5.2)
ALP SERPL-CCNC: 60 U/L (ref 40–150)
ALT SERPL W P-5'-P-CCNC: 34 U/L (ref 0–50)
ANION GAP SERPL CALCULATED.3IONS-SCNC: 10 MMOL/L (ref 7–15)
AST SERPL W P-5'-P-CCNC: 21 U/L (ref 0–45)
BILIRUB SERPL-MCNC: 0.3 MG/DL
BUN SERPL-MCNC: 11.4 MG/DL (ref 6–20)
CALCIUM SERPL-MCNC: 10 MG/DL (ref 8.6–10)
CHLORIDE SERPL-SCNC: 99 MMOL/L (ref 98–107)
CHOLEST SERPL-MCNC: 236 MG/DL
CREAT SERPL-MCNC: 0.74 MG/DL (ref 0.51–0.95)
DEPRECATED HCO3 PLAS-SCNC: 29 MMOL/L (ref 22–29)
EGFRCR SERPLBLD CKD-EPI 2021: >90 ML/MIN/1.73M2
ERYTHROCYTE [DISTWIDTH] IN BLOOD BY AUTOMATED COUNT: 14.5 % (ref 10–15)
FASTING STATUS PATIENT QL REPORTED: YES
FERRITIN SERPL-MCNC: 46 NG/ML (ref 6–175)
GLUCOSE SERPL-MCNC: 92 MG/DL (ref 70–99)
HBA1C MFR BLD: 5.6 % (ref 0–5.6)
HCT VFR BLD AUTO: 33.9 % (ref 35–47)
HDLC SERPL-MCNC: 106 MG/DL
HGB BLD-MCNC: 10.6 G/DL (ref 11.7–15.7)
LDLC SERPL CALC-MCNC: 113 MG/DL
MCH RBC QN AUTO: 24.7 PG (ref 26.5–33)
MCHC RBC AUTO-ENTMCNC: 31.3 G/DL (ref 31.5–36.5)
MCV RBC AUTO: 79 FL (ref 78–100)
NONHDLC SERPL-MCNC: 130 MG/DL
PLATELET # BLD AUTO: 394 10E3/UL (ref 150–450)
POTASSIUM SERPL-SCNC: 4.4 MMOL/L (ref 3.4–5.3)
PROLACTIN SERPL 3RD IS-MCNC: 16 NG/ML (ref 5–23)
PROT SERPL-MCNC: 8.2 G/DL (ref 6.4–8.3)
PTH-INTACT SERPL-MCNC: 18 PG/ML (ref 15–65)
RBC # BLD AUTO: 4.29 10E6/UL (ref 3.8–5.2)
SODIUM SERPL-SCNC: 138 MMOL/L (ref 135–145)
TRIGL SERPL-MCNC: 83 MG/DL
VIT B12 SERPL-MCNC: 1573 PG/ML (ref 232–1245)
VIT D+METAB SERPL-MCNC: 57 NG/ML (ref 20–50)
WBC # BLD AUTO: 4.8 10E3/UL (ref 4–11)

## 2024-02-23 PROCEDURE — 82306 VITAMIN D 25 HYDROXY: CPT

## 2024-02-23 PROCEDURE — 80053 COMPREHEN METABOLIC PANEL: CPT

## 2024-02-23 PROCEDURE — 36415 COLL VENOUS BLD VENIPUNCTURE: CPT

## 2024-02-23 PROCEDURE — 83036 HEMOGLOBIN GLYCOSYLATED A1C: CPT

## 2024-02-23 PROCEDURE — 80061 LIPID PANEL: CPT

## 2024-02-23 PROCEDURE — 99000 SPECIMEN HANDLING OFFICE-LAB: CPT

## 2024-02-23 PROCEDURE — 82728 ASSAY OF FERRITIN: CPT

## 2024-02-23 PROCEDURE — 84590 ASSAY OF VITAMIN A: CPT | Mod: 90

## 2024-02-23 PROCEDURE — 85027 COMPLETE CBC AUTOMATED: CPT

## 2024-02-23 PROCEDURE — 83970 ASSAY OF PARATHORMONE: CPT

## 2024-02-23 PROCEDURE — 84146 ASSAY OF PROLACTIN: CPT

## 2024-02-23 PROCEDURE — 82607 VITAMIN B-12: CPT

## 2024-02-26 LAB
ANNOTATION COMMENT IMP: NORMAL
RETINYL PALMITATE SERPL-MCNC: 0.03 MG/L
VIT A SERPL-MCNC: 0.65 MG/L

## 2024-03-14 ENCOUNTER — PATIENT OUTREACH (OUTPATIENT)
Dept: CARE COORDINATION | Facility: CLINIC | Age: 49
End: 2024-03-14
Payer: COMMERCIAL

## 2024-03-28 ENCOUNTER — PATIENT OUTREACH (OUTPATIENT)
Dept: CARE COORDINATION | Facility: CLINIC | Age: 49
End: 2024-03-28
Payer: COMMERCIAL

## 2024-04-11 ENCOUNTER — MYC MEDICAL ADVICE (OUTPATIENT)
Dept: FAMILY MEDICINE | Facility: CLINIC | Age: 49
End: 2024-04-11
Payer: COMMERCIAL

## 2024-04-11 NOTE — TELEPHONE ENCOUNTER
RN replied to patient via Linkdex[employer]. See message for details.     Levi Palmer RN, BSN, PHN  [facility]: [place]

## 2024-04-11 NOTE — TELEPHONE ENCOUNTER
Per review of protocol, patient should be seen in 2 weeks.    See TE for triage    RN called and spoke with patient and assisted in scheduling.    RN reviewed in detail when patient should call clinic back or seek immediate medical attention.    Patient verbalized understanding.    Levi Palmer, RN, BSN, PHN  [facility]

## 2024-04-12 ENCOUNTER — NURSE TRIAGE (OUTPATIENT)
Dept: FAMILY MEDICINE | Facility: CLINIC | Age: 49
End: 2024-04-12
Payer: COMMERCIAL

## 2024-04-12 NOTE — TELEPHONE ENCOUNTER
Reason for Disposition   Age > 39 years with irregular or excessive bleeding    Additional Information   Negative: SEVERE vaginal bleeding (e.g., continuous red blood from vagina, or large blood clots) and very weak (can't stand)   Negative: Passed out (i.e., fainted, collapsed and was not responding)   Negative: Difficult to awaken or acting confused (e.g., disoriented, slurred speech)   Negative: Shock suspected (e.g., cold/pale/clammy skin, too weak to stand, low BP, rapid pulse)   Negative: Sounds like a life-threatening emergency to the triager   Negative: Pregnant 20 or more weeks (5 months or more)   Negative: Pregnant < 20 weeks (less than 5 months)   Negative: Postpartum (from 0 to 6 weeks after delivery)   Negative: Vaginal discharge is main symptom and bleeding is slight   Negative: SEVERE abdominal pain (e.g., excruciating)   Negative: SEVERE dizziness (e.g., unable to stand, requires support to walk, feels like passing out now)   Negative: SEVERE vaginal bleeding (e.g., soaking 2 pads or tampons per hour and present 2 or more hours; 1 menstrual cup every 2 hours)   Negative: Patient sounds very sick or weak to the triager   Negative: MODERATE vaginal bleeding (i.e., soaking pad or tampon per hour and present > 6 hours; 1 menstrual cup every 6 hours)   Negative: Constant abdominal pain lasting > 2 hours   Negative: Pale skin (pallor) of new-onset or worsening   Negative: Taking Coumadin (warfarin) or other strong blood thinner, or known bleeding disorder (e.g., thrombocytopenia)   Negative: Skin bruises or nosebleed and not caused by an injury   Negative: Patient wants to be seen   Negative: Passed tissue (e.g., gray-white)   Negative: Bleeding or spotting after procedure (e.g., biopsy) or pelvic examination (e.g., pap smear) that lasts > 7 days    Answer Assessment - Initial Assessment Questions  See My chart message    Protocols used: Vaginal Bleeding - [id number]

## 2024-04-22 ENCOUNTER — OFFICE VISIT (OUTPATIENT)
Dept: FAMILY MEDICINE | Facility: CLINIC | Age: 49
End: 2024-04-22
Payer: COMMERCIAL

## 2024-04-22 VITALS
TEMPERATURE: 98.5 F | HEART RATE: 96 BPM | DIASTOLIC BLOOD PRESSURE: 78 MMHG | BODY MASS INDEX: 34.95 KG/M2 | WEIGHT: 210 LBS | OXYGEN SATURATION: 98 % | SYSTOLIC BLOOD PRESSURE: 122 MMHG

## 2024-04-22 DIAGNOSIS — D64.9 ANEMIA, UNSPECIFIED TYPE: ICD-10-CM

## 2024-04-22 DIAGNOSIS — I10 HYPERTENSION GOAL BP (BLOOD PRESSURE) < 140/90: ICD-10-CM

## 2024-04-22 DIAGNOSIS — N93.9 ABNORMAL UTERINE BLEEDING (AUB): Primary | ICD-10-CM

## 2024-04-22 LAB
BASOPHILS # BLD AUTO: 0 10E3/UL (ref 0–0.2)
BASOPHILS NFR BLD AUTO: 1 %
EOSINOPHIL # BLD AUTO: 0.2 10E3/UL (ref 0–0.7)
EOSINOPHIL NFR BLD AUTO: 3 %
ERYTHROCYTE [DISTWIDTH] IN BLOOD BY AUTOMATED COUNT: 14.4 % (ref 10–15)
HCT VFR BLD AUTO: 32.9 % (ref 35–47)
HGB BLD-MCNC: 10.4 G/DL (ref 11.7–15.7)
IMM GRANULOCYTES # BLD: 0 10E3/UL
IMM GRANULOCYTES NFR BLD: 0 %
LYMPHOCYTES # BLD AUTO: 2.2 10E3/UL (ref 0.8–5.3)
LYMPHOCYTES NFR BLD AUTO: 38 %
MCH RBC QN AUTO: 25.3 PG (ref 26.5–33)
MCHC RBC AUTO-ENTMCNC: 31.6 G/DL (ref 31.5–36.5)
MCV RBC AUTO: 80 FL (ref 78–100)
MONOCYTES # BLD AUTO: 0.4 10E3/UL (ref 0–1.3)
MONOCYTES NFR BLD AUTO: 7 %
NEUTROPHILS # BLD AUTO: 2.9 10E3/UL (ref 1.6–8.3)
NEUTROPHILS NFR BLD AUTO: 51 %
PLATELET # BLD AUTO: 386 10E3/UL (ref 150–450)
RBC # BLD AUTO: 4.11 10E6/UL (ref 3.8–5.2)
WBC # BLD AUTO: 5.7 10E3/UL (ref 4–11)

## 2024-04-22 PROCEDURE — 83550 IRON BINDING TEST: CPT | Performed by: STUDENT IN AN ORGANIZED HEALTH CARE EDUCATION/TRAINING PROGRAM

## 2024-04-22 PROCEDURE — 83540 ASSAY OF IRON: CPT | Performed by: STUDENT IN AN ORGANIZED HEALTH CARE EDUCATION/TRAINING PROGRAM

## 2024-04-22 PROCEDURE — 82728 ASSAY OF FERRITIN: CPT | Performed by: STUDENT IN AN ORGANIZED HEALTH CARE EDUCATION/TRAINING PROGRAM

## 2024-04-22 PROCEDURE — 85025 COMPLETE CBC W/AUTO DIFF WBC: CPT | Performed by: STUDENT IN AN ORGANIZED HEALTH CARE EDUCATION/TRAINING PROGRAM

## 2024-04-22 PROCEDURE — 99214 OFFICE O/P EST MOD 30 MIN: CPT | Performed by: STUDENT IN AN ORGANIZED HEALTH CARE EDUCATION/TRAINING PROGRAM

## 2024-04-22 PROCEDURE — 83001 ASSAY OF GONADOTROPIN (FSH): CPT | Performed by: STUDENT IN AN ORGANIZED HEALTH CARE EDUCATION/TRAINING PROGRAM

## 2024-04-22 PROCEDURE — 84443 ASSAY THYROID STIM HORMONE: CPT | Performed by: STUDENT IN AN ORGANIZED HEALTH CARE EDUCATION/TRAINING PROGRAM

## 2024-04-22 PROCEDURE — 36415 COLL VENOUS BLD VENIPUNCTURE: CPT | Performed by: STUDENT IN AN ORGANIZED HEALTH CARE EDUCATION/TRAINING PROGRAM

## 2024-04-22 RX ORDER — LOSARTAN POTASSIUM AND HYDROCHLOROTHIAZIDE 12.5; 5 MG/1; MG/1
1 TABLET ORAL DAILY
Qty: 90 TABLET | Refills: 3 | Status: SHIPPED | OUTPATIENT
Start: 2024-04-22

## 2024-04-22 NOTE — PROGRESS NOTES
Assessment & Plan     Abnormal uterine bleeding (AUB)  New abnormal prolonged bleeding with clots after delayed menses; previously regular bleeding. No recent intercourse, no concerns for infections. We will check for worsening anemia. Check thyroid function, we will obtain FSH to evaluate for menopausal changes. Recommend TVUS to evaluate fibroids, abnormal thickening, discussed evaluation for uterine  tumors. On exam there is what looks like a small endocervical polyp but nothing on surface of cervix. This may be contributing to bleeding changes. Pap UTD and normal.   - TSH with free T4 reflex; Future  - CBC with platelets and differential; Future  - Ferritin; Future  - Iron and iron binding capacity; Future  - US Pelvic Complete with Transvaginal; Future  - Follicle stimulating hormone; Future  - TSH with free T4 reflex  - CBC with platelets and differential  - Ferritin  - Iron and iron binding capacity  - Follicle stimulating hormone    Anemia, unspecified type  Last year had Iron infusion. With recent menstrual bleeding with clots recommend checking her anemia and iron levels   - CBC with platelets and differential; Future  - Ferritin; Future  - Iron and iron binding capacity; Future  - CBC with platelets and differential  - Ferritin  - Iron and iron binding capacity    Hypertension goal BP (blood pressure) < 140/90  Chronic, stable BP. Normotensive. Has upcoming physical and will check other lab work then as indicated. Normal renal function previously  - losartan-hydrochlorothiazide (HYZAAR) 50-12.5 MG tablet; Take 1 tablet by mouth daily      Josephine Akbar is a 48 year old, presenting for the following health issues:  Menopausal Sx        4/22/2024     1:21 PM   Additional Questions   Accompanied by na         4/22/2024     1:21 PM   Patient Reported Additional Medications   Patient reports taking the following new medications none     History of Present Illness       Reason for visit:  Pre menopause  symptoms  Symptom onset:  3-4 weeks ago    She eats 2-3 servings of fruits and vegetables daily.She consumes 3 sweetened beverage(s) daily.She exercises with enough effort to increase her heart rate 20 to 29 minutes per day.  She exercises with enough effort to increase her heart rate 3 or less days per week.   She is taking medications regularly.       Typically periods come every 25-27 days and last for 5 days.   The last period was having minor clotting after the 5 days. Then a few days later started to have this come back. Then 2-3 days in a row had heavy clotting more at night, then bled for a few days after.  was having this period for 2 weeks. Bleeding now stopped 1 week ago. No cramping or painful menses. Was having some vaginal itching at that time. This period also came 6 weeks (late/irregular) but before always regular.               Objective    There were no vitals taken for this visit.  There is no height or weight on file to calculate BMI.  Physical Exam               Signed Electronically by: Ree Hillman DO

## 2024-04-23 LAB
FERRITIN SERPL-MCNC: 20 NG/ML (ref 6–175)
FSH SERPL IRP2-ACNC: 8.5 MIU/ML
IRON BINDING CAPACITY (ROCHE): 309 UG/DL (ref 240–430)
IRON SATN MFR SERPL: 16 % (ref 15–46)
IRON SERPL-MCNC: 49 UG/DL (ref 37–145)
TSH SERPL DL<=0.005 MIU/L-ACNC: 1.13 UIU/ML (ref 0.3–4.2)

## 2024-04-27 ENCOUNTER — HEALTH MAINTENANCE LETTER (OUTPATIENT)
Age: 49
End: 2024-04-27

## 2024-05-01 ENCOUNTER — ANCILLARY PROCEDURE (OUTPATIENT)
Dept: ULTRASOUND IMAGING | Facility: CLINIC | Age: 49
End: 2024-05-01
Attending: STUDENT IN AN ORGANIZED HEALTH CARE EDUCATION/TRAINING PROGRAM
Payer: COMMERCIAL

## 2024-05-01 DIAGNOSIS — N93.9 ABNORMAL UTERINE BLEEDING (AUB): ICD-10-CM

## 2024-05-01 DIAGNOSIS — N83.201 RIGHT OVARIAN CYST: Primary | ICD-10-CM

## 2024-05-01 PROCEDURE — 76856 US EXAM PELVIC COMPLETE: CPT | Mod: TC | Performed by: RADIOLOGY

## 2024-05-01 PROCEDURE — 76830 TRANSVAGINAL US NON-OB: CPT | Mod: TC | Performed by: RADIOLOGY

## 2024-06-05 ENCOUNTER — OFFICE VISIT (OUTPATIENT)
Dept: FAMILY MEDICINE | Facility: CLINIC | Age: 49
End: 2024-06-05
Payer: COMMERCIAL

## 2024-06-05 VITALS
RESPIRATION RATE: 20 BRPM | HEIGHT: 65 IN | OXYGEN SATURATION: 98 % | TEMPERATURE: 98.6 F | BODY MASS INDEX: 39.65 KG/M2 | DIASTOLIC BLOOD PRESSURE: 74 MMHG | WEIGHT: 238 LBS | HEART RATE: 95 BPM | SYSTOLIC BLOOD PRESSURE: 120 MMHG

## 2024-06-05 DIAGNOSIS — E66.812 CLASS 2 SEVERE OBESITY WITH SERIOUS COMORBIDITY AND BODY MASS INDEX (BMI) OF 39.0 TO 39.9 IN ADULT, UNSPECIFIED OBESITY TYPE (H): ICD-10-CM

## 2024-06-05 DIAGNOSIS — N93.9 ABNORMAL UTERINE BLEEDING (AUB): ICD-10-CM

## 2024-06-05 DIAGNOSIS — N83.201 RIGHT OVARIAN CYST: ICD-10-CM

## 2024-06-05 DIAGNOSIS — Z00.00 ROUTINE GENERAL MEDICAL EXAMINATION AT A HEALTH CARE FACILITY: Primary | ICD-10-CM

## 2024-06-05 DIAGNOSIS — K21.9 GASTROESOPHAGEAL REFLUX DISEASE WITHOUT ESOPHAGITIS: ICD-10-CM

## 2024-06-05 DIAGNOSIS — E66.01 CLASS 2 SEVERE OBESITY WITH SERIOUS COMORBIDITY AND BODY MASS INDEX (BMI) OF 39.0 TO 39.9 IN ADULT, UNSPECIFIED OBESITY TYPE (H): ICD-10-CM

## 2024-06-05 DIAGNOSIS — Z12.31 VISIT FOR SCREENING MAMMOGRAM: ICD-10-CM

## 2024-06-05 PROCEDURE — 90750 HZV VACC RECOMBINANT IM: CPT | Performed by: STUDENT IN AN ORGANIZED HEALTH CARE EDUCATION/TRAINING PROGRAM

## 2024-06-05 PROCEDURE — 90746 HEPB VACCINE 3 DOSE ADULT IM: CPT | Performed by: STUDENT IN AN ORGANIZED HEALTH CARE EDUCATION/TRAINING PROGRAM

## 2024-06-05 PROCEDURE — 99396 PREV VISIT EST AGE 40-64: CPT | Mod: 25 | Performed by: STUDENT IN AN ORGANIZED HEALTH CARE EDUCATION/TRAINING PROGRAM

## 2024-06-05 PROCEDURE — 90472 IMMUNIZATION ADMIN EACH ADD: CPT | Performed by: STUDENT IN AN ORGANIZED HEALTH CARE EDUCATION/TRAINING PROGRAM

## 2024-06-05 PROCEDURE — 90471 IMMUNIZATION ADMIN: CPT | Performed by: STUDENT IN AN ORGANIZED HEALTH CARE EDUCATION/TRAINING PROGRAM

## 2024-06-05 PROCEDURE — 99213 OFFICE O/P EST LOW 20 MIN: CPT | Mod: 25 | Performed by: STUDENT IN AN ORGANIZED HEALTH CARE EDUCATION/TRAINING PROGRAM

## 2024-06-05 RX ORDER — FAMOTIDINE 40 MG/1
40 TABLET, FILM COATED ORAL 2 TIMES DAILY
Qty: 60 TABLET | Refills: 0 | Status: SHIPPED | OUTPATIENT
Start: 2024-06-05

## 2024-06-05 SDOH — HEALTH STABILITY: PHYSICAL HEALTH: ON AVERAGE, HOW MANY DAYS PER WEEK DO YOU ENGAGE IN MODERATE TO STRENUOUS EXERCISE (LIKE A BRISK WALK)?: 4 DAYS

## 2024-06-05 ASSESSMENT — SOCIAL DETERMINANTS OF HEALTH (SDOH): HOW OFTEN DO YOU GET TOGETHER WITH FRIENDS OR RELATIVES?: ONCE A WEEK

## 2024-06-05 NOTE — NURSING NOTE
Prior to immunization administration, verified patients identity using patient s name and date of birth. Please see Immunization Activity for additional information.     Screening Questionnaire for Adult Immunization    Are you sick today?   No   Do you have allergies to medications, food, a vaccine component or latex?   Yes   Have you ever had a serious reaction after receiving a vaccination?   No   Do you have a long-term health problem with heart, lung, kidney, or metabolic disease (e.g., diabetes), asthma, a blood disorder, no spleen, complement component deficiency, a cochlear implant, or a spinal fluid leak?  Are you on long-term aspirin therapy?   No   Do you have cancer, leukemia, HIV/AIDS, or any other immune system problem?   No   Do you have a parent, brother, or sister with an immune system problem?   No   In the past 3 months, have you taken medications that affect  your immune system, such as prednisone, other steroids, or anticancer drugs; drugs for the treatment of rheumatoid arthritis, Crohn s disease, or psoriasis; or have you had radiation treatments?   No   Have you had a seizure, or a brain or other nervous system problem?   No   During the past year, have you received a transfusion of blood or blood    products, or been given immune (gamma) globulin or antiviral drug?   No   For women: Are you pregnant or is there a chance you could become       pregnant during the next month?   No   Have you received any vaccinations in the past 4 weeks?   No     Immunization questionnaire was positive for at least one answer.  Notified Dr. Hillman.      Patient instructed to remain in clinic for 15 minutes afterwards, and to report any adverse reactions.     Screening performed by Triny Mcdermott CMA on 6/5/2024 at 2:16 PM.

## 2024-06-05 NOTE — PATIENT INSTRUCTIONS
"Preventive Care Advice   This is general advice we often give to help people stay healthy. Your care team may have specific advice just for you. Please talk to your care team about your own preventive care needs.  Lifestyle  Exercise at least 150 minutes each week (30 minutes a day, 5 days a week).  Do muscle strengthening activities 2 days a week. These help control your weight and prevent disease.  No smoking.  Wear sunscreen to prevent skin cancer.  Have your home tested for radon every 2 to 5 years. Radon is a colorless, odorless gas that can harm your lungs. To learn more, go to www.health.[facility].[place]. and search for \"Radon in Homes.\"  Keep guns unloaded and locked up in a safe place like a safe or gun vault, or, use a gun lock and hide the keys. Always lock away bullets separately. To learn more, visit [employer].[place].gov and search for \"safe gun storage.\"  Nutrition  Eat 5 or more servings of fruits and vegetables each day.  Try wheat bread, brown rice and whole grain pasta (instead of white bread, rice, and pasta).  Get enough calcium and vitamin D. Check the label on foods and aim for 100% of the RDA (recommended daily allowance).  Regular exams  Have a dental exam and cleaning every 6 months.  See your health care team every year to talk about:  Any changes in your health.  Any medicines your care team has prescribed.  Preventive care, family planning, and ways to prevent chronic diseases.  Shots (vaccines)   HPV shots (up to age 26), if you've never had them before.  Hepatitis B shots (up to age 59), if you've never had them before.  COVID-19 shot: Get this shot when it's due.  Flu shot: Get a flu shot every year.  Tetanus shot: Get a tetanus shot every 10 years.  Pneumococcal, hepatitis A, and RSV shots: Ask your care team if you need these based on your risk.  Shingles shot (for age 50 and up).  General health tests  Diabetes screening:  Starting at age 35, Get screened for diabetes at least every 3 years.  If " you are younger than age 35, ask your care team if you should be screened for diabetes.  Cholesterol test: At age 39, start having a cholesterol test every 5 years, or more often if advised.  Bone density scan (DEXA): At age 50, ask your care team if you should have this scan for osteoporosis (brittle bones).  Hepatitis C: Get tested at least once in your life.  Abdominal aortic aneurysm screening: Talk to your doctor about having this screening if you:  Have ever smoked; and  Are biologically male; and  Are between the ages of 65 and 75.  STIs (sexually transmitted infections)  Before age 24: Ask your care team if you should be screened for STIs.  After age 24: Get screened for STIs if you're at risk. You are at risk for STIs (including HIV) if:  You are sexually active with more than one person.  You don't use condoms every time.  You or a partner was diagnosed with a sexually transmitted infection.  If you are at risk for HIV, ask about PrEP medicine to prevent HIV.  Get tested for HIV at least once in your life, whether you are at risk for HIV or not.  Cancer screening tests  Cervical cancer screening: If you have a cervix, begin getting regular cervical cancer screening tests at age 21. Most people who have regular screenings with normal results can stop after age 65. Talk about this with your provider.  Breast cancer scan (mammogram): If you've ever had breasts, begin having regular mammograms starting at age 40. This is a scan to check for breast cancer.  Colon cancer screening: It is important to start screening for colon cancer at age 45.  Have a colonoscopy test every 10 years (or more often if you're at risk) Or, ask your provider about stool tests like a FIT test every year or Cologuard test every 3 years.  To learn more about your testing options, visit: www.[employer]/448778.pdf.  For help making a decision, visit: ruddy/nw89921.  Prostate cancer screening test: If you have a prostate and are age 55  to 69, ask your provider if you would benefit from a yearly prostate cancer screening test.  Lung cancer screening: If you are a current or former smoker age 50 to 80, ask your care team if ongoing lung cancer screenings are right for you.  For informational purposes only. Not to replace the advice of your health care provider. Copyright   2023 [employer]. All rights reserved. Clinically reviewed by the [facility] Transitions Program. Reddit 315997 - REV 04/24.

## 2024-06-05 NOTE — PROGRESS NOTES
"Preventive Care Visit  [facility]  Ree Hillman DO, Family Medicine  Jun 5, 2024      Assessment & Plan     Routine general medical examination at a health care facility  Vitals stable. Pap due 2027, cologuard due 2026    GERD  Symptoms adequately controlled with omeprazole 20 mg. She has tried weaning and was unsuccessful. We will do a trial of stopping omeprazole and try famotidine   - famotidine (PEPCID) 40 MG tablet; Take 1 tablet (40 mg) by mouth 2 times daily    Visit for screening mammogram  - MA Screening Bilateral w/ Rhys; Future    Abnormal uterine bleeding (AUB)  Symptoms improving, likely perimenopausal. Did have fibroids noted on US    Right ovarian cyst  Scheduled for upcoming repeat US due to complex cyst     Class 2 severe obesity with serious comorbidity and body mass index (BMI) of 39.0 to 39.9 in adult, unspecified obesity type (H)  Had lost 70 lb since gastric sleeve surgery in jan 2023. Eating better and discussed diet/exercise today     Patient has been advised of split billing requirements and indicates understanding: Yes        BMI  Estimated body mass index is 39.61 kg/m  as calculated from the following:    Height as of this encounter: 1.651 m (5' 5\").    Weight as of this encounter: 108 kg (238 lb).   Weight management plan: Discussed healthy diet and exercise guidelines    Counseling  Appropriate preventive services were discussed with this patient, including applicable screening as appropriate for fall prevention, nutrition, physical activity, Tobacco-use cessation, weight loss and cognition.  Checklist reviewing preventive services available has been given to the patient.  Reviewed patient's diet, addressing concerns and/or questions.           Subjective   Abeer is a 48 year old, presenting for the following:  Physical        6/5/2024     1:22 PM   Additional Questions   Roomed by Triny V         6/5/2024     1:22 PM   Patient Reported Additional " Medications   Patient reports taking the following new medications none        Health Care Directive  Patient has a Health Care Directive on file  Advance care planning document is on file and is current.    HPI  Very busy. Mental health has been good.   Eldest on is graduating this year. 2 sons.   . Thinking about dating again.     Going to the gym 3 days per week, walking.     Weight down 70 lb sine lap gastric sleeve surgery.   Less swelling in legs. Able to move easier with traveling.   Still counting calories, limit to 1 cup with meals, 3 meals per day and snacks.   Struggled with fasting for Ramadan this year, could only do 1/2 the month due to not enough fluids and protein.     Pap due 2027. Cologuard due 2026      Changing maxi pads 4-5 times per day for 5 days. Periods were every 4 weeks, now every 5-6 weeks. Nothing like before - no bleeding between cycles     Pelvic US 5/1/2024   IMPRESSION:  1.  Multiple small uterine fibroids as above.  2.  Minimally complex right ovarian cyst measuring up to 1.3 cm, most  likely physiologic but could consider follow-up ultrasound in 6-12  weeks to document stability/resolution.        6/5/2024   General Health   How would you rate your overall physical health? Good   Feel stress (tense, anxious, or unable to sleep) Not at all         6/5/2024   Nutrition   Three or more servings of calcium each day? Yes   Diet: Regular (no restrictions)   How many servings of fruit and vegetables per day? (!) 2-3   How many sweetened beverages each day? (!) 3         6/5/2024   Exercise   Days per week of moderate/strenous exercise 4 days         6/5/2024   Social Factors   Frequency of gathering with friends or relatives Once a week   Worry food won't last until get money to buy more No   Food not last or not have enough money for food? No   Do you have housing?  Yes   Are you worried about losing your housing? No   Lack of transportation? No   Unable to get utilities  (heat,electricity)? No         6/5/2024   Fall Risk   Fallen 2 or more times in the past year? No   Trouble with walking or balance? No          6/5/2024   Dental   Dentist two times every year? Yes         6/5/2024   TB Screening   Were you born outside of the [place]? Yes           Today's PHQ-2 Score:       1/30/2024    10:09 AM   PHQ-2 ( 1999 [employer])   Q1: Little interest or pleasure in doing things 0   Q2: Feeling down, depressed or hopeless 0   PHQ-2 Score 0         6/5/2024   Substance Use   Alcohol more than 3/day or more than 7/wk No   Do you use any other substances recreationally? No     Social History     Tobacco Use    Smoking status: Never     Passive exposure: Never    Smokeless tobacco: Never   Vaping Use    Vaping status: Never Used   Substance Use Topics    Alcohol use: No    Drug use: No           4/13/2023   LAST FHS-7 RESULTS   1st degree relative breast or ovarian cancer No   Any relative bilateral breast cancer Yes   Any male have breast cancer No   Any ONE woman have BOTH breast AND ovarian cancer Yes   Any woman with breast cancer before 50yrs Yes   2 or more relatives with breast AND/OR ovarian cancer Yes   2 or more relatives with breast AND/OR bowel cancer Yes        Mammogram Screening - Mammogram every 1-2 years updated in Health Maintenance based on mutual decision making        6/5/2024   STI Screening   New sexual partner(s) since last STI/HIV test? No     History of abnormal Pap smear: No - age 30- 64 PAP with HPV every 5 years recommended        Latest Ref Rng & Units 1/12/2022     9:16 AM 2/2/2018    12:34 PM 2/2/2018    12:30 PM   PAP / HPV   PAP  Negative for Intraepithelial Lesion or Malignancy (NILM)      PAP (Historical)   NIL     HPV 16 DNA Negative Negative   Negative    HPV 18 DNA Negative Negative   Negative    Other HR HPV Negative Negative   Negative      ASCVD Risk   The ASCVD Risk score (Odette MELTON, et al., 2019) failed to calculate for the following reasons:     The valid HDL cholesterol range is 20 to 100 mg/dL            2024   Contraception/Family Planning   Questions about contraception or family planning No        Reviewed and updated as needed this visit by Provider                    Past Medical History:   Diagnosis Date    Asthma     Constipation     GERD (gastroesophageal reflux disease)     Hypertension     Morbid obesity (H)     Psoriasis      Past Surgical History:   Procedure Laterality Date    BIOPSY      BUNIONECTOMY LAPIDUS WITH TARSAL METATARSAL (TMT) FUSION Left 12/15/2020    Procedure: Left first tarsometatarsal joint fusion Left Jacob bunionectomy Left fifth metatarsal Efraín bunionectomy;  Surgeon: Markus Hart DPM;  Location: [facility]    COLONOSCOPY      ESOPHAGOSCOPY, GASTROSCOPY, DUODENOSCOPY (EGD), COMBINED  2013    Procedure: COMBINED ESOPHAGOSCOPY, GASTROSCOPY, DUODENOSCOPY (EGD), BIOPSY SINGLE OR MULTIPLE;;  Surgeon: Wilson Fournier MD;  Location: [facility]    ESOPHAGOSCOPY, GASTROSCOPY, DUODENOSCOPY (EGD), COMBINED  2014    Procedure: COMBINED ESOPHAGOSCOPY, GASTROSCOPY, DUODENOSCOPY (EGD);  Surgeon: Wilson Fournier MD;  [facility]: [facility]    EXCISE LESION FOOT Left 12/15/2020    Procedure: Left foot mass removal;  Surgeon: Markus Hart DPM;  Location: [facility]    EXCISE MASS FINGER Right 2023    Procedure: RIGHT FOURTH FINGER MASS EXCISION;  Surgeon: Jin Soto MD;  [facility]: [facility]    LAPAROSCOPIC GASTRIC ADJUSTABLE BANDING  2014    Procedure: LAPAROSCOPIC GASTRIC ADJUSTABLE BANDING;  Surgeon: Wilson Fournier MD;  [facility]    LAPAROSCOPIC GASTRIC SLEEVE N/A 1/10/2023    Procedure: GASTRECTOMY, SLEEVE, LAPAROSCOPIC;  Surgeon: Gurinder Ritter MD;  [facility]    LAPAROSCOPIC REMOVAL GASTRIC ADJUSTABLE BAND N/A 1/10/2023    Procedure: REMOVAL, GASTRIC BAND, ADJUSTABLE, and components,  LAPAROSCOPIC;  Surgeon: Gurinder Ritter MD;  [facility]: [facility]     OB History    Para  Term  AB Living   2 0 0 0 0 0   SAB IAB Ectopic Multiple Live Births   0 0 0 0 0      # Outcome Date GA Lbr Yoel/2nd Weight Sex Type Anes PTL Lv   2  09           1  06             Lab work is in process  Labs reviewed in EPIC  BP Readings from Last 3 Encounters:   24 120/74   24 122/78   24 139/80    Wt Readings from Last 3 Encounters:   24 108 kg (238 lb)   24 95.3 kg (210 lb)   24 103.9 kg (229 lb)                  Patient Active Problem List   Diagnosis    Chronic constipation    CARDIOVASCULAR SCREENING; LDL GOAL LESS THAN 160    Family history of breast cancer    GERD (gastroesophageal reflux disease)    Class 3 severe obesity due to excess calories with serious comorbidity and body mass index (BMI) of 45.0 to 49.9 in adult (H)    Vitamin D deficiency    Psoriasis    Bariatric surgery status    H/O laparoscopic adjustable gastric banding    Diaphragmatic hernia    Hypertension goal BP (blood pressure) < 140/90    Anxiety    Impingement syndrome, shoulder, left    Spontaneous vaginal delivery    Advanced maternal age during pregnancy    37+ weeks gestation completed    Bunion, left    Tailor's bunion of both feet    Morbid obesity (H)    Iron deficiency anemia    S/P laparoscopic sleeve gastrectomy    Mass of right finger     Past Surgical History:   Procedure Laterality Date    BIOPSY      BUNIONECTOMY LAPIDUS WITH TARSAL METATARSAL (TMT) FUSION Left 12/15/2020    Procedure: Left first tarsometatarsal joint fusion Left Jacob bunionectomy Left fifth metatarsal Efraín bunionectomy;  Surgeon: Markus Hart DPM;  [facility]: [facility]    COLONOSCOPY      ESOPHAGOSCOPY, GASTROSCOPY, DUODENOSCOPY (EGD), COMBINED  2013    Procedure: COMBINED ESOPHAGOSCOPY, GASTROSCOPY, DUODENOSCOPY (EGD), BIOPSY SINGLE OR MULTIPLE;;  Surgeon: Wilson Fournier MD;  Location:  GI    ESOPHAGOSCOPY, GASTROSCOPY, DUODENOSCOPY (EGD), COMBINED  2014     Procedure: COMBINED ESOPHAGOSCOPY, GASTROSCOPY, DUODENOSCOPY (EGD);  Surgeon: Wilson Fournier MD;  [facility]    EXCISE LESION FOOT Left 12/15/2020    Procedure: Left foot mass removal;  Surgeon: Markus Hart DPM;  [facility]    EXCISE MASS FINGER Right 9/27/2023    Procedure: RIGHT FOURTH FINGER MASS EXCISION;  Surgeon: Jin Soto MD;  [facility]    LAPAROSCOPIC GASTRIC ADJUSTABLE BANDING  05/27/2014    Procedure: LAPAROSCOPIC GASTRIC ADJUSTABLE BANDING;  Surgeon: Wilson Fournier MD;  [facility]    LAPAROSCOPIC GASTRIC SLEEVE N/A 1/10/2023    Procedure: GASTRECTOMY, SLEEVE, LAPAROSCOPIC;  Surgeon: Gurinder Ritter MD;  [facility]    LAPAROSCOPIC REMOVAL GASTRIC ADJUSTABLE BAND N/A 1/10/2023    Procedure: REMOVAL, GASTRIC BAND, ADJUSTABLE, and components,  LAPAROSCOPIC;  Surgeon: Gurinder Ritter MD;  [facility]       Social History     Tobacco Use    Smoking status: Never     Passive exposure: Never    Smokeless tobacco: Never   Substance Use Topics    Alcohol use: No     Family History   Problem Relation Age of Onset    Blood Disease Mother         anemia    Heart Disease Mother     Hypertension Mother     Hyperlipidemia Mother     Anxiety Disorder Mother     Osteoporosis Mother     Genetic Disorder Mother         Alpha Thalassemia trait    Cerebrovascular Disease Father     Hypertension Father     Diabetes Father         type  2    Deep Vein Thrombosis (DVT) Father     Breast Cancer Maternal Aunt     Breast Cancer Paternal Aunt     Cancer Paternal Aunt         stomach    Breast Cancer Cousin     Breast Cancer Other     Blood Disease Other         son with alpha thalessemia/mother alpha thalasemia trait    Breast Cancer Other     Breast Cancer Other     Anesthesia Reaction No family hx of          Current Outpatient Medications   Medication Sig Dispense Refill    albuterol (PROAIR HFA/PROVENTIL HFA/VENTOLIN HFA) 108 (90 Base) MCG/ACT inhaler Inhale  "2 puffs into the lungs every 6 hours as needed for shortness of breath or wheezing 8.5 g 3    Calcium Citrate-Vitamin D (CALCIUM CITRATE CHEWY BITE) 500-12.5 MG-MCG CHEW Take 1 chew tab by mouth 3 times daily 90 tablet 3    clobetasol propionate (OLUX) 0.05 % external foam Apply to base of scalp as needed for psoriasis 200 g 3    famotidine (PEPCID) 40 MG tablet Take 1 tablet (40 mg) by mouth 2 times daily 60 tablet 0    losartan-hydrochlorothiazide (HYZAAR) 50-12.5 MG tablet Take 1 tablet by mouth daily 90 tablet 3    multivitamin w/minerals (MULTI-VITAMIN) tablet Take 1 tablet by mouth daily      omeprazole (PRILOSEC) 20 MG DR capsule Take 1 capsule (20 mg) by mouth daily 90 capsule 3    senna (SENOKOT) 8.6 MG tablet Take 2 tablets by mouth At Bedtime      acetaminophen (TYLENOL) 325 MG tablet Take 2 tablets (650 mg) by mouth every 4 hours as needed for mild pain (Patient not taking: Reported on 6/5/2024) 50 tablet 0     Allergies   Allergen Reactions    Lisinopril Cough         Review of Systems  Constitutional, HEENT, cardiovascular, pulmonary, gi and gu systems are negative, except as otherwise noted.     Objective    Exam  /74 (BP Location: Right arm, Patient Position: Sitting, Cuff Size: Adult Large)   Pulse 95   Temp 98.6  F (37  C) (Oral)   Resp 20   Ht 1.651 m (5' 5\")   Wt 108 kg (238 lb)   LMP 06/04/2024   SpO2 98%   BMI 39.61 kg/m     Estimated body mass index is 39.61 kg/m  as calculated from the following:    Height as of this encounter: 1.651 m (5' 5\").    Weight as of this encounter: 108 kg (238 lb).    Physical Exam  GENERAL: alert and no distress  EYES: Eyes grossly normal to inspection, PERRL and conjunctivae and sclerae normal  HENT: ear canals and TM's normal, nose and mouth without ulcers or lesions  NECK: no adenopathy, no asymmetry, masses, or scars  RESP: lungs clear to auscultation - no rales, rhonchi or wheezes  CV: regular rate and rhythm, normal S1 S2, no S3 or S4, no " murmur, click or rub, no peripheral edema  ABDOMEN: soft, nontender, no hepatosplenomegaly, no masses and bowel sounds normal  MS: no gross musculoskeletal defects noted, no edema  SKIN: no suspicious lesions or rashes  NEURO: Normal strength and tone, mentation intact and speech normal  PSYCH: mentation appears normal, affect normal/bright        Signed Electronically by: Ree Hillman, DO

## 2024-06-12 ENCOUNTER — ANCILLARY PROCEDURE (OUTPATIENT)
Dept: ULTRASOUND IMAGING | Facility: CLINIC | Age: 49
End: 2024-06-12
Attending: STUDENT IN AN ORGANIZED HEALTH CARE EDUCATION/TRAINING PROGRAM
Payer: COMMERCIAL

## 2024-06-12 DIAGNOSIS — N83.201 RIGHT OVARIAN CYST: ICD-10-CM

## 2024-06-12 PROCEDURE — 76830 TRANSVAGINAL US NON-OB: CPT | Mod: TC | Performed by: RADIOLOGY

## 2024-06-12 PROCEDURE — 76856 US EXAM PELVIC COMPLETE: CPT | Mod: TC | Performed by: RADIOLOGY

## 2024-06-13 DIAGNOSIS — N83.201 RIGHT OVARIAN CYST: ICD-10-CM

## 2024-06-13 DIAGNOSIS — N93.9 ABNORMAL UTERINE BLEEDING (AUB): Primary | ICD-10-CM

## 2024-07-10 ENCOUNTER — PATIENT OUTREACH (OUTPATIENT)
Dept: CARE COORDINATION | Facility: CLINIC | Age: 49
End: 2024-07-10
Payer: COMMERCIAL

## 2024-07-12 ENCOUNTER — MYC MEDICAL ADVICE (OUTPATIENT)
Dept: FAMILY MEDICINE | Facility: CLINIC | Age: 49
End: 2024-07-12
Payer: COMMERCIAL

## 2024-07-12 NOTE — TELEPHONE ENCOUNTER
Routing Quipt message to provider.    Patient asking if she needs another iron infusion based on symptoms.    Would you like to check lab work?    Christine M Klisch, RN

## 2024-07-15 NOTE — TELEPHONE ENCOUNTER
Could you help her get set up for venofer iron infusions with the infusion center?    Thank you,     Ree Hillman D.O.

## 2024-07-17 DIAGNOSIS — D50.8 OTHER IRON DEFICIENCY ANEMIA: Primary | ICD-10-CM

## 2024-07-17 RX ORDER — ALBUTEROL SULFATE 0.83 MG/ML
2.5 SOLUTION RESPIRATORY (INHALATION)
Status: CANCELLED | OUTPATIENT
Start: 2024-07-17

## 2024-07-17 RX ORDER — DIPHENHYDRAMINE HYDROCHLORIDE 50 MG/ML
50 INJECTION INTRAMUSCULAR; INTRAVENOUS
Status: CANCELLED
Start: 2024-07-17

## 2024-07-17 RX ORDER — ALBUTEROL SULFATE 90 UG/1
1-2 AEROSOL, METERED RESPIRATORY (INHALATION)
Status: CANCELLED
Start: 2024-07-17

## 2024-07-17 RX ORDER — METHYLPREDNISOLONE SODIUM SUCCINATE 125 MG/2ML
125 INJECTION, POWDER, LYOPHILIZED, FOR SOLUTION INTRAMUSCULAR; INTRAVENOUS
Status: CANCELLED
Start: 2024-07-17

## 2024-07-17 RX ORDER — MEPERIDINE HYDROCHLORIDE 25 MG/ML
25 INJECTION INTRAMUSCULAR; INTRAVENOUS; SUBCUTANEOUS EVERY 30 MIN PRN
Status: CANCELLED | OUTPATIENT
Start: 2024-07-17

## 2024-07-17 RX ORDER — HEPARIN SODIUM (PORCINE) LOCK FLUSH IV SOLN 100 UNIT/ML 100 UNIT/ML
5 SOLUTION INTRAVENOUS
Status: CANCELLED | OUTPATIENT
Start: 2024-07-17

## 2024-07-17 RX ORDER — HEPARIN SODIUM,PORCINE 10 UNIT/ML
5-20 VIAL (ML) INTRAVENOUS DAILY PRN
Status: CANCELLED | OUTPATIENT
Start: 2024-07-17

## 2024-07-17 RX ORDER — EPINEPHRINE 1 MG/ML
0.3 INJECTION, SOLUTION, CONCENTRATE INTRAVENOUS EVERY 5 MIN PRN
Status: CANCELLED | OUTPATIENT
Start: 2024-07-17

## 2024-07-17 NOTE — TELEPHONE ENCOUNTER
Routing to PCP    Per [facility], Venofer on back order    Patients insurance covers Infed or Ferrlecit.    Please specify brand and dosing and rout back to RN       RN received message back from [facility] regarding above.    Levi Palmer, RN, BSN, PHN  [facility]

## 2024-07-17 NOTE — TELEPHONE ENCOUNTER
RN called and spoke with Heriberto at infusion pharmacy. [phone]    All doses of current order have been used and patient will need new therapy plan place,    Venofer is on back order.    Valentine will contact her finance team to see what option will be covered by her insurance.    Levi Palmer RN, BSN, PHN  [facility]

## 2024-07-17 NOTE — TELEPHONE ENCOUNTER
Orders pended by pharmacy for provider to sign    Please sign  rout back to nurse to assist with scheduling.    Levi Palmer, RN, BSN, PHN  [facility]

## 2024-07-17 NOTE — TELEPHONE ENCOUNTER
Called and spoke with patient and gave her phone numbers for [facility] [facility] [place] infusion centers to call and schedule infusion.    Christine M Klisch, RN

## 2024-07-30 ENCOUNTER — ANCILLARY PROCEDURE (OUTPATIENT)
Dept: MAMMOGRAPHY | Facility: CLINIC | Age: 49
End: 2024-07-30
Attending: STUDENT IN AN ORGANIZED HEALTH CARE EDUCATION/TRAINING PROGRAM
Payer: COMMERCIAL

## 2024-07-30 DIAGNOSIS — Z12.31 VISIT FOR SCREENING MAMMOGRAM: ICD-10-CM

## 2024-07-30 PROCEDURE — 77067 SCR MAMMO BI INCL CAD: CPT | Mod: TC | Performed by: RADIOLOGY

## 2024-07-30 PROCEDURE — 77063 BREAST TOMOSYNTHESIS BI: CPT | Mod: TC | Performed by: RADIOLOGY

## 2024-08-01 ENCOUNTER — TELEPHONE (OUTPATIENT)
Dept: ENDOCRINOLOGY | Facility: CLINIC | Age: 49
End: 2024-08-01
Payer: COMMERCIAL

## 2024-08-01 NOTE — TELEPHONE ENCOUNTER
Patient confirmed scheduled appointment:  Date: 1/31/25  Time: 10:30 am  Visit type: RET FIDE  Provider: Melinda Marshall  Location: virtual  Testing/imaging: n/a  Additional notes: n/a

## 2024-08-02 ENCOUNTER — INFUSION THERAPY VISIT (OUTPATIENT)
Dept: INFUSION THERAPY | Facility: CLINIC | Age: 49
End: 2024-08-02
Attending: STUDENT IN AN ORGANIZED HEALTH CARE EDUCATION/TRAINING PROGRAM
Payer: COMMERCIAL

## 2024-08-02 DIAGNOSIS — D50.8 OTHER IRON DEFICIENCY ANEMIA: Primary | ICD-10-CM

## 2024-08-02 PROCEDURE — 258N000003 HC RX IP 258 OP 636: Performed by: STUDENT IN AN ORGANIZED HEALTH CARE EDUCATION/TRAINING PROGRAM

## 2024-08-02 PROCEDURE — 96365 THER/PROPH/DIAG IV INF INIT: CPT

## 2024-08-02 PROCEDURE — 250N000011 HC RX IP 250 OP 636: Performed by: STUDENT IN AN ORGANIZED HEALTH CARE EDUCATION/TRAINING PROGRAM

## 2024-08-02 PROCEDURE — 99207 PR NO CHARGE LOS: CPT

## 2024-08-02 RX ORDER — MEPERIDINE HYDROCHLORIDE 25 MG/ML
25 INJECTION INTRAMUSCULAR; INTRAVENOUS; SUBCUTANEOUS EVERY 30 MIN PRN
OUTPATIENT
Start: 2024-08-02

## 2024-08-02 RX ORDER — METHYLPREDNISOLONE SODIUM SUCCINATE 125 MG/2ML
125 INJECTION, POWDER, LYOPHILIZED, FOR SOLUTION INTRAMUSCULAR; INTRAVENOUS
Start: 2024-08-02

## 2024-08-02 RX ORDER — DIPHENHYDRAMINE HYDROCHLORIDE 50 MG/ML
50 INJECTION INTRAMUSCULAR; INTRAVENOUS
Start: 2024-08-02

## 2024-08-02 RX ORDER — ALBUTEROL SULFATE 0.83 MG/ML
2.5 SOLUTION RESPIRATORY (INHALATION)
OUTPATIENT
Start: 2024-08-02

## 2024-08-02 RX ORDER — EPINEPHRINE 1 MG/ML
0.3 INJECTION, SOLUTION INTRAMUSCULAR; SUBCUTANEOUS EVERY 5 MIN PRN
OUTPATIENT
Start: 2024-08-02

## 2024-08-02 RX ORDER — ALBUTEROL SULFATE 90 UG/1
1-2 AEROSOL, METERED RESPIRATORY (INHALATION)
Start: 2024-08-02

## 2024-08-02 RX ORDER — HEPARIN SODIUM (PORCINE) LOCK FLUSH IV SOLN 100 UNIT/ML 100 UNIT/ML
5 SOLUTION INTRAVENOUS
OUTPATIENT
Start: 2024-08-02

## 2024-08-02 RX ORDER — HEPARIN SODIUM,PORCINE 10 UNIT/ML
5-20 VIAL (ML) INTRAVENOUS DAILY PRN
OUTPATIENT
Start: 2024-08-02

## 2024-08-02 RX ADMIN — SODIUM CHLORIDE 250 ML: 9 INJECTION, SOLUTION INTRAVENOUS at 11:27

## 2024-08-02 RX ADMIN — SODIUM CHLORIDE 125 MG: 9 INJECTION, SOLUTION INTRAVENOUS at 11:31

## 2024-08-02 NOTE — PROGRESS NOTES
Infusion Nursing Note:  Raffy Castelan presents today for Ferrlecit.    Patient seen by provider today: No   present during visit today: Not Applicable.    Note: Patient has had Venofer in the past without complications.     Intravenous Access:  Peripheral IV placed.    Treatment Conditions:  Not Applicable.      Post Infusion Assessment:  Patient tolerated infusion without incident.  Site patent and intact, free from redness, edema or discomfort.  No evidence of extravasations.  Access discontinued per protocol.       Discharge Plan:   AVS to patient via MYCHART.   Patient discharged in stable condition accompanied by: self.  Departure Mode: Ambulatory.      Tish Burdick RN

## 2024-10-29 ENCOUNTER — MYC MEDICAL ADVICE (OUTPATIENT)
Dept: FAMILY MEDICINE | Facility: CLINIC | Age: 49
End: 2024-10-29
Payer: COMMERCIAL

## 2024-10-29 NOTE — TELEPHONE ENCOUNTER
RN replied to patient via [email or web address]. See message for details.     Levi Palmer RN, BSN, PHN  [facility] [place]

## 2024-10-30 ENCOUNTER — VIRTUAL VISIT (OUTPATIENT)
Dept: FAMILY MEDICINE | Facility: CLINIC | Age: 49
End: 2024-10-30
Payer: COMMERCIAL

## 2024-10-30 DIAGNOSIS — D50.8 OTHER IRON DEFICIENCY ANEMIA: ICD-10-CM

## 2024-10-30 DIAGNOSIS — Z30.011 ENCOUNTER FOR INITIAL PRESCRIPTION OF CONTRACEPTIVE PILLS: Primary | ICD-10-CM

## 2024-10-30 PROCEDURE — 99213 OFFICE O/P EST LOW 20 MIN: CPT | Mod: 95 | Performed by: STUDENT IN AN ORGANIZED HEALTH CARE EDUCATION/TRAINING PROGRAM

## 2024-10-30 RX ORDER — LEVONORGESTREL/ETHIN.ESTRADIOL 0.1-0.02MG
1 TABLET ORAL DAILY
Qty: 84 TABLET | Refills: 3 | Status: SHIPPED | OUTPATIENT
Start: 2024-10-30

## 2024-10-30 NOTE — PROGRESS NOTES
Raffy is a 49 year old who is being evaluated via a billable video visit.    How would you like to obtain your AVS? MyChart  If the video visit is dropped, the invitation should be resent by: Text to cell phone: [phone]  Will anyone else be joining your video visit? No      Assessment & Plan       Encounter for initial prescription of contraceptive pills  She is considering becoming sexually active soon and wants to start contraception before. Has not been sexually active for many years. Is still having regular periods at 50 yo, every 5 weeks, but has historically always had heavy menses. We discussed potential risk of blood clots, heart disease, breast cancer, breast tenderness, mood changes with pt. No contraindications to use. Never smoker. She does have a hx of HTN but has been well controlled. Advise she start monitoring her BP at home after starting the OCP to make sure this doesn't increase.   - levonorgestrel-ethinyl estradiol (AVIANE) 0.1-20 MG-MCG tablet; Take 1 tablet by mouth daily.      Other iron deficiency anemia  We discussed that the OCP can help control her heavy menses which will in turn help her anemia.           Subjective   Raffy is a 49 year old, presenting for the following health issues:  No chief complaint on file.        10/30/2024     3:59 PM   Additional Questions   Roomed by Renee   Accompanied by none         10/30/2024     3:59 PM   Patient Reported Additional Medications   Patient reports taking the following new medications none     History of Present Illness       Reason for visit:  Discuss possibly starting birth control to be sexually active   She is taking medications regularly.       Started dating again recently and is getting more serious so wants to be cautious and get on contraception in case they become sexually active soon. Has not been sexually active for many years since exsband.   Still getting period regularly with a lot of bleeding. Coming every 5 weeks  "regularly. No hot flashes. Once had prolonged bleeding with clots in April 2024 but hasn't recurred since then.     When younger was on the OCP but then had an IUD after kids but has a lot of irregular bleeding with IUD.     Never smoker.     Denies history of DVT or known hypercoagulability. No history of CVA, Ischemic heart disease, Breast cancer, migraine with aura or cirrhosis. Non-smoker OR smokes <15 cig/day. Blood pressure has been well controlled.        Review of Systems  Constitutional, HEENT, cardiovascular, pulmonary, gi and gu systems are negative, except as otherwise noted.      Objective    Vitals - Patient Reported  Systolic (Patient Reported): 135  Diastolic (Patient Reported): 85  Weight (Patient Reported): 105.2 kg (232 lb)  Height (Patient Reported): 165.1 cm (5' 5\")  BMI (Based on Pt Reported Ht/Wt): 38.61  Pain Score: No Pain (0)        Physical Exam   GENERAL: alert and no distress  EYES: Eyes grossly normal to inspection.  No discharge or erythema, or obvious scleral/conjunctival abnormalities.  RESP: No audible wheeze, cough, or visible cyanosis.    SKIN: Visible skin clear. No significant rash, abnormal pigmentation or lesions.  NEURO: Cranial nerves grossly intact.  Mentation and speech appropriate for age.  PSYCH: Appropriate affect, tone, and pace of words          Video-Visit Details    Type of service:  Video Visit   Originating Location (pt. Location): Home    Distant Location (provider location):  On-site  Platform used for Video Visit: Jess  Signed Electronically by: Ree Hillman DO    "

## 2024-11-17 ENCOUNTER — MYC MEDICAL ADVICE (OUTPATIENT)
Dept: ENDOCRINOLOGY | Facility: CLINIC | Age: 49
End: 2024-11-17
Payer: COMMERCIAL

## 2024-11-20 DIAGNOSIS — K21.9 GASTROESOPHAGEAL REFLUX DISEASE WITHOUT ESOPHAGITIS: ICD-10-CM

## 2024-11-21 NOTE — TELEPHONE ENCOUNTER
omeprazole (PRILOSEC) 20 MG DR capsule   Disp 90  R 3  Start: 10/27/2023     1/30/2024  [facility] Melinda Claros PA-C  Surgery    Nv: 1/31/25

## 2024-11-25 NOTE — MR AVS SNAPSHOT
After Visit Summary   12/8/2017    Raffy Castelan    MRN: [id number]           Patient Information     Date Of Birth          1975        Visit Information        Provider Department      12/8/2017 1:40 PM Vonnie Baugh MD [facility]        Today's Diagnoses     Hypertension goal BP (blood pressure) < 140/90    -  1    BMI >40        H/O laparoscopic adjustable gastric banding        Dyspnea, unspecified type          Care Instructions    Due to your SOB, heart racing and swelling we will consider getting an Echocardiogram.   We will begin Losartan for your blood pressure.   Stop amlodipine  Decrease carbohydrates  Consider consult with endocrinology and/or bariatric center again      [facility]   Discharged by : Mone SAMPSON CMA (AAMA)    Paper scripts provided to patient : none      If you have any questions regarding your visit please contact your care team:     Team Gold                Clinic Hours Telephone Number     Dr. Dulce Johnson 7am-7pm  Monday - Thursday   7am-5pm  Fridays  [phone]   (Appointment scheduling available 24/7)     RN Line  [phone] option 2     Urgent Care - [place] and [facility] - 11am-9pm Monday-Friday Saturday-Sunday- 9am-5pm     [place] -   5pm-9pm Monday-Friday Saturday-Sunday- 9am-5pm    [phone] - [place]    [phone] - [place]       For a Price Quote for your services, please call our Consumer Price Line at [phone].     What options do I have for visits at the clinic other than the traditional office visit?     To expand how we care for you, many of our providers are utilizing electronic visits (e-visits) and telephone visits, when medically appropriate, for interactions with their patients rather than a visit in the clinic. We also offer nurse visits for many medical concerns. Just like  any other service, we will bill your insurance company for this type of visit based on time spent on the phone with your provider. Not all insurance companies cover these visits. Please check with your medical insurance if this type of visit is covered. You will be responsible for any charges that are not paid by your insurance.   E-visits via [employer]: generally incur a $35.00 fee.     Telephone visits:   Time spent on the phone: *charged based on time that is spent on the phone in increments of 10 minutes. Estimated cost:   5-10 mins $30.00   11-20 mins. $59.00   21-30 mins. $85.00     Use [employer] (secure email communication and access to your chart) to send your primary care provider a message or make an appointment. Ask someone on your Team how to sign up for [employer].     As always, Thank you for trusting us with your health care needs!      [facility]:    Scheduling Appointments  [facility]  Call: [phone]    [facility]  Call: [phone]    [facility]  Call: [phone]    For Gastroenterology referrals   [facility] G[facility]enterology   Clinics and Surgery Center, 4th Floor   [address] [place] [address]   Appointments: [phone]    WHERE TO GO FOR CARE?  Clinic    Make an appointment if you:       Are sick (cold, cough, flu, sore throat, earache or in pain).       Have a small injury (sprain, small cut, burn or broken bone).       Need a physical exam, Pap smear, vaccine or prescription refill.       Have questions about your health or medicines.    To reach us:      Call [phone] ([phone]). Open 24 hours every day. (For counseling services, call [phone].)    Log into [employer] at [email or web address]. (Visit [email or web address] to create an account.) Hospital emergency room    An emergency is a serious or life- threatening problem that must be treated right away.    Call 825 or  get to the hospital if you have:      Very bad or sudden:            - Chest pain or pressure         - Bleeding         - Head or belly pain         - Dizziness or trouble seeing, walking or                          Speaking      Problems breathing      Blood in your vomit or you are coughing up blood      A major injury (knocked out, loss of a finger or limb, rape, broken bone protruding from skin)    A mental health crisis. (Or call the Mental Health Crisis line at 1-741.545.4366 or Suicide Prevention Hotline at 1-626.730.6888.)    Open 24 hours every day. You don't need an appointment.     Urgent care    Visit urgent care for sickness or small injuries when the clinic is closed. You don't need an appointment. To check hours or find an urgent care near you, visit www.[facility] [email or web address]. Online care    Get online care from [facility] for more than 70 common problems, like colds, allergies and infections. Open 24 hours every day at:   [email or web address]   Need help deciding?    For advice about where to be seen, you may call your clinic and ask to speak with a nurse. We're here for you 24 hours every day.         If you are deaf or hard of hearing, please let us know. We provide many free services including sign language interpreters, oral interpreters, TTYs, telephone amplifiers, note takers and written materials.                   Follow-ups after your visit        Your next 10 appointments already scheduled     Feb 02, 2018 11:20 AM CST   PHYSICAL with Vonnie Baugh MD   [facility]    [address] [place] [id number] [phone]              Future tests that were ordered for you today     Open Future Orders        Priority Expected Expires Ordered    Echocardiogram Complete Routine  12/8/2018 12/8/2017            Who to contact     If you have questions or need follow up information about today's clinic visit or your schedule please contact [facility] directly at [phone].  Normal or non-critical lab and imaging results will be communicated to you by MyChart, letter or phone within 4 business days after the clinic has received the results. If you do not hear from us within 7 days, please contact the clinic through [employer] or phone. If you have a critical or abnormal lab result, we will notify you by phone as soon as possible.  Submit refill requests through [employer] or call your pharmacy and they will forward the refill request to us. Please allow 3 business days for your refill to be completed.          Additional Information About Your Visit        [employer] Information     [employer] gives you secure access to your electronic health record. If you see a primary care provider, you can also send messages to your care team and make appointments. If you have questions, please call your primary care clinic.  If you do not have a primary care provider, please call [phone] and they will assist you.        Care EveryWhere ID     This is your Care EveryWhere ID. This could be used by other organizations to access your [facility] medical records  [id number]        Your Vitals Were     Pulse Temperature Height Pulse Oximetry BMI (Body Mass Index)       108 98.3  F (36.8  C) (Oral) 5' 5\" (1.651 m) 98% 44.8 kg/m2        Blood Pressure from Last 3 Encounters:   12/08/17 140/80   12/04/17 144/82   04/28/17 138/80    Weight from Last 3 Encounters:   12/08/17 269 lb 3.2 oz (122.1 kg)   12/04/17 270 lb (122.5 kg)   04/28/17 256 lb (116.1 kg)              We Performed the Following     Comprehensive metabolic panel (BMP + Alb, Alk Phos, ALT, AST, Total. Bili, TP)     EKG 12-lead complete w/read - Clinics     TSH with free T4 reflex          Today's Medication Changes          These changes are accurate as of: 12/8/17  2:57 PM.  If you have any questions, ask your nurse or doctor.               Start taking these medicines.        Dose/Directions    " losartan 25 MG tablet   Commonly known as:  COZAAR   Used for:  Hypertension goal BP (blood pressure) < 140/90   Started by:  Vonnie Baugh MD        Dose:  25 mg   Take 1 tablet (25 mg) by mouth daily   Quantity:  90 tablet   Refills:  3         Stop taking these medicines if you haven't already. Please contact your care team if you have questions.     amLODIPine 10 MG tablet   Commonly known as:  NORVASC   Stopped by:  Vonnie Baugh MD                Where to get your medicines      These [place] [facility] [place] [address]  [address] [facility] [address]     Phone:  [phone]     losartan 25 MG tablet                Primary Care Provider Office Phone # Fax #    Vonnie Baugh [phone]       [address] [place] [address]        Equal Access to Services     [facility]: Hadii aad ku hadasho Soomaali, waaxda luqadaha, qaybta kaalmada adeegyada, waxay idiin hayaan paul dhaliwalaraaidee jackson . So [facility] [phone].    ATENCIÓN: Si habla español, tiene a petersen disposición servicios gratuitos de asistencia lingüística. Llame al [phone].    We comply with applicable federal civil rights laws and [place] laws. We do not discriminate on the basis of race, color, national origin, age, disability, sex, sexual orientation, or gender identity.            Thank you!     Thank you for choosing [facility]  for your care. Our goal is always to provide you with excellent care. Hearing back from our patients is one way we can continue to improve our services. Please take a few minutes to complete the written survey that you may receive in the mail after your visit with us. Thank you!             Your Updated Medication List - Protect others around you: Learn how to safely use, store and throw away your medicines at www.disposemymeds.org.          This list is accurate as of: 12/8/17   2:57 PM.  Always use your most recent med list.                   Brand Name Dispense Instructions for use Diagnosis    albuterol 108 (90 BASE) MCG/ACT Inhaler    PROAIR HFA/PROVENTIL HFA/VENTOLIN HFA     Inhale 2 puffs into the lungs every 6 hours as needed for shortness of breath / dyspnea or wheezing        clobetasol propionate 0.05 % Foam     600 g    Apply sparingly to affected area twice daily as needed.  Do not apply to face.    Psoriasis       losartan 25 MG tablet    COZAAR    90 tablet    Take 1 tablet (25 mg) by mouth daily    Hypertension goal BP (blood pressure) < 140/90       Multi-vitamin Tabs tablet      Take 1 tablet by mouth daily.        SENNA LAXATIVE 8.6 MG tablet   Generic drug:  senna      Take 1 tablet by mouth daily           72.1

## 2024-12-05 ENCOUNTER — TELEPHONE (OUTPATIENT)
Dept: ENDOCRINOLOGY | Facility: CLINIC | Age: 49
End: 2024-12-05
Payer: COMMERCIAL

## 2024-12-05 NOTE — TELEPHONE ENCOUNTER
Patient confirmed scheduled appointment:     Date: 3/20/2025  Time: 12:30 PM  Visit type: Return Bariatric  Visit mode: Virtual Visit  Provider:  Melinda Marshall PA-C  Location: [facility]    Additional Notes:

## 2025-02-20 ENCOUNTER — MYC REFILL (OUTPATIENT)
Dept: ENDOCRINOLOGY | Facility: CLINIC | Age: 50
End: 2025-02-20
Payer: COMMERCIAL

## 2025-02-20 DIAGNOSIS — K21.9 GASTROESOPHAGEAL REFLUX DISEASE WITHOUT ESOPHAGITIS: ICD-10-CM

## 2025-02-20 RX ORDER — OMEPRAZOLE 20 MG/1
20 CAPSULE, DELAYED RELEASE ORAL DAILY
Qty: 90 CAPSULE | Refills: 0 | Status: SHIPPED | OUTPATIENT
Start: 2025-02-20

## 2025-03-20 ENCOUNTER — VIRTUAL VISIT (OUTPATIENT)
Dept: ENDOCRINOLOGY | Facility: CLINIC | Age: 50
End: 2025-03-20
Payer: COMMERCIAL

## 2025-03-20 VITALS — WEIGHT: 240 LBS | HEIGHT: 65 IN | BODY MASS INDEX: 39.99 KG/M2

## 2025-03-20 DIAGNOSIS — Z98.84 S/P LAPAROSCOPIC SLEEVE GASTRECTOMY: ICD-10-CM

## 2025-03-20 DIAGNOSIS — E66.01 CLASS 2 SEVERE OBESITY WITH SERIOUS COMORBIDITY AND BODY MASS INDEX (BMI) OF 39.0 TO 39.9 IN ADULT, UNSPECIFIED OBESITY TYPE (H): ICD-10-CM

## 2025-03-20 DIAGNOSIS — E66.812 CLASS 2 SEVERE OBESITY WITH SERIOUS COMORBIDITY AND BODY MASS INDEX (BMI) OF 39.0 TO 39.9 IN ADULT, UNSPECIFIED OBESITY TYPE (H): ICD-10-CM

## 2025-03-20 DIAGNOSIS — D50.8 OTHER IRON DEFICIENCY ANEMIA: ICD-10-CM

## 2025-03-20 DIAGNOSIS — K21.9 GASTROESOPHAGEAL REFLUX DISEASE WITHOUT ESOPHAGITIS: ICD-10-CM

## 2025-03-20 PROCEDURE — G2211 COMPLEX E/M VISIT ADD ON: HCPCS | Mod: 95

## 2025-03-20 PROCEDURE — 98006 SYNCH AUDIO-VIDEO EST MOD 30: CPT

## 2025-03-20 RX ORDER — OMEPRAZOLE 20 MG/1
20 CAPSULE, DELAYED RELEASE ORAL DAILY
Qty: 90 CAPSULE | Refills: 3 | Status: SHIPPED | OUTPATIENT
Start: 2025-03-20

## 2025-03-20 NOTE — NURSING NOTE
Is the patient currently in the state of [place]? YES    Location: home    Visit mode:VIDEO    If the visit is dropped, the patient can be reconnected by: VIDEO VISIT: Text to cell phone:   Telephone Information:   Mobile [phone]    and VIDEO VISIT: Send to e-mail at: [email or web address]    Will anyone else be joining the visit? NO  (If patient encounters technical issues they should call [phone])    Are changes needed to the allergy or medication list? No    Are refills needed on medications prescribed by this physician? NO    Reason for visit: LEILANI Rios, Virtual Visit Facilitator    QNR Status: NA

## 2025-03-20 NOTE — PROGRESS NOTES
Virtual Visit Details    Type of service:  Video Visit     Originating Location (pt. Location): Home    Distant Location (provider location):  On-site  Platform used for Video Visit: [facility] Bariatric Surgery Note    RE: Raffy Castelan  MR#: [id number]  : 1975  VISIT DATE: Mar 20, 2025      Dear Ree Hillman,    I had the pleasure of seeing your patient, Raffy Castelan, in my post-bariatric surgery assessment clinic.    Assessment & Plan   Problem List Items Addressed This Visit       GERD (gastroesophageal reflux disease)    Relevant Medications    omeprazole (PRILOSEC) 20 MG DR capsule    Iron deficiency anemia    Relevant Orders    CBC with platelets    Ferritin    Iron and iron binding capacity    S/P laparoscopic sleeve gastrectomy - Primary    Relevant Orders    Comprehensive metabolic panel    Hemoglobin A1c    Lipid panel reflex to direct LDL Fasting    Vitamin A    Parathyroid Hormone Intact    Vitamin B12    Vitamin D Deficiency    Med Therapy Management Referral     Other Visit Diagnoses       Class 2 severe obesity with serious comorbidity and body mass index (BMI) of 39.0 to 39.9 in adult, unspecified obesity type (H)        Relevant Medications    tirzepatide-Weight Management (ZEPBOUND) 2.5 MG/0.5ML prefilled pen    tirzepatide-Weight Management (ZEPBOUND) 5 MG/0.5ML prefilled pen (Start on 2025)    Other Relevant Orders    Med Therapy Management Referral           Start Zepbound 2.5mg once weekly for 4 weeks, then increase to 5.0mg once weekly. Consider Wegovy and Saxenda as needed. If not covered will start zepbound vials .   Continue omeprazole 20mg daily. Refills sent   Bariatric annual labs ordered   [facility] pharm in 6 weeks   Melinda Sanford in 4 months     30 minutes spent by me on the date of the encounter doing chart review, history and exam, documentation and further activities per the note    CHIEF COMPLAINT: Post-bariatric surgery follow-up. 2 years s/p gastric  elsie with Dr. Ritter.    HISTORY OF PRESENT ILLNESS:      3/19/2025     2:27 PM   Questions Regarding Prior Weight Loss Surgery Reviewed With Patient   I had the following weight loss procedure Sleeve Gastrectomy   What year was your surgery? 2022   How has your weight changed since your last visit? I have gained weight   Do you currently have any of the following Heartburn, acid reflux, or GERD (acid reflux disease)?    Hypertension (high blood pressure)?   Do you have any concerns today? Weight gain     Is starting to see weight regain. Was very happy at 228-230lb. Has regained around 8lbs in the past 6 months. Feels like she is eating more frequently, but portion sizes feel the same. Would be interested in restarting an AOM.    Anti-obesity medications:     Wegovy - was on prior to surgery, was cash pay. No side effects. Does think it was helpful with weight loss prior to surgery.   Phentermine - was not as helpful as wegovy. No side effects.     Recent diet changes: Portion sizes around 1 cup. Will eat to full, and then will go back to finish. Eating every 2-3 hours. Having some increase hunger, but food is just accessible.     -Protein? Continues to be protein focused - getting at least 60g  -Water? 48oz of water daily. Also drinking coffee and tea     Recent exercise/activity changes: was going to the gym (walking) 3-4xweek till around October. Has not been to the gym since then - due to weather and work schedule.     Recent stressors: Work has been more stressful/busy. But doing well overall.     Vitamins/Labs: Calcium 500mg BID, MV with iron, Hair nail skin vitamin.     GERD - tried stopping omeprazole, but kept having     Weight History:      3/19/2025     2:27 PM   --   What is your highest lifetime weight? 300   What is your lowest weight since surgery? (In pounds) 228     Initial Weight (lbs): 290 lbs  Weight: 108.9 kg (240 lb)  Last Visits Weight: 103.9 kg (229 lb)  Cumulative weight loss (lbs):  50  Weight Loss Percentage: 17.24%    Wt Readings from Last 5 Encounters:   03/20/25 108.9 kg (240 lb)   06/05/24 108 kg (238 lb)   04/22/24 95.3 kg (210 lb)   01/30/24 103.9 kg (229 lb)   01/02/24 107.2 kg (236 lb 6.4 oz)             3/19/2025     2:27 PM   Questions Regarding Co-Morbidities and Health Concerns Reviewed With Patient   Pre-diabetes Gone away   Diabetes II Never   High Blood Pressure Improved   High cholesterol Improved   Heartburn/Reflux Worsened   Sleep apnea Improved   PCOS Never   Back pain Improved   Joint pain Improved   Lower leg swelling Improved     WHITNEY - started OCP which has been helpful with heavy menstrual cycle.     Anxiety - well controlled     HTN - well controlled on medications.     No hx of CVD, MI, glaucoma, or pancreatitis. No personal or family hx of MTC or MENII. No hx of ROSE         3/19/2025     2:27 PM   Eating Habits   How many meals do you eat per day? 3   Do you snack between meals? Sometimes   How much food are you eating at each meal? Greater than 1 cup   Are you able to separate your meals and liquids by at least 30 minutes? Sometimes   Are you able to avoid liquid calories? Yes           3/19/2025     2:27 PM   Exercise Questions Reviewed With Patient   How often do you exercise? 1 to 2 times per week   What is the duration of your exercise (in minutes)? 30 Minutes   What types of exercise do you do? walking   What keeps you from being more active? I should be more active but I just have not gotten around to it    Lack of Time       Social History:      3/19/2025     2:27 PM   --   Are you smoking? No   Are you drinking alcohol? No       Medications:  Current Outpatient Medications   Medication Sig Dispense Refill    omeprazole (PRILOSEC) 20 MG DR capsule Take 1 capsule (20 mg) by mouth daily. 90 capsule 3    tirzepatide-Weight Management (ZEPBOUND) 2.5 MG/0.5ML prefilled pen Inject 0.5 mLs (2.5 mg) subcutaneously every 7 days. For 4 weeks 2 mL 0    [START ON  4/19/2025] tirzepatide-Weight Management (ZEPBOUND) 5 MG/0.5ML prefilled pen Inject 0.5 mLs (5 mg) subcutaneously every 7 days. After completing 4 weeks of 2.5mg dose 2 mL 2    acetaminophen (TYLENOL) 325 MG tablet Take 2 tablets (650 mg) by mouth every 4 hours as needed for mild pain (Patient not taking: Reported on 10/30/2024) 50 tablet 0    albuterol (PROAIR HFA/PROVENTIL HFA/VENTOLIN HFA) 108 (90 Base) MCG/ACT inhaler Inhale 2 puffs into the lungs every 6 hours as needed for shortness of breath or wheezing 8.5 g 3    Calcium Citrate-Vitamin D (CALCIUM CITRATE CHEWY BITE) 500-12.5 MG-MCG CHEW Take 1 chew tab by mouth 3 times daily 90 tablet 3    clobetasol propionate (OLUX) 0.05 % external foam Apply to base of scalp as needed for psoriasis 200 g 3    levonorgestrel-ethinyl estradiol (AVIANE) 0.1-20 MG-MCG tablet Take 1 tablet by mouth daily. 84 tablet 3    losartan-hydrochlorothiazide (HYZAAR) 50-12.5 MG tablet Take 1 tablet by mouth daily 90 tablet 3    multivitamin w/minerals (MULTI-VITAMIN) tablet Take 1 tablet by mouth daily      senna (SENOKOT) 8.6 MG tablet Take 2 tablets by mouth At Bedtime       No current facility-administered medications for this visit.         3/19/2025     2:27 PM   --   Do you avoid NSAIDs such as (Ibuprofen, Aleve, Naproxen, Advil)? Yes       ROS:  GI:       3/19/2025     2:27 PM   --   Vomiting No   Diarrhea No   Constipation Yes   Swallowing trouble No   Abdominal pain Yes   Heartburn Yes     Skin:       3/19/2025     2:27 PM   BAR RBS ROS - SKIN   Rash in skin folds No     Psych:       3/19/2025     2:27 PM   --   Depression No   Anxiety No     Female Only:       3/19/2025     2:27 PM   BAR RBS ROS -    Female only Excessive menstrual bleeding    Post-menopausal    Birth control    Regular menstrual cycles   Stress urinary incontinence No       Office Visit on 04/22/2024   Component Date Value Ref Range Status    TSH 04/22/2024 1.13  0.30 - 4.20 uIU/mL Final    Ferritin  04/22/2024 20  6 - 175 ng/mL Final    Iron 04/22/2024 49  37 - 145 ug/dL Final    Iron Binding Capacity 04/22/2024 309  240 - 430 ug/dL Final    Iron Sat Index 04/22/2024 16  15 - 46 % Final    FSH 04/22/2024 8.5  mIU/mL Final    19 years and older:   Follicular phase: 3.5-12.5 mIU/mL   Ovulation phase: 4.7-21.5 mIU/mL   Luteal phase: 1.7-7.7 mIU/mL   Postmenopause: 25.8-134.8 mIU/mL       WBC Count 04/22/2024 5.7  4.0 - 11.0 10e3/uL Final    RBC Count 04/22/2024 4.11  3.80 - 5.20 10e6/uL Final    Hemoglobin 04/22/2024 10.4 (L)  11.7 - 15.7 g/dL Final    Hematocrit 04/22/2024 32.9 (L)  35.0 - 47.0 % Final    MCV 04/22/2024 80  78 - 100 fL Final    MCH 04/22/2024 25.3 (L)  26.5 - 33.0 pg Final    MCHC 04/22/2024 31.6  31.5 - 36.5 g/dL Final    RDW 04/22/2024 14.4  10.0 - 15.0 % Final    Platelet Count 04/22/2024 386  150 - 450 10e3/uL Final    % Neutrophils 04/22/2024 51  % Final    % Lymphocytes 04/22/2024 38  % Final    % Monocytes 04/22/2024 7  % Final    % Eosinophils 04/22/2024 3  % Final    % Basophils 04/22/2024 1  % Final    % Immature Granulocytes 04/22/2024 0  % Final    Absolute Neutrophils 04/22/2024 2.9  1.6 - 8.3 10e3/uL Final    Absolute Lymphocytes 04/22/2024 2.2  0.8 - 5.3 10e3/uL Final    Absolute Monocytes 04/22/2024 0.4  0.0 - 1.3 10e3/uL Final    Absolute Eosinophils 04/22/2024 0.2  0.0 - 0.7 10e3/uL Final    Absolute Basophils 04/22/2024 0.0  0.0 - 0.2 10e3/uL Final    Absolute Immature Granulocytes 04/22/2024 0.0  <=0.4 10e3/uL Final               Objective             Vitals:  No vitals were obtained today due to virtual visit.        Physical Exam   GENERAL: alert and no distress  EYES: Eyes grossly normal to inspection.  No discharge or erythema, or obvious scleral/conjunctival abnormalities.  RESP: No audible wheeze, cough, or visible cyanosis.    SKIN: Visible skin clear. No significant rash, abnormal pigmentation or lesions.  NEURO: Cranial nerves grossly intact.  Mentation and speech  appropriate for age.  PSYCH: Appropriate affect, tone, and pace of words        Sincerely,    Melinda Marshall, PA-C    The longitudinal plan of care for the diagnosis(es)/condition(s) as documented were addressed during this visit. Due to the added complexity in care, I will continue to support Abeer in the subsequent management and with ongoing continuity of care.

## 2025-03-20 NOTE — LETTER
3/20/2025       RE: Raffy Castelan  [address]  [place] [address]     Dear Colleague,    Thank you for referring your patient, Raffy Castelan, to the [facility] [place] at [facility]. Please see a copy of my visit note below.    Virtual Visit Details    Type of service:  Video Visit     Originating Location (pt. Location): Home    Distant Location (provider location):  On-site  Platform used for Video Visit: [facility] Bariatric Surgery Note    RE: Raffy Castelan  MR#: [id number]  : 1975  VISIT DATE: Mar 20, 2025      Dear Ree Hillman,    I had the pleasure of seeing your patient, Raffy Castelan, in my post-bariatric surgery assessment clinic.    Assessment & Plan  Problem List Items Addressed This Visit       GERD (gastroesophageal reflux disease)    Relevant Medications    omeprazole (PRILOSEC) 20 MG DR capsule    Iron deficiency anemia    Relevant Orders    CBC with platelets    Ferritin    Iron and iron binding capacity    S/P laparoscopic sleeve gastrectomy - Primary    Relevant Orders    Comprehensive metabolic panel    Hemoglobin A1c    Lipid panel reflex to direct LDL Fasting    Vitamin A    Parathyroid Hormone Intact    Vitamin B12    Vitamin D Deficiency    Med Therapy Management Referral     Other Visit Diagnoses       Class 2 severe obesity with serious comorbidity and body mass index (BMI) of 39.0 to 39.9 in adult, unspecified obesity type (H)        Relevant Medications    tirzepatide-Weight Management (ZEPBOUND) 2.5 MG/0.5ML prefilled pen    tirzepatide-Weight Management (ZEPBOUND) 5 MG/0.5ML prefilled pen (Start on 2025)    Other Relevant Orders    Med Therapy Management Referral           Start Zepbound 2.5mg once weekly for 4 weeks, then increase to 5.0mg once weekly. Consider Wegovy and Saxenda as needed. If not covered will start zepbound vials .   Continue omeprazole 20mg  daily. Refills sent   Bariatric annual labs ordered   MTM pharm in 6 weeks   Melinda Sanford in 4 months     30 minutes spent by me on the date of the encounter doing chart review, history and exam, documentation and further activities per the note    CHIEF COMPLAINT: Post-bariatric surgery follow-up. 2 years s/p gastric sleeve with Dr. Ritter.    HISTORY OF PRESENT ILLNESS:      3/19/2025     2:27 PM   Questions Regarding Prior Weight Loss Surgery Reviewed With Patient   I had the following weight loss procedure Sleeve Gastrectomy   What year was your surgery? 2022   How has your weight changed since your last visit? I have gained weight   Do you currently have any of the following Heartburn, acid reflux, or GERD (acid reflux disease)?    Hypertension (high blood pressure)?   Do you have any concerns today? Weight gain     Is starting to see weight regain. Was very happy at 228-230lb. Has regained around 8lbs in the past 6 months. Feels like she is eating more frequently, but portion sizes feel the same. Would be interested in restarting an AOM.    Anti-obesity medications:     Wegovy - was on prior to surgery, was cash pay. No side effects. Does think it was helpful with weight loss prior to surgery.   Phentermine - was not as helpful as wegovy. No side effects.     Recent diet changes: Portion sizes around 1 cup. Will eat to full, and then will go back to finish. Eating every 2-3 hours. Having some increase hunger, but food is just accessible.     -Protein? Continues to be protein focused - getting at least 60g  -Water? 48oz of water daily. Also drinking coffee and tea     Recent exercise/activity changes: was going to the gym (walking) 3-4xweek till around October. Has not been to the gym since then - due to weather and work schedule.     Recent stressors: Work has been more stressful/busy. But doing well overall.     Vitamins/Labs: Calcium 500mg BID, MV with iron, Hair nail skin vitamin.     GERD - tried stopping  omeprazole, but kept having     Weight History:      3/19/2025     2:27 PM   --   What is your highest lifetime weight? 300   What is your lowest weight since surgery? (In pounds) 228     Initial Weight (lbs): 290 lbs  Weight: 108.9 kg (240 lb)  Last Visits Weight: 103.9 kg (229 lb)  Cumulative weight loss (lbs): 50  Weight Loss Percentage: 17.24%    Wt Readings from Last 5 Encounters:   03/20/25 108.9 kg (240 lb)   06/05/24 108 kg (238 lb)   04/22/24 95.3 kg (210 lb)   01/30/24 103.9 kg (229 lb)   01/02/24 107.2 kg (236 lb 6.4 oz)             3/19/2025     2:27 PM   Questions Regarding Co-Morbidities and Health Concerns Reviewed With Patient   Pre-diabetes Gone away   Diabetes II Never   High Blood Pressure Improved   High cholesterol Improved   Heartburn/Reflux Worsened   Sleep apnea Improved   PCOS Never   Back pain Improved   Joint pain Improved   Lower leg swelling Improved     WHITNEY - started OCP which has been helpful with heavy menstrual cycle.     Anxiety - well controlled     HTN - well controlled on medications.     No hx of CVD, MI, glaucoma, or pancreatitis. No personal or family hx of MTC or MENII. No hx of ROSE         3/19/2025     2:27 PM   Eating Habits   How many meals do you eat per day? 3   Do you snack between meals? Sometimes   How much food are you eating at each meal? Greater than 1 cup   Are you able to separate your meals and liquids by at least 30 minutes? Sometimes   Are you able to avoid liquid calories? Yes           3/19/2025     2:27 PM   Exercise Questions Reviewed With Patient   How often do you exercise? 1 to 2 times per week   What is the duration of your exercise (in minutes)? 30 Minutes   What types of exercise do you do? walking   What keeps you from being more active? I should be more active but I just have not gotten around to it    Lack of Time       Social History:      3/19/2025     2:27 PM   --   Are you smoking? No   Are you drinking alcohol? No       Medications:  Current  Outpatient Medications   Medication Sig Dispense Refill     omeprazole (PRILOSEC) 20 MG DR capsule Take 1 capsule (20 mg) by mouth daily. 90 capsule 3     tirzepatide-Weight Management (ZEPBOUND) 2.5 MG/0.5ML prefilled pen Inject 0.5 mLs (2.5 mg) subcutaneously every 7 days. For 4 weeks 2 mL 0     [START ON 4/19/2025] tirzepatide-Weight Management (ZEPBOUND) 5 MG/0.5ML prefilled pen Inject 0.5 mLs (5 mg) subcutaneously every 7 days. After completing 4 weeks of 2.5mg dose 2 mL 2     acetaminophen (TYLENOL) 325 MG tablet Take 2 tablets (650 mg) by mouth every 4 hours as needed for mild pain (Patient not taking: Reported on 10/30/2024) 50 tablet 0     albuterol (PROAIR HFA/PROVENTIL HFA/VENTOLIN HFA) 108 (90 Base) MCG/ACT inhaler Inhale 2 puffs into the lungs every 6 hours as needed for shortness of breath or wheezing 8.5 g 3     Calcium Citrate-Vitamin D (CALCIUM CITRATE CHEWY BITE) 500-12.5 MG-MCG CHEW Take 1 chew tab by mouth 3 times daily 90 tablet 3     clobetasol propionate (OLUX) 0.05 % external foam Apply to base of scalp as needed for psoriasis 200 g 3     levonorgestrel-ethinyl estradiol (AVIANE) 0.1-20 MG-MCG tablet Take 1 tablet by mouth daily. 84 tablet 3     losartan-hydrochlorothiazide (HYZAAR) 50-12.5 MG tablet Take 1 tablet by mouth daily 90 tablet 3     multivitamin w/minerals (MULTI-VITAMIN) tablet Take 1 tablet by mouth daily       senna (SENOKOT) 8.6 MG tablet Take 2 tablets by mouth At Bedtime       No current facility-administered medications for this visit.         3/19/2025     2:27 PM   --   Do you avoid NSAIDs such as (Ibuprofen, Aleve, Naproxen, Advil)? Yes       ROS:  GI:       3/19/2025     2:27 PM   --   Vomiting No   Diarrhea No   Constipation Yes   Swallowing trouble No   Abdominal pain Yes   Heartburn Yes     Skin:       3/19/2025     2:27 PM   BAR RBS ROS - SKIN   Rash in skin folds No     Psych:       3/19/2025     2:27 PM   --   Depression No   Anxiety No     Female Only:        3/19/2025     2:27 PM   BAR RBS ROS -    Female only Excessive menstrual bleeding    Post-menopausal    Birth control    Regular menstrual cycles   Stress urinary incontinence No       Office Visit on 04/22/2024   Component Date Value Ref Range Status     TSH 04/22/2024 1.13  0.30 - 4.20 uIU/mL Final     Ferritin 04/22/2024 20  6 - 175 ng/mL Final     Iron 04/22/2024 49  37 - 145 ug/dL Final     Iron Binding Capacity 04/22/2024 309  240 - 430 ug/dL Final     Iron Sat Index 04/22/2024 16  15 - 46 % Final     FSH 04/22/2024 8.5  mIU/mL Final    19 years and older:   Follicular phase: 3.5-12.5 mIU/mL   Ovulation phase: 4.7-21.5 mIU/mL   Luteal phase: 1.7-7.7 mIU/mL   Postmenopause: 25.8-134.8 mIU/mL        WBC Count 04/22/2024 5.7  4.0 - 11.0 10e3/uL Final     RBC Count 04/22/2024 4.11  3.80 - 5.20 10e6/uL Final     Hemoglobin 04/22/2024 10.4 (L)  11.7 - 15.7 g/dL Final     Hematocrit 04/22/2024 32.9 (L)  35.0 - 47.0 % Final     MCV 04/22/2024 80  78 - 100 fL Final     MCH 04/22/2024 25.3 (L)  26.5 - 33.0 pg Final     MCHC 04/22/2024 31.6  31.5 - 36.5 g/dL Final     RDW 04/22/2024 14.4  10.0 - 15.0 % Final     Platelet Count 04/22/2024 386  150 - 450 10e3/uL Final     % Neutrophils 04/22/2024 51  % Final     % Lymphocytes 04/22/2024 38  % Final     % Monocytes 04/22/2024 7  % Final     % Eosinophils 04/22/2024 3  % Final     % Basophils 04/22/2024 1  % Final     % Immature Granulocytes 04/22/2024 0  % Final     Absolute Neutrophils 04/22/2024 2.9  1.6 - 8.3 10e3/uL Final     Absolute Lymphocytes 04/22/2024 2.2  0.8 - 5.3 10e3/uL Final     Absolute Monocytes 04/22/2024 0.4  0.0 - 1.3 10e3/uL Final     Absolute Eosinophils 04/22/2024 0.2  0.0 - 0.7 10e3/uL Final     Absolute Basophils 04/22/2024 0.0  0.0 - 0.2 10e3/uL Final     Absolute Immature Granulocytes 04/22/2024 0.0  <=0.4 10e3/uL Final               Objective            Vitals:  No vitals were obtained today due to virtual visit.        Physical Exam   GENERAL:  alert and no distress  EYES: Eyes grossly normal to inspection.  No discharge or erythema, or obvious scleral/conjunctival abnormalities.  RESP: No audible wheeze, cough, or visible cyanosis.    SKIN: Visible skin clear. No significant rash, abnormal pigmentation or lesions.  NEURO: Cranial nerves grossly intact.  Mentation and speech appropriate for age.  PSYCH: Appropriate affect, tone, and pace of words        Sincerely,    Melinda Marshall PA-C    The longitudinal plan of care for the diagnosis(es)/condition(s) as documented were addressed during this visit. Due to the added complexity in care, I will continue to support Abbrooker in the subsequent management and with ongoing continuity of care.      Again, thank you for allowing me to participate in the care of your patient.      Sincerely,    Melinda Marshall PA-C

## 2025-03-24 ENCOUNTER — TELEPHONE (OUTPATIENT)
Dept: ENDOCRINOLOGY | Facility: CLINIC | Age: 50
End: 2025-03-24
Payer: COMMERCIAL

## 2025-03-24 NOTE — TELEPHONE ENCOUNTER
MTM referral from: [facility] visit (referral by provider)    MTM referral outreach attempt #2 on March 24, 2025 at 12:15 PM      Outcome: Patient not reachable after several attempts, routed to Pharmacist Team/Provider as an FYI    Use hbc for the carrier/Plan on the flowsheet      [employer]D ANTIQUES & CONSIGNMENT Message Sent    Kaylan Napoles  MTM

## 2025-03-24 NOTE — PATIENT INSTRUCTIONS
Visit Plan:   Start Zepbound 2.5mg once weekly for 4 weeks, then increase to 5.0mg once weekly. Consider Wegovy and Saxenda as needed. If not covered will start zepbound vials .   Continue omeprazole 20mg daily. Refills sent   Bariatric annual labs ordered   MTM pharm in 6 weeks   Melinda Sanford in 4 months     Zepbound (Tirzepatide)    Zepbound (Tirzepatide): is an injectable prescription medicine recently FDA approved for adults with obesity or overweight with an additional condition affected by their weight (type 2 diabetes, high blood pressure, high cholesterol, obstructive sleep apnea, etc). Zepbound contains the same active ingredient as what is in Mounjaro, an injectable FDA approved for Type 2 Diabtes. Zepbound is intended to be used along with dietary/behavioral changes and consistent exercise to improve assist with weight loss and other health improvement outcomes.     It is given as a shot once a week. It activates the body's receptors for GIP (glucose-dependent insulinotropic polypeptide) and GLP-1 (glucagon-like peptide-1) receptor, two naturally occurring hormones that help tell the brain that you are full. It also works is by slowing down how quickly food leaves your stomach. What this means for you: You will start to feel perez more quickly, notice portion changes and eat less often between meals. It is still important to maintain consistent eating schedule with meals +/- snacks and get in good hydration.      Dosing:  Initial dose: 2.5 mg injected subcutaneously once weekly for 4 weeks  Further dose changes can include: increase to 5 mg once weekly for 4 weeks, then 7.5 mg once weekly for 4 weeks, then 10 mg once weekly for 4 weeks then 12.5 mg once weekly for 4 weeks, then 15 mg (maximum dose) once weekly.    Dose changes are based on how you are tolerating the current dose, what benefits you are seeing at the current dose and if improvement in effectiveness of the drug is needed. The goal is to find  the dose that works best for you with the least amount of side effects. You may find you reach this at a lower dose than the maximum dose.     Side effects: Patients are advised to eat more slowly and purposefully. Give yourself less portions. You may find after starting this medication you have a new point of fullness. It is also important to stay adequately hydrated on the medication to reduce risks of some of these side effects. Many of these side effects (nausea, diarrhea, etc) occurred during dose escalation but did improve over time with continuing the medication. If side effects are unmanageable, reach out to your prescribing provider.     The risk of pancreatitis (inflammation of the pancreas) has been associated with this type of medication, but is very rare.  If you have had pancreatitis in the past, this medication may not be for you. Please let us know about any past history of pancreas problems. If you experience persistent severe abdominal pain (sometimes radiating to the back potentially accompanied by vomiting and have a fever), stop the medication and contact your provider immediately for assessment. They will do a blood test to check for pancreatitis.       How to Inject:   https://[email or web address].[email or web address]-to-use    Storage:   Store your pen in the refrigerator. You may store your pen at room temperature for up to 21 days. If you store the pen at room temperature, do not return the pen to the refrigerator. Discard the pen if not used within 21 days after removing from the refrigerator.      For any questions or concerns please send a [employer] message to our team or call our weight management call center at [phone] during regular business hours.

## 2025-04-09 ENCOUNTER — LAB (OUTPATIENT)
Dept: LAB | Facility: CLINIC | Age: 50
End: 2025-04-09
Payer: COMMERCIAL

## 2025-04-09 DIAGNOSIS — D50.8 OTHER IRON DEFICIENCY ANEMIA: ICD-10-CM

## 2025-04-09 DIAGNOSIS — Z98.84 S/P LAPAROSCOPIC SLEEVE GASTRECTOMY: ICD-10-CM

## 2025-04-09 LAB
ALBUMIN SERPL BCG-MCNC: 3.9 G/DL (ref 3.5–5.2)
ALP SERPL-CCNC: 37 U/L (ref 40–150)
ALT SERPL W P-5'-P-CCNC: 10 U/L (ref 0–50)
ANION GAP SERPL CALCULATED.3IONS-SCNC: 11 MMOL/L (ref 7–15)
AST SERPL W P-5'-P-CCNC: 16 U/L (ref 0–45)
BILIRUB SERPL-MCNC: 0.3 MG/DL
BUN SERPL-MCNC: 10.9 MG/DL (ref 6–20)
CALCIUM SERPL-MCNC: 9.3 MG/DL (ref 8.8–10.4)
CHLORIDE SERPL-SCNC: 101 MMOL/L (ref 98–107)
CHOLEST SERPL-MCNC: 228 MG/DL
CREAT SERPL-MCNC: 0.74 MG/DL (ref 0.51–0.95)
EGFRCR SERPLBLD CKD-EPI 2021: >90 ML/MIN/1.73M2
ERYTHROCYTE [DISTWIDTH] IN BLOOD BY AUTOMATED COUNT: 14.5 % (ref 10–15)
EST. AVERAGE GLUCOSE BLD GHB EST-MCNC: 120 MG/DL
FASTING STATUS PATIENT QL REPORTED: YES
FASTING STATUS PATIENT QL REPORTED: YES
FERRITIN SERPL-MCNC: 15 NG/ML (ref 11–328)
GLUCOSE SERPL-MCNC: 91 MG/DL (ref 70–99)
HBA1C MFR BLD: 5.8 % (ref 0–5.6)
HCO3 SERPL-SCNC: 26 MMOL/L (ref 22–29)
HCT VFR BLD AUTO: 32.4 % (ref 35–47)
HDLC SERPL-MCNC: 89 MG/DL
HGB BLD-MCNC: 10.6 G/DL (ref 11.7–15.7)
IRON BINDING CAPACITY (ROCHE): 366 UG/DL (ref 240–430)
IRON SATN MFR SERPL: 15 % (ref 15–46)
IRON SERPL-MCNC: 56 UG/DL (ref 37–145)
LDLC SERPL CALC-MCNC: 114 MG/DL
MCH RBC QN AUTO: 25.4 PG (ref 26.5–33)
MCHC RBC AUTO-ENTMCNC: 32.7 G/DL (ref 31.5–36.5)
MCV RBC AUTO: 78 FL (ref 78–100)
NONHDLC SERPL-MCNC: 139 MG/DL
PLATELET # BLD AUTO: 363 10E3/UL (ref 150–450)
POTASSIUM SERPL-SCNC: 3.8 MMOL/L (ref 3.4–5.3)
PROT SERPL-MCNC: 7.6 G/DL (ref 6.4–8.3)
PTH-INTACT SERPL-MCNC: 20 PG/ML (ref 15–65)
RBC # BLD AUTO: 4.17 10E6/UL (ref 3.8–5.2)
SODIUM SERPL-SCNC: 138 MMOL/L (ref 135–145)
TRIGL SERPL-MCNC: 124 MG/DL
VIT B12 SERPL-MCNC: 1421 PG/ML (ref 232–1245)
VIT D+METAB SERPL-MCNC: 63 NG/ML (ref 20–50)
WBC # BLD AUTO: 5.6 10E3/UL (ref 4–11)

## 2025-04-09 PROCEDURE — 82306 VITAMIN D 25 HYDROXY: CPT

## 2025-04-09 PROCEDURE — 82607 VITAMIN B-12: CPT

## 2025-04-09 PROCEDURE — 80061 LIPID PANEL: CPT

## 2025-04-09 PROCEDURE — 99000 SPECIMEN HANDLING OFFICE-LAB: CPT

## 2025-04-09 PROCEDURE — 83036 HEMOGLOBIN GLYCOSYLATED A1C: CPT

## 2025-04-09 PROCEDURE — 84590 ASSAY OF VITAMIN A: CPT | Mod: 90

## 2025-04-09 PROCEDURE — 82728 ASSAY OF FERRITIN: CPT

## 2025-04-09 PROCEDURE — 83550 IRON BINDING TEST: CPT

## 2025-04-09 PROCEDURE — 83970 ASSAY OF PARATHORMONE: CPT

## 2025-04-09 PROCEDURE — 36415 COLL VENOUS BLD VENIPUNCTURE: CPT

## 2025-04-09 PROCEDURE — 83540 ASSAY OF IRON: CPT

## 2025-04-09 PROCEDURE — 80053 COMPREHEN METABOLIC PANEL: CPT

## 2025-04-09 PROCEDURE — 85027 COMPLETE CBC AUTOMATED: CPT

## 2025-04-27 DIAGNOSIS — I10 HYPERTENSION GOAL BP (BLOOD PRESSURE) < 140/90: ICD-10-CM

## 2025-04-27 RX ORDER — LOSARTAN POTASSIUM AND HYDROCHLOROTHIAZIDE 12.5; 5 MG/1; MG/1
1 TABLET ORAL DAILY
Qty: 90 TABLET | Refills: 0 | Status: SHIPPED | OUTPATIENT
Start: 2025-04-27

## 2025-05-06 ENCOUNTER — PATIENT OUTREACH (OUTPATIENT)
Dept: CARE COORDINATION | Facility: CLINIC | Age: 50
End: 2025-05-06
Payer: COMMERCIAL

## 2025-05-09 ENCOUNTER — MYC MEDICAL ADVICE (OUTPATIENT)
Dept: ENDOCRINOLOGY | Facility: CLINIC | Age: 50
End: 2025-05-09
Payer: COMMERCIAL

## 2025-05-09 DIAGNOSIS — E66.812 CLASS 2 SEVERE OBESITY WITH SERIOUS COMORBIDITY AND BODY MASS INDEX (BMI) OF 39.0 TO 39.9 IN ADULT, UNSPECIFIED OBESITY TYPE (H): ICD-10-CM

## 2025-05-09 DIAGNOSIS — E66.01 CLASS 2 SEVERE OBESITY WITH SERIOUS COMORBIDITY AND BODY MASS INDEX (BMI) OF 39.0 TO 39.9 IN ADULT, UNSPECIFIED OBESITY TYPE (H): ICD-10-CM

## 2025-05-09 DIAGNOSIS — Z98.84 S/P LAPAROSCOPIC SLEEVE GASTRECTOMY: Primary | ICD-10-CM

## 2025-05-12 NOTE — TELEPHONE ENCOUNTER
Okay to dose increase Zepbound to 7.5 mg per Shoshana Bustamante PA-C.  New dose sent to pharmacy.

## 2025-05-14 DIAGNOSIS — E66.01 CLASS 2 SEVERE OBESITY WITH SERIOUS COMORBIDITY AND BODY MASS INDEX (BMI) OF 39.0 TO 39.9 IN ADULT, UNSPECIFIED OBESITY TYPE (H): Primary | ICD-10-CM

## 2025-05-14 DIAGNOSIS — E66.812 CLASS 2 SEVERE OBESITY WITH SERIOUS COMORBIDITY AND BODY MASS INDEX (BMI) OF 39.0 TO 39.9 IN ADULT, UNSPECIFIED OBESITY TYPE (H): Primary | ICD-10-CM

## 2025-05-14 RX ORDER — PHENTERMINE HYDROCHLORIDE 15 MG/1
15 CAPSULE ORAL EVERY MORNING
Qty: 30 CAPSULE | Refills: 3 | Status: SHIPPED | OUTPATIENT
Start: 2025-05-14

## 2025-06-30 ENCOUNTER — PATIENT OUTREACH (OUTPATIENT)
Dept: CARE COORDINATION | Facility: CLINIC | Age: 50
End: 2025-06-30
Payer: COMMERCIAL

## 2025-06-30 ENCOUNTER — TELEPHONE (OUTPATIENT)
Dept: ENDOCRINOLOGY | Facility: CLINIC | Age: 50
End: 2025-06-30
Payer: COMMERCIAL

## 2025-06-30 NOTE — TELEPHONE ENCOUNTER
Patient confirmed scheduled appointment:     Date: 7/11/25  Time: 2:30 PM  Visit type: New MTM  Visit mode: Virtual Visit  Provider:  Dao Moreira RPH  [place] [facility] [place] Notes:     Patient confirmed scheduled appointment:     Date: 10/3/25  Time: 1:30 PM  Visit type: Return Bariatric  Visit mode: Virtual Visit  Provider:  Melinda Marshall PA-C  [place] [facility] [place] Notes:

## 2025-07-02 ENCOUNTER — OFFICE VISIT (OUTPATIENT)
Dept: FAMILY MEDICINE | Facility: CLINIC | Age: 50
End: 2025-07-02
Attending: STUDENT IN AN ORGANIZED HEALTH CARE EDUCATION/TRAINING PROGRAM
Payer: COMMERCIAL

## 2025-07-02 ENCOUNTER — ANCILLARY PROCEDURE (OUTPATIENT)
Dept: GENERAL RADIOLOGY | Facility: CLINIC | Age: 50
End: 2025-07-02
Attending: STUDENT IN AN ORGANIZED HEALTH CARE EDUCATION/TRAINING PROGRAM
Payer: COMMERCIAL

## 2025-07-02 VITALS
TEMPERATURE: 98.1 F | HEIGHT: 65 IN | BODY MASS INDEX: 38.99 KG/M2 | OXYGEN SATURATION: 98 % | DIASTOLIC BLOOD PRESSURE: 78 MMHG | HEART RATE: 94 BPM | SYSTOLIC BLOOD PRESSURE: 116 MMHG | WEIGHT: 234 LBS

## 2025-07-02 DIAGNOSIS — M25.531 RIGHT WRIST PAIN: ICD-10-CM

## 2025-07-02 DIAGNOSIS — M25.551 HIP PAIN, RIGHT: ICD-10-CM

## 2025-07-02 DIAGNOSIS — Z30.41 ENCOUNTER FOR SURVEILLANCE OF CONTRACEPTIVE PILLS: ICD-10-CM

## 2025-07-02 DIAGNOSIS — Z00.00 ROUTINE GENERAL MEDICAL EXAMINATION AT A HEALTH CARE FACILITY: Primary | ICD-10-CM

## 2025-07-02 DIAGNOSIS — R73.03 PREDIABETES: ICD-10-CM

## 2025-07-02 DIAGNOSIS — Z13.6 CARDIOVASCULAR SCREENING; LDL GOAL LESS THAN 160: ICD-10-CM

## 2025-07-02 DIAGNOSIS — D50.8 OTHER IRON DEFICIENCY ANEMIA: ICD-10-CM

## 2025-07-02 DIAGNOSIS — Z12.31 VISIT FOR SCREENING MAMMOGRAM: ICD-10-CM

## 2025-07-02 PROCEDURE — 3078F DIAST BP <80 MM HG: CPT | Performed by: STUDENT IN AN ORGANIZED HEALTH CARE EDUCATION/TRAINING PROGRAM

## 2025-07-02 PROCEDURE — 99213 OFFICE O/P EST LOW 20 MIN: CPT | Mod: 25 | Performed by: STUDENT IN AN ORGANIZED HEALTH CARE EDUCATION/TRAINING PROGRAM

## 2025-07-02 PROCEDURE — 3074F SYST BP LT 130 MM HG: CPT | Performed by: STUDENT IN AN ORGANIZED HEALTH CARE EDUCATION/TRAINING PROGRAM

## 2025-07-02 PROCEDURE — 99396 PREV VISIT EST AGE 40-64: CPT | Performed by: STUDENT IN AN ORGANIZED HEALTH CARE EDUCATION/TRAINING PROGRAM

## 2025-07-02 PROCEDURE — 73110 X-RAY EXAM OF WRIST: CPT | Mod: TC | Performed by: RADIOLOGY

## 2025-07-02 SDOH — HEALTH STABILITY: PHYSICAL HEALTH: ON AVERAGE, HOW MANY DAYS PER WEEK DO YOU ENGAGE IN MODERATE TO STRENUOUS EXERCISE (LIKE A BRISK WALK)?: 3 DAYS

## 2025-07-02 ASSESSMENT — SOCIAL DETERMINANTS OF HEALTH (SDOH): HOW OFTEN DO YOU GET TOGETHER WITH FRIENDS OR RELATIVES?: THREE TIMES A WEEK

## 2025-07-02 NOTE — PROGRESS NOTES
"Preventive Care Visit  [facility]  Ree Hillman DO, Family Medicine  Jul 2, 2025      Assessment & Plan     Routine general medical examination at a health care facility      Visit for screening mammogram  - MA Screening Bilateral w/ Rhys; Future    Encounter for surveillance prescription of contraceptive pills  Stable on current OCP. Has missed one period last month. Discussed may be transitioning to post-menopause. Monitor. Will continue for now    Hip pain, right  Symptoms seem consistent with arthritis hip pain. Recommend XR Today. If no significant arthritis recommend PT and MRI.   - XR Pelvis and Hip Right 1 View; Future  - diclofenac (VOLTAREN) 1 % topical gel; Apply 2 g topically 4 times daily as needed for moderate pain.    Right wrist pain  Likely OA. consider carpal tunnel. Recommend voltaren gel and XR today. May need ortho referral   - diclofenac (VOLTAREN) 1 % topical gel; Apply 2 g topically 4 times daily as needed for moderate pain.  - XR Wrist Right G/E 3 Views; Future    Other iron deficiency anemia  - Ferritin; Future  - CBC with platelets and differential; Future  - Iron and iron binding capacity; Future    Prediabetes  - Hemoglobin A1c; Future    CARDIOVASCULAR SCREENING; LDL GOAL LESS THAN 160  - Lipid panel reflex to direct LDL Fasting; Future    Patient has been advised of split billing requirements and indicates understanding: Yes        BMI  Estimated body mass index is 39.22 kg/m  as calculated from the following:    Height as of this encounter: 1.645 m (5' 4.76\").    Weight as of this encounter: 106.1 kg (234 lb).   Weight management plan: Discussed healthy diet and exercise guidelines  Reviewed preventive health counseling, as reflected in patient instructions       Regular exercise       Healthy diet/nutrition       Contraception       (Macrina)menopause management  Counseling  Appropriate preventive services were addressed with this patient via screening, " questionnaire, or discussion as appropriate for fall prevention, nutrition, physical activity, Tobacco-use cessation, social engagement, weight loss and cognition.  Checklist reviewing preventive services available has been given to the patient.  Reviewed patient's diet, addressing concerns and/or questions.   She is at risk for lack of exercise and has been provided with information to increase physical activity for the benefit of her well-being.         Follow-up    Follow-up Visit   Expected date:  Jul 02, 2026 (Approximate)      Follow Up Appointment Details:     Follow-up with whom?: PCP    Follow-Up for what?: Adult Preventive    How?: In Person                 Josephine Akbar is a 49 year old, presenting for the following:  Physical           History of Present Illness       Hypertension: She presents for follow up of hypertension.  She does check blood pressure  regularly outside of the clinic. Outpatient blood pressures have not been over 140/90. She does not follow a low salt diet.           Joint pain-  right hip pain for 2 months. Voltaren gel did help. Pain is making it hard to stand up from sitting or even rolling over in bed. Sharp pain. Constant pain for over two months. Getting hard to be more mobile.   Right wrist into thumb, more burning pain. 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 Right hand dominant. No numbness/tingling. Happens more at nighttime and with doing dishes.        New OCP in October 2024. Not sexually active now.   Didn't get it last month.   Still getting some of the PMS symptoms before period.     Working with weight management clinic. Had gained some weight after surgery but losing again. Went on zepbound for 2 months which helped but cost too much. Now on  short term phentermine. Lost 10 lb with it. Wants to lose another 10 lbs.         Advance Care Planning    Document on file is a Health Care Directive or POLST.        7/2/2025   General Health   How would you rate  your overall physical health? Good   Feel stress (tense, anxious, or unable to sleep) Not at all         7/2/2025   Nutrition   Three or more servings of calcium each day? Yes   Diet: Regular (no restrictions)   How many servings of fruit and vegetables per day? (!) 2-3   How many sweetened beverages each day? (!) 2         7/2/2025   Exercise   Days per week of moderate/strenous exercise 3 days         7/2/2025   Social Factors   Frequency of gathering with friends or relatives Three times a week   Worry food won't last until get money to buy more No   Food not last or not have enough money for food? No   Do you have housing? (Housing is defined as stable permanent housing and does not include staying outside in a car, in a tent, in an abandoned building, in an overnight shelter, or couch-surfing.) Yes   Are you worried about losing your housing? No   Lack of transportation? No   Unable to get utilities (heat,electricity)? No         7/2/2025   Fall Risk   Fallen 2 or more times in the past year? No   Trouble with walking or balance? No          7/2/2025   Dental   Dentist two times every year? Yes         Today's PHQ-2 Score:       7/2/2025     1:21 PM   PHQ-2 ( 1999 [employer])   Q1: Little interest or pleasure in doing things 0   Q2: Feeling down, depressed or hopeless 0   PHQ-2 Score 0    Q1: Little interest or pleasure in doing things Not at all   Q2: Feeling down, depressed or hopeless Not at all   PHQ-2 Score 0       Patient-reported           7/2/2025   Substance Use   Alcohol more than 3/day or more than 7/wk Not Applicable   Do you use any other substances recreationally? No     Social History     Tobacco Use    Smoking status: Never     Passive exposure: Never    Smokeless tobacco: Never   Vaping Use    Vaping status: Never Used   Substance Use Topics    Alcohol use: No    Drug use: No           7/30/2024   LAST FHS-7 RESULTS   1st degree relative breast or ovarian cancer No   Any relative bilateral breast  cancer Yes   Any male have breast cancer No   Any ONE woman have BOTH breast AND ovarian cancer No   Any woman with breast cancer before 50yrs Yes   2 or more relatives with breast AND/OR ovarian cancer Yes   2 or more relatives with breast AND/OR bowel cancer Yes        Mammogram Screening - Mammogram every 1-2 years updated in Health Maintenance based on mutual decision making        7/2/2025   STI Screening   New sexual partner(s) since last STI/HIV test? No     History of abnormal Pap smear: No - age 30- 64 PAP with HPV every 5 years recommended        Latest Ref Rng & Units 1/12/2022     9:16 AM 2/2/2018    12:34 PM 2/2/2018    12:30 PM   PAP / HPV   PAP  Negative for Intraepithelial Lesion or Malignancy (NILM)      PAP (Historical)   NIL     HPV 16 DNA Negative Negative   Negative    HPV 18 DNA Negative Negative   Negative    Other HR HPV Negative Negative   Negative      ASCVD Risk   The 10-year ASCVD risk score (Odette MELTON, et al., 2019) is: 1.3%    Values used to calculate the score:      Age: 49 years      Sex: Female      Is Non- : No      Diabetic: No      Tobacco smoker: No      Systolic Blood Pressure: 133 mmHg      Is BP treated: Yes      HDL Cholesterol: 89 mg/dL      Total Cholesterol: 228 mg/dL           Reviewed and updated as needed this visit by Provider                    Past Medical History:   Diagnosis Date    Asthma     Constipation     GERD (gastroesophageal reflux disease)     Hypertension     Morbid obesity (H)     Psoriasis      Past Surgical History:   Procedure Laterality Date    BIOPSY      BUNIONECTOMY LAPIDUS WITH TARSAL METATARSAL (TMT) FUSION Left 12/15/2020    Procedure: Left first tarsometatarsal joint fusion Left Jacob bunionectomy Left fifth metatarsal Efraín bunionectomy;  Surgeon: Markus Hart DPM;  Location: [facility]    COLONOSCOPY      ESOPHAGOSCOPY, GASTROSCOPY, DUODENOSCOPY (EGD), COMBINED  07/30/2013    Procedure: COMBINED  ESOPHAGOSCOPY, GASTROSCOPY, DUODENOSCOPY (EGD), BIOPSY SINGLE OR MULTIPLE;;  Surgeon: Wilson Fournier MD;  Location: [facility]    ESOPHAGOSCOPY, GASTROSCOPY, DUODENOSCOPY (EGD), COMBINED  2014    Procedure: COMBINED ESOPHAGOSCOPY, GASTROSCOPY, DUODENOSCOPY (EGD);  Surgeon: Wilson Fournier MD;  Location: [facility]    EXCISE LESION FOOT Left 12/15/2020    Procedure: Left foot mass removal;  Surgeon: Markus Hart DPM;  Location: [facility]    EXCISE MASS FINGER Right 2023    Procedure: RIGHT FOURTH FINGER MASS EXCISION;  Surgeon: Jin Soto MD;  Location: [facility]    LAPAROSCOPIC GASTRIC ADJUSTABLE BANDING  2014    Procedure: LAPAROSCOPIC GASTRIC ADJUSTABLE BANDING;  Surgeon: Wilson Fournier MD;  Location: [facility]    LAPAROSCOPIC GASTRIC SLEEVE N/A 1/10/2023    Procedure: GASTRECTOMY, SLEEVE, LAPAROSCOPIC;  Surgeon: Gurinder Ritter MD;  Location: [facility]    LAPAROSCOPIC REMOVAL GASTRIC ADJUSTABLE BAND N/A 1/10/2023    Procedure: REMOVAL, GASTRIC BAND, ADJUSTABLE, and components,  LAPAROSCOPIC;  Surgeon: Gurinder Ritter MD;  Location: [facility]     OB History    Para Term  AB Living   2 0 0 0 0 0   SAB IAB Ectopic Multiple Live Births   0 0 0 0 0      # Outcome Date GA Lbr Yoel/2nd Weight Sex Type Anes PTL Lv   2  09           1  06             Lab work is in process  Labs reviewed in EPIC  BP Readings from Last 3 Encounters:   25 116/78   25 133/88   24 120/74    Wt Readings from Last 3 Encounters:   25 106.1 kg (234 lb)   25 108.9 kg (240 lb)   24 108 kg (238 lb)                  Patient Active Problem List   Diagnosis    Chronic constipation    CARDIOVASCULAR SCREENING; LDL GOAL LESS THAN 160    Family history of breast cancer    GERD (gastroesophageal reflux disease)    Class 3 severe obesity due to excess calories with serious comorbidity and body mass index (BMI) of 45.0 to 49.9 in adult (H)     Vitamin D deficiency    Psoriasis    Bariatric surgery status    H/O laparoscopic adjustable gastric banding    Diaphragmatic hernia    Hypertension goal BP (blood pressure) < 140/90    Anxiety    Impingement syndrome, shoulder, left    Spontaneous vaginal delivery    Advanced maternal age during pregnancy    37+ weeks gestation completed    Bunion, left    Tailor's bunion of both feet    Morbid obesity (H)    Iron deficiency anemia    S/P laparoscopic sleeve gastrectomy    Mass of right finger     Past Surgical History:   Procedure Laterality Date    BIOPSY      BUNIONECTOMY LAPIDUS WITH TARSAL METATARSAL (TMT) FUSION Left 12/15/2020    Procedure: Left first tarsometatarsal joint fusion Left Jacob bunionectomy Left fifth metatarsal Efraín bunionectomy;  Surgeon: Markus Hart DPM;  Location: [facility]    COLONOSCOPY      ESOPHAGOSCOPY, GASTROSCOPY, DUODENOSCOPY (EGD), COMBINED  07/30/2013    Procedure: COMBINED ESOPHAGOSCOPY, GASTROSCOPY, DUODENOSCOPY (EGD), BIOPSY SINGLE OR MULTIPLE;;  Surgeon: Wilson Fournier MD;  [facility]: [facility]    ESOPHAGOSCOPY, GASTROSCOPY, DUODENOSCOPY (EGD), COMBINED  03/24/2014    Procedure: COMBINED ESOPHAGOSCOPY, GASTROSCOPY, DUODENOSCOPY (EGD);  Surgeon: Wilson Fournier MD;  Location: [facility]    EXCISE LESION FOOT Left 12/15/2020    Procedure: Left foot mass removal;  Surgeon: Markus Hart DPM;  [facility]: [facility]    EXCISE MASS FINGER Right 9/27/2023    Procedure: RIGHT FOURTH FINGER MASS EXCISION;  Surgeon: Jin Soto MD;  [facility]    LAPAROSCOPIC GASTRIC ADJUSTABLE BANDING  05/27/2014    Procedure: LAPAROSCOPIC GASTRIC ADJUSTABLE BANDING;  Surgeon: Wilson Fournier MD;  [facility]    LAPAROSCOPIC GASTRIC SLEEVE N/A 1/10/2023    Procedure: GASTRECTOMY, SLEEVE, LAPAROSCOPIC;  Surgeon: Gurinder Ritter MD;  [facility]    LAPAROSCOPIC REMOVAL GASTRIC ADJUSTABLE BAND N/A 1/10/2023    Procedure: REMOVAL, GASTRIC BAND, ADJUSTABLE, and components,   LAPAROSCOPIC;  Surgeon: Gurinder Ritter MD;  Location:  OR       Social History     Tobacco Use    Smoking status: Never     Passive exposure: Never    Smokeless tobacco: Never   Substance Use Topics    Alcohol use: No     Family History   Problem Relation Age of Onset    Blood Disease Mother         anemia    Heart Disease Mother     Hypertension Mother     Hyperlipidemia Mother     Anxiety Disorder Mother     Osteoporosis Mother     Genetic Disorder Mother         Alpha Thalassemia trait    Cerebrovascular Disease Father     Hypertension Father     Diabetes Father         type  2    Deep Vein Thrombosis (DVT) Father     Breast Cancer Maternal Aunt     Breast Cancer Paternal Aunt     Cancer Paternal Aunt         stomach    Breast Cancer Cousin     Breast Cancer Other     Blood Disease Other         son with alpha thalessemia/mother alpha thalasemia trait    Breast Cancer Other     Breast Cancer Other     Anesthesia Reaction No family hx of          Current Outpatient Medications   Medication Sig Dispense Refill    diclofenac (VOLTAREN) 1 % topical gel Apply 2 g topically 4 times daily as needed for moderate pain. 150 g 3    acetaminophen (TYLENOL) 325 MG tablet Take 2 tablets (650 mg) by mouth every 4 hours as needed for mild pain (Patient not taking: Reported on 10/30/2024) 50 tablet 0    albuterol (PROAIR HFA/PROVENTIL HFA/VENTOLIN HFA) 108 (90 Base) MCG/ACT inhaler Inhale 2 puffs into the lungs every 6 hours as needed for shortness of breath or wheezing 8.5 g 3    Calcium Citrate-Vitamin D (CALCIUM CITRATE CHEWY BITE) 500-12.5 MG-MCG CHEW Take 1 chew tab by mouth 3 times daily 90 tablet 3    clobetasol propionate (OLUX) 0.05 % external foam Apply to base of scalp as needed for psoriasis 200 g 3    levonorgestrel-ethinyl estradiol (AVIANE) 0.1-20 MG-MCG tablet Take 1 tablet by mouth daily. 84 tablet 3    losartan-hydrochlorothiazide (HYZAAR) 50-12.5 MG tablet TAKE ONE TABLET BY MOUTH ONCE DAILY 90 tablet  "0    multivitamin w/minerals (MULTI-VITAMIN) tablet Take 1 tablet by mouth daily      omeprazole (PRILOSEC) 20 MG DR capsule Take 1 capsule (20 mg) by mouth daily. 90 capsule 3    phentermine 15 MG capsule Take 1 capsule (15 mg) by mouth every morning. 30 capsule 3    senna (SENOKOT) 8.6 MG tablet Take 2 tablets by mouth At Bedtime      tirzepatide-Weight Management (ZEPBOUND) 2.5 MG/0.5ML prefilled pen Inject 0.5 mLs (2.5 mg) subcutaneously every 7 days. For 4 weeks 2 mL 0    tirzepatide-Weight Management (ZEPBOUND) 5 MG/0.5ML prefilled pen Inject 0.5 mLs (5 mg) subcutaneously every 7 days. After completing 4 weeks of 2.5mg dose 2 mL 2    tirzepatide-Weight Management (ZEPBOUND) 7.5 MG/0.5ML prefilled pen Inject 0.5 mLs (7.5 mg) subcutaneously every 7 days. 2 mL 2     Allergies   Allergen Reactions    Lisinopril Cough     Recent Labs   Lab Test 04/09/25  0837 04/22/24  1418 02/23/24  0827 07/05/23  0914 03/30/23  0946 03/28/23  0943 12/30/22  0828 12/22/22  1131 10/04/21  0928 12/07/20  1605 03/27/20  0744   A1C 5.8*  --  5.6 6.1*  --   --    < >  --    < >  --  5.8*   *  --  113*  --  104*  --   --   --    < >  --  111*   HDL 89  --  106  --  91  --   --   --    < >  --  94   TRIG 124  --  83  --  95  --   --   --    < >  --  117   ALT 10  --  34  --   --  13  --   --    < >  --  24   CR 0.74  --  0.74  --   --  0.79   < >  --    < > 0.78 0.66   GFRESTIMATED >90  --  >90  --   --  >90   < >  --    < > >90 >90   GFRESTBLACK  --   --   --   --   --   --   --   --   --  >90 >90   POTASSIUM 3.8  --  4.4  --   --  3.9   < >  --    < > 3.8 4.2   TSH  --  1.13  --   --   --   --   --  1.12  --   --  1.67    < > = values in this interval not displayed.          Review of Systems  Constitutional, HEENT, cardiovascular, pulmonary, gi and gu systems are negative, except as otherwise noted.     Objective    Exam  Pulse 94   Temp 98.1  F (36.7  C) (Oral)   Ht 1.645 m (5' 4.76\")   Wt 106.1 kg (234 lb)   SpO2 98%   " "BMI 39.22 kg/m     Estimated body mass index is 39.22 kg/m  as calculated from the following:    Height as of this encounter: 1.645 m (5' 4.76\").    Weight as of this encounter: 106.1 kg (234 lb).    Physical Exam  GENERAL: alert and no distress  EYES: Eyes grossly normal to inspection, PERRL and conjunctivae and sclerae normal  HENT: ear canals and TM's normal, nose and mouth without ulcers or lesions  NECK: no adenopathy, no asymmetry, masses, or scars  RESP: lungs clear to auscultation - no rales, rhonchi or wheezes  CV: regular rate and rhythm, normal S1 S2, no S3 or S4, no murmur, click or rub, no peripheral edema  ABDOMEN: soft, nontender, no hepatosplenomegaly, no masses and bowel sounds normal  MS: no gross musculoskeletal defects noted, pain with internal and external rotation of right hip. Right lateral wrist at base of thumb with tenderness to palpation and edema on palmar side   SKIN: no suspicious lesions or rashes  NEURO: Normal strength and tone, mentation intact and speech normal  PSYCH: mentation appears normal, affect normal/bright        Signed Electronically by: Ree Hillman,     "

## 2025-07-02 NOTE — PATIENT INSTRUCTIONS
Preventive Care Advice   This is general advice given by our system to help you stay healthy. However, your care team may have specific advice just for you. Please talk to your care team about your preventive care needs.  Nutrition  Eat 5 or more servings of fruits and vegetables each day.  Try wheat bread, brown rice and whole grain pasta (instead of white bread, rice, and pasta).  Get enough calcium and vitamin D. Check the label on foods and aim for 100% of the RDA (recommended daily allowance).  Lifestyle  Exercise at least 150 minutes each week  (30 minutes a day, 5 days a week).  Do muscle strengthening activities 2 days a week. These help control your weight and prevent disease.  No smoking.  Wear sunscreen to prevent skin cancer.  Have a dental exam and cleaning every 6 months.  Yearly exams  See your health care team every year to talk about:  Any changes in your health.  Any medicines your care team has prescribed.  Preventive care, family planning, and ways to prevent chronic diseases.  Shots (vaccines)   HPV shots (up to age 26), if you've never had them before.  Hepatitis B shots (up to age 59), if you've never had them before.  COVID-19 shot: Get this shot when it's due.  Flu shot: Get a flu shot every year.  Tetanus shot: Get a tetanus shot every 10 years.  Pneumococcal, hepatitis A, and RSV shots: Ask your care team if you need these based on your risk.  Shingles shot (for age 50 and up)  General health tests  Diabetes screening:  Starting at age 35, Get screened for diabetes at least every 3 years.  If you are younger than age 35, ask your care team if you should be screened for diabetes.  Cholesterol test: At age 39, start having a cholesterol test every 5 years, or more often if advised.  Bone density scan (DEXA): At age 50, ask your care team if you should have this scan for osteoporosis (brittle bones).  Hepatitis C: Get tested at least once in your life.  STIs (sexually transmitted  infections)  Before age 24: Ask your care team if you should be screened for STIs.  After age 24: Get screened for STIs if you're at risk. You are at risk for STIs (including HIV) if:  You are sexually active with more than one person.  You don't use condoms every time.  You or a partner was diagnosed with a sexually transmitted infection.  If you are at risk for HIV, ask about PrEP medicine to prevent HIV.  Get tested for HIV at least once in your life, whether you are at risk for HIV or not.  Cancer screening tests  Cervical cancer screening: If you have a cervix, begin getting regular cervical cancer screening tests starting at age 21.  Breast cancer scan (mammogram): If you've ever had breasts, begin having regular mammograms starting at age 40. This is a scan to check for breast cancer.  Colon cancer screening: It is important to start screening for colon cancer at age 45.  Have a colonoscopy test every 10 years (or more often if you're at risk) Or, ask your provider about stool tests like a FIT test every year or Cologuard test every 3 years.  To learn more about your testing options, visit:   .  For help making a decision, visit:   https://bit.ly/uz62443.  Prostate cancer screening test: If you have a prostate, ask your care team if a prostate cancer screening test (PSA) at age 55 is right for you.  Lung cancer screening: If you are a current or former smoker ages 50 to 80, ask your care team if ongoing lung cancer screenings are right for you.  For informational purposes only. Not to replace the advice of your health care provider. Copyright   2023 [employer]. All rights reserved. Clinically reviewed by the [facility] Transitions Program. [employer] 284036 - REV 01/24.

## 2025-07-03 ENCOUNTER — RESULTS FOLLOW-UP (OUTPATIENT)
Dept: FAMILY MEDICINE | Facility: CLINIC | Age: 50
End: 2025-07-03

## 2025-07-03 DIAGNOSIS — M16.11 PRIMARY OSTEOARTHRITIS OF RIGHT HIP: ICD-10-CM

## 2025-07-03 DIAGNOSIS — M18.9 OSTEOARTHRITIS OF FIRST CARPOMETACARPAL (CMC) JOINT OF ONE HAND: ICD-10-CM

## 2025-07-03 DIAGNOSIS — M25.551 HIP PAIN, RIGHT: Primary | ICD-10-CM

## 2025-07-05 ENCOUNTER — PATIENT OUTREACH (OUTPATIENT)
Dept: CARE COORDINATION | Facility: CLINIC | Age: 50
End: 2025-07-05
Payer: COMMERCIAL

## 2025-07-07 ENCOUNTER — PATIENT OUTREACH (OUTPATIENT)
Dept: CARE COORDINATION | Facility: CLINIC | Age: 50
End: 2025-07-07
Payer: COMMERCIAL

## 2025-07-08 ENCOUNTER — OFFICE VISIT (OUTPATIENT)
Dept: ORTHOPEDICS | Facility: CLINIC | Age: 50
End: 2025-07-08
Attending: STUDENT IN AN ORGANIZED HEALTH CARE EDUCATION/TRAINING PROGRAM
Payer: COMMERCIAL

## 2025-07-08 VITALS
SYSTOLIC BLOOD PRESSURE: 134 MMHG | DIASTOLIC BLOOD PRESSURE: 83 MMHG | WEIGHT: 234 LBS | BODY MASS INDEX: 38.99 KG/M2 | HEIGHT: 65 IN | HEART RATE: 94 BPM | OXYGEN SATURATION: 100 %

## 2025-07-08 DIAGNOSIS — M18.9 OSTEOARTHRITIS OF FIRST CARPOMETACARPAL (CMC) JOINT OF ONE HAND: ICD-10-CM

## 2025-07-08 PROCEDURE — 99243 OFF/OP CNSLTJ NEW/EST LOW 30: CPT | Mod: 25 | Performed by: ORTHOPAEDIC SURGERY

## 2025-07-08 PROCEDURE — 3079F DIAST BP 80-89 MM HG: CPT | Performed by: ORTHOPAEDIC SURGERY

## 2025-07-08 PROCEDURE — 3075F SYST BP GE 130 - 139MM HG: CPT | Performed by: ORTHOPAEDIC SURGERY

## 2025-07-08 PROCEDURE — 1125F AMNT PAIN NOTED PAIN PRSNT: CPT | Performed by: ORTHOPAEDIC SURGERY

## 2025-07-08 PROCEDURE — 20600 DRAIN/INJ JOINT/BURSA W/O US: CPT | Mod: RT | Performed by: ORTHOPAEDIC SURGERY

## 2025-07-08 RX ORDER — LIDOCAINE HYDROCHLORIDE 10 MG/ML
0.5 INJECTION, SOLUTION INFILTRATION; PERINEURAL
Status: COMPLETED | OUTPATIENT
Start: 2025-07-08 | End: 2025-07-08

## 2025-07-08 RX ORDER — METHYLPREDNISOLONE ACETATE 80 MG/ML
80 INJECTION, SUSPENSION INTRA-ARTICULAR; INTRALESIONAL; INTRAMUSCULAR; SOFT TISSUE
Status: COMPLETED | OUTPATIENT
Start: 2025-07-08 | End: 2025-07-08

## 2025-07-08 RX ADMIN — LIDOCAINE HYDROCHLORIDE 0.5 ML: 10 INJECTION, SOLUTION INFILTRATION; PERINEURAL at 16:40

## 2025-07-08 RX ADMIN — METHYLPREDNISOLONE ACETATE 80 MG: 80 INJECTION, SUSPENSION INTRA-ARTICULAR; INTRALESIONAL; INTRAMUSCULAR; SOFT TISSUE at 16:40

## 2025-07-08 ASSESSMENT — PAIN SCALES - GENERAL: PAINLEVEL_OUTOF10: MILD PAIN (3)

## 2025-07-08 NOTE — PROGRESS NOTES
SUBJECTIVE:   Raffy Castelan is a 49 year old female who is seen in consultation at the request of Dr. Hillman for evaluation of progressive right thumb pain.    History of injury: no. On /off pains.   More constant the past few months.  Swelling noted.    Present symptoms: pain to open jars  Pain at rest   Worst later in the day  Base of the thumb, and thenar eminence  Hasn't affected  yet.  Pain to move the thumb    Treatments tried to this point: NSAID:  voltaren cream.  Can't use  oral nsaids  Has tried Glucosamine-Chondroitin       Review of Systems:  Constitutional:  NEGATIVE for fever, chills, change in weight  Integumentary/Skin:  NEGATIVE for worrisome rashes, moles or lesions  Eyes:  NEGATIVE for vision changes or irritation  ENT/Mouth:  NEGATIVE for ear, mouth and throat problems  Resp:  NEGATIVE for significant cough or SOB  Breast:  NEGATIVE for masses, tenderness or discharge  CV:  NEGATIVE for chest pain, palpitations or peripheral edema  GI:  NEGATIVE for nausea, abdominal pain, heartburn, or change in bowel habits  :  Negative   Musculoskeletal:  See HPI above  Neuro:  NEGATIVE for weakness, dizziness or paresthesias  Endocrine:  NEGATIVE for temperature intolerance, skin/hair changes  Heme/allergy/immune:  NEGATIVE for bleeding problems  Psychiatric:  NEGATIVE for changes in mood or affect    Past Medical History:   Past Medical History:   Diagnosis Date    Asthma     Constipation     GERD (gastroesophageal reflux disease)     Hypertension     Morbid obesity (H)     Psoriasis      Past Surgical History:   Past Surgical History:   Procedure Laterality Date    BIOPSY      BUNIONECTOMY LAPIDUS WITH TARSAL METATARSAL (TMT) FUSION Left 12/15/2020    Procedure: Left first tarsometatarsal joint fusion Left Jacob bunionectomy Left fifth metatarsal Efraín bunionectomy;  Surgeon: Markus Hart DPM;  Location: [facility]    COLONOSCOPY      ESOPHAGOSCOPY, GASTROSCOPY, DUODENOSCOPY (EGD), COMBINED   07/30/2013    Procedure: COMBINED ESOPHAGOSCOPY, GASTROSCOPY, DUODENOSCOPY (EGD), BIOPSY SINGLE OR MULTIPLE;;  Surgeon: Wilson Fournier MD;  Location: [facility]    ESOPHAGOSCOPY, GASTROSCOPY, DUODENOSCOPY (EGD), COMBINED  03/24/2014    Procedure: COMBINED ESOPHAGOSCOPY, GASTROSCOPY, DUODENOSCOPY (EGD);  Surgeon: Wilson Fournier MD;  Location: [facility]    EXCISE LESION FOOT Left 12/15/2020    Procedure: Left foot mass removal;  Surgeon: Markus Hart DPM;  Location: [facility]    EXCISE MASS FINGER Right 9/27/2023    Procedure: RIGHT FOURTH FINGER MASS EXCISION;  Surgeon: Jin Soto MD;  Location: [facility]    LAPAROSCOPIC GASTRIC ADJUSTABLE BANDING  05/27/2014    Procedure: LAPAROSCOPIC GASTRIC ADJUSTABLE BANDING;  Surgeon: Wilson Fournier MD;  [facility]    LAPAROSCOPIC GASTRIC SLEEVE N/A 1/10/2023    Procedure: GASTRECTOMY, SLEEVE, LAPAROSCOPIC;  Surgeon: Gurinder Ritter MD;  [facility]: [facility]    LAPAROSCOPIC REMOVAL GASTRIC ADJUSTABLE BAND N/A 1/10/2023    Procedure: REMOVAL, GASTRIC BAND, ADJUSTABLE, and components,  LAPAROSCOPIC;  Surgeon: Gurinder Ritter MD;  Location: [facility]     Family History:   Family History   Problem Relation Age of Onset    Blood Disease Mother         anemia    Heart Disease Mother     Hypertension Mother     Hyperlipidemia Mother     Anxiety Disorder Mother     Osteoporosis Mother     Genetic Disorder Mother         Alpha Thalassemia trait    Cerebrovascular Disease Father     Hypertension Father     Diabetes Father         type  2    Deep Vein Thrombosis (DVT) Father     Breast Cancer Maternal Aunt     Breast Cancer Paternal Aunt     Cancer Paternal Aunt         stomach    Breast Cancer Cousin     Breast Cancer Other     Blood Disease Other         son with alpha thalessemia/mother alpha thalasemia trait    Breast Cancer Other     Breast Cancer Other     Anesthesia Reaction No family hx of      Social History:   Social History     Tobacco Use    Smoking  "status: Never     Passive exposure: Never    Smokeless tobacco: Never   Substance Use Topics    Alcohol use: No     OBJECTIVE:  Physical Exam:  /83 (BP Location: Left arm, Patient Position: Sitting, Cuff Size: Adult Regular)   Pulse 94   Ht 1.645 m (5' 4.76\")   Wt 106.1 kg (234 lb)   LMP 05/26/2025 (Approximate)   SpO2 100%   BMI 39.23 kg/m    General Appearance: healthy, alert and no distress   Skin: no suspicious lesions or rashes  Neuro: Normal strength and tone, mentation intact and speech normal  Vascular: good pulses, and cappillary refill   Lymph: no lymphadenopathy   Psych:  mentation appears normal and affect normal/bright  Resp: no increased work of breathing     Right Hand Exam:  Inspection: some ? Swelling of the thenar eminence area  ROM: pain with rotation of thumb.    Tender: base of the 1st metacarpal, 1st cmc joint     EXAM: XR WRIST RIGHT G/E 3 VIEWS  LOCATION: [facility]  DATE: 7/2/2025  No fractures. Advanced degenerative changes at the first CMC joint. With subluxation of the 1st metacarpal on the trapezium.  I think maybe the joint space narrowing is not as severe as the one view suggests.         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   Bracing and nsaids/Tylenol are usually the first line of treatment.  Corticosteroid injections in the joint, oral steroids, hand therapy can be helpful.  Surgery, (LRTI, or other CMC arthroplasty procedure) is the last option.      She would like to proceed with an injection., bracing. Topical nsaids.  Procedure Note:   Informed consent obtained. Risks, benefits and complications of the injection were discussed with the patient and the patient elected to proceed. A Right 1st CMC joint steroid injection was performed using 1cc Kenalog-40 40mg per mL and 0.5 cc of local anesthetic after sterile prep. This was tolerated well by the patient.      The " procedure of LRTI was discussed with the patient in detail, including alternatives to the proposed procedure. The risks gone over thoroughly. .    Risks were discussed, including, but not limited to weakness of  is the most common complaint after CMC arthroplasty.      Return to clinic: as needed     KAMALJIT Soto MD  Dept. Orthopedic Surgery  [facility]

## 2025-07-08 NOTE — PROGRESS NOTES
Small Joint Injection/Arthrocentesis: R thumb CMC    Date/Time: 7/8/2025 4:40 PM    Performed by: Jin Soto MD  Authorized by: Jin Soto MD    Needle Size:  25 G  Guidance: landmark     Approach:  Radial  Location:  Thumb    Site:  R thumb CMC                    Medications:  80 mg methylPREDNISolone 80 MG/ML; 0.5 mL lidocaine 1 %        Outcome:  Tolerated well, no immediate complications  Procedure discussed: discussed risks, benefits, and alternatives    Consent Given by:  Patient  Timeout: timeout called immediately prior to procedure    Prep: patient was prepped and draped in usual sterile fashion

## 2025-07-08 NOTE — LETTER
7/8/2025      Raffy Castelan  [address]  [facility] [address]      Dear Colleague,    Thank you for referring your patient, Raffy Castelan, to the [facility]. Please see a copy of my visit note below.    SUBJECTIVE:   Raffy Castelan is a 49 year old female who is seen in consultation at the request of Dr. Hillman for evaluation of progressive right thumb pain.    History of injury: no. On /off pains.   More constant the past few months.  Swelling noted.    Present symptoms: pain to open jars  Pain at rest   Worst later in the day  Base of the thumb, and thenar eminence  Hasn't affected  yet.  Pain to move the thumb    Treatments tried to this point: NSAID:  voltaren cream.  Can't use  oral nsaids  Has tried Glucosamine-Chondroitin       Review of Systems:  Constitutional:  NEGATIVE for fever, chills, change in weight  Integumentary/Skin:  NEGATIVE for worrisome rashes, moles or lesions  Eyes:  NEGATIVE for vision changes or irritation  ENT/Mouth:  NEGATIVE for ear, mouth and throat problems  Resp:  NEGATIVE for significant cough or SOB  Breast:  NEGATIVE for masses, tenderness or discharge  CV:  NEGATIVE for chest pain, palpitations or peripheral edema  GI:  NEGATIVE for nausea, abdominal pain, heartburn, or change in bowel habits  :  Negative   Musculoskeletal:  See HPI above  Neuro:  NEGATIVE for weakness, dizziness or paresthesias  Endocrine:  NEGATIVE for temperature intolerance, skin/hair changes  Heme/allergy/immune:  NEGATIVE for bleeding problems  Psychiatric:  NEGATIVE for changes in mood or affect    Past Medical History:   Past Medical History:   Diagnosis Date     Asthma      Constipation      GERD (gastroesophageal reflux disease)      Hypertension      Morbid obesity (H)      Psoriasis      Past Surgical History:   Past Surgical History:   Procedure Laterality Date     BIOPSY       BUNIONECTOMY LAPIDUS WITH TARSAL METATARSAL (TMT) FUSION Left 12/15/2020     Procedure: Left first tarsometatarsal joint fusion Left Jacob bunionectomy Left fifth metatarsal Efraín bunionectomy;  Surgeon: Markus Hart DPM;  Location: [facility]     COLONOSCOPY       ESOPHAGOSCOPY, GASTROSCOPY, DUODENOSCOPY (EGD), COMBINED  07/30/2013    Procedure: COMBINED ESOPHAGOSCOPY, GASTROSCOPY, DUODENOSCOPY (EGD), BIOPSY SINGLE OR MULTIPLE;;  Surgeon: Wilson Fournier MD;  [facility]     ESOPHAGOSCOPY, GASTROSCOPY, DUODENOSCOPY (EGD), COMBINED  03/24/2014    Procedure: COMBINED ESOPHAGOSCOPY, GASTROSCOPY, DUODENOSCOPY (EGD);  Surgeon: Wilson Fournier MD;  [facility]     EXCISE LESION FOOT Left 12/15/2020    Procedure: Left foot mass removal;  Surgeon: Markus Hart DPM;  Location: [facility]     EXCISE MASS FINGER Right 9/27/2023    Procedure: RIGHT FOURTH FINGER MASS EXCISION;  Surgeon: Jin Soto MD;  [facility]: [facility]     LAPAROSCOPIC GASTRIC ADJUSTABLE BANDING  05/27/2014    Procedure: LAPAROSCOPIC GASTRIC ADJUSTABLE BANDING;  Surgeon: Wilson Fournier MD;  [facility]     LAPAROSCOPIC GASTRIC SLEEVE N/A 1/10/2023    Procedure: GASTRECTOMY, SLEEVE, LAPAROSCOPIC;  Surgeon: Gurinder Ritter MD;  [facility]     LAPAROSCOPIC REMOVAL GASTRIC ADJUSTABLE BAND N/A 1/10/2023    Procedure: REMOVAL, GASTRIC BAND, ADJUSTABLE, and components,  LAPAROSCOPIC;  Surgeon: Gurinder Ritter MD;  [facility]     Family History:   Family History   Problem Relation Age of Onset     Blood Disease Mother         anemia     Heart Disease Mother      Hypertension Mother      Hyperlipidemia Mother      Anxiety Disorder Mother      Osteoporosis Mother      Genetic Disorder Mother         Alpha Thalassemia trait     Cerebrovascular Disease Father      Hypertension Father      Diabetes Father         type  2     Deep Vein Thrombosis (DVT) Father      Breast Cancer Maternal Aunt      Breast Cancer Paternal Aunt      Cancer Paternal Aunt         stomach     Breast Cancer Cousin   "    Breast Cancer Other      Blood Disease Other         son with alpha thalessemia/mother alpha thalasemia trait     Breast Cancer Other      Breast Cancer Other      Anesthesia Reaction No family hx of      Social History:   Social History     Tobacco Use     Smoking status: Never     Passive exposure: Never     Smokeless tobacco: Never   Substance Use Topics     Alcohol use: No     OBJECTIVE:  Physical Exam:  /83 (BP Location: Left arm, Patient Position: Sitting, Cuff Size: Adult Regular)   Pulse 94   Ht 1.645 m (5' 4.76\")   Wt 106.1 kg (234 lb)   LMP 05/26/2025 (Approximate)   SpO2 100%   BMI 39.23 kg/m    General Appearance: healthy, alert and no distress   Skin: no suspicious lesions or rashes  Neuro: Normal strength and tone, mentation intact and speech normal  Vascular: good pulses, and cappillary refill   Lymph: no lymphadenopathy   Psych:  mentation appears normal and affect normal/bright  Resp: no increased work of breathing     Right Hand Exam:  Inspection: some ? Swelling of the thenar eminence area  ROM: pain with rotation of thumb.    Tender: base of the 1st metacarpal, 1st cmc joint     EXAM: XR WRIST RIGHT G/E 3 VIEWS  LOCATION: [facility]  DATE: 7/2/2025  No fractures. Advanced degenerative changes at the first CMC joint. With subluxation of the 1st metacarpal on the trapezium.  I think maybe the joint space narrowing is not as severe as the one view suggests.         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   Bracing and nsaids/Tylenol are usually the first line of treatment.  Corticosteroid injections in the joint, oral steroids, hand therapy can be helpful.  Surgery, (LRTI, or other CMC arthroplasty procedure) is the last option.      She would like to proceed with an injection., bracing. Topical nsaids.  Procedure Note:   Informed consent obtained. Risks, benefits and complications of " the injection were discussed with the patient and the patient elected to proceed. A Right 1st CMC joint steroid injection was performed using 1cc Kenalog-40 40mg per mL and 0.5 cc of local anesthetic after sterile prep. This was tolerated well by the patient.      The procedure of LRTI was discussed with the patient in detail, including alternatives to the proposed procedure. The risks gone over thoroughly. .    Risks were discussed, including, but not limited to weakness of  is the most common complaint after CMC arthroplasty.      Return to clinic: as needed     KAMALJIT Soto MD  Dept. Orthopedic Surgery  [facility]         Small Joint Injection/Arthrocentesis: R thumb CMC    Date/Time: 7/8/2025 4:40 PM    Performed by: Jin Soto MD  Authorized by: Jin Soto MD    Needle Size:  25 G  Guidance: landmark     Approach:  Radial  Location:  Thumb    Site:  R thumb CMC                    Medications:  80 mg methylPREDNISolone 80 MG/ML; 0.5 mL lidocaine 1 %        Outcome:  Tolerated well, no immediate complications  Procedure discussed: discussed risks, benefits, and alternatives    Consent Given by:  Patient  Timeout: timeout called immediately prior to procedure    Prep: patient was prepped and draped in usual sterile fashion            Again, thank you for allowing me to participate in the care of your patient.        Sincerely,        Jin Soto MD    Electronically signed

## 2025-07-10 ENCOUNTER — PATIENT OUTREACH (OUTPATIENT)
Dept: CARE COORDINATION | Facility: CLINIC | Age: 50
End: 2025-07-10
Payer: COMMERCIAL

## 2025-07-26 DIAGNOSIS — I10 HYPERTENSION GOAL BP (BLOOD PRESSURE) < 140/90: ICD-10-CM

## 2025-07-28 RX ORDER — LOSARTAN POTASSIUM AND HYDROCHLOROTHIAZIDE 12.5; 5 MG/1; MG/1
1 TABLET ORAL DAILY
Qty: 90 TABLET | Refills: 1 | Status: SHIPPED | OUTPATIENT
Start: 2025-07-28

## 2025-08-01 ENCOUNTER — ANCILLARY PROCEDURE (OUTPATIENT)
Dept: MAMMOGRAPHY | Facility: CLINIC | Age: 50
End: 2025-08-01
Attending: STUDENT IN AN ORGANIZED HEALTH CARE EDUCATION/TRAINING PROGRAM
Payer: COMMERCIAL

## 2025-08-01 DIAGNOSIS — Z12.31 VISIT FOR SCREENING MAMMOGRAM: ICD-10-CM

## 2025-08-01 PROCEDURE — 77067 SCR MAMMO BI INCL CAD: CPT | Mod: TC | Performed by: RADIOLOGY

## 2025-08-01 PROCEDURE — 77063 BREAST TOMOSYNTHESIS BI: CPT | Mod: TC | Performed by: RADIOLOGY

## 2025-08-05 ENCOUNTER — THERAPY VISIT (OUTPATIENT)
Dept: PHYSICAL THERAPY | Facility: CLINIC | Age: 50
End: 2025-08-05
Attending: STUDENT IN AN ORGANIZED HEALTH CARE EDUCATION/TRAINING PROGRAM
Payer: MEDICAID

## 2025-08-05 DIAGNOSIS — M16.11 PRIMARY OSTEOARTHRITIS OF RIGHT HIP: ICD-10-CM

## 2025-08-05 DIAGNOSIS — M25.551 HIP PAIN, RIGHT: ICD-10-CM

## 2025-08-05 PROCEDURE — 97161 PT EVAL LOW COMPLEX 20 MIN: CPT | Mod: GP | Performed by: PHYSICAL THERAPIST

## 2025-08-05 PROCEDURE — 97110 THERAPEUTIC EXERCISES: CPT | Mod: GP | Performed by: PHYSICAL THERAPIST

## 2025-08-05 ASSESSMENT — ACTIVITIES OF DAILY LIVING (ADL)
SITTING FOR 15 MINUTES: NO DIFFICULTY AT ALL
GOING_DOWN_1_FLIGHT_OF_STAIRS: NO DIFFICULTY AT ALL
ROLLING_OVER_IN_BED: MODERATE DIFFICULTY
GETTING_INTO_AND_OUT_OF_A_BATHTUB: MODERATE DIFFICULTY
SPORTS_COUNT: 9
SPORTS_SCORE(%): 0
SITTING_FOR_15_MINUTES: NO DIFFICULTY AT ALL
WALKING_15_MINUTES_OR_GREATER: NO DIFFICULTY AT ALL
HOW_WOULD_YOU_RATE_YOUR_CURRENT_LEVEL_OF_FUNCTION_DURING_YOUR_SPORTS_RELATED_ACTIVITIES_FROM_0_TO_100_WITH_100_BEING_YOUR_LEVEL_OF_FUNCTION_PRIOR_TO_YOUR_HIP_PROBLEM_AND_0_BEING_THE_INABILITY_TO_PERFORM_ANY_OF_YOUR_USUAL_DAILY_ACTIVITIES?: 50
HEAVY_WORK: MODERATE DIFFICULTY
HOS_ADL_SCORE(%): 72.06
ADL_HIGHEST_POTENTIAL_SCORE: 68
HOW_WOULD_YOU_RATE_YOUR_CURRENT_LEVEL_OF_FUNCTION_DURING_YOUR_USUAL_ACTIVITIES_OF_DAILY_LIVING_FROM_0_TO_100_WITH_100_BEING_YOUR_LEVEL_OF_FUNCTION_PRIOR_TO_YOUR_HIP_PROBLEM_AND_0_BEING_THE_INABILITY_TO_PERFORM_ANY_OF_YOUR_USUAL_DAILY_ACTIVITIES?: 75
ADL_TOTAL_ITEM_SCORE: 0
SPORTS_TOTAL_ITEM_SCORE: 0
WALKING_DOWN_STEEP_HILLS: SLIGHT DIFFICULTY
ADL_COUNT: 17
GOING DOWN 1 FLIGHT OF STAIRS: NO DIFFICULTY AT ALL
LIGHT_TO_MODERATE_WORK: SLIGHT DIFFICULTY
HOS_ADL_HIGHEST_POTENTIAL_SCORE: 68
DEEP SQUATTING: EXTREME DIFFICULTY
ROLLING OVER IN BED: MODERATE DIFFICULTY
WALKING_APPROXIMATELY_10_MINUTES: NO DIFFICULTY AT ALL
STANDING FOR 15 MINUTES: NO DIFFICULTY AT ALL
WALKING_INITIALLY: SLIGHT DIFFICULTY
PUTTING_ON_SOCKS_AND_SHOES: SLIGHT DIFFICULTY
SPORTS_HIGHEST_POTENTIAL_SCORE: 36
STEPPING_UP_AND_DOWN_CURBS: SLIGHT DIFFICULTY
ADL_SCORE(%): 0
HOW_WOULD_YOU_RATE_YOUR_CURRENT_LEVEL_OF_FUNCTION?: NEARLY NORMAL
WALKING_INITIALLY: SLIGHT DIFFICULTY
TWISTING/PIVOTING_ON_INVOLVED_LEG: MODERATE DIFFICULTY
STANDING_FOR_15_MINUTES: NO DIFFICULTY AT ALL
WALKING_15_MINUTES_OR_GREATER: NO DIFFICULTY AT ALL
GOING UP 1 FLIGHT OF STAIRS: SLIGHT DIFFICULTY
GETTING INTO AND OUT OF AN AVERAGE CAR: NO DIFFICULTY AT ALL
RECREATIONAL ACTIVITIES: SLIGHT DIFFICULTY
PLEASE_INDICATE_YOR_PRIMARY_REASON_FOR_REFERRAL_TO_THERAPY:: HIP
WALKING_FOR_APPROXIMATELY_10_MINUTES: NO DIFFICULTY AT ALL
PUTTING ON SOCKS AND SHOES: SLIGHT DIFFICULTY
GOING_UP_1_FLIGHT_OF_STAIRS: SLIGHT DIFFICULTY
HEAVY_WORK: MODERATE DIFFICULTY
HOS_ADL_ITEM_SCORE_TOTAL: 49
WALKING_DOWN_STEEP_HILLS: SLIGHT DIFFICULTY
LOW_IMPACT_ACTIVITIES_LIKE_FAST_WALKING: MODERATE DIFFICULTY
ABILITY_TO_PERFORM_ACTIVITY_WITH_YOUR_NORMAL_TECHNIQUE: SLIGHT DIFFICULTY
WALKING_UP_STEEP_HILLS: MODERATE DIFFICULTY
GETTING_INTO_AND_OUT_OF_AN_AVERAGE_CAR: NO DIFFICULTY AT ALL
TWISTING/PIVOTING ON INVOLVED LEG: MODERATE DIFFICULTY
STEPPING UP AND DOWN CURBS: SLIGHT DIFFICULTY
HOW_WOULD_YOU_RATE_YOUR_CURRENT_LEVEL_OF_FUNCTION_DURING_YOUR_USUAL_ACTIVITIES_OF_DAILY_LIVING_FROM_0_TO_100_WITH_100_BEING_YOUR_LEVEL_OF_FUNCTION_PRIOR_TO_YOUR_HIP_PROBLEM_AND_0_BEING_THE_INABILITY_TO_PERFORM_ANY_OF_YOUR_USUAL_DAILY_ACTIVITIES?: 75
WALKING_UP_STEEP_HILLS: MODERATE DIFFICULTY
LIGHT_TO_MODERATE_WORK: SLIGHT DIFFICULTY
DEEP_SQUATTING: EXTREME DIFFICULTY
RECREATIONAL_ACTIVITIES: SLIGHT DIFFICULTY
GETTING_INTO_AND_OUT_OF_A_BATHTUB: MODERATE DIFFICULTY

## 2025-08-13 ENCOUNTER — TELEPHONE (OUTPATIENT)
Dept: ENDOCRINOLOGY | Facility: CLINIC | Age: 50
End: 2025-08-13
Payer: MEDICAID

## 2025-08-22 ENCOUNTER — TELEPHONE (OUTPATIENT)
Dept: ENDOCRINOLOGY | Facility: CLINIC | Age: 50
End: 2025-08-22
Payer: MEDICAID

## 2025-08-22 DIAGNOSIS — Z98.84 S/P LAPAROSCOPIC SLEEVE GASTRECTOMY: ICD-10-CM

## 2025-08-22 DIAGNOSIS — E66.9 OBESITY: Primary | ICD-10-CM

## 2025-08-26 ENCOUNTER — TELEPHONE (OUTPATIENT)
Dept: FAMILY MEDICINE | Facility: CLINIC | Age: 50
End: 2025-08-26
Payer: MEDICAID

## 2025-09-04 ENCOUNTER — TELEPHONE (OUTPATIENT)
Dept: ENDOCRINOLOGY | Facility: CLINIC | Age: 50
End: 2025-09-04
Payer: MEDICAID

## (undated) DEVICE — ENDO TROCAR FIRST ENTRY KII FIOS Z-THRD 12X100MM CTF73

## (undated) DEVICE — SOL ISOPROPYL ALCOHOL USP 70% 16OZ  NDC10565-002-16 D0022

## (undated) DEVICE — NDL INSUFFLATION 14GA STEP S100000

## (undated) DEVICE — ESU PENCIL W/COATED BLADE E2450H

## (undated) DEVICE — DRSG GAUZE 2X2" 8042

## (undated) DEVICE — NDL 19GA 1.5"

## (undated) DEVICE — PREP CHLORAPREP 26ML TINTED ORANGE  260815

## (undated) DEVICE — SYR 10ML LL W/O NDL 302995

## (undated) DEVICE — LIGHT HANDLE X1 31140133

## (undated) DEVICE — SYR EAR BULB 2OZ

## (undated) DEVICE — PIN GUARD 10-1-001 101001PBX

## (undated) DEVICE — PREP CHLORAPREP 26ML TINTED HI-LITE ORANGE 930815

## (undated) DEVICE — GLOVE PROTEXIS BLUE W/NEU-THERA 7.5  2D73EB75

## (undated) DEVICE — Device

## (undated) DEVICE — SPONGE RAY-TEC 4X8" 7318

## (undated) DEVICE — DRAPE C-ARM MINI 5423

## (undated) DEVICE — ESU PENCIL W/SMOKE EVAC CVPLP2000

## (undated) DEVICE — CLIP APPLIER ENDO 5MM M/L LIGAMAX EL5ML

## (undated) DEVICE — SOL WATER IRRIG 1000ML BOTTLE 07139-09

## (undated) DEVICE — NDL SPINAL 22GA 3.5" QUINCKE 405181

## (undated) DEVICE — SOL NACL 0.9% IRRIG 1000ML BOTTLE 07138-09

## (undated) DEVICE — ENDO TROCAR SLEEVE KII ADV FIXATION 05X100MM CFS02

## (undated) DEVICE — ENDO SHEARS 5MM 176643

## (undated) DEVICE — ADH SKIN CLOSURE PREMIERPRO EXOFIN 1.0ML 3470

## (undated) DEVICE — BNDG KLING 2" 2231

## (undated) DEVICE — BNDG KLING 3" 2232

## (undated) DEVICE — STPL RELOAD REG TISSUE ECHELON 60 X 3.6MM BLUE GST60B

## (undated) DEVICE — STPL POWERED ECHELON LONG 60MM PLEE60A

## (undated) DEVICE — BLADE SAW OSCILLATING STRYK MED 9.0X25X0.38MM 2296-003-111

## (undated) DEVICE — DRSG GAUZE 4X4" TRAY 6939

## (undated) DEVICE — PACK BASIC SET-UP 9101

## (undated) DEVICE — ESU GROUND PAD ADULT W/CORD E7507

## (undated) DEVICE — BUR STRK ROUND 3.2X54MM FAST CUT 8 FLUTE 1608-006-149

## (undated) DEVICE — LINEN TOWEL PACK X30 5481

## (undated) DEVICE — GLOVE PROTEXIS POWDER FREE SMT 7.5  2D72PT75X

## (undated) DEVICE — TUBING SMOKE EVAC PNEUMOCLEAR HIGH FLOW 0620050250

## (undated) DEVICE — GLOVE PROTEXIS W/NEU-THERA 8.5  2D73TE85

## (undated) DEVICE — ENDO POUCH UNIVERSAL RETRIEVAL SYSTEM INZII 12/15MM CD004

## (undated) DEVICE — DRSG KERLIX 4 1/2"X4YDS ROLL 6715

## (undated) DEVICE — SU VICRYL 3-0 PS-2 18" UND J497H

## (undated) DEVICE — SU VICRYL 4-0 PS-2 18" UND J496H

## (undated) DEVICE — ENDO TROCAR FIRST ENTRY KII FIOS ADV FIX 05X100MM CFF03

## (undated) DEVICE — STPL SKIN SUBCUTICULAR INSORB  2030

## (undated) DEVICE — DRAPE STERI TOWEL SM 1000

## (undated) DEVICE — SU ETHILON 5-0 PS-2 18" 1666H

## (undated) DEVICE — SYR 10ML LL W/O NDL

## (undated) DEVICE — SOL WATER IRRIG 1000ML BOTTLE 2F7114

## (undated) DEVICE — DRSG PRIMAPORE 02X3" 7133

## (undated) DEVICE — SU ETHILON 4-0 FS-2 18" 662H

## (undated) DEVICE — ANTIFOG SOLUTION W/FOAM PAD CF-1002

## (undated) DEVICE — STPL SKIN 35W ROTATING HEAD PRW35

## (undated) DEVICE — ENDO TROCAR SLEEVE KII Z-THREADED 05X100MM CTS02

## (undated) DEVICE — BNDG ESMARK 4" STERILE

## (undated) DEVICE — PACK EXTREMITY SOP15EXFSD

## (undated) DEVICE — LINEN TOWEL PACK X6 WHITE 5487

## (undated) DEVICE — SU VICRYL 3-0 RB-1 27" UND J215H

## (undated) DEVICE — NDL 25GA 1.5" 305127

## (undated) DEVICE — GOWN IMPERVIOUS BREATHABLE 2XL/XLONG

## (undated) DEVICE — POSITIONER ARMBOARD FOAM 1PAIR LF FP-ARMB1

## (undated) DEVICE — SUCTION CANISTER MEDIVAC LINER 1500ML W/LID 65651-515

## (undated) DEVICE — PACK HAND WRIST FOREARM CUSTOM

## (undated) DEVICE — ESU PENCIL SMOKE EVAC W/ROCKER SWITCH 0703-047-000

## (undated) DEVICE — SYR 30ML LL W/O NDL 302832

## (undated) DEVICE — ESU LIGASURE MARYLAND VESSEL LAP 44CM XLONG LF1944

## (undated) DEVICE — COVER CAMERA IN-LIGHT LENS LT-C02-P

## (undated) DEVICE — BASIN SET MINOR DISP

## (undated) RX ORDER — HYDROMORPHONE HYDROCHLORIDE 1 MG/ML
INJECTION, SOLUTION INTRAMUSCULAR; INTRAVENOUS; SUBCUTANEOUS
Status: DISPENSED
Start: 2023-01-10

## (undated) RX ORDER — ENOXAPARIN SODIUM 100 MG/ML
INJECTION SUBCUTANEOUS
Status: DISPENSED
Start: 2023-01-10

## (undated) RX ORDER — EPHEDRINE SULFATE 50 MG/ML
INJECTION, SOLUTION INTRAMUSCULAR; INTRAVENOUS; SUBCUTANEOUS
Status: DISPENSED
Start: 2023-01-10

## (undated) RX ORDER — FENTANYL CITRATE 50 UG/ML
INJECTION, SOLUTION INTRAMUSCULAR; INTRAVENOUS
Status: DISPENSED
Start: 2023-01-10

## (undated) RX ORDER — DEXAMETHASONE SODIUM PHOSPHATE 4 MG/ML
INJECTION, SOLUTION INTRA-ARTICULAR; INTRALESIONAL; INTRAMUSCULAR; INTRAVENOUS; SOFT TISSUE
Status: DISPENSED
Start: 2023-01-10

## (undated) RX ORDER — ONDANSETRON 2 MG/ML
INJECTION INTRAMUSCULAR; INTRAVENOUS
Status: DISPENSED
Start: 2023-01-10

## (undated) RX ORDER — PROPOFOL 10 MG/ML
INJECTION, EMULSION INTRAVENOUS
Status: DISPENSED
Start: 2023-01-10

## (undated) RX ORDER — CEFAZOLIN SODIUM 1 G/3ML
INJECTION, POWDER, FOR SOLUTION INTRAMUSCULAR; INTRAVENOUS
Status: DISPENSED
Start: 2020-12-15

## (undated) RX ORDER — FENTANYL CITRATE 50 UG/ML
INJECTION, SOLUTION INTRAMUSCULAR; INTRAVENOUS
Status: DISPENSED
Start: 2020-12-15

## (undated) RX ORDER — FAMOTIDINE 20 MG/1
TABLET, FILM COATED ORAL
Status: DISPENSED
Start: 2023-01-10

## (undated) RX ORDER — KETOROLAC TROMETHAMINE 30 MG/ML
INJECTION, SOLUTION INTRAMUSCULAR; INTRAVENOUS
Status: DISPENSED
Start: 2023-01-10

## (undated) RX ORDER — LABETALOL HYDROCHLORIDE 5 MG/ML
INJECTION, SOLUTION INTRAVENOUS
Status: DISPENSED
Start: 2023-01-10

## (undated) RX ORDER — FENTANYL CITRATE-0.9 % NACL/PF 10 MCG/ML
PLASTIC BAG, INJECTION (ML) INTRAVENOUS
Status: DISPENSED
Start: 2020-12-15

## (undated) RX ORDER — LIDOCAINE HYDROCHLORIDE 10 MG/ML
INJECTION, SOLUTION EPIDURAL; INFILTRATION; INTRACAUDAL; PERINEURAL
Status: DISPENSED
Start: 2020-12-15

## (undated) RX ORDER — ACETAMINOPHEN 325 MG/1
TABLET ORAL
Status: DISPENSED
Start: 2023-01-10

## (undated) RX ORDER — EPHEDRINE SULFATE 50 MG/ML
INJECTION, SOLUTION INTRAMUSCULAR; INTRAVENOUS; SUBCUTANEOUS
Status: DISPENSED
Start: 2020-12-15

## (undated) RX ORDER — ESMOLOL HYDROCHLORIDE 10 MG/ML
INJECTION INTRAVENOUS
Status: DISPENSED
Start: 2023-01-10

## (undated) RX ORDER — SODIUM CHLORIDE, SODIUM LACTATE, POTASSIUM CHLORIDE, CALCIUM CHLORIDE 600; 310; 30; 20 MG/100ML; MG/100ML; MG/100ML; MG/100ML
INJECTION, SOLUTION INTRAVENOUS
Status: DISPENSED
Start: 2023-01-10

## (undated) RX ORDER — DEXAMETHASONE SODIUM PHOSPHATE 4 MG/ML
INJECTION, SOLUTION INTRA-ARTICULAR; INTRALESIONAL; INTRAMUSCULAR; INTRAVENOUS; SOFT TISSUE
Status: DISPENSED
Start: 2020-12-15

## (undated) RX ORDER — ACETAMINOPHEN 325 MG/1
TABLET ORAL
Status: DISPENSED
Start: 2020-12-15

## (undated) RX ORDER — FENTANYL CITRATE-0.9 % NACL/PF 10 MCG/ML
PLASTIC BAG, INJECTION (ML) INTRAVENOUS
Status: DISPENSED
Start: 2023-01-10

## (undated) RX ORDER — GLYCOPYRROLATE 0.2 MG/ML
INJECTION, SOLUTION INTRAMUSCULAR; INTRAVENOUS
Status: DISPENSED
Start: 2023-01-10

## (undated) RX ORDER — BUPIVACAINE HYDROCHLORIDE 2.5 MG/ML
INJECTION, SOLUTION EPIDURAL; INFILTRATION; INTRACAUDAL
Status: DISPENSED
Start: 2023-01-10

## (undated) RX ORDER — PROPOFOL 10 MG/ML
INJECTION, EMULSION INTRAVENOUS
Status: DISPENSED
Start: 2020-12-15

## (undated) RX ORDER — CEFAZOLIN SODIUM/WATER 3 G/30 ML
SYRINGE (ML) INTRAVENOUS
Status: DISPENSED
Start: 2023-01-10